# Patient Record
Sex: FEMALE | Race: WHITE | NOT HISPANIC OR LATINO | Employment: OTHER | URBAN - METROPOLITAN AREA
[De-identification: names, ages, dates, MRNs, and addresses within clinical notes are randomized per-mention and may not be internally consistent; named-entity substitution may affect disease eponyms.]

---

## 2017-03-06 ENCOUNTER — TELEPHONE (OUTPATIENT)
Dept: MEDICAL GROUP | Facility: MEDICAL CENTER | Age: 82
End: 2017-03-06

## 2017-03-06 ENCOUNTER — HOSPITAL ENCOUNTER (OUTPATIENT)
Dept: LAB | Facility: MEDICAL CENTER | Age: 82
End: 2017-03-06
Attending: INTERNAL MEDICINE
Payer: MEDICARE

## 2017-03-06 ENCOUNTER — OFFICE VISIT (OUTPATIENT)
Dept: MEDICAL GROUP | Facility: MEDICAL CENTER | Age: 82
End: 2017-03-06
Payer: MEDICARE

## 2017-03-06 VITALS
HEIGHT: 60 IN | DIASTOLIC BLOOD PRESSURE: 60 MMHG | HEART RATE: 97 BPM | WEIGHT: 136 LBS | BODY MASS INDEX: 26.7 KG/M2 | SYSTOLIC BLOOD PRESSURE: 118 MMHG | TEMPERATURE: 98.6 F | RESPIRATION RATE: 16 BRPM | OXYGEN SATURATION: 97 %

## 2017-03-06 DIAGNOSIS — E83.52 HYPERCALCEMIA: ICD-10-CM

## 2017-03-06 DIAGNOSIS — E55.9 VITAMIN D DEFICIENCY: ICD-10-CM

## 2017-03-06 DIAGNOSIS — M25.561 ACUTE PAIN OF RIGHT KNEE: ICD-10-CM

## 2017-03-06 LAB
ALBUMIN SERPL BCP-MCNC: 3.8 G/DL (ref 3.2–4.9)
ALBUMIN/GLOB SERPL: 1.2 G/DL
ALP SERPL-CCNC: 58 U/L (ref 30–99)
ALT SERPL-CCNC: 10 U/L (ref 2–50)
ANION GAP SERPL CALC-SCNC: 6 MMOL/L (ref 0–11.9)
AST SERPL-CCNC: 20 U/L (ref 12–45)
BILIRUB SERPL-MCNC: 0.4 MG/DL (ref 0.1–1.5)
BUN SERPL-MCNC: 20 MG/DL (ref 8–22)
CALCIUM SERPL-MCNC: 9.5 MG/DL (ref 8.5–10.5)
CHLORIDE SERPL-SCNC: 102 MMOL/L (ref 96–112)
CO2 SERPL-SCNC: 28 MMOL/L (ref 20–33)
CREAT SERPL-MCNC: 0.75 MG/DL (ref 0.5–1.4)
GLOBULIN SER CALC-MCNC: 3.3 G/DL (ref 1.9–3.5)
GLUCOSE SERPL-MCNC: 100 MG/DL (ref 65–99)
POTASSIUM SERPL-SCNC: 4.1 MMOL/L (ref 3.6–5.5)
PROT SERPL-MCNC: 7.1 G/DL (ref 6–8.2)
SODIUM SERPL-SCNC: 136 MMOL/L (ref 135–145)

## 2017-03-06 PROCEDURE — 4040F PNEUMOC VAC/ADMIN/RCVD: CPT | Performed by: INTERNAL MEDICINE

## 2017-03-06 PROCEDURE — 36415 COLL VENOUS BLD VENIPUNCTURE: CPT

## 2017-03-06 PROCEDURE — 80053 COMPREHEN METABOLIC PANEL: CPT

## 2017-03-06 PROCEDURE — G8432 DEP SCR NOT DOC, RNG: HCPCS | Performed by: INTERNAL MEDICINE

## 2017-03-06 PROCEDURE — G8482 FLU IMMUNIZE ORDER/ADMIN: HCPCS | Performed by: INTERNAL MEDICINE

## 2017-03-06 PROCEDURE — 99213 OFFICE O/P EST LOW 20 MIN: CPT | Performed by: INTERNAL MEDICINE

## 2017-03-06 PROCEDURE — G8420 CALC BMI NORM PARAMETERS: HCPCS | Performed by: INTERNAL MEDICINE

## 2017-03-06 PROCEDURE — 1036F TOBACCO NON-USER: CPT | Performed by: INTERNAL MEDICINE

## 2017-03-06 PROCEDURE — 1101F PT FALLS ASSESS-DOCD LE1/YR: CPT | Performed by: INTERNAL MEDICINE

## 2017-03-06 ASSESSMENT — PAIN SCALES - GENERAL: PAINLEVEL: 5=MODERATE PAIN

## 2017-03-06 NOTE — TELEPHONE ENCOUNTER
1. Caller Name: Ramya Bhakta                                             Call Back Number: [phone] (home)         Patient approves a detailed voicemail message: N\A    Patient wanted recent lab faxed to [phone],  [facility] on [address]. This has been addressed as requested by the patient.

## 2017-03-07 NOTE — ASSESSMENT & PLAN NOTE
In reviewing her labs for this visit, we noticed that her vitamin D level was a little low. She takes her vitamin D supplement only sporadically.

## 2017-03-07 NOTE — PROGRESS NOTES
Subjective:     Chief Complaint   Patient presents with   • Knee Pain     RT knee x 1 wk     Ramya Bhakta is a 83 y.o. female here today for evaluation of right knee pain. She is a patient of Dr. Banks.    Vitamin D deficiency  In reviewing her labs for this visit, we noticed that her vitamin D level was a little low. She takes her vitamin D supplement only sporadically.    Acute pain of right knee  She reports that for about the last week she has noticed some discomfort in her right knee anteriorly. She is not aware of any injury. She is not particularly aware of aggravating or alleviating factors. She has not really taken any medication for this. She denies any redness or swelling or locking or clicking or snapping. The pain does not radiate.       Diagnoses of Acute pain of right knee and Vitamin D deficiency were pertinent to this visit.    Allergies: Prednisolone  Current medicines (including changes today)  Current Outpatient Prescriptions   Medication Sig Dispense Refill   • calcium carbonate (CALCIUM CARBONATE) 500 MG Tab Take 1,000 mg by mouth 2 times a day, with meals.     • Cholecalciferol (VITAMIN D) 2000 UNITS Cap Take  by mouth.       No current facility-administered medications for this visit.       She  has a past medical history of Hepatitis (1955); Arthritis; Glaucoma; Cancer (CMS-HCC); Hepatitis A (1955); Jaundice (1955); CATARACT; and Acute pain of right knee (3/6/2017).    ROS    Patient denies significant change in strength, weight or appetite.  No new ankle swelling. She has the above-noted right knee pain for about the last week. She is not aware of any particular injury. She has not had similar such pain.       Objective:     PE:  /60 mmHg  Pulse 97  Temp(Src) 37 °C (98.6 °F)  Resp 16  Ht 1.524 m (5')  Wt 61.689 kg (136 lb)  BMI 26.56 kg/m2  SpO2 97%   Lungs are clear with normal breath sounds without wheezes or rales .  Cardiovascular: peripheral circulation is  satisfactory, heart sounds are unchanged and unremarkable.  Abdomen is soft, without masses or tenderness, with normal bowel sounds.  Extremities are without significant edema, cyanosis or deformity. There is no significant knee effusion. There is good range of motion. There is no redness. There are no popliteal masses. I cannot elicit any locking or clicking or snapping. There is some tenderness to moderate palpation over the anterior ridge of the tibial plateau. There is no palpable abnormality. Ligaments are intact.      Assessment and Plan:   The following treatment plan was discussed  1. Acute pain of right knee      She will try taking Aleve 2 tablets twice a day for the next 5 or 6 days and report back.  We reviewed potential side effects to taking Aleve and other anti-inflammatory medications. If she is not better or is worse, she should see her orthopedist.   2. Vitamin D deficiency      She should take her vitamin D supplement regularly. We discussed the importance of this.       Followup: She will keep her next scheduled appointment with Dr. Banks or contact us as needed sooner.

## 2017-03-07 NOTE — ASSESSMENT & PLAN NOTE
She reports that for about the last week she has noticed some discomfort in her right knee anteriorly. She is not aware of any injury. She is not particularly aware of aggravating or alleviating factors. She has not really taken any medication for this. She denies any redness or swelling or locking or clicking or snapping. The pain does not radiate.

## 2017-03-27 ENCOUNTER — TELEPHONE (OUTPATIENT)
Dept: MEDICAL GROUP | Facility: MEDICAL CENTER | Age: 82
End: 2017-03-27

## 2017-03-27 DIAGNOSIS — M81.0 OSTEOPOROSIS: ICD-10-CM

## 2017-03-27 NOTE — TELEPHONE ENCOUNTER
1. Caller Name: Kyra Dennison [facility]                                         Call Back Number: [phone]      Patient approves a detailed voicemail message: N\A    2. SPECIFIC Action To Be Taken: Orders Needed for Prolia Injection  Kyra called for the patient she wanted to know if you would order the prolia injection for the patient? Please advise    3. Diagnosis/Clinical Reason for Request: Osteoporosis       4. Specialty & Provider Name/Lab/Imaging Location: [facility]    5. Is appointment scheduled for requested order/referral: yes - 3/28

## 2017-04-04 ENCOUNTER — OUTPATIENT INFUSION SERVICES (OUTPATIENT)
Dept: ONCOLOGY | Facility: MEDICAL CENTER | Age: 82
End: 2017-04-04
Attending: FAMILY MEDICINE
Payer: MEDICARE

## 2017-04-04 VITALS
OXYGEN SATURATION: 96 % | WEIGHT: 138.23 LBS | RESPIRATION RATE: 18 BRPM | DIASTOLIC BLOOD PRESSURE: 69 MMHG | HEIGHT: 59 IN | BODY MASS INDEX: 27.87 KG/M2 | HEART RATE: 105 BPM | TEMPERATURE: 98.2 F | SYSTOLIC BLOOD PRESSURE: 118 MMHG

## 2017-04-04 LAB
CA-I BLD ISE-SCNC: 1.22 MMOL/L (ref 1.1–1.3)
CREAT BLD-MCNC: 0.8 MG/DL (ref 0.5–1.4)

## 2017-04-04 PROCEDURE — 82565 ASSAY OF CREATININE: CPT

## 2017-04-04 PROCEDURE — 700111 HCHG RX REV CODE 636 W/ 250 OVERRIDE (IP): Performed by: INTERNAL MEDICINE

## 2017-04-04 PROCEDURE — 82330 ASSAY OF CALCIUM: CPT

## 2017-04-04 PROCEDURE — 96401 CHEMO ANTI-NEOPL SQ/IM: CPT

## 2017-04-04 RX ADMIN — DENOSUMAB 60 MG: 60 INJECTION SUBCUTANEOUS at 12:04

## 2017-04-04 ASSESSMENT — PAIN SCALES - GENERAL: PAINLEVEL: NO PAIN

## 2017-04-04 NOTE — PROGRESS NOTES
"Pt arrives ambulatory for scheduled s2tyfra Prolia. Denies any recent invasive dental work and/or bone fractures. Pt reports having symptoms of a \"cold\" for the last \"seven days\" which include a cough & sore throat. Assessment complete. POC reviewed. Call placed to MD and informed Dr. Banks of pt condition and sx's reported; okay to proceed w/ Prolia received. Pt updated on POC and instructed pt to call MD office if sx's worsen or do not improve within 1 week, understanding is verbalized. Istat ca & creatinine drawn per protocol from R AC w/ 23g BF, gauze dressing applied. Lab results reviewed w/ pharmacy. Prolia administered as ordered & to back of R arm, band aid applied. Future appt given. Pt d/c'd in good condition no distress observed.   "

## 2017-04-04 NOTE — PROGRESS NOTES
Pharmacy Note:    Ca = 1.22 mmol/L  SCr = 0.8 mg/dL, est CrCl ~53 mL/min  Last Dose 9/26/17  Okay to receive Prolia today.    Martha Ocampo, PharmD

## 2017-04-04 NOTE — Clinical Note
Infusion Services   [address] [place] [address]  [place] [address]  Phone: [phone]  Fax: [phone]              Dear Dr. Banks,    Your patient, Ramya Bhakta (: 9/15/1933), was scheduled at [facility].  Ramya's encounter diagnosis is:  Osteoporosis  She arrived for her appointment, and  the scheduled treatment was   given. These medications were administered to the patient: We administered denosumab..  Ramya tolerated treatment.. In addition, the following labs were drawn    Recent Results (from the past 24 hour(s))   ISTAT CREATININE    Collection Time: 17 11:49 AM   Result Value Ref Range    Istat Creatinine 0.8 0.5 - 1.4 mg/dL   ISTAT IONIZED CA    Collection Time: 17 11:49 AM   Result Value Ref Range    Istat Ionized Calcium 1.22 1.10 - 1.30 mmol/L            Her next appointment is rescheduled for 10/3/17.    For more information, you may review the nurse's progress notes in chart review under the notes section.       Sincerely,  Hayley Anderson R.N.

## 2017-09-27 ENCOUNTER — OFFICE VISIT (OUTPATIENT)
Dept: MEDICAL GROUP | Facility: MEDICAL CENTER | Age: 82
End: 2017-09-27
Payer: MEDICARE

## 2017-09-27 VITALS
DIASTOLIC BLOOD PRESSURE: 68 MMHG | HEIGHT: 59 IN | RESPIRATION RATE: 16 BRPM | BODY MASS INDEX: 28.43 KG/M2 | SYSTOLIC BLOOD PRESSURE: 120 MMHG | WEIGHT: 141 LBS | OXYGEN SATURATION: 94 % | TEMPERATURE: 98.6 F | HEART RATE: 90 BPM

## 2017-09-27 DIAGNOSIS — Z23 NEED FOR PNEUMOCOCCAL VACCINATION: ICD-10-CM

## 2017-09-27 DIAGNOSIS — E78.5 DYSLIPIDEMIA: ICD-10-CM

## 2017-09-27 DIAGNOSIS — M81.0 OSTEOPOROSIS, UNSPECIFIED OSTEOPOROSIS TYPE, UNSPECIFIED PATHOLOGICAL FRACTURE PRESENCE: ICD-10-CM

## 2017-09-27 DIAGNOSIS — R10.84 GENERALIZED ABDOMINAL PAIN: ICD-10-CM

## 2017-09-27 DIAGNOSIS — K21.9 GASTROESOPHAGEAL REFLUX DISEASE, ESOPHAGITIS PRESENCE NOT SPECIFIED: ICD-10-CM

## 2017-09-27 DIAGNOSIS — Z00.00 MEDICARE ANNUAL WELLNESS VISIT, SUBSEQUENT: ICD-10-CM

## 2017-09-27 DIAGNOSIS — Z23 INFLUENZA VACCINE NEEDED: ICD-10-CM

## 2017-09-27 PROCEDURE — 90732 PPSV23 VACC 2 YRS+ SUBQ/IM: CPT | Performed by: INTERNAL MEDICINE

## 2017-09-27 PROCEDURE — G0008 ADMIN INFLUENZA VIRUS VAC: HCPCS | Performed by: INTERNAL MEDICINE

## 2017-09-27 PROCEDURE — 99213 OFFICE O/P EST LOW 20 MIN: CPT | Mod: 25 | Performed by: INTERNAL MEDICINE

## 2017-09-27 PROCEDURE — G0439 PPPS, SUBSEQ VISIT: HCPCS | Mod: 25 | Performed by: INTERNAL MEDICINE

## 2017-09-27 PROCEDURE — 90662 IIV NO PRSV INCREASED AG IM: CPT | Performed by: INTERNAL MEDICINE

## 2017-09-27 PROCEDURE — G0009 ADMIN PNEUMOCOCCAL VACCINE: HCPCS | Performed by: INTERNAL MEDICINE

## 2017-09-27 ASSESSMENT — PATIENT HEALTH QUESTIONNAIRE - PHQ9: CLINICAL INTERPRETATION OF PHQ2 SCORE: 0

## 2017-09-27 ASSESSMENT — PAIN SCALES - GENERAL: PAINLEVEL: NO PAIN

## 2017-09-27 NOTE — PROGRESS NOTES
CC: Multiple issues due for wellness examination.    HPI:   Ramya presents today with the following.    1. Medicare annual wellness visit, subsequent  Screens performed below.    2. Generalized abdominal pain  She is complaining of some mild intermittent abdominal complaints. Lasting for very brief moments in time sometimes associated with meals. No nausea or vomiting no changes to stool and no blood or melena.    3. Dyslipidemia  History of dyslipidemia coming due for repeat of cholesterol testing. No chest pain or shortness of breath no edema.    4. Osteoporosis, unspecified osteoporosis type, unspecified pathological fracture presence  She is maintained on infusions coming due for repeat next month. Tolerates medication well.    5. Gastroesophageal reflux disease, esophagitis presence not specified  Occasional reflux she is taking Tums again no alarm symptomatology.    6. Influenza vaccine needed      7. Need for pneumococcal vaccination        Depression Screening    Little interest or pleasure in doing things?  0 - not at all  Feeling down, depressed , or hopeless? 0 - not at all  Patient Health Questionnaire Score: 0     If depressive symptoms identified deferred to follow up visit unless specifically addressed in assessment and plan.    Interpretation of PHQ-9 Total Score   Score Severity   1-4 No Depression   5-9 Mild Depression   10-14 Moderate Depression   15-19 Moderately Severe Depression   20-27 Severe Depression    Screening for Cognitive Impairment    Three Minute Recall (apple, watch, maria fernanda)  3/3    Draw clock face with all 12 numbers set to the hand to show 10 minutes past 11 o'clock  1 4/5  Cognitive concerns identified deferred for follow up unless specifically addressed in assessment and plan.    Fall Risk Assessment    Has the patient had two or more falls in the last year or any fall with injury in the last year?  No    Safety Assessment    Throw rugs on floor.  Yes  Handrails on all stairs.   Yes  Good lighting in all hallways.  Yes  Difficulty hearing.  Yes  Patient counseled about all safety risks that were identified.    Functional Assessment ADLs    Are there any barriers preventing you from cooking for yourself or meeting nutritional needs?  No.    Are there any barriers preventing you from driving safely or obtaining transportation?  No.    Are there any barriers preventing you from using a telephone or calling for help?  No.    Are there any barriers preventing you from shopping?  No.    Are there any barriers preventing you from taking care of your own finances?  No.    Are there any barriers preventing you from managing your medications?  No.    Are currently engaging any exercise or physical activity?  No.       Health Maintenance Summary                IMM INFLUENZA Overdue 9/1/2017      Done 9/6/2016 Imm Admin: Influenza Vaccine Adult HD     Patient has more history with this topic...    IMM PNEUMOCOCCAL 65+ (ADULT) LOW/MEDIUM RISK SERIES Overdue 9/6/2017      Done 9/6/2016 Imm Admin: Pneumococcal Conjugate Vaccine (Prevnar/PCV-13)    Annual Wellness Visit Overdue 9/7/2017      Done 9/6/2016 Visit Dx: Medicare annual wellness visit, subsequent     Patient has more history with this topic...    MAMMOGRAM Next Due 10/17/2017      Done 10/17/2016 MA-MAMMO SCREENING BILAT W/TOMOSYNTHESIS W/CAD     Patient has more history with this topic...    BONE DENSITY Next Due 1/2/2020      Done 1/2/2015 DS-BONE DENSITY STUDY (DEXA)     Patient has more history with this topic...    IMM DTaP/Tdap/Td Vaccine Next Due 9/6/2021      Done 9/6/2011 Imm Admin: Tdap Vaccine          Patient Care Team:  Basil Banks M.D. as PCP - General (Internal Medicine)  Shanna Mac M.D. as Consulting Physician (Ophthalmology)  Jamin Tsang M.D. as Consulting Physician (Gastroenterology)  Amy R Schoening, P.A. as Consulting Physician (Family Medicine)  Tr Bustos M.D. 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 1 mL 0   • calcium carbonate (CALCIUM CARBONATE) 500 MG Tab Take 1,000 mg by mouth 2 times a day, with meals.     • Cholecalciferol (VITAMIN D) 2000 UNITS Cap Take  by mouth.       No current facility-administered medications for this visit.          Allergies as of 09/27/2017 - Reviewed 09/27/2017   Allergen Reaction Noted   • Prednisolone  07/14/2014        ROS: As per HPI.    /68   Pulse 90   Temp 37 °C (98.6 °F)   Resp 16   Ht 1.499 m (4' 11.02\")   Wt 64 kg (141 lb)   SpO2 94%   BMI 28.46 kg/m²     Physical Exam:  Gen:         Alert and oriented, No apparent distress.  Neck:        No Lymphadenopathy or Bruits.  Lungs:     Clear to auscultation bilaterally  CV:          Regular rate and rhythm. No murmurs, rubs or gallops.  Abd:         Soft non tender, non distended. Normal active bowel sounds.  No  Hepatosplenomegaly, No pulsatile masses.                   Ext:          No clubbing, cyanosis, edema.      Assessment and Plan.   84 y.o. female with the following issues.    1. Medicare annual wellness visit, subsequent  Discussed healthy lifestyle habits as well as screening regimens.  - Annual Wellness Visit - Includes PPPS Subsequent ()    2. Generalized abdominal pain  Fairly benign in nature in terms of description of it worsens she will follow up.    3. Dyslipidemia  Recheck cholesterol    - COMP METABOLIC PANEL; Future  - LIPID PROFILE; Future    4. Osteoporosis, unspecified osteoporosis type, unspecified pathological fracture presence  Have placed orders for repeat infusions " place order to the infusion center.      5. Gastroesophageal reflux disease, esophagitis presence not specified  Clinical stable no change in medications  - Annual Wellness Visit - Includes PPPS Subsequent ()    6. Influenza vaccine needed    - INFLUENZA VACCINE, HIGH DOSE (65+ ONLY)      7. Need for pneumococcal vaccination    - Pneumococal Polysaccharide Vaccine 23-Valent =>1yo SQ/IM

## 2017-10-03 ENCOUNTER — OUTPATIENT INFUSION SERVICES (OUTPATIENT)
Dept: ONCOLOGY | Facility: MEDICAL CENTER | Age: 82
End: 2017-10-03
Attending: FAMILY MEDICINE
Payer: MEDICARE

## 2017-10-03 VITALS
DIASTOLIC BLOOD PRESSURE: 71 MMHG | HEIGHT: 59 IN | WEIGHT: 139.99 LBS | RESPIRATION RATE: 18 BRPM | BODY MASS INDEX: 28.22 KG/M2 | HEART RATE: 86 BPM | SYSTOLIC BLOOD PRESSURE: 126 MMHG | TEMPERATURE: 97.8 F | OXYGEN SATURATION: 97 %

## 2017-10-03 LAB
CA-I BLD ISE-SCNC: 1.11 MMOL/L (ref 1.1–1.3)
CREAT BLD-MCNC: 0.8 MG/DL (ref 0.5–1.4)

## 2017-10-03 PROCEDURE — 700111 HCHG RX REV CODE 636 W/ 250 OVERRIDE (IP): Performed by: INTERNAL MEDICINE

## 2017-10-03 PROCEDURE — 96401 CHEMO ANTI-NEOPL SQ/IM: CPT

## 2017-10-03 PROCEDURE — 82565 ASSAY OF CREATININE: CPT

## 2017-10-03 PROCEDURE — 82330 ASSAY OF CALCIUM: CPT

## 2017-10-03 RX ADMIN — DENOSUMAB 60 MG: 60 INJECTION SUBCUTANEOUS at 12:05

## 2017-10-03 ASSESSMENT — PAIN SCALES - GENERAL: PAINLEVEL: NO PAIN

## 2017-10-03 NOTE — PROGRESS NOTES
Pharmacy note  Cr = 0.8, CrCl ~ 53 ml/min  Ionized Ca = 1.11  OK for denosumab (Prolia) 60 mg SQ today

## 2017-10-03 NOTE — PROGRESS NOTES
Prolia administered per MD orders to left back of arm via subcutaneous injection.  Pt tolerated injection well and without incident. No adverse effects observed or expressed.  Band aid applied to injection site.  Pt released to self care in no apparent distress after completion of treatment, ambulatory.  Pt to f/u with Dr. Banks.  No further appointments scheduled at this time.

## 2017-10-03 NOTE — LETTER
Infusion Services   [address] [place] [address]  [place] [address]  Phone: [phone]  Fax: [phone]              Dear Dr. Banks,    Your patient, Ramya hBakta (: 9/15/1933), was scheduled at [facility].  Ramya's encounter diagnosis is:  No diagnosis found.  She arrived for her appointment, and  the scheduled treatment was   given. These medications were administered to the patient: We administered denosumab..  Ramya tolerated treatment.. In addition, the following labs were drawn    Recent Results (from the past 24 hour(s))   ISTAT CREATININE    Collection Time: 10/03/17 11:47 AM   Result Value Ref Range    Istat Creatinine 0.8 0.5 - 1.4 mg/dL   ISTAT IONIZED CA    Collection Time: 10/03/17 11:47 AM   Result Value Ref Range    Istat Ionized Calcium 1.11 1.10 - 1.30 mmol/L            Her next appointment is not rescheduled; beacause she must see her provider first.    For more information, you may review the nurse's progress notes in chart review under the notes section.       Sincerely,  Infusion Services

## 2017-10-03 NOTE — PROGRESS NOTES
Pt to infusion services ambulatory per self.  Pt here for scheduled Prolia injection.  Plan of care reviewed.  Pt verbalizes understanding.  Pt tells me she has received this course of treatment in the past and tolerated well.  Pt tells me she is taking Calcium and Vitamin D, however admits to not being consistent about taking them.  Pt denies any invasive dental work or oral surgery in the past 4 weeks or plans to have any invasive dental work or oral surgery in the next 4 weeks.  Labs drawn from LAC using #23G BD needle.  Pt tolerated well.  Blood sample to lab for iSTAT calcium and creatinine.  Ionized calcium = 1.11 today.  Per protocol; okay for Prolia today.

## 2017-10-30 ENCOUNTER — HOSPITAL ENCOUNTER (OUTPATIENT)
Dept: RADIOLOGY | Facility: MEDICAL CENTER | Age: 82
End: 2017-10-30
Attending: INTERNAL MEDICINE
Payer: MEDICARE

## 2017-10-30 DIAGNOSIS — Z12.31 SCREENING MAMMOGRAM, ENCOUNTER FOR: ICD-10-CM

## 2017-10-30 PROCEDURE — G0202 SCR MAMMO BI INCL CAD: HCPCS

## 2017-11-08 ENCOUNTER — OFFICE VISIT (OUTPATIENT)
Dept: MEDICAL GROUP | Facility: MEDICAL CENTER | Age: 82
End: 2017-11-08
Payer: MEDICARE

## 2017-11-08 ENCOUNTER — HOSPITAL ENCOUNTER (OUTPATIENT)
Dept: LAB | Facility: MEDICAL CENTER | Age: 82
End: 2017-11-08
Attending: INTERNAL MEDICINE
Payer: MEDICARE

## 2017-11-08 VITALS
HEART RATE: 88 BPM | BODY MASS INDEX: 28.71 KG/M2 | TEMPERATURE: 99.2 F | HEIGHT: 59 IN | SYSTOLIC BLOOD PRESSURE: 116 MMHG | OXYGEN SATURATION: 96 % | DIASTOLIC BLOOD PRESSURE: 76 MMHG | WEIGHT: 142.4 LBS | RESPIRATION RATE: 16 BRPM

## 2017-11-08 DIAGNOSIS — H61.23 BILATERAL IMPACTED CERUMEN: ICD-10-CM

## 2017-11-08 DIAGNOSIS — R10.84 GENERALIZED ABDOMINAL PAIN: ICD-10-CM

## 2017-11-08 LAB
ALBUMIN SERPL BCP-MCNC: 3.9 G/DL (ref 3.2–4.9)
ALBUMIN/GLOB SERPL: 1.2 G/DL
ALP SERPL-CCNC: 51 U/L (ref 30–99)
ALT SERPL-CCNC: 12 U/L (ref 2–50)
ANION GAP SERPL CALC-SCNC: 9 MMOL/L (ref 0–11.9)
AST SERPL-CCNC: 21 U/L (ref 12–45)
BASOPHILS # BLD AUTO: 0.5 % (ref 0–1.8)
BASOPHILS # BLD: 0.03 K/UL (ref 0–0.12)
BILIRUB SERPL-MCNC: 0.5 MG/DL (ref 0.1–1.5)
BUN SERPL-MCNC: 15 MG/DL (ref 8–22)
CALCIUM SERPL-MCNC: 9.3 MG/DL (ref 8.5–10.5)
CHLORIDE SERPL-SCNC: 107 MMOL/L (ref 96–112)
CO2 SERPL-SCNC: 24 MMOL/L (ref 20–33)
CREAT SERPL-MCNC: 0.64 MG/DL (ref 0.5–1.4)
EOSINOPHIL # BLD AUTO: 0.02 K/UL (ref 0–0.51)
EOSINOPHIL NFR BLD: 0.3 % (ref 0–6.9)
ERYTHROCYTE [DISTWIDTH] IN BLOOD BY AUTOMATED COUNT: 49.9 FL (ref 35.9–50)
GFR SERPL CREATININE-BSD FRML MDRD: >60 ML/MIN/1.73 M 2
GLOBULIN SER CALC-MCNC: 3.3 G/DL (ref 1.9–3.5)
GLUCOSE SERPL-MCNC: 80 MG/DL (ref 65–99)
HCT VFR BLD AUTO: 44.5 % (ref 37–47)
HGB BLD-MCNC: 14.2 G/DL (ref 12–16)
IMM GRANULOCYTES # BLD AUTO: 0.01 K/UL (ref 0–0.11)
IMM GRANULOCYTES NFR BLD AUTO: 0.2 % (ref 0–0.9)
LYMPHOCYTES # BLD AUTO: 1.28 K/UL (ref 1–4.8)
LYMPHOCYTES NFR BLD: 21.8 % (ref 22–41)
MCH RBC QN AUTO: 30.5 PG (ref 27–33)
MCHC RBC AUTO-ENTMCNC: 31.9 G/DL (ref 33.6–35)
MCV RBC AUTO: 95.7 FL (ref 81.4–97.8)
MONOCYTES # BLD AUTO: 0.52 K/UL (ref 0–0.85)
MONOCYTES NFR BLD AUTO: 8.9 % (ref 0–13.4)
NEUTROPHILS # BLD AUTO: 4.01 K/UL (ref 2–7.15)
NEUTROPHILS NFR BLD: 68.3 % (ref 44–72)
NRBC # BLD AUTO: 0 K/UL
NRBC BLD AUTO-RTO: 0 /100 WBC
PLATELET # BLD AUTO: 245 K/UL (ref 164–446)
PMV BLD AUTO: 9.5 FL (ref 9–12.9)
POTASSIUM SERPL-SCNC: 4.1 MMOL/L (ref 3.6–5.5)
PROT SERPL-MCNC: 7.2 G/DL (ref 6–8.2)
RBC # BLD AUTO: 4.65 M/UL (ref 4.2–5.4)
SODIUM SERPL-SCNC: 140 MMOL/L (ref 135–145)
WBC # BLD AUTO: 5.9 K/UL (ref 4.8–10.8)

## 2017-11-08 PROCEDURE — 80053 COMPREHEN METABOLIC PANEL: CPT

## 2017-11-08 PROCEDURE — 99214 OFFICE O/P EST MOD 30 MIN: CPT | Performed by: INTERNAL MEDICINE

## 2017-11-08 PROCEDURE — 36415 COLL VENOUS BLD VENIPUNCTURE: CPT

## 2017-11-08 PROCEDURE — 85025 COMPLETE CBC W/AUTO DIFF WBC: CPT

## 2017-11-08 ASSESSMENT — PAIN SCALES - GENERAL: PAINLEVEL: 6=MODERATE PAIN

## 2017-11-08 NOTE — PROGRESS NOTES
"CC: Ear wax, abdominal pains.    HPI:   Ramya presents today with the following.    1. Generalized abdominal pain  Patient was seen a couple months ago reporting bilateral quadrant abdominal pain meals she reports they have gotten somewhat worse 4-10 intensity. She has no pain in office today. She reports no blood in her stool or black stools no diarrhea or constipation. She has no nausea or vomiting. Again only related to meals    2. Bilateral impacted cerumen  Complaining of bilateral hearing loss with a history of cerumen impaction.      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     • Cholecalciferol (VITAMIN D) 2000 UNITS Cap Take  by mouth.       No current facility-administered medications for this visit.          Allergies as of 11/08/2017 - Reviewed 11/08/2017   Allergen Reaction Noted   • Prednisolone  07/14/2014        ROS: As per HPI.    /76   Pulse 88   Temp 37.3 °C (99.2 °F)   Resp 16   Ht 1.499 m (4' 11\")   Wt 64.6 kg (142 lb 6.4 oz)   SpO2 96%   BMI 28.76 kg/m²     Physical Exam:  Gen:         Alert and oriented, No apparent distress.  Tms          Nl after levage bilat.   Neck:        No Lymphadenopathy or Bruits.  Lungs:     Clear to auscultation bilaterally  CV:          Regular rate and rhythm. No murmurs, rubs or gallops.     ABD:      Soft non tender, non distended. Normal active bowel sounds.  No  Hepatosplenomegaly, No pulsatile masses.                Ext:          No clubbing, cyanosis, edema.      Assessment and Plan.   84 y.o. female with the following issues.    1. Generalized " abdominal pain  Have written for blood work and CT referral back to gastroenterology.  - REFERRAL TO GASTROENTEROLOGY  - CT-ABDOMEN WITH & W/O, PELVIS WITH; Future  - COMP METABOLIC PANEL; Future  - CBC WITH DIFFERENTIAL; Future    2. Bilateral impacted cerumen  Disimpacted today with good result.

## 2017-11-15 ENCOUNTER — APPOINTMENT (RX ONLY)
Dept: URBAN - METROPOLITAN AREA CLINIC 4 | Facility: CLINIC | Age: 82
Setting detail: DERMATOLOGY
End: 2017-11-15

## 2017-11-15 DIAGNOSIS — Z85.828 PERSONAL HISTORY OF OTHER MALIGNANT NEOPLASM OF SKIN: ICD-10-CM

## 2017-11-15 DIAGNOSIS — L82.1 OTHER SEBORRHEIC KERATOSIS: ICD-10-CM

## 2017-11-15 DIAGNOSIS — L57.0 ACTINIC KERATOSIS: ICD-10-CM

## 2017-11-15 PROCEDURE — ? LIQUID NITROGEN

## 2017-11-15 PROCEDURE — ? COUNSELING

## 2017-11-15 PROCEDURE — 99213 OFFICE O/P EST LOW 20 MIN: CPT | Mod: 25

## 2017-11-15 PROCEDURE — 17000 DESTRUCT PREMALG LESION: CPT

## 2017-11-15 PROCEDURE — 17003 DESTRUCT PREMALG LES 2-14: CPT

## 2017-11-15 PROCEDURE — ? OBSERVATION AND MEASURE

## 2017-11-15 ASSESSMENT — LOCATION DETAILED DESCRIPTION DERM
LOCATION DETAILED: RIGHT SUPERIOR NASAL CHEEK
LOCATION DETAILED: LEFT ANTERIOR DISTAL THIGH
LOCATION DETAILED: RIGHT MEDIAL UPPER BACK
LOCATION DETAILED: LEFT SUPERIOR FOREHEAD
LOCATION DETAILED: SUPERIOR THORACIC SPINE
LOCATION DETAILED: LEFT MEDIAL FRONTAL SCALP
LOCATION DETAILED: LEFT RADIAL DORSAL HAND
LOCATION DETAILED: RIGHT RADIAL DORSAL HAND
LOCATION DETAILED: RIGHT DISTAL DORSAL FOREARM

## 2017-11-15 ASSESSMENT — LOCATION ZONE DERM
LOCATION ZONE: SCALP
LOCATION ZONE: LEG
LOCATION ZONE: HAND
LOCATION ZONE: TRUNK
LOCATION ZONE: ARM
LOCATION ZONE: FACE

## 2017-11-15 ASSESSMENT — LOCATION SIMPLE DESCRIPTION DERM
LOCATION SIMPLE: UPPER BACK
LOCATION SIMPLE: RIGHT FOREARM
LOCATION SIMPLE: LEFT SCALP
LOCATION SIMPLE: LEFT HAND
LOCATION SIMPLE: RIGHT CHEEK
LOCATION SIMPLE: LEFT THIGH
LOCATION SIMPLE: RIGHT UPPER BACK
LOCATION SIMPLE: LEFT FOREHEAD
LOCATION SIMPLE: RIGHT HAND

## 2017-11-15 NOTE — HPI: SKIN LESION (ACTINIC KERATOSES)
Is This A New Presentation, Or A Follow-Up?: Follow Up Actinic Keratoses
Additional History: Pt has no concerns Today.  She has not noticed any tender or bleeding spots.  No changes with her moles.

## 2017-12-18 ENCOUNTER — HOSPITAL ENCOUNTER (OUTPATIENT)
Dept: RADIOLOGY | Facility: MEDICAL CENTER | Age: 82
End: 2017-12-18
Attending: INTERNAL MEDICINE
Payer: MEDICARE

## 2017-12-18 DIAGNOSIS — R10.84 GENERALIZED ABDOMINAL PAIN: ICD-10-CM

## 2017-12-18 PROCEDURE — 700117 HCHG RX CONTRAST REV CODE 255: Performed by: INTERNAL MEDICINE

## 2017-12-18 PROCEDURE — 74177 CT ABD & PELVIS W/CONTRAST: CPT

## 2017-12-18 RX ADMIN — IOHEXOL 100 ML: 350 INJECTION, SOLUTION INTRAVENOUS at 11:01

## 2017-12-18 RX ADMIN — IOHEXOL 50 ML: 240 INJECTION, SOLUTION INTRATHECAL; INTRAVASCULAR; INTRAVENOUS; ORAL at 10:56

## 2018-01-31 ENCOUNTER — HOSPITAL ENCOUNTER (OUTPATIENT)
Dept: LAB | Facility: MEDICAL CENTER | Age: 83
End: 2018-01-31
Attending: INTERNAL MEDICINE
Payer: MEDICARE

## 2018-01-31 LAB
BASOPHILS # BLD AUTO: 0.8 % (ref 0–1.8)
BASOPHILS # BLD: 0.04 K/UL (ref 0–0.12)
CRP SERPL HS-MCNC: 0.13 MG/DL (ref 0–0.75)
EOSINOPHIL # BLD AUTO: 0.03 K/UL (ref 0–0.51)
EOSINOPHIL NFR BLD: 0.6 % (ref 0–6.9)
ERYTHROCYTE [DISTWIDTH] IN BLOOD BY AUTOMATED COUNT: 46 FL (ref 35.9–50)
HCT VFR BLD AUTO: 42.3 % (ref 37–47)
HGB BLD-MCNC: 14.1 G/DL (ref 12–16)
IMM GRANULOCYTES # BLD AUTO: 0.02 K/UL (ref 0–0.11)
IMM GRANULOCYTES NFR BLD AUTO: 0.4 % (ref 0–0.9)
LYMPHOCYTES # BLD AUTO: 1.48 K/UL (ref 1–4.8)
LYMPHOCYTES NFR BLD: 29.1 % (ref 22–41)
MCH RBC QN AUTO: 31.5 PG (ref 27–33)
MCHC RBC AUTO-ENTMCNC: 33.3 G/DL (ref 33.6–35)
MCV RBC AUTO: 94.4 FL (ref 81.4–97.8)
MONOCYTES # BLD AUTO: 0.57 K/UL (ref 0–0.85)
MONOCYTES NFR BLD AUTO: 11.2 % (ref 0–13.4)
NEUTROPHILS # BLD AUTO: 2.94 K/UL (ref 2–7.15)
NEUTROPHILS NFR BLD: 57.9 % (ref 44–72)
NRBC # BLD AUTO: 0 K/UL
NRBC BLD-RTO: 0 /100 WBC
PLATELET # BLD AUTO: 240 K/UL (ref 164–446)
PMV BLD AUTO: 9.5 FL (ref 9–12.9)
RBC # BLD AUTO: 4.48 M/UL (ref 4.2–5.4)
WBC # BLD AUTO: 5.1 K/UL (ref 4.8–10.8)

## 2018-01-31 PROCEDURE — 86140 C-REACTIVE PROTEIN: CPT

## 2018-01-31 PROCEDURE — 36415 COLL VENOUS BLD VENIPUNCTURE: CPT

## 2018-01-31 PROCEDURE — 85025 COMPLETE CBC W/AUTO DIFF WBC: CPT

## 2018-03-13 ENCOUNTER — PATIENT OUTREACH (OUTPATIENT)
Dept: HEALTH INFORMATION MANAGEMENT | Facility: OTHER | Age: 83
End: 2018-03-13

## 2018-03-13 NOTE — PROGRESS NOTES
1. Attempt #: 1    2. HealthConnect Verified: yes    3. Verify PCP: yes    4. Care Team Updated:       •   DME Company (gait device, O2, CPAP, etc.): YES       •   Other Specialists (eye doctor, derm, GYN, cardiology, endo, etc): YES    5.  Reviewed/Updated the following with patient:       •   Communication Preference Obtained? YES       •   Preferred Pharmacy? YES       •   Preferred Lab? YES       •   Family History (document living status of immediate family members and if + hx of cancer, diabetes, hypertension, hyperlipidemia, heart attack, stroke) -pt declined-stated she had trouble hearing  6. Xi3 Activation: already active    7. Xi3 Jocelyn: no    8. Annual Wellness Visit Scheduling  Scheduling Status:Scheduled      9. Care Gap Scheduling (Attempt to Schedule EACH Overdue Care Gap!)     There are no preventive care reminders to display for this patient.     Scheduled patient for Annual Wellness Visit    10. Patient was advised: “This is a free wellness visit. The provider will screen for medical conditions to help you stay healthy. If you have other concerns to address you may be asked to discuss these at a separate visit or there may be an additional fee.”     11. Patient was informed to arrive 15 min prior to their scheduled appointment and bring in their medication bottles.

## 2018-04-11 ENCOUNTER — APPOINTMENT (OUTPATIENT)
Dept: MEDICAL GROUP | Facility: MEDICAL CENTER | Age: 83
End: 2018-04-11
Payer: MEDICARE

## 2018-05-16 ENCOUNTER — APPOINTMENT (RX ONLY)
Dept: URBAN - METROPOLITAN AREA CLINIC 4 | Facility: CLINIC | Age: 83
Setting detail: DERMATOLOGY
End: 2018-05-16

## 2018-05-16 DIAGNOSIS — L21.8 OTHER SEBORRHEIC DERMATITIS: ICD-10-CM

## 2018-05-16 DIAGNOSIS — L82.1 OTHER SEBORRHEIC KERATOSIS: ICD-10-CM

## 2018-05-16 DIAGNOSIS — L81.4 OTHER MELANIN HYPERPIGMENTATION: ICD-10-CM

## 2018-05-16 DIAGNOSIS — Z85.828 PERSONAL HISTORY OF OTHER MALIGNANT NEOPLASM OF SKIN: ICD-10-CM

## 2018-05-16 DIAGNOSIS — Z71.89 OTHER SPECIFIED COUNSELING: ICD-10-CM

## 2018-05-16 DIAGNOSIS — D18.0 HEMANGIOMA: ICD-10-CM

## 2018-05-16 DIAGNOSIS — L57.0 ACTINIC KERATOSIS: ICD-10-CM

## 2018-05-16 DIAGNOSIS — D22 MELANOCYTIC NEVI: ICD-10-CM

## 2018-05-16 PROBLEM — D18.01 HEMANGIOMA OF SKIN AND SUBCUTANEOUS TISSUE: Status: ACTIVE | Noted: 2018-05-16

## 2018-05-16 PROBLEM — D22.5 MELANOCYTIC NEVI OF TRUNK: Status: ACTIVE | Noted: 2018-05-16

## 2018-05-16 PROCEDURE — ? LIQUID NITROGEN

## 2018-05-16 PROCEDURE — 99213 OFFICE O/P EST LOW 20 MIN: CPT | Mod: 25

## 2018-05-16 PROCEDURE — ? COUNSELING

## 2018-05-16 PROCEDURE — 17000 DESTRUCT PREMALG LESION: CPT

## 2018-05-16 PROCEDURE — 17003 DESTRUCT PREMALG LES 2-14: CPT

## 2018-05-16 PROCEDURE — ? ADDITIONAL NOTES

## 2018-05-16 ASSESSMENT — LOCATION SIMPLE DESCRIPTION DERM
LOCATION SIMPLE: LEFT FOREHEAD
LOCATION SIMPLE: POSTERIOR SCALP
LOCATION SIMPLE: LEFT UPPER BACK
LOCATION SIMPLE: RIGHT SCALP
LOCATION SIMPLE: ABDOMEN
LOCATION SIMPLE: UPPER BACK
LOCATION SIMPLE: RIGHT FOREARM
LOCATION SIMPLE: RIGHT FOREHEAD
LOCATION SIMPLE: RIGHT CHEEK

## 2018-05-16 ASSESSMENT — LOCATION DETAILED DESCRIPTION DERM
LOCATION DETAILED: LEFT FOREHEAD
LOCATION DETAILED: SUPERIOR THORACIC SPINE
LOCATION DETAILED: LEFT MEDIAL UPPER BACK
LOCATION DETAILED: RIGHT MEDIAL FRONTAL SCALP
LOCATION DETAILED: INFERIOR THORACIC SPINE
LOCATION DETAILED: RIGHT CENTRAL FRONTAL SCALP
LOCATION DETAILED: RIGHT SUPERIOR FOREHEAD
LOCATION DETAILED: EPIGASTRIC SKIN
LOCATION DETAILED: RIGHT LATERAL FOREHEAD
LOCATION DETAILED: RIGHT SUPERIOR MEDIAL FOREHEAD
LOCATION DETAILED: POSTERIOR MID-PARIETAL SCALP
LOCATION DETAILED: RIGHT SUPERIOR NASAL CHEEK
LOCATION DETAILED: LEFT RIB CAGE
LOCATION DETAILED: RIGHT DISTAL DORSAL FOREARM

## 2018-05-16 ASSESSMENT — LOCATION ZONE DERM
LOCATION ZONE: ARM
LOCATION ZONE: TRUNK
LOCATION ZONE: FACE
LOCATION ZONE: SCALP

## 2018-05-16 NOTE — HPI: FULL BODY SKIN EXAMINATION
What Is The Reason For Today's Visit?: Full Body Skin Examination
What Is The Reason For Today's Visit? (Being Monitored For X): concerning skin lesions on an annual basis
Additional History: She has not noticed any changes with her moles or any tender or bleeding spots.

## 2018-05-18 ENCOUNTER — OFFICE VISIT (OUTPATIENT)
Dept: MEDICAL GROUP | Facility: MEDICAL CENTER | Age: 83
End: 2018-05-18
Payer: MEDICARE

## 2018-05-18 VITALS
SYSTOLIC BLOOD PRESSURE: 110 MMHG | HEART RATE: 87 BPM | DIASTOLIC BLOOD PRESSURE: 60 MMHG | TEMPERATURE: 98.5 F | OXYGEN SATURATION: 96 % | HEIGHT: 59 IN | BODY MASS INDEX: 29.23 KG/M2 | WEIGHT: 145 LBS

## 2018-05-18 DIAGNOSIS — E78.5 DYSLIPIDEMIA: ICD-10-CM

## 2018-05-18 DIAGNOSIS — K21.9 GASTROESOPHAGEAL REFLUX DISEASE, ESOPHAGITIS PRESENCE NOT SPECIFIED: ICD-10-CM

## 2018-05-18 DIAGNOSIS — M81.0 OSTEOPOROSIS, UNSPECIFIED OSTEOPOROSIS TYPE, UNSPECIFIED PATHOLOGICAL FRACTURE PRESENCE: ICD-10-CM

## 2018-05-18 DIAGNOSIS — Z00.00 MEDICARE ANNUAL WELLNESS VISIT, SUBSEQUENT: ICD-10-CM

## 2018-05-18 DIAGNOSIS — F41.8 SITUATIONAL ANXIETY: ICD-10-CM

## 2018-05-18 PROBLEM — R41.3 MEMORY LOSS: Status: RESOLVED | Noted: 2018-05-18 | Resolved: 2018-05-18

## 2018-05-18 PROBLEM — R41.3 MEMORY LOSS: Status: ACTIVE | Noted: 2018-05-18

## 2018-05-18 PROBLEM — M25.561 ACUTE PAIN OF RIGHT KNEE: Status: RESOLVED | Noted: 2017-03-06 | Resolved: 2018-05-18

## 2018-05-18 PROCEDURE — 99213 OFFICE O/P EST LOW 20 MIN: CPT | Mod: 25 | Performed by: INTERNAL MEDICINE

## 2018-05-18 PROCEDURE — G0439 PPPS, SUBSEQ VISIT: HCPCS | Performed by: INTERNAL MEDICINE

## 2018-05-18 ASSESSMENT — ENCOUNTER SYMPTOMS: GENERAL WELL-BEING: GOOD

## 2018-05-18 ASSESSMENT — PATIENT HEALTH QUESTIONNAIRE - PHQ9: CLINICAL INTERPRETATION OF PHQ2 SCORE: 0

## 2018-05-18 ASSESSMENT — PAIN SCALES - GENERAL: PAINLEVEL: NO PAIN

## 2018-05-18 ASSESSMENT — ACTIVITIES OF DAILY LIVING (ADL): BATHING_REQUIRES_ASSISTANCE: 0
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
## 2018-06-18 ENCOUNTER — OFFICE VISIT (OUTPATIENT)
Dept: MEDICAL GROUP | Facility: MEDICAL CENTER | Age: 83
End: 2018-06-18
Payer: MEDICARE

## 2018-06-18 VITALS
BODY MASS INDEX: 28.22 KG/M2 | SYSTOLIC BLOOD PRESSURE: 110 MMHG | RESPIRATION RATE: 16 BRPM | WEIGHT: 140 LBS | DIASTOLIC BLOOD PRESSURE: 72 MMHG | TEMPERATURE: 99.8 F | OXYGEN SATURATION: 94 % | HEIGHT: 59 IN | HEART RATE: 91 BPM

## 2018-06-18 DIAGNOSIS — F03.90 DEMENTIA WITHOUT BEHAVIORAL DISTURBANCE, UNSPECIFIED DEMENTIA TYPE: ICD-10-CM

## 2018-06-18 PROCEDURE — 99213 OFFICE O/P EST LOW 20 MIN: CPT | Performed by: INTERNAL MEDICINE

## 2018-06-18 RX ORDER — DICYCLOMINE HYDROCHLORIDE 10 MG/1
CAPSULE ORAL
Refills: 3 | Status: ON HOLD | COMMUNITY
Start: 2018-05-22 | End: 2019-08-16
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
## 2018-08-11 ENCOUNTER — HOSPITAL ENCOUNTER (OUTPATIENT)
Facility: MEDICAL CENTER | Age: 83
End: 2018-08-13
Attending: EMERGENCY MEDICINE | Admitting: HOSPITALIST
Payer: MEDICARE

## 2018-08-11 ENCOUNTER — APPOINTMENT (OUTPATIENT)
Dept: RADIOLOGY | Facility: MEDICAL CENTER | Age: 83
End: 2018-08-11
Attending: EMERGENCY MEDICINE
Payer: MEDICARE

## 2018-08-11 DIAGNOSIS — T74.91XA ELDER ABUSE, INITIAL ENCOUNTER: ICD-10-CM
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
## 2018-08-11 DIAGNOSIS — S09.90XA CLOSED HEAD INJURY, INITIAL ENCOUNTER: ICD-10-CM

## 2018-08-11 DIAGNOSIS — S00.83XA CONTUSION OF FACE, INITIAL ENCOUNTER: ICD-10-CM

## 2018-08-11 DIAGNOSIS — H53.9 VISION CHANGES: ICD-10-CM

## 2018-08-11 LAB
ANION GAP SERPL CALC-SCNC: 13 MMOL/L (ref 0–11.9)
APTT PPP: 24.7 SEC (ref 24.7–36)
BASOPHILS # BLD AUTO: 0.6 % (ref 0–1.8)
BASOPHILS # BLD: 0.04 K/UL (ref 0–0.12)
BUN SERPL-MCNC: 35 MG/DL (ref 8–22)
CALCIUM SERPL-MCNC: 9.6 MG/DL (ref 8.5–10.5)
CHLORIDE SERPL-SCNC: 106 MMOL/L (ref 96–112)
CO2 SERPL-SCNC: 20 MMOL/L (ref 20–33)
CREAT SERPL-MCNC: 0.86 MG/DL (ref 0.5–1.4)
EOSINOPHIL # BLD AUTO: 0.08 K/UL (ref 0–0.51)
EOSINOPHIL NFR BLD: 1.2 % (ref 0–6.9)
ERYTHROCYTE [DISTWIDTH] IN BLOOD BY AUTOMATED COUNT: 44.6 FL (ref 35.9–50)
GLUCOSE SERPL-MCNC: 118 MG/DL (ref 65–99)
HCT VFR BLD AUTO: 41.9 % (ref 37–47)
HGB BLD-MCNC: 14.3 G/DL (ref 12–16)
IMM GRANULOCYTES # BLD AUTO: 0.01 K/UL (ref 0–0.11)
IMM GRANULOCYTES NFR BLD AUTO: 0.2 % (ref 0–0.9)
INR PPP: 1.01 (ref 0.87–1.13)
LYMPHOCYTES # BLD AUTO: 1.44 K/UL (ref 1–4.8)
LYMPHOCYTES NFR BLD: 21.8 % (ref 22–41)
MCH RBC QN AUTO: 31.7 PG (ref 27–33)
MCHC RBC AUTO-ENTMCNC: 34.1 G/DL (ref 33.6–35)
MCV RBC AUTO: 92.9 FL (ref 81.4–97.8)
MONOCYTES # BLD AUTO: 0.68 K/UL (ref 0–0.85)
MONOCYTES NFR BLD AUTO: 10.3 % (ref 0–13.4)
NEUTROPHILS # BLD AUTO: 4.36 K/UL (ref 2–7.15)
NEUTROPHILS NFR BLD: 65.9 % (ref 44–72)
NRBC # BLD AUTO: 0 K/UL
NRBC BLD-RTO: 0 /100 WBC
PLATELET # BLD AUTO: 211 K/UL (ref 164–446)
PMV BLD AUTO: 9.7 FL (ref 9–12.9)
POTASSIUM SERPL-SCNC: 4 MMOL/L (ref 3.6–5.5)
PROTHROMBIN TIME: 13 SEC (ref 12–14.6)
RBC # BLD AUTO: 4.51 M/UL (ref 4.2–5.4)
SODIUM SERPL-SCNC: 139 MMOL/L (ref 135–145)
WBC # BLD AUTO: 6.6 K/UL (ref 4.8–10.8)

## 2018-08-11 PROCEDURE — 85610 PROTHROMBIN TIME: CPT

## 2018-08-11 PROCEDURE — 99220 PR INITIAL OBSERVATION CARE,LEVL III: CPT | Performed by: HOSPITALIST

## 2018-08-11 PROCEDURE — 85025 COMPLETE CBC W/AUTO DIFF WBC: CPT

## 2018-08-11 PROCEDURE — 85730 THROMBOPLASTIN TIME PARTIAL: CPT

## 2018-08-11 PROCEDURE — 700102 HCHG RX REV CODE 250 W/ 637 OVERRIDE(OP): Performed by: HOSPITALIST

## 2018-08-11 PROCEDURE — 70486 CT MAXILLOFACIAL W/O DYE: CPT

## 2018-08-11 PROCEDURE — 36415 COLL VENOUS BLD VENIPUNCTURE: CPT

## 2018-08-11 PROCEDURE — 70450 CT HEAD/BRAIN W/O DYE: CPT

## 2018-08-11 PROCEDURE — 700101 HCHG RX REV CODE 250: Performed by: EMERGENCY MEDICINE

## 2018-08-11 PROCEDURE — G0378 HOSPITAL OBSERVATION PER HR: HCPCS

## 2018-08-11 PROCEDURE — A9270 NON-COVERED ITEM OR SERVICE: HCPCS | Performed by: EMERGENCY MEDICINE

## 2018-08-11 PROCEDURE — 80048 BASIC METABOLIC PNL TOTAL CA: CPT

## 2018-08-11 PROCEDURE — 73080 X-RAY EXAM OF ELBOW: CPT | Mod: RT

## 2018-08-11 PROCEDURE — A9270 NON-COVERED ITEM OR SERVICE: HCPCS | Performed by: HOSPITALIST

## 2018-08-11 PROCEDURE — 99285 EMERGENCY DEPT VISIT HI MDM: CPT

## 2018-08-11 PROCEDURE — 700101 HCHG RX REV CODE 250

## 2018-08-11 PROCEDURE — 700102 HCHG RX REV CODE 250 W/ 637 OVERRIDE(OP): Performed by: EMERGENCY MEDICINE

## 2018-08-11 PROCEDURE — 72125 CT NECK SPINE W/O DYE: CPT

## 2018-08-11 RX ORDER — ERYTHROMYCIN 5 MG/G
1 OINTMENT OPHTHALMIC DAILY
COMMUNITY
End: 2018-11-09

## 2018-08-11 RX ORDER — ACETAMINOPHEN 325 MG/1
650 TABLET ORAL EVERY 4 HOURS PRN
COMMUNITY
End: 2021-04-06

## 2018-08-11 RX ORDER — PROPARACAINE HYDROCHLORIDE 5 MG/ML
1 SOLUTION/ DROPS OPHTHALMIC ONCE
Status: COMPLETED | OUTPATIENT
Start: 2018-08-11 | End: 2018-08-11

## 2018-08-11 RX ORDER — ONDANSETRON 4 MG/1
4 TABLET, ORALLY DISINTEGRATING ORAL EVERY 4 HOURS PRN
Status: DISCONTINUED | OUTPATIENT
Start: 2018-08-11 | End: 2018-08-13 | Stop reason: HOSPADM

## 2018-08-11 RX ORDER — CALCIUM CARBONATE 500 MG/1
1000 TABLET, CHEWABLE ORAL 3 TIMES DAILY PRN
Status: ON HOLD | COMMUNITY
End: 2019-09-23

## 2018-08-11 RX ORDER — ONDANSETRON 2 MG/ML
4 INJECTION INTRAMUSCULAR; INTRAVENOUS EVERY 4 HOURS PRN
Status: DISCONTINUED | OUTPATIENT
Start: 2018-08-11 | End: 2018-08-13 | Stop reason: HOSPADM

## 2018-08-11 RX ORDER — ACETAMINOPHEN 325 MG/1
1000 TABLET ORAL ONCE
Status: COMPLETED | OUTPATIENT
Start: 2018-08-11 | End: 2018-08-11

## 2018-08-11 RX ORDER — ACETAMINOPHEN 325 MG/1
650 TABLET ORAL EVERY 6 HOURS PRN
Status: DISCONTINUED | OUTPATIENT
Start: 2018-08-11 | End: 2018-08-13 | Stop reason: HOSPADM

## 2018-08-11 RX ORDER — POLYETHYLENE GLYCOL 3350 17 G/17G
1 POWDER, FOR SOLUTION ORAL
Status: DISCONTINUED | OUTPATIENT
Start: 2018-08-11 | End: 2018-08-13 | Stop reason: HOSPADM

## 2018-08-11 RX ORDER — BISACODYL 10 MG
10 SUPPOSITORY, RECTAL RECTAL
Status: DISCONTINUED | OUTPATIENT
Start: 2018-08-11 | End: 2018-08-13 | Stop reason: HOSPADM

## 2018-08-11 RX ORDER — AMOXICILLIN 250 MG
2 CAPSULE ORAL 2 TIMES DAILY
Status: DISCONTINUED | OUTPATIENT
Start: 2018-08-11 | End: 2018-08-13 | Stop reason: HOSPADM

## 2018-08-11 RX ADMIN — FLUORESCEIN SODIUM 1 STRIP: 0.6 STRIP OPHTHALMIC at 05:30

## 2018-08-11 RX ADMIN — VITAMIN D, TAB 1000IU (100/BT) 2000 UNITS: 25 TAB at 11:07

## 2018-08-11 RX ADMIN — PROPARACAINE HYDROCHLORIDE 1 DROP: 5 SOLUTION/ DROPS OPHTHALMIC at 05:45

## 2018-08-11 RX ADMIN — ACETAMINOPHEN 975 MG: 325 TABLET, FILM COATED ORAL at 03:00

## 2018-08-11 ASSESSMENT — COGNITIVE AND FUNCTIONAL STATUS - GENERAL
MOBILITY SCORE: 24
SUGGESTED CMS G CODE MODIFIER DAILY ACTIVITY: CH
MOBILITY SCORE: 24
DAILY ACTIVITIY SCORE: 24
SUGGESTED CMS G CODE MODIFIER MOBILITY: CH
SUGGESTED CMS G CODE MODIFIER DAILY ACTIVITY: CH
DAILY ACTIVITIY SCORE: 24
SUGGESTED CMS G CODE MODIFIER MOBILITY: CH

## 2018-08-11 ASSESSMENT — COPD QUESTIONNAIRES
DO YOU EVER COUGH UP ANY MUCUS OR PHLEGM?: NO/ONLY WITH OCCASIONAL COLDS OR INFECTIONS
DURING THE PAST 4 WEEKS HOW MUCH DID YOU FEEL SHORT OF BREATH: NONE/LITTLE OF THE TIME
IN THE PAST 12 MONTHS DO YOU DO LESS THAN YOU USED TO BECAUSE OF YOUR BREATHING PROBLEMS: DISAGREE/UNSURE
COPD SCREENING SCORE: 2
HAVE YOU SMOKED AT LEAST 100 CIGARETTES IN YOUR ENTIRE LIFE: NO/DON'T KNOW

## 2018-08-11 ASSESSMENT — ENCOUNTER SYMPTOMS
VOMITING: 0
MYALGIAS: 1
WEAKNESS: 0
LOSS OF CONSCIOUSNESS: 0
COUGH: 0
DIZZINESS: 0
BLURRED VISION: 1
EYE REDNESS: 1
HEADACHES: 0
FOCAL WEAKNESS: 0
CHILLS: 0
NAUSEA: 0
PSYCHIATRIC NEGATIVE: 1
FALLS: 0
PALPITATIONS: 0
ABDOMINAL PAIN: 0
EYE PAIN: 1
FEVER: 0
SHORTNESS OF BREATH: 0

## 2018-08-11 ASSESSMENT — LIFESTYLE VARIABLES
ALCOHOL_USE: NO
EVER_SMOKED: NEVER
DO YOU DRINK ALCOHOL: NO

## 2018-08-11 ASSESSMENT — PATIENT HEALTH QUESTIONNAIRE - PHQ9
1. LITTLE INTEREST OR PLEASURE IN DOING THINGS: NOT AT ALL
SUM OF ALL RESPONSES TO PHQ9 QUESTIONS 1 AND 2: 0
2. FEELING DOWN, DEPRESSED, IRRITABLE, OR HOPELESS: NOT AT ALL

## 2018-08-11 ASSESSMENT — PAIN SCALES - GENERAL
PAINLEVEL_OUTOF10: 9
PAINLEVEL_OUTOF10: 1
PAINLEVEL_OUTOF10: 0
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
## 2018-08-11 NOTE — PROGRESS NOTES
Head to toe skin assessment completed with second RN Marilyn. Scattered bruising over upper extremities. One inch open tear on right forearm, photo taken and uploaded to chart. Facial bruising, significant surrounding right orbital. Otherwise skin intact with no other wounds or pressure injuries.

## 2018-08-11 NOTE — ED NOTES
The Medication Reconciliation process has been PARTIALLY completed by interviewing the patient and I will call her pharmacy when they open for an eye ointment    Allergies have been reviewed  Antibiotic use in 30 days - unknown    Home Pharmacy:  [facility] [place]
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
## 2018-08-11 NOTE — ED NOTES
Pratima fall assessment completed. Pt is High risk for fall. Interventions complete. Pt placed in yellow non slip socks, wrist band placed, green sign on door. Bed locked in low position, call light in place. Personal possessions in place.  Personal needs assessed. Charge nurse notified to move pt to a more visible room if available. Safety assessed. Will monitor frequently
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
## 2018-08-11 NOTE — PROGRESS NOTES
Patient set up with welcome packet and care bag. Admission profile completed and needs provided. Currently resting in bed comfortably watching tv.
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
## 2018-08-11 NOTE — CARE PLAN
Problem: Discharge Barriers/Planning  Goal: Patient's continuum of care needs will be met  Outcome: PROGRESSING AS EXPECTED  Pt oriented to room and unit. Questions, concerns and needs addressed. Vision improved from this morning    Problem: Pain Management  Goal: Pain level will decrease to patient's comfort goal  Outcome: PROGRESSING AS EXPECTED  Ice and medications offered for right orbital pain
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
## 2018-08-12 ENCOUNTER — HOME HEALTH ADMISSION (OUTPATIENT)
Dept: HOME HEALTH SERVICES | Facility: HOME HEALTHCARE | Age: 83
End: 2018-08-12
Payer: MEDICARE

## 2018-08-12 LAB
ANION GAP SERPL CALC-SCNC: 10 MMOL/L (ref 0–11.9)
BUN SERPL-MCNC: 22 MG/DL (ref 8–22)
CALCIUM SERPL-MCNC: 9.5 MG/DL (ref 8.5–10.5)
CHLORIDE SERPL-SCNC: 107 MMOL/L (ref 96–112)
CO2 SERPL-SCNC: 24 MMOL/L (ref 20–33)
CREAT SERPL-MCNC: 0.81 MG/DL (ref 0.5–1.4)
GLUCOSE SERPL-MCNC: 98 MG/DL (ref 65–99)
POTASSIUM SERPL-SCNC: 4.2 MMOL/L (ref 3.6–5.5)
SODIUM SERPL-SCNC: 141 MMOL/L (ref 135–145)

## 2018-08-12 PROCEDURE — A9270 NON-COVERED ITEM OR SERVICE: HCPCS | Performed by: HOSPITALIST

## 2018-08-12 PROCEDURE — 80048 BASIC METABOLIC PNL TOTAL CA: CPT

## 2018-08-12 PROCEDURE — 99224 PR SUBSEQUENT OBSERVATION CARE,LEVEL I: CPT | Performed by: HOSPITALIST

## 2018-08-12 PROCEDURE — 36415 COLL VENOUS BLD VENIPUNCTURE: CPT

## 2018-08-12 PROCEDURE — 700102 HCHG RX REV CODE 250 W/ 637 OVERRIDE(OP): Performed by: HOSPITALIST

## 2018-08-12 PROCEDURE — G0378 HOSPITAL OBSERVATION PER HR: HCPCS

## 2018-08-12 RX ORDER — POLYVINYL ALCOHOL 14 MG/ML
1 SOLUTION/ DROPS OPHTHALMIC 2 TIMES DAILY
Status: DISCONTINUED | OUTPATIENT
Start: 2018-08-12 | End: 2018-08-13 | Stop reason: HOSPADM

## 2018-08-12 RX ADMIN — Medication 2 TABLET: at 05:45

## 2018-08-12 RX ADMIN — POLYVINYL ALCOHOL 1 DROP: 14 SOLUTION/ DROPS OPHTHALMIC at 09:45

## 2018-08-12 RX ADMIN — VITAMIN D, TAB 1000IU (100/BT) 2000 UNITS: 25 TAB at 05:45

## 2018-08-12 RX ADMIN — POLYVINYL ALCOHOL 1 DROP: 14 SOLUTION/ DROPS OPHTHALMIC at 18:00

## 2018-08-12 RX ADMIN — Medication 2 TABLET: at 17:29

## 2018-08-12 ASSESSMENT — ENCOUNTER SYMPTOMS
LOSS OF CONSCIOUSNESS: 0
WEAKNESS: 0
SHORTNESS OF BREATH: 0
VOMITING: 0
ABDOMINAL PAIN: 0
CHILLS: 0
DIZZINESS: 0
SENSORY CHANGE: 0
FALLS: 0
HEADACHES: 0
NAUSEA: 0
BLURRED VISION: 1
FEVER: 0
MYALGIAS: 1
PALPITATIONS: 0
PSYCHIATRIC NEGATIVE: 1

## 2018-08-12 ASSESSMENT — PAIN SCALES - GENERAL
PAINLEVEL_OUTOF10: 0

## 2018-08-12 NOTE — DISCHARGE PLANNING
Received Choice form at [id number]  Agency/Facility Name: [facility]  Referral sent per Choice form @ 1887

## 2018-08-12 NOTE — CARE PLAN
Problem: Communication  Goal: The ability to communicate needs accurately and effectively will improve  Outcome: PROGRESSING AS EXPECTED  Pt encouraged to call for assistance when needed. Pt calls appropriately.

## 2018-08-12 NOTE — DISCHARGE PLANNING
Thank you for the referral.  This referral is being reviewed by a clinician and we will let you know of acceptance as soon as possible.

## 2018-08-12 NOTE — PROGRESS NOTES
Patient is alert/orientated x4 and resting in bed at this point in time. Tolerates food and has a good appetite this morning. States no pain and her only question at this point in time is when she will be discharged. Fall precautions maintained.

## 2018-08-12 NOTE — CARE PLAN
Problem: Safety  Goal: Will remain free from injury  Outcome: PROGRESSING AS EXPECTED  Treaded slipper socks on. Bed in locked and low position. Pt encouraged to call for assistance when needed.

## 2018-08-12 NOTE — CARE PLAN
Problem: Safety  Goal: Will remain free from injury  Outcome: PROGRESSING AS EXPECTED  Patient understands fall precautions education, asks for assistance, frequent RN/CNA checks.     Problem: Pain Management  Goal: Pain level will decrease to patient's comfort goal  Outcome: PROGRESSING AS EXPECTED

## 2018-08-13 ENCOUNTER — PATIENT OUTREACH (OUTPATIENT)
Dept: HEALTH INFORMATION MANAGEMENT | Facility: OTHER | Age: 83
End: 2018-08-13

## 2018-08-13 VITALS
SYSTOLIC BLOOD PRESSURE: 104 MMHG | DIASTOLIC BLOOD PRESSURE: 60 MMHG | HEART RATE: 76 BPM | BODY MASS INDEX: 27.29 KG/M2 | WEIGHT: 139 LBS | RESPIRATION RATE: 16 BRPM | OXYGEN SATURATION: 92 % | HEIGHT: 60 IN | TEMPERATURE: 97.4 F
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
## 2018-08-13 PROCEDURE — G8987 SELF CARE CURRENT STATUS: HCPCS | Mod: CH

## 2018-08-13 PROCEDURE — G8988 SELF CARE GOAL STATUS: HCPCS | Mod: CH

## 2018-08-13 PROCEDURE — G8989 SELF CARE D/C STATUS: HCPCS | Mod: CH

## 2018-08-13 PROCEDURE — 700102 HCHG RX REV CODE 250 W/ 637 OVERRIDE(OP): Performed by: HOSPITALIST

## 2018-08-13 PROCEDURE — 97165 OT EVAL LOW COMPLEX 30 MIN: CPT

## 2018-08-13 PROCEDURE — G0378 HOSPITAL OBSERVATION PER HR: HCPCS

## 2018-08-13 PROCEDURE — 99217 PR OBSERVATION CARE DISCHARGE: CPT | Performed by: HOSPITALIST

## 2018-08-13 PROCEDURE — A9270 NON-COVERED ITEM OR SERVICE: HCPCS | Performed by: HOSPITALIST

## 2018-08-13 RX ADMIN — POLYVINYL ALCOHOL 1 DROP: 14 SOLUTION/ DROPS OPHTHALMIC at 06:00

## 2018-08-13 RX ADMIN — VITAMIN D, TAB 1000IU (100/BT) 2000 UNITS: 25 TAB at 05:36

## 2018-08-13 ASSESSMENT — COGNITIVE AND FUNCTIONAL STATUS - GENERAL
DAILY ACTIVITIY SCORE: 24
SUGGESTED CMS G CODE MODIFIER DAILY ACTIVITY: CH

## 2018-08-13 ASSESSMENT — ACTIVITIES OF DAILY LIVING (ADL): TOILETING: INDEPENDENT

## 2018-08-13 ASSESSMENT — PAIN SCALES - GENERAL: PAINLEVEL_OUTOF10: 0
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
## 2018-08-13 NOTE — PROGRESS NOTES
Pt AOx4 with some forgetfulness. Sleeping but easily aroused. Not complaint of pain. Pt stated that vision has improved since yesterday. Hourly rounding in place. All needs met at this time.

## 2018-08-13 NOTE — PROGRESS NOTES
Patient discharged, down with staff and with her niece at her side. Going home. IV discontinued and patient belongings returned. Education provided on safety and fall prevention, and patient understood the importance of keeping all follow-up appointments. Patient left with no pain and no distress.

## 2018-08-13 NOTE — DISCHARGE PLANNING
ATTN: Case Management  RE: Referral for Home Health    Disregard previous note              We would like to take this opportunity to thank you for submitting a referral for your patient to continue care with [facility]. Our skilled team is dedicated to helping all patients recover and gain independence in the home setting.            As of 8/13/2018, we have accepted the above patient into our service. A [facility] clinician will be out to see the patient within 48 hours to conduct our initial visit. If you have any questions or concerns regarding the patient’s transition to Home Health, please do not hesitate to contact us. We are open for referrals 7 days a week from 8AM to 5PM at [phone].      We look forward to collaborating with you,  [facility] Team
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
## 2018-08-13 NOTE — DISCHARGE PLANNING
Spoke To: Enriqueta  Agency/Facility Name:  [facility]  Plan or Request: Per Enriqueta, family has now changed there minds and are requesting [facility].  HELEN Phelps has been advised.

## 2018-08-13 NOTE — THERAPY
"Occupational Therapy Evaluation completed.   Functional Status:  Pt is independent for all self care, functional mob and bed mobility, without use of AD. Pt has social support as needed. No further acute OT needs.   Plan of Care: Patient with no further skilled OT needs in the acute care setting at this time  Discharge Recommendations:  Equipment: No Equipment Needed. Post-acute therapy Currently anticipate no further skilled therapy needs once patient is discharged from the inpatient setting.    See \"Rehab Therapy-Acute\" Patient Summary Report for complete documentation.    "

## 2018-08-13 NOTE — DISCHARGE INSTRUCTIONS
Discharge Instructions    Discharged to home by car with relative. Discharged via walking, hospital escort: Refused.  Special equipment needed: Not Applicable    Be sure to schedule a follow-up appointment with your primary care doctor or any specialists as instructed.     Discharge Plan:   Diet Plan: Discussed  Activity Level: Discussed  Confirmed Follow up Appointment: Appointment Scheduled  Confirmed Symptoms Management: Discussed  Medication Reconciliation Updated: Yes  Influenza Vaccine Indication: Indicated: Not available from distributor/    I understand that a diet low in cholesterol, fat, and sodium is recommended for good health. Unless I have been given specific instructions below for another diet, I accept this instruction as my diet prescription.   Other diet: Regular    Special Instructions: None    · Is patient discharged on Warfarin / Coumadin?   No     Depression / Suicide Risk    As you are discharged from this [facility], it is important to learn how to keep safe from harming yourself.    Recognize the warning signs:  · Abrupt changes in personality, positive or negative- including increase in energy   · Giving away possessions  · Change in eating patterns- significant weight changes-  positive or negative  · Change in sleeping patterns- unable to sleep or sleeping all the time   · Unwillingness or inability to communicate  · Depression  · Unusual sadness, discouragement and loneliness  · Talk of wanting to die  · Neglect of personal appearance   · Rebelliousness- reckless behavior  · Withdrawal from people/activities they love  · Confusion- inability to concentrate     If you or a loved one observes any of these behaviors or has concerns about self-harm, here's what you can do:  · Talk about it- your feelings and reasons for harming yourself  · Remove any means that you might use to hurt yourself (examples: pills, rope, extension cords, firearm)  · Get professional help  from the community (Mental Health, Substance Abuse, psychological counseling)  · Do not be alone:Call your Safe Contact- someone whom you trust who will be there for you.  · Call your local CRISIS HOTLINE 022-7280 or 567-948-4668  · Call your local Children's Mobile Crisis Response Team [place] (143) 269-4930 or www.[employer]  · Call the toll free National Suicide Prevention Hotlines   · National Suicide Prevention Lifeline 425-920-NTXL (0822)  · National Hope Line Network 800-SUICIDE (422-9291)        Fall Prevention in the Home  Introduction  Falls can cause injuries. They can happen to people of all ages. There are many things you can do to make your home safe and to help prevent falls.  What can I do on the outside of my home?  · Regularly fix the edges of walkways and driveways and fix any cracks.  · Remove anything that might make you trip as you walk through a door, such as a raised step or threshold.  · Trim any bushes or trees on the path to your home.  · Use bright outdoor lighting.  · Clear any walking paths of anything that might make someone trip, such as rocks or tools.  · Regularly check to see if handrails are loose or broken. Make sure that both sides of any steps have handrails.  · Any raised decks and porches should have guardrails on the edges.  · Have any leaves, snow, or ice cleared regularly.  · Use sand or salt on walking paths during winter.  · Clean up any spills in your garage right away. This includes oil or grease spills.  What can I do in the bathroom?  · Use night lights.  · Install grab bars by the toilet and in the tub and shower. Do not use towel bars as grab bars.  · Use non-skid mats or decals in the tub or shower.  · If you need to sit down in the shower, use a plastic, non-slip stool.  · Keep the floor dry. Clean up any water that spills on the floor as soon as it happens.  · Remove soap buildup in the tub or shower regularly.  · Attach bath mats securely with  double-sided non-slip rug tape.  · Do not have throw rugs and other things on the floor that can make you trip.  What can I do in the bedroom?  · Use night lights.  · Make sure that you have a light by your bed that is easy to reach.  · Do not use any sheets or blankets that are too big for your bed. They should not hang down onto the floor.  · Have a firm chair that has side arms. You can use this for support while you get dressed.  · Do not have throw rugs and other things on the floor that can make you trip.  What can I do in the kitchen?  · Clean up any spills right away.  · Avoid walking on wet floors.  · Keep items that you use a lot in easy-to-reach places.  · If you need to reach something above you, use a strong step stool that has a grab bar.  · Keep electrical cords out of the way.  · Do not use floor polish or wax that makes floors slippery. If you must use wax, use non-skid floor wax.  · Do not have throw rugs and other things on the floor that can make you trip.  What can I do with my stairs?  · Do not leave any items on the stairs.  · Make sure that there are handrails on both sides of the stairs and use them. Fix handrails that are broken or loose. Make sure that handrails are as long as the stairways.  · Check any carpeting to make sure that it is firmly attached to the stairs. Fix any carpet that is loose or worn.  · Avoid having throw rugs at the top or bottom of the stairs. If you do have throw rugs, attach them to the floor with carpet tape.  · Make sure that you have a light switch at the top of the stairs and the bottom of the stairs. If you do not have them, ask someone to add them for you.  What else can I do to help prevent falls?  · Wear shoes that:  ¨ Do not have high heels.  ¨ Have rubber bottoms.  ¨ Are comfortable and fit you well.  ¨ Are closed at the toe. Do not wear sandals.  · If you use a stepladder:  ¨ Make sure that it is fully opened. Do not climb a closed stepladder.  ¨ Make  sure that both sides of the stepladder are locked into place.  ¨ Ask someone to hold it for you, if possible.  · Clearly vibha and make sure that you can see:  ¨ Any grab bars or handrails.  ¨ First and last steps.  ¨ Where the edge of each step is.  · Use tools that help you move around (mobility aids) if they are needed. These include:  ¨ Canes.  ¨ Walkers.  ¨ Scooters.  ¨ Crutches.  · Turn on the lights when you go into a dark area. Replace any light bulbs as soon as they burn out.  · Set up your furniture so you have a clear path. Avoid moving your furniture around.  · If any of your floors are uneven, fix them.  · If there are any pets around you, be aware of where they are.  · Review your medicines with your doctor. Some medicines can make you feel dizzy. This can increase your chance of falling.  Ask your doctor what other things that you can do to help prevent falls.  This information is not intended to replace advice given to you by your health care provider. Make sure you discuss any questions you have with your health care provider.  Document Released: 10/14/2010 Document Revised: 05/25/2017 Document Reviewed: 01/22/2016  © 2017 [employer]

## 2018-08-13 NOTE — CARE PLAN
Problem: Communication  Goal: The ability to communicate needs accurately and effectively will improve  Outcome: PROGRESSING AS EXPECTED  Pt encouraged to verbalize feelings. Pt calls for assistance and makes needs known appropriately.     Problem: Safety  Goal: Will remain free from injury  Outcome: PROGRESSING AS EXPECTED  Pt educated on fall risk. Bed locked and in low position. Treaded slipper socks on.

## 2018-08-13 NOTE — DISCHARGE PLANNING
ATTN: Case Management  RE: Referral for Home Health    Reason for referral denial: [facility] just received a call from family member stating they wish to go with [facility] and not [facility].  Declining this referral due to refusal of [facility].                We would like to take this opportunity to thank you for submitting a referral for your patient to continue the journey to recovery with [facility]. We hope to facilitate continued referrals from your facility as our goal is to provide quality, skilled care to as many patients as possible.            Unfortunately, we are not able to accept your patient into our service for the reason listed above. If further clarity is needed, our Intake Coordinators are available to discuss any barriers to service.            If you have any questions or concerns regarding this or future patients’ transition to Home Health, please do not hesitate to contact us. We are open for referrals 7 days a week from 8AM to 5PM at [phone].      We look forward to collaborating with you in the future,  [facility] Team

## 2018-08-13 NOTE — DISCHARGE PLANNING
ATTN: Case Management  RE: Referral for Home Health    Reason for referral denial: Patient DC'd without appropriate PCP appointment in placed. CCS and SW were both notified that the appointment for 8/29 was too far into the future for home health to accept since we need a PCP to sign all home health orders.               We would like to take this opportunity to thank you for submitting a referral for your patient to continue the journey to recovery with [facility]. We hope to facilitate continued referrals from your facility as our goal is to provide quality, skilled care to as many patients as possible.            Unfortunately, we are not able to accept your patient into our service for the reason listed above. If further clarity is needed, our Intake Coordinators are available to discuss any barriers to service.            If you have any questions or concerns regarding this or future patients’ transition to Home Health, please do not hesitate to contact us. We are open for referrals 7 days a week from 8AM to 5PM at [phone].      We look forward to collaborating with you in the future,  [facility] Team

## 2018-08-14 ENCOUNTER — PATIENT OUTREACH (OUTPATIENT)
Dept: HEALTH INFORMATION MANAGEMENT | Facility: OTHER | Age: 83
End: 2018-08-14

## 2018-08-14 ENCOUNTER — TELEPHONE (OUTPATIENT)
Dept: MEDICAL GROUP | Facility: MEDICAL CENTER | Age: 83
End: 2018-08-14

## 2018-08-14 DIAGNOSIS — S09.90XA INJURY OF HEAD, INITIAL ENCOUNTER: ICD-10-CM

## 2018-08-14 DIAGNOSIS — T74.91XA ELDER ABUSE, INITIAL ENCOUNTER: ICD-10-CM
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
## 2018-08-21 NOTE — ADDENDUM NOTE
Encounter addended by: Magaly Valadez R.N. on: 8/21/2018  3:18 PM<BR>    Actions taken: Flowsheet accepted

## 2018-08-22 ENCOUNTER — OFFICE VISIT (OUTPATIENT)
Dept: MEDICAL GROUP | Facility: MEDICAL CENTER | Age: 83
End: 2018-08-22
Payer: MEDICARE

## 2018-08-22 VITALS
RESPIRATION RATE: 16 BRPM | HEART RATE: 89 BPM | SYSTOLIC BLOOD PRESSURE: 116 MMHG | HEIGHT: 60 IN | BODY MASS INDEX: 26.5 KG/M2 | TEMPERATURE: 98.3 F | OXYGEN SATURATION: 94 % | DIASTOLIC BLOOD PRESSURE: 60 MMHG | WEIGHT: 135 LBS

## 2018-08-22 DIAGNOSIS — S09.90XS INJURY OF HEAD, SEQUELA: ICD-10-CM
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
## 2018-08-22 DIAGNOSIS — F03.90 DEMENTIA WITHOUT BEHAVIORAL DISTURBANCE, UNSPECIFIED DEMENTIA TYPE: ICD-10-CM

## 2018-08-22 PROCEDURE — 99495 TRANSJ CARE MGMT MOD F2F 14D: CPT | Performed by: INTERNAL MEDICINE
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
## 2018-08-28 ENCOUNTER — HOSPITAL ENCOUNTER (OUTPATIENT)
Facility: MEDICAL CENTER | Age: 83
End: 2018-08-28
Attending: INTERNAL MEDICINE
Payer: MEDICARE

## 2018-08-28 PROCEDURE — 81001 URINALYSIS AUTO W/SCOPE: CPT

## 2018-08-29 LAB
APPEARANCE UR: ABNORMAL
BACTERIA #/AREA URNS HPF: NEGATIVE /HPF
BILIRUB UR QL STRIP.AUTO: NEGATIVE
CAOX CRY #/AREA URNS HPF: ABNORMAL /HPF
COLOR UR: ABNORMAL
EPI CELLS #/AREA URNS HPF: ABNORMAL /HPF
GLUCOSE UR STRIP.AUTO-MCNC: NEGATIVE MG/DL
HYALINE CASTS #/AREA URNS LPF: ABNORMAL /LPF
KETONES UR STRIP.AUTO-MCNC: 15 MG/DL
LEUKOCYTE ESTERASE UR QL STRIP.AUTO: ABNORMAL
MICRO URNS: ABNORMAL
NITRITE UR QL STRIP.AUTO: NEGATIVE
PH UR STRIP.AUTO: 5 [PH]
PROT UR QL STRIP: NEGATIVE MG/DL
RBC # URNS HPF: ABNORMAL /HPF
RBC UR QL AUTO: ABNORMAL
SP GR UR STRIP.AUTO: 1.03
UROBILINOGEN UR STRIP.AUTO-MCNC: 1 MG/DL
WBC #/AREA URNS HPF: ABNORMAL /HPF

## 2018-08-31 ENCOUNTER — TELEPHONE (OUTPATIENT)
Dept: MEDICAL GROUP | Facility: MEDICAL CENTER | Age: 83
End: 2018-08-31

## 2018-08-31 NOTE — TELEPHONE ENCOUNTER
VOICEMAIL  1. Caller Name: Sigifredo                      Call Back Number: [phone]    2. Message: Niseverino called and patient's  is requesting a letter that states that patient needs assistance with making decisions and financial support. Please advise.    3. Patient approves office to leave a detailed voicemail/MyChart message: N\A

## 2018-08-31 NOTE — LETTER
August 31, 2018        Ramya Alarcon suffers from cognitive decline and needs assistance with complex decisions.                         Basil Banks MD

## 2018-10-01 ENCOUNTER — OFFICE VISIT (OUTPATIENT)
Dept: MEDICAL GROUP | Facility: MEDICAL CENTER | Age: 83
End: 2018-10-01
Payer: MEDICARE

## 2018-10-01 VITALS
HEIGHT: 60 IN | BODY MASS INDEX: 27.48 KG/M2 | TEMPERATURE: 97.5 F | SYSTOLIC BLOOD PRESSURE: 128 MMHG | DIASTOLIC BLOOD PRESSURE: 60 MMHG | HEART RATE: 74 BPM | WEIGHT: 140 LBS | OXYGEN SATURATION: 96 % | RESPIRATION RATE: 16 BRPM

## 2018-10-01 DIAGNOSIS — J06.9 UPPER RESPIRATORY TRACT INFECTION, UNSPECIFIED TYPE: ICD-10-CM

## 2018-10-01 DIAGNOSIS — Z23 NEED FOR VACCINATION: ICD-10-CM

## 2018-10-01 PROCEDURE — 90662 IIV NO PRSV INCREASED AG IM: CPT | Performed by: NURSE PRACTITIONER

## 2018-10-01 PROCEDURE — 99214 OFFICE O/P EST MOD 30 MIN: CPT | Mod: 25 | Performed by: NURSE PRACTITIONER

## 2018-10-01 PROCEDURE — G0008 ADMIN INFLUENZA VIRUS VAC: HCPCS | Performed by: NURSE PRACTITIONER

## 2018-10-01 RX ORDER — AZITHROMYCIN 250 MG/1
TABLET, FILM COATED ORAL
Qty: 1 QUANTITY SUFFICIENT | Refills: 0 | Status: SHIPPED | OUTPATIENT
Start: 2018-10-01 | End: 2018-11-09

## 2018-10-01 RX ORDER — BENZONATATE 100 MG/1
100-200 CAPSULE ORAL 3 TIMES DAILY PRN
Qty: 60 CAP | Refills: 0 | Status: SHIPPED | OUTPATIENT
Start: 2018-10-01 | End: 2018-11-09

## 2018-10-01 NOTE — PROGRESS NOTES
CC: Cough (x several days, feels like in the chest. Chest pain. Some sore throat. )        HPI:     Ramya is a Dr. Banks patient, all problems are new to me today, presents today for the followin. Upper respiratory tract infection, unspecified type  Here today stating for several days however less than a week she has been having a cough that is dry.  States today and yesterday she felt like it was getting deeper in her chest and has some associated aches in the substernal area with deep breathing and coughing.  Mild sore throat.  No earache.  Cough is dry.  No associated fevers.  Only mild relief with over-the-counter cough syrup of unknown type    She has no history of asthma or bronchitis.  No personal history of pneumonia.  She is never used an inhaler before.    She is a poor historian today however this seems to be consistent with a notation of dementia in her chart.  She lives at the [facility].    Current Outpatient Prescriptions   Medication Sig Dispense Refill   • benzonatate (TESSALON) 100 MG Cap Take 1-2 Caps by mouth 3 times a day as needed for Cough. 60 Cap 0   • azithromycin (ZITHROMAX) 250 MG Tab Per packet 1 Quantity Sufficient 0   • acetaminophen (TYLENOL) 325 MG Tab Take 650 mg by mouth every four hours as needed.     • calcium carbonate (TUMS) 500 MG Chew Tab Take 1,000 mg by mouth 3 times a day as needed (bone and stomach).     • erythromycin 5 MG/GM Ointment Place 1 Dose in left eye every day.     • dicyclomine (BENTYL) 10 MG Cap TAKE 1 CAPSULE BY MOUTH EVERY 6 HOURS AS NEEDED FOR ABDOMINAL CRAMPS AND/OR DISCOMFORT  3   • Cholecalciferol (VITAMIN D) 2000 UNITS Cap Take 2,000 Units by mouth every day.       No current facility-administered medications for this visit.      Social History   Substance Use Topics   • Smoking status: Never Smoker   • Smokeless tobacco: Never Used   • Alcohol use Yes      Comment: very rare     I reviewed patients allergies, problem list and medications today in  EPIC.    ROS: Any/all pertinent positives listed in the HPI, otherwise all others reviewed are negative today.      /60 (BP Location: Left arm, Patient Position: Sitting, BP Cuff Size: Adult)   Pulse 74   Temp 36.4 °C (97.5 °F) (Temporal)   Resp 16   Ht 1.524 m (5')   Wt 63.5 kg (140 lb)   SpO2 96%   BMI 27.34 kg/m²     Exam:   Gen: Alert and oriented, No apparent distress. WDWN  Psych: A+Ox3, normal affect and mood  Skin: Warm, dry and intact. Good turgor   No rashes in visible areas.  Eye: Conjunctiva clear, lids normal  ENMT: Lips without lesions, good dentition   Oropharynx clear.  TMs unremarkable with cerumen however bilaterally.  No significant erythema bilateral nasal turbinates.  Neck: No Lymphadenopathy, Thyromegaly, Bruits.   Trachea midline, no masses  Lungs: Clear to auscultation bilaterally, no rales or rhonchi   Unlabored respiratory effort.  Intermittent dry cough noted  CV: Regular rate and rhythm, S1, S2. No murmurs.   No Edema        Assessment and Plan.   85 y.o. female with the following issues.    1. Upper respiratory tract infection, unspecified type  Suspect this is likely viral however given age we will cover with antibiotics.  Tessalon Perles and/or her over-the-counter cough syrup for cough.  Exam is normal without wheezes so we will hold off on anything further such as inhalers.  She will notify us if she is not getting improvement or she has any new symptoms.  - benzonatate (TESSALON) 100 MG Cap; Take 1-2 Caps by mouth 3 times a day as needed for Cough.  Dispense: 60 Cap; Refill: 0  - azithromycin (ZITHROMAX) 250 MG Tab; Per packet  Dispense: 1 Quantity Sufficient; Refill: 0    2. Need for vaccination  I have placed the below orders and discussed them with an approved delegating provider. The MA is performing the below orders under the direction of an office MD.  -Given today.  - Influenza Vaccine, High Dose (65+ Only)

## 2018-10-24 ENCOUNTER — APPOINTMENT (RX ONLY)
Dept: URBAN - METROPOLITAN AREA CLINIC 4 | Facility: CLINIC | Age: 83
Setting detail: DERMATOLOGY
End: 2018-10-24

## 2018-10-24 DIAGNOSIS — Z71.89 OTHER SPECIFIED COUNSELING: ICD-10-CM

## 2018-10-24 DIAGNOSIS — L82.1 OTHER SEBORRHEIC KERATOSIS: ICD-10-CM

## 2018-10-24 DIAGNOSIS — L57.0 ACTINIC KERATOSIS: ICD-10-CM

## 2018-10-24 DIAGNOSIS — D22 MELANOCYTIC NEVI: ICD-10-CM

## 2018-10-24 DIAGNOSIS — L81.4 OTHER MELANIN HYPERPIGMENTATION: ICD-10-CM

## 2018-10-24 DIAGNOSIS — L90.5 SCAR CONDITIONS AND FIBROSIS OF SKIN: ICD-10-CM

## 2018-10-24 DIAGNOSIS — D18.0 HEMANGIOMA: ICD-10-CM

## 2018-10-24 PROBLEM — D22.5 MELANOCYTIC NEVI OF TRUNK: Status: ACTIVE | Noted: 2018-10-24

## 2018-10-24 PROBLEM — D18.01 HEMANGIOMA OF SKIN AND SUBCUTANEOUS TISSUE: Status: ACTIVE | Noted: 2018-10-24

## 2018-10-24 PROCEDURE — 17003 DESTRUCT PREMALG LES 2-14: CPT

## 2018-10-24 PROCEDURE — ? LIQUID NITROGEN

## 2018-10-24 PROCEDURE — ? ADDITIONAL NOTES

## 2018-10-24 PROCEDURE — ? COUNSELING

## 2018-10-24 PROCEDURE — 17000 DESTRUCT PREMALG LESION: CPT

## 2018-10-24 PROCEDURE — 99213 OFFICE O/P EST LOW 20 MIN: CPT | Mod: 25

## 2018-10-24 ASSESSMENT — LOCATION SIMPLE DESCRIPTION DERM
LOCATION SIMPLE: FRONTAL SCALP
LOCATION SIMPLE: LEFT SCALP
LOCATION SIMPLE: RIGHT FOREHEAD
LOCATION SIMPLE: ABDOMEN
LOCATION SIMPLE: RIGHT SCALP
LOCATION SIMPLE: LEFT UPPER BACK
LOCATION SIMPLE: RIGHT FOREARM

## 2018-10-24 ASSESSMENT — LOCATION DETAILED DESCRIPTION DERM
LOCATION DETAILED: RIGHT PROXIMAL RADIAL DORSAL FOREARM
LOCATION DETAILED: EPIGASTRIC SKIN
LOCATION DETAILED: MEDIAL FRONTAL SCALP
LOCATION DETAILED: LEFT MEDIAL UPPER BACK
LOCATION DETAILED: RIGHT FOREHEAD
LOCATION DETAILED: LEFT MEDIAL FRONTAL SCALP
LOCATION DETAILED: LEFT RIB CAGE
LOCATION DETAILED: RIGHT CENTRAL FRONTAL SCALP

## 2018-10-24 ASSESSMENT — LOCATION ZONE DERM
LOCATION ZONE: ARM
LOCATION ZONE: SCALP
LOCATION ZONE: TRUNK
LOCATION ZONE: FACE

## 2018-10-24 NOTE — HPI: SKIN LESION
Is This A New Presentation, Or A Follow-Up?: Skin Lesion
How Severe Is Your Skin Lesion?: moderate
Has Your Skin Lesion Been Treated?: not been treated
Additional History: Her niece states she did have a skin tear in this area a month ago.

## 2018-10-24 NOTE — PROCEDURE: ADDITIONAL NOTES
Detail Level: Simple
Additional Notes: Discussed with patient doing a biopsy versus using over the counter scar away for 1 month. Patient will use scar away for 1 month and return to clinic for observation.

## 2018-10-31 ENCOUNTER — HOSPITAL ENCOUNTER (OUTPATIENT)
Dept: RADIOLOGY | Facility: MEDICAL CENTER | Age: 83
End: 2018-10-31
Attending: INTERNAL MEDICINE
Payer: MEDICARE

## 2018-10-31 DIAGNOSIS — Z12.39 SCREENING BREAST EXAMINATION: ICD-10-CM

## 2018-10-31 PROCEDURE — 77067 SCR MAMMO BI INCL CAD: CPT

## 2018-11-01 ENCOUNTER — OCCUPATIONAL THERAPY (OUTPATIENT)
Dept: OCCUPATIONAL THERAPY | Facility: REHABILITATION | Age: 83
End: 2018-11-01
Attending: INTERNAL MEDICINE
Payer: MEDICARE

## 2018-11-01 DIAGNOSIS — F03.90 DEMENTIA WITHOUT BEHAVIORAL DISTURBANCE, UNSPECIFIED DEMENTIA TYPE: ICD-10-CM

## 2018-11-01 PROCEDURE — 97750 PHYSICAL PERFORMANCE TEST: CPT

## 2018-11-01 ASSESSMENT — BALANCE ASSESSMENTS
BALANCE - SITTING STATIC: GOOD
BALANCE - STANDING STATIC: FAIR +
BALANCE - STANDING DYNAMIC: FAIR +
BALANCE - SITTING DYNAMIC: GOOD
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
## 2018-11-09 ENCOUNTER — OFFICE VISIT (OUTPATIENT)
Dept: NEUROLOGY | Facility: MEDICAL CENTER | Age: 83
End: 2018-11-09
Payer: MEDICARE

## 2018-11-09 VITALS
TEMPERATURE: 98 F | OXYGEN SATURATION: 93 % | RESPIRATION RATE: 18 BRPM | BODY MASS INDEX: 27.88 KG/M2 | WEIGHT: 142 LBS | HEART RATE: 66 BPM | HEIGHT: 60 IN | SYSTOLIC BLOOD PRESSURE: 114 MMHG | DIASTOLIC BLOOD PRESSURE: 66 MMHG

## 2018-11-09 DIAGNOSIS — G30.1 LATE ONSET ALZHEIMER'S DISEASE WITHOUT BEHAVIORAL DISTURBANCE (HCC): Primary | ICD-10-CM

## 2018-11-09 DIAGNOSIS — F02.80 LATE ONSET ALZHEIMER'S DISEASE WITHOUT BEHAVIORAL DISTURBANCE (HCC): Primary | ICD-10-CM

## 2018-11-09 PROCEDURE — 99205 OFFICE O/P NEW HI 60 MIN: CPT | Performed by: PSYCHIATRY & NEUROLOGY

## 2018-11-09 RX ORDER — DONEPEZIL HYDROCHLORIDE 5 MG/1
5 TABLET, FILM COATED ORAL EVERY EVENING
Qty: 30 TAB | Refills: 0 | Status: SHIPPED | OUTPATIENT
Start: 2018-11-09 | End: 2018-12-28

## 2018-11-09 RX ORDER — DONEPEZIL HYDROCHLORIDE 10 MG/1
10 TABLET, FILM COATED ORAL EVERY EVENING
Qty: 30 TAB | Refills: 6 | Status: SHIPPED | OUTPATIENT
Start: 2018-11-09 | End: 2019-02-08

## 2018-11-09 ASSESSMENT — ENCOUNTER SYMPTOMS
MEMORY LOSS: 1
DEPRESSION: 0
TREMORS: 0
SPEECH CHANGE: 0
MYALGIAS: 0
FALLS: 0
INSOMNIA: 0
LOSS OF CONSCIOUSNESS: 0
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
## 2018-11-18 ENCOUNTER — HOSPITAL ENCOUNTER (OUTPATIENT)
Dept: RADIOLOGY | Facility: MEDICAL CENTER | Age: 83
End: 2018-11-18
Attending: PSYCHIATRY & NEUROLOGY
Payer: MEDICARE

## 2018-11-18 DIAGNOSIS — F02.80 LATE ONSET ALZHEIMER'S DISEASE WITHOUT BEHAVIORAL DISTURBANCE (HCC): ICD-10-CM

## 2018-11-18 DIAGNOSIS — G30.1 LATE ONSET ALZHEIMER'S DISEASE WITHOUT BEHAVIORAL DISTURBANCE (HCC): ICD-10-CM

## 2018-11-18 PROCEDURE — 70551 MRI BRAIN STEM W/O DYE: CPT

## 2018-11-26 ENCOUNTER — NON-PROVIDER VISIT (OUTPATIENT)
Dept: NEUROLOGY | Facility: MEDICAL CENTER | Age: 83
End: 2018-11-26
Payer: MEDICARE

## 2018-11-26 DIAGNOSIS — G30.1 LATE ONSET ALZHEIMER'S DISEASE WITHOUT BEHAVIORAL DISTURBANCE (HCC): ICD-10-CM

## 2018-11-26 DIAGNOSIS — F02.80 LATE ONSET ALZHEIMER'S DISEASE WITHOUT BEHAVIORAL DISTURBANCE (HCC): ICD-10-CM

## 2018-11-26 PROCEDURE — 95951 EEG: CPT | Mod: 52 | Performed by: PSYCHIATRY & NEUROLOGY

## 2018-11-26 NOTE — PROCEDURES
VIDEO ELECTROENCEPHALOGRAM REPORT      Referring provider: Dr. Banks    DOS: November 26, 2018, of 30-minute duration.    INDICATION:  Ramya Bhakta 85 y.o. female presenting with a history of cognitive impairment suggestive of dementia, also with a history of severe concussion/head trauma, EEG requested to rule out nonconvulsive seizure activity.    CURRENT ANTIEPILEPTIC REGIMEN: None    TECHNIQUE: 30 channel video electroencephalogram (EEG) was performed in accordance with the international 10-20 system. The study was reviewed in bipolar and referential montages. The recording examined the patient during wakeful and drowsy/sleep state(s).     DESCRIPTION OF THE RECORD:  During the wakefulness, the background showed a symmetrical, though low amplitude <70 mV alpha activity, of 10 Hz posteriorly.  There was reactivity to eye closure/opening.  A normal anterior-posterior gradient was noted with faster beta frequencies seen anteriorly.  During drowsiness, theta/delta frequencies were seen.  No asymmetries or paroxysmal activity was seen.    For most of the tracing, the patient remained in a sleep state, background shows diffuse high-amplitude 4-5 Hz delta activity.  Symmetrical high-amplitude sleep spindles and vertex sharps were seen in the leads over the central regions.     ACTIVATION PROCEDURES:   Hyperventilation was not performed. Intermittent Photic stimulation was performed in a stepwise fashion from 1 to 30 Hz and elicited a normal response (photic driving), most noticeable in the posterior leads.    ICTAL AND/OR INTERICTAL FINDINGS:   No focal or generalized epileptiform activity noted. No regional slowing was seen during this routine study.  No clinical events or seizures were reported or recorded during the study.     EKG: sampling of the EKG recording demonstrated sinus rhythm.     EVENTS: None    INTERPRETATION:  This is a normal video EEG recording in the awake, but mostly drowsy/sleep state(s).   Clinical correlation is recommended.  A cause for the patient's cognitive impairment cannot be determined.    Note: A normal EEG does not rule out epilepsy.  If the clinical suspicion remains high for seizures, a prolonged recording to capture clinical or subclinical events may be helpful.        Tony Reeves M.D.

## 2018-11-28 ENCOUNTER — APPOINTMENT (RX ONLY)
Dept: URBAN - METROPOLITAN AREA CLINIC 4 | Facility: CLINIC | Age: 83
Setting detail: DERMATOLOGY
End: 2018-11-28

## 2018-11-28 DIAGNOSIS — L90.5 SCAR CONDITIONS AND FIBROSIS OF SKIN: ICD-10-CM

## 2018-11-28 PROCEDURE — 99212 OFFICE O/P EST SF 10 MIN: CPT

## 2018-11-28 PROCEDURE — ? ADDITIONAL NOTES

## 2018-11-28 PROCEDURE — ? COUNSELING

## 2018-11-28 ASSESSMENT — LOCATION SIMPLE DESCRIPTION DERM
LOCATION SIMPLE: RIGHT FOREARM
LOCATION SIMPLE: RIGHT FOREARM

## 2018-11-28 ASSESSMENT — LOCATION DETAILED DESCRIPTION DERM
LOCATION DETAILED: RIGHT PROXIMAL RADIAL DORSAL FOREARM
LOCATION DETAILED: RIGHT PROXIMAL RADIAL DORSAL FOREARM

## 2018-11-28 ASSESSMENT — LOCATION ZONE DERM
LOCATION ZONE: ARM
LOCATION ZONE: ARM

## 2018-11-28 NOTE — HPI: SCAR
Is This A New Presentation, Or A Follow-Up?: Scar Follow Up
How Severe Is Your Scar?: mild
Additional History: Patient has used scar away on this site.  Spot has improved.
caffeine

## 2018-12-03 ENCOUNTER — HOSPITAL ENCOUNTER (OUTPATIENT)
Dept: LAB | Facility: MEDICAL CENTER | Age: 83
End: 2018-12-03
Attending: PSYCHIATRY & NEUROLOGY
Payer: MEDICARE

## 2018-12-03 DIAGNOSIS — G30.1 LATE ONSET ALZHEIMER'S DISEASE WITHOUT BEHAVIORAL DISTURBANCE (HCC): ICD-10-CM

## 2018-12-03 DIAGNOSIS — F02.80 LATE ONSET ALZHEIMER'S DISEASE WITHOUT BEHAVIORAL DISTURBANCE (HCC): ICD-10-CM

## 2018-12-03 PROCEDURE — 84165 PROTEIN E-PHORESIS SERUM: CPT

## 2018-12-03 PROCEDURE — 84160 ASSAY OF PROTEIN ANY SOURCE: CPT

## 2018-12-03 PROCEDURE — 82746 ASSAY OF FOLIC ACID SERUM: CPT

## 2018-12-03 PROCEDURE — 85652 RBC SED RATE AUTOMATED: CPT

## 2018-12-03 PROCEDURE — 86141 C-REACTIVE PROTEIN HS: CPT

## 2018-12-03 PROCEDURE — 82607 VITAMIN B-12: CPT

## 2018-12-03 PROCEDURE — 36415 COLL VENOUS BLD VENIPUNCTURE: CPT

## 2018-12-03 PROCEDURE — 81401 MOPATH PROCEDURE LEVEL 2: CPT

## 2018-12-04 LAB
CRP SERPL HS-MCNC: 5 MG/L (ref 0–7.5)
ERYTHROCYTE [SEDIMENTATION RATE] IN BLOOD BY WESTERGREN METHOD: 27 MM/HOUR (ref 0–30)
FOLATE SERPL-MCNC: >23.4 NG/ML
VIT B12 SERPL-MCNC: 455 PG/ML (ref 211–911)

## 2018-12-06 LAB
ALBUMIN SERPL-MCNC: 3.86 G/DL (ref 3.75–5.01)
ALPHA1 GLOB SERPL ELPH-MCNC: 0.3 G/DL (ref 0.19–0.46)
ALPHA2 GLOB SERPL ELPH-MCNC: 0.94 G/DL (ref 0.48–1.05)
B-GLOBULIN SERPL ELPH-MCNC: 0.88 G/DL (ref 0.48–1.1)
GAMMA GLOB SERPL ELPH-MCNC: 1.22 G/DL (ref 0.62–1.51)
INTERPRETATION SERPL IFE-IMP: NORMAL
MONOCLON BAND OBS SERPL: NORMAL
PATHOLOGY STUDY: NORMAL
PROT SERPL-MCNC: 7.2 G/DL (ref 6–8.3)

## 2018-12-07 LAB — TEST NAME 95000: ABNORMAL

## 2018-12-28 ENCOUNTER — TELEPHONE (OUTPATIENT)
Dept: MEDICAL GROUP | Facility: MEDICAL CENTER | Age: 83
End: 2018-12-28

## 2018-12-28 ENCOUNTER — HOSPITAL ENCOUNTER (OUTPATIENT)
Dept: RADIOLOGY | Facility: MEDICAL CENTER | Age: 83
End: 2018-12-28
Attending: INTERNAL MEDICINE
Payer: MEDICARE

## 2018-12-28 ENCOUNTER — OFFICE VISIT (OUTPATIENT)
Dept: MEDICAL GROUP | Facility: MEDICAL CENTER | Age: 83
End: 2018-12-28
Payer: MEDICARE

## 2018-12-28 ENCOUNTER — HOSPITAL ENCOUNTER (OUTPATIENT)
Dept: LAB | Facility: MEDICAL CENTER | Age: 83
End: 2018-12-28
Attending: INTERNAL MEDICINE
Payer: MEDICARE

## 2018-12-28 VITALS
OXYGEN SATURATION: 96 % | WEIGHT: 128 LBS | HEIGHT: 60 IN | BODY MASS INDEX: 25.13 KG/M2 | HEART RATE: 94 BPM | DIASTOLIC BLOOD PRESSURE: 60 MMHG | TEMPERATURE: 97.6 F | SYSTOLIC BLOOD PRESSURE: 118 MMHG

## 2018-12-28 DIAGNOSIS — R10.9 ABDOMINAL PAIN, UNSPECIFIED ABDOMINAL LOCATION: ICD-10-CM

## 2018-12-28 DIAGNOSIS — M54.41 ACUTE RIGHT-SIDED LOW BACK PAIN WITH RIGHT-SIDED SCIATICA: ICD-10-CM

## 2018-12-28 DIAGNOSIS — H61.23 BILATERAL IMPACTED CERUMEN: ICD-10-CM

## 2018-12-28 DIAGNOSIS — S32.040A CLOSED COMPRESSION FRACTURE OF FOURTH LUMBAR VERTEBRA, INITIAL ENCOUNTER: ICD-10-CM

## 2018-12-28 LAB
ALBUMIN SERPL BCP-MCNC: 4.1 G/DL (ref 3.2–4.9)
ALBUMIN/GLOB SERPL: 1.1 G/DL
ALP SERPL-CCNC: 193 U/L (ref 30–99)
ALT SERPL-CCNC: 12 U/L (ref 2–50)
ANION GAP SERPL CALC-SCNC: 10 MMOL/L (ref 0–11.9)
APPEARANCE UR: CLEAR
AST SERPL-CCNC: 20 U/L (ref 12–45)
BACTERIA #/AREA URNS HPF: NEGATIVE /HPF
BASOPHILS # BLD AUTO: 0.3 % (ref 0–1.8)
BASOPHILS # BLD: 0.03 K/UL (ref 0–0.12)
BILIRUB SERPL-MCNC: 0.5 MG/DL (ref 0.1–1.5)
BILIRUB UR QL STRIP.AUTO: NEGATIVE
BUN SERPL-MCNC: 21 MG/DL (ref 8–22)
CALCIUM SERPL-MCNC: 10.2 MG/DL (ref 8.5–10.5)
CHLORIDE SERPL-SCNC: 102 MMOL/L (ref 96–112)
CO2 SERPL-SCNC: 27 MMOL/L (ref 20–33)
COLOR UR: YELLOW
CREAT SERPL-MCNC: 0.77 MG/DL (ref 0.5–1.4)
EOSINOPHIL # BLD AUTO: 0.02 K/UL (ref 0–0.51)
EOSINOPHIL NFR BLD: 0.2 % (ref 0–6.9)
EPI CELLS #/AREA URNS HPF: ABNORMAL /HPF
ERYTHROCYTE [DISTWIDTH] IN BLOOD BY AUTOMATED COUNT: 45.8 FL (ref 35.9–50)
GLOBULIN SER CALC-MCNC: 3.7 G/DL (ref 1.9–3.5)
GLUCOSE SERPL-MCNC: 76 MG/DL (ref 65–99)
GLUCOSE UR STRIP.AUTO-MCNC: NEGATIVE MG/DL
HCT VFR BLD AUTO: 44 % (ref 37–47)
HGB BLD-MCNC: 14.2 G/DL (ref 12–16)
HYALINE CASTS #/AREA URNS LPF: ABNORMAL /LPF
IMM GRANULOCYTES # BLD AUTO: 0.04 K/UL (ref 0–0.11)
IMM GRANULOCYTES NFR BLD AUTO: 0.4 % (ref 0–0.9)
KETONES UR STRIP.AUTO-MCNC: 15 MG/DL
LEUKOCYTE ESTERASE UR QL STRIP.AUTO: NEGATIVE
LYMPHOCYTES # BLD AUTO: 1.55 K/UL (ref 1–4.8)
LYMPHOCYTES NFR BLD: 16.8 % (ref 22–41)
MCH RBC QN AUTO: 30.4 PG (ref 27–33)
MCHC RBC AUTO-ENTMCNC: 32.3 G/DL (ref 33.6–35)
MCV RBC AUTO: 94.2 FL (ref 81.4–97.8)
MICRO URNS: ABNORMAL
MONOCYTES # BLD AUTO: 0.66 K/UL (ref 0–0.85)
MONOCYTES NFR BLD AUTO: 7.2 % (ref 0–13.4)
NEUTROPHILS # BLD AUTO: 6.92 K/UL (ref 2–7.15)
NEUTROPHILS NFR BLD: 75.1 % (ref 44–72)
NITRITE UR QL STRIP.AUTO: NEGATIVE
NRBC # BLD AUTO: 0 K/UL
NRBC BLD-RTO: 0 /100 WBC
PH UR STRIP.AUTO: 5 [PH]
PLATELET # BLD AUTO: 260 K/UL (ref 164–446)
PMV BLD AUTO: 9.7 FL (ref 9–12.9)
POTASSIUM SERPL-SCNC: 4 MMOL/L (ref 3.6–5.5)
PROT SERPL-MCNC: 7.8 G/DL (ref 6–8.2)
PROT UR QL STRIP: NEGATIVE MG/DL
RBC # BLD AUTO: 4.67 M/UL (ref 4.2–5.4)
RBC # URNS HPF: ABNORMAL /HPF
RBC UR QL AUTO: ABNORMAL
SODIUM SERPL-SCNC: 139 MMOL/L (ref 135–145)
SP GR UR STRIP.AUTO: 1.02
UROBILINOGEN UR STRIP.AUTO-MCNC: 0.2 MG/DL
WBC # BLD AUTO: 9.2 K/UL (ref 4.8–10.8)
WBC #/AREA URNS HPF: ABNORMAL /HPF

## 2018-12-28 PROCEDURE — 36415 COLL VENOUS BLD VENIPUNCTURE: CPT

## 2018-12-28 PROCEDURE — 81001 URINALYSIS AUTO W/SCOPE: CPT

## 2018-12-28 PROCEDURE — 99214 OFFICE O/P EST MOD 30 MIN: CPT | Mod: 25 | Performed by: INTERNAL MEDICINE

## 2018-12-28 PROCEDURE — 69210 REMOVE IMPACTED EAR WAX UNI: CPT | Performed by: INTERNAL MEDICINE

## 2018-12-28 PROCEDURE — 85025 COMPLETE CBC W/AUTO DIFF WBC: CPT

## 2018-12-28 PROCEDURE — 74018 RADEX ABDOMEN 1 VIEW: CPT

## 2018-12-28 PROCEDURE — 72100 X-RAY EXAM L-S SPINE 2/3 VWS: CPT

## 2018-12-28 PROCEDURE — 80053 COMPREHEN METABOLIC PANEL: CPT

## 2018-12-28 RX ORDER — METHYLPREDNISOLONE 4 MG/1
TABLET ORAL
Qty: 21 TAB | Refills: 0 | Status: SHIPPED | OUTPATIENT
Start: 2018-12-28 | End: 2019-08-15

## 2018-12-28 NOTE — PROGRESS NOTES
CC: Back pain, abdominal pain, hearing loss.    HPI:   Ramya presents today with the following.    1. Acute right-sided low back pain with right-sided sciatica  Presents complaining of back pain for the last month.  Denies any recent falls.  Pain is 5 out of 10 intensity on the right side rating down the leg.  No weakness in the extremity no loss of bowel or bladder.  Advil slightly helpful.    2. Abdominal pain, unspecified abdominal location  Recurrent abdominal pain.  She does have a slightly poor history on this.  Seems to have been daily 4 out of 10 intensity may be worsened by meals.  There is no reports of blood in stool or dark tarry stool.  Daughter reports she does take a long time in the bathroom but does not condone constipation issues.  No fevers or chills.    3.  Decreased hearing  Decreased hearing concerns for impacted cerumen.      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 21 Tab 0   • donepezil (ARICEPT) 10 MG tablet Take 1 Tab by mouth every evening. 30 Tab 6   • acetaminophen (TYLENOL) 325 MG Tab Take 650 mg by mouth every four hours as needed.     • calcium carbonate (TUMS) 500 MG Chew Tab Take 1,000 mg by mouth 3 times a day as needed (bone and stomach).     • dicyclomine (BENTYL) 10 MG Cap TAKE 1 CAPSULE BY MOUTH EVERY 6 HOURS AS NEEDED FOR ABDOMINAL CRAMPS AND/OR DISCOMFORT  3   • Cholecalciferol (VITAMIN D) 2000 UNITS Cap Take 2,000 Units by mouth every day.       No current facility-administered medications for this visit.          Allergies as of  12/28/2018 - Reviewed 12/28/2018   Allergen Reaction Noted   • Prednisone Unspecified 08/11/2018        ROS: Denies Chest pain, SOB, LE edema.    /60 (BP Location: Left arm, Patient Position: Sitting)   Pulse 94   Temp 36.4 °C (97.6 °F)   Ht 1.524 m (5')   Wt 58.1 kg (128 lb)   SpO2 96%   BMI 25.00 kg/m²     Physical Exam:  Gen:         Alert and oriented, No apparent distress.  TM            occluded by cerumen bilaterally  Neck:        No Lymphadenopathy or Bruits.  Lungs:     Clear to auscultation bilaterally  CV:          Regular rate and rhythm. No murmurs, rubs or gallops.     ABD:      Soft non tender, non distended. Normal active bowel sounds.  No  Hepatosplenomegaly, No pulsatile masses.                Ext:          No clubbing, cyanosis, edema.      Assessment and Plan.   85 y.o. female with the following issues.    1. Acute right-sided low back pain with right-sided sciatica  Have written for x-ray to rule out compression fracture giving a Medrol Dosepak see if we can improve symptoms and referring to physical therapy.  - DX-LUMBAR SPINE-2 OR 3 VIEWS; Future  - MethylPREDNISolone (MEDROL DOSEPAK) 4 MG Tablet Therapy Pack; Per package insert.  Dispense: 21 Tab; Refill: 0    2. Abdominal pain, unspecified abdominal location  Suspect constipation have written for x-rays recommending stool softeners.  Follow-up if not improving  - COMP METABOLIC PANEL; Future  - CBC WITH DIFFERENTIAL; Future  - URINALYSIS,CULTURE IF INDICATED; Future  - [id number] VIEW; Future    3. Bilateral impacted cerumen  Lavaged and personally removed with curette.

## 2018-12-28 NOTE — TELEPHONE ENCOUNTER
----- Message from Basil Banks M.D. sent at 12/28/2018  1:41 PM PST -----  Compression fracture of low back.  Would hold off on physical therapy.   WE may want to get mri to determine if this is a new fracture.  If it is there is some injections to prop up the vertebrae.

## 2019-01-08 ENCOUNTER — HOSPITAL ENCOUNTER (OUTPATIENT)
Dept: RADIOLOGY | Facility: MEDICAL CENTER | Age: 84
End: 2019-01-08
Attending: INTERNAL MEDICINE
Payer: MEDICARE

## 2019-01-08 DIAGNOSIS — S32.040A CLOSED COMPRESSION FRACTURE OF FOURTH LUMBAR VERTEBRA, INITIAL ENCOUNTER: ICD-10-CM

## 2019-01-08 PROCEDURE — 72148 MRI LUMBAR SPINE W/O DYE: CPT

## 2019-02-08 ENCOUNTER — OFFICE VISIT (OUTPATIENT)
Dept: MEDICAL GROUP | Facility: MEDICAL CENTER | Age: 84
End: 2019-02-08
Payer: MEDICARE

## 2019-02-08 VITALS
SYSTOLIC BLOOD PRESSURE: 124 MMHG | DIASTOLIC BLOOD PRESSURE: 62 MMHG | HEART RATE: 91 BPM | OXYGEN SATURATION: 91 % | WEIGHT: 134 LBS | HEIGHT: 60 IN | BODY MASS INDEX: 26.31 KG/M2 | TEMPERATURE: 97.2 F

## 2019-02-08 DIAGNOSIS — M54.50 CHRONIC RIGHT-SIDED LOW BACK PAIN WITHOUT SCIATICA: ICD-10-CM

## 2019-02-08 DIAGNOSIS — G89.29 CHRONIC RIGHT-SIDED LOW BACK PAIN WITHOUT SCIATICA: ICD-10-CM

## 2019-02-08 PROCEDURE — 8041 PR SCP AHA: Performed by: INTERNAL MEDICINE

## 2019-02-08 PROCEDURE — 99213 OFFICE O/P EST LOW 20 MIN: CPT | Performed by: INTERNAL MEDICINE

## 2019-02-08 NOTE — PROGRESS NOTES
Annual Health Assessment Questions:    1.  Are you currently engaging in any exercise or physical activity? Yes    2.  How would you describe your mood or emotional well-being today? good    3.  Have you had any falls in the last year? Yes    4.  Have you noticed any problems with your balance or had difficulty walking? Yes  Precautions given.    5.  In the last six months have you experienced any leakage of urine? No    6. DPA/Advanced Directive: Patient has Durable Power of  on file.     CC: Follow-up back pain.    HPI:   Ramya presents today with the following.    1. Chronic right-sided low back pain without sciatica  Was seen approximately 1 month ago with radicular symptoms down the right leg.  She reports her pain is somewhat improved.  She does have some chronic compression fractures in her low back.  The initial pain is significantly improved after course of steroids.  She denies any loss of bowel or bladder no weakness of the extremities.  They are more in a comfort care mode for her as opposed to being aggressive and or not interested in surgical interventions.  They are interested in nonnarcotic modalities of treatment.      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     • calcium carbonate (TUMS) 500 MG Chew Tab Take 1,000 mg by mouth 3 times a day as needed (bone and stomach).     • dicyclomine (BENTYL) 10 MG Cap TAKE 1 CAPSULE BY MOUTH EVERY 6 HOURS AS NEEDED FOR ABDOMINAL CRAMPS AND/OR DISCOMFORT  3   • Cholecalciferol (VITAMIN D) 2000 UNITS Cap Take 2,000  Units by mouth every day.     • MethylPREDNISolone (MEDROL DOSEPAK) 4 MG Tablet Therapy Pack Per package insert. (Patient not taking: Reported on 2/8/2019) 21 Tab 0     No current facility-administered medications for this visit.          Allergies as of 02/08/2019 - Reviewed 02/08/2019   Allergen Reaction Noted   • Prednisone Unspecified 08/11/2018        ROS: Denies Chest pain, SOB, LE edema.    /62 (BP Location: Right arm, Patient Position: Sitting)   Pulse 91   Temp 36.2 °C (97.2 °F)   Ht 1.524 m (5')   Wt 60.8 kg (134 lb)   SpO2 91%   BMI 26.17 kg/m²     Physical Exam:  Gen:         Alert and oriented, No apparent distress.  Neck:        No Lymphadenopathy or Bruits.  Lungs:     Clear to auscultation bilaterally  CV:          Regular rate and rhythm. No murmurs, rubs or gallops.               Ext:          No clubbing, cyanosis, edema.      Assessment and Plan.   85 y.o. female with the following issues.    1. Chronic right-sided low back pain without sciatica  Have placed referral to pain specialist to discuss therapy.  There is a portion of central canal stenosis which is likely not to be taking care of short of surgery but again they are not interested in this consultation.  - REFERRAL TO PAIN CLINIC

## 2019-02-21 ENCOUNTER — NON-PROVIDER VISIT (OUTPATIENT)
Dept: MEDICAL GROUP | Facility: MEDICAL CENTER | Age: 84
End: 2019-02-21

## 2019-02-21 DIAGNOSIS — H61.23 BILATERAL HEARING LOSS DUE TO CERUMEN IMPACTION: ICD-10-CM

## 2019-02-21 PROCEDURE — 99999 PR NO CHARGE: CPT | Performed by: INTERNAL MEDICINE

## 2019-02-21 NOTE — PROGRESS NOTES
Ramya Bhakta is a 85 y.o. female here for a non-provider visit for bilateral ear lavage.   Patient had a small amount of cerumen in her left ear which was successfully removed. Patient said she could hear better out of left ear.  Patient had moderate amount in right ear that could not be completed today. Right ear was starting to have mild pain per patient  and cerumen had only moved a little bit. Therefore I confirmed with Dr. Banks that patient should leave today and  OTC ear wax removal drops. Her daughter will either try to clean them at home after use of the cerumen removal drops or will return on another day for MA visit.     If abnormal was an in office provider notified today (if so, indicate provider)? Yes, Dr. Banks.   Routed to PCP? Yes

## 2019-04-10 ENCOUNTER — APPOINTMENT (RX ONLY)
Dept: URBAN - METROPOLITAN AREA CLINIC 4 | Facility: CLINIC | Age: 84
Setting detail: DERMATOLOGY
End: 2019-04-10

## 2019-04-10 DIAGNOSIS — Z85.828 PERSONAL HISTORY OF OTHER MALIGNANT NEOPLASM OF SKIN: ICD-10-CM

## 2019-04-10 PROBLEM — D48.5 NEOPLASM OF UNCERTAIN BEHAVIOR OF SKIN: Status: ACTIVE | Noted: 2019-04-10

## 2019-04-10 PROCEDURE — ? BIOPSY BY SHAVE METHOD

## 2019-04-10 PROCEDURE — 99212 OFFICE O/P EST SF 10 MIN: CPT | Mod: 25

## 2019-04-10 PROCEDURE — ? COUNSELING

## 2019-04-10 PROCEDURE — 11102 TANGNTL BX SKIN SINGLE LES: CPT

## 2019-04-10 PROCEDURE — ? ADDITIONAL NOTES

## 2019-04-10 ASSESSMENT — LOCATION DETAILED DESCRIPTION DERM
LOCATION DETAILED: RIGHT DISTAL DORSAL FOREARM
LOCATION DETAILED: INFERIOR THORACIC SPINE
LOCATION DETAILED: RIGHT SUPERIOR NASAL CHEEK
LOCATION DETAILED: SUPERIOR THORACIC SPINE

## 2019-04-10 ASSESSMENT — LOCATION SIMPLE DESCRIPTION DERM
LOCATION SIMPLE: RIGHT CHEEK
LOCATION SIMPLE: RIGHT FOREARM
LOCATION SIMPLE: UPPER BACK

## 2019-04-10 ASSESSMENT — LOCATION ZONE DERM
LOCATION ZONE: TRUNK
LOCATION ZONE: FACE
LOCATION ZONE: ARM

## 2019-04-10 NOTE — PROCEDURE: BIOPSY BY SHAVE METHOD
Bill 41675 For Specimen Handling/Conveyance To Laboratory?: no
Electrodesiccation Text: The wound bed was treated with electrodesiccation after the biopsy was performed.
Render Post-Care Instructions In Note?: yes
Biopsy Type: H and E
Wound Care: Vaseline
Detail Level: Detailed
Biopsy Method: Personna blade
Post-Care Instructions: I reviewed with the patient in detail post-care instructions. Patient is to keep the biopsy site dry overnight, and then apply vaseline twice daily until healed.
Dressing: bandage
Type Of Destruction Used: Curettage
Billing Type: Third-Party Bill
Electrodesiccation And Curettage Text: The wound bed was treated with electrodesiccation and curettage after the biopsy was performed.
Anesthesia Volume In Cc: 0.5
Curettage Text: The wound bed was treated with curettage after the biopsy was performed.
Notification Instructions: Patient will be notified of biopsy results. However, patient instructed to call the office if not contacted within 2 weeks.
Hemostasis: Drysol and Electrocautery
Silver Nitrate Text: The wound bed was treated with silver nitrate after the biopsy was performed.
Cryotherapy Text: The wound bed was treated with cryotherapy after the biopsy was performed.
Lab Facility: 
X Size Of Lesion In Cm: 0
Anesthesia Type: 1% lidocaine with epinephrine
Consent: Written consent was obtained and risks were reviewed including but not limited to scarring, infection, bleeding, scabbing, incomplete removal, nerve damage and allergy to anesthesia.
Depth Of Biopsy: dermis
Lab: [id number]

## 2019-05-20 ENCOUNTER — OFFICE VISIT (OUTPATIENT)
Dept: MEDICAL GROUP | Facility: MEDICAL CENTER | Age: 84
End: 2019-05-20
Payer: MEDICARE

## 2019-05-20 VITALS
WEIGHT: 138 LBS | SYSTOLIC BLOOD PRESSURE: 116 MMHG | BODY MASS INDEX: 27.09 KG/M2 | OXYGEN SATURATION: 95 % | HEIGHT: 60 IN | DIASTOLIC BLOOD PRESSURE: 66 MMHG | TEMPERATURE: 97.4 F | HEART RATE: 87 BPM

## 2019-05-20 DIAGNOSIS — K21.9 GASTROESOPHAGEAL REFLUX DISEASE, ESOPHAGITIS PRESENCE NOT SPECIFIED: Chronic | ICD-10-CM

## 2019-05-20 DIAGNOSIS — Z00.00 MEDICARE ANNUAL WELLNESS VISIT, SUBSEQUENT: ICD-10-CM

## 2019-05-20 DIAGNOSIS — M81.0 OSTEOPOROSIS, UNSPECIFIED OSTEOPOROSIS TYPE, UNSPECIFIED PATHOLOGICAL FRACTURE PRESENCE: ICD-10-CM

## 2019-05-20 DIAGNOSIS — E78.5 DYSLIPIDEMIA: ICD-10-CM

## 2019-05-20 DIAGNOSIS — G30.1 LATE ONSET ALZHEIMER'S DISEASE WITHOUT BEHAVIORAL DISTURBANCE (HCC): ICD-10-CM

## 2019-05-20 DIAGNOSIS — F02.80 LATE ONSET ALZHEIMER'S DISEASE WITHOUT BEHAVIORAL DISTURBANCE (HCC): ICD-10-CM

## 2019-05-20 PROCEDURE — G0439 PPPS, SUBSEQ VISIT: HCPCS | Performed by: INTERNAL MEDICINE

## 2019-05-20 PROCEDURE — 99213 OFFICE O/P EST LOW 20 MIN: CPT | Mod: 25 | Performed by: INTERNAL MEDICINE

## 2019-05-20 RX ORDER — GABAPENTIN 100 MG/1
100-200 CAPSULE ORAL 2 TIMES DAILY PRN
Refills: 2 | Status: ON HOLD | COMMUNITY
Start: 2019-04-05 | End: 2019-10-07 | Stop reason: SDUPTHER

## 2019-05-20 RX ORDER — DONEPEZIL HYDROCHLORIDE 10 MG/1
TABLET, FILM COATED ORAL
Refills: 5 | COMMUNITY
Start: 2019-04-12 | End: 2019-06-28 | Stop reason: SDUPTHER

## 2019-05-20 ASSESSMENT — PATIENT HEALTH QUESTIONNAIRE - PHQ9: CLINICAL INTERPRETATION OF PHQ2 SCORE: 0

## 2019-05-20 ASSESSMENT — ACTIVITIES OF DAILY LIVING (ADL): BATHING_REQUIRES_ASSISTANCE: 0

## 2019-05-20 ASSESSMENT — ENCOUNTER SYMPTOMS: GENERAL WELL-BEING: GOOD

## 2019-05-20 NOTE — PROGRESS NOTES
CC: Osteoporosis, bone density testing.    HPI:   Ramya presents today with the following.      1. Medicare annual wellness visit, subsequent  Screenings performed below advance directives already on file.    2. Osteoporosis, unspecified osteoporosis type, unspecified pathological fracture presence  Does have a history of osteoporosis been maintained on injections clinically doing well no side effects to medication she is coming due for repeat.  She is reportedly taking calcium and vitamin D.  Denying any other complaints no other concerns today.  No recent falls or fractures.      Information for advance directives given to patient or instructed to bring in advance directives into to office to put in chart.      Depression Screening    Little interest or pleasure in doing things?  0 - not at all  Feeling down, depressed , or hopeless? 0 - not at all  Patient Health Questionnaire Score: 0     If depressive symptoms identified deferred to follow up visit unless specifically addressed in assessment and plan.    Interpretation of PHQ-9 Total Score   Score Severity   1-4 No Depression   5-9 Mild Depression   10-14 Moderate Depression   15-19 Moderately Severe Depression   20-27 Severe Depression    Screening for Cognitive Impairment    Three Minute Recall (village, kitchen, baby) 0/3    Hakeem clock face with all 12 numbers and set the hands to show 10 past 10.  Yes 4/5  Cognitive concerns identified deferred for follow up unless specifically addressed in assessment and plan.    Fall Risk Assessment    Has the patient had two or more falls in the last year or any fall with injury in the last year?  No    Safety Assessment    Throw rugs on floor.  Yes  Handrails on all stairs.  Yes  Good lighting in all hallways.  Yes  Difficulty hearing.  Yes  Patient counseled about all safety risks that were identified.    Functional Assessment ADLs    Are there any barriers preventing you from cooking for yourself or meeting  nutritional needs?  No.    Are there any barriers preventing you from driving safely or obtaining transportation?  Yes.    Are there any barriers preventing you from using a telephone or calling for help?  No.    Are there any barriers preventing you from shopping?  Yes.    Are there any barriers preventing you from taking care of your own finances?  No.    Are there any barriers preventing you from managing your medications?  Yes.    Are there any barriers preventing you from showering, bathing or dressing yourself?  No.    Are you currently engaging in any exercise or physical activity?  Yes.     What is your perception of your health?  Good.      Health Maintenance Summary                Annual Wellness Visit Overdue 5/19/2019      Done 5/18/2018 Visit Dx: Medicare annual wellness visit, subsequent     Patient has more history with this topic...    IMM ZOSTER VACCINES Postponed 12/15/2033 Originally 9/15/1983. Insurance/Financial    MAMMOGRAM Next Due 10/31/2019      Done 10/31/2018 MA-SCREENING MAMMO BILAT W/TOMOSYNTHESIS W/CAD     Patient has more history with this topic...    BONE DENSITY Next Due 1/2/2020      Done 1/2/2015 DS-BONE DENSITY STUDY (DEXA)     Patient has more history with this topic...    IMM DTaP/Tdap/Td Vaccine Next Due 9/6/2021      Done 9/6/2011 Imm Admin: Tdap Vaccine          Patient Care Team:  Basil Banks M.D. as PCP - General (Internal Medicine)  Shanna Mac M.D. as Consulting Physician (Ophthalmology)  Jamin Tsang M.D. as Consulting Physician (Gastroenterology)  Amy R. Schoening, P.A. as Consulting Physician (Family Medicine)  Tr Bustos M.D. as Consulting Physician (Dermatology)  Pranav Álvarez MS,OTR/L as Occupational Therapy (Occupational Therapy)            Health Care Screening: Age-appropriate preventive services that Medicare covers were discussed today and ordered as indicated and agreed upon by the patient, and as recommended by USPTF and ACIP.     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 1 mL 0   • acetaminophen (TYLENOL) 325 MG Tab Take 650 mg by mouth every four hours as needed.     • calcium carbonate (TUMS) 500 MG Chew Tab Take 1,000 mg by mouth 3 times a day as needed (bone and stomach).     • dicyclomine (BENTYL) 10 MG Cap TAKE 1 CAPSULE BY MOUTH EVERY 6 HOURS AS NEEDED FOR ABDOMINAL CRAMPS AND/OR DISCOMFORT  3   • Cholecalciferol (VITAMIN D) 2000 UNITS Cap Take 2,000 Units by mouth every day.     • MethylPREDNISolone (MEDROL DOSEPAK) 4 MG Tablet Therapy Pack Per package insert. (Patient not taking: Reported on 2/8/2019) 21 Tab 0     No current facility-administered medications for this visit.        Family History   Problem Relation Age of Onset   • Heart Disease Mother    • Lung Disease Father    • Stroke Brother    • Cancer Sister         breast   • Heart Disease Sister    • Stroke Maternal Grandfather        Social History     Social History   • Marital status: Single     Spouse name: N/A   • Number of children: N/A   • Years of education: N/A     Occupational History   • Not on file.     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    /66 (BP Location: Left arm, Patient Position: Sitting)   Pulse 87   Temp 36.3 °C (97.4 °F)   Ht 1.524 m (5')   Wt 62.6 kg (138 lb)   SpO2 95%   BMI 26.95 kg/m²     Physical Exam:  Gen:         Alert and oriented, No apparent distress.  Neck:        No Lymphadenopathy or Bruits.  Lungs:     Clear to auscultation bilaterally  CV:          Regular rate and rhythm. No murmurs, rubs or gallops.  Abd:         Soft non tender, non distended. Normal active bowel sounds.  No  Hepatosplenomegaly, No pulsatile masses.                   Ext:          No clubbing, cyanosis, edema.      Assessment and Plan.   85 y.o. female with the following issues.    1. Medicare annual wellness visit, subsequent  Discussed healthy lifestyle habits as well as screening regimens.  - Subsequent Annual Wellness Visit - Includes PPPS ()    2. Osteoporosis, unspecified osteoporosis type, unspecified pathological fracture presence  Tolerating well have reordered injections.  - denosumab (PROLIA) 60 MG/ML Solution; Inject 1 mL as instructed Once for 1 dose.  Dispense: 1 mL; Refill: 0    3. Late onset Alzheimer's disease without behavioral disturbance  Stable continue current meds now living in assisted living.  - Subsequent Annual Wellness Visit - Includes PPPS ()    4. Gastroesophageal reflux disease, esophagitis presence not specified  Stable  - Subsequent Annual Wellness Visit - Includes PPPS ()    5. Dyslipidemia  Lipids currently well controlled. Discussed continued diet and exercise recheck 6 months to 1 year.  - Subsequent Annual Wellness Visit - Includes  PPPS ()        Referrals offered: Community-based lifestyle interventions to reduce health risks and promote self-management and wellness, fall prevention, nutrition, physical activity, tobacco-use cessation, weight loss, and mental health services as per orders if indicated.    Discussion today about general wellness and lifestyle habits:    · Prevent falls and reduce trip hazards; Cautioned about securing or removing rugs.  · Have a working fire alarm and carbon monoxide detector;   · Engage in regular physical activity and social activities

## 2019-07-01 RX ORDER — DONEPEZIL HYDROCHLORIDE 10 MG/1
TABLET, FILM COATED ORAL
Qty: 90 TAB | Refills: 3 | Status: SHIPPED | OUTPATIENT
Start: 2019-07-01 | End: 2019-07-09 | Stop reason: SDUPTHER

## 2019-07-09 ENCOUNTER — OFFICE VISIT (OUTPATIENT)
Dept: NEUROLOGY | Facility: MEDICAL CENTER | Age: 84
End: 2019-07-09
Payer: MEDICARE

## 2019-07-09 VITALS
WEIGHT: 140 LBS | DIASTOLIC BLOOD PRESSURE: 62 MMHG | OXYGEN SATURATION: 96 % | SYSTOLIC BLOOD PRESSURE: 100 MMHG | TEMPERATURE: 97.6 F | BODY MASS INDEX: 27.48 KG/M2 | HEIGHT: 60 IN | HEART RATE: 68 BPM

## 2019-07-09 DIAGNOSIS — G30.1 LATE ONSET ALZHEIMER'S DISEASE WITHOUT BEHAVIORAL DISTURBANCE (HCC): ICD-10-CM

## 2019-07-09 DIAGNOSIS — F02.80 LATE ONSET ALZHEIMER'S DISEASE WITHOUT BEHAVIORAL DISTURBANCE (HCC): ICD-10-CM

## 2019-07-09 PROCEDURE — 99214 OFFICE O/P EST MOD 30 MIN: CPT | Performed by: PSYCHIATRY & NEUROLOGY

## 2019-07-09 RX ORDER — DONEPEZIL HYDROCHLORIDE 10 MG/1
20 TABLET, FILM COATED ORAL
Qty: 60 TAB | Refills: 0 | Status: ON HOLD | OUTPATIENT
Start: 2019-07-09 | End: 2019-08-16

## 2019-07-09 RX ORDER — OMEGA-3 FATTY ACIDS/FISH OIL 300-1000MG
1 CAPSULE ORAL EVERY 6 HOURS PRN
Status: ON HOLD | COMMUNITY
End: 2019-08-20

## 2019-07-09 NOTE — PROGRESS NOTES
Subjective:      Ramya Bhakta is a 85 y.o. female who presents with her niece Lisa, for follow-up, with a history of severe cognitive impairment, most likely dementia due to Alzheimer's disease.     HPI    Still at her resident facility, Ramya continues to insist that she lives at home, but then when corrected, she states that she does live in her own apartment, she goes to the cafeteria and dining room to eat, she actually remembers her table mates by name.  She states she takes medications, but it is actually Lisa who puts them out, has had to change her schedule so that she takes them consistently.    There have been no problems with nausea, diarrhea or vomiting, she sleeps well without hallucinations or nightmares, she does get out and socialize.  She does repeat herself still, has difficulty retaining recently attended events.  Appetite and weight are stable, she has not been falling, there has been no real distortion with her ADLs, her personality has been unchanged.    Now on Aricept 10 mg at midday, Lisa feels that things have continued to worsen slowly.  The patient herself is unaware of any of this as being significant.    EEG done back on November 26, 2018 was normal.  MRI scan of the brain as well revealed some asymmetric atrophy possibly consistent with Alzheimer's disease, no significant parenchymal abnormalities or NPH.  Blood work looking for medical illnesses associated with cognitive impairment also proved unremarkable.  Her apo-E genotyping was 3/4, increasing her risk of her dementing illness as being due to Alzheimer's.    Medical, surgical and family histories are reviewed in the electronic health record, there are no new drug allergies.  The patient herself is unaware of anyone in the family with fact both her sisters and her mother all suffered from the disease.  Other than Aricept 10 mg every afternoon, she is on Neurontin 100 mg every morning and 200 mill grams every evening, also  dicyclomine and Advil.    Review of Systems   Unable to perform ROS: Dementia        Objective:     /62 (BP Location: Left arm, Patient Position: Sitting, BP Cuff Size: Adult)   Pulse 68   Temp 36.4 °C (97.6 °F) (Temporal)   Ht 1.524 m (5')   Wt 63.5 kg (140 lb)   SpO2 96%   BMI 27.34 kg/m²      Physical Exam    She appears in no acute distress.  Her vital signs are stable.  There is no malar rash.  The neck is supple.  Cardiac evaluation is unremarkable.    She repeats herself throughout the evaluation, often looking at Lisa when questions are asked of her.  She has no concept of the date or place where my office is located, she does not remember who I am or why she had seen me.  She does deny that she has major memory problems.  Frontal release signs are absent.  There is no aphasia, apraxia, or inattention.    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  Visual fields are full with the right. Otherwise, in quick and cursory fashion, with observation, cranial nerve, musculoskeletal and coordination evaluations are unremarkable and undisturbed.       Assessment/Plan:     1. Late onset Alzheimer's disease without behavioral disturbance  She does have significant disease, most likely Alzheimer's type, I would like to increase the Aricept a little bit further, we will go to 20 mg daily for 1 month and then eventually 23 mg daily.  GI side effects were reviewed.  Lisa will call the office to update through the titration, and so that we can write a 23 mg strength tablet.  She seems to be doing well in her present living circumstance, there is no reason to rock the boat in this.  We can follow-up in about another 8 or 9 months and a repeat MOCA will be performed.  Hopefully will be able to see an more clean benefit symptomatically with a higher dose.    - donepezil (ARICEPT) 10 MG tablet; Take 2 Tabs by mouth every day.  Dispense: 60 Tab; Refill: 0    Time: 25-minute  spent face-to-face for exam, review, discussion, and education, of this over 50% of the time spent counseling and coordinating care surrounding all of the above issues.

## 2019-07-17 ENCOUNTER — HOSPITAL ENCOUNTER (OUTPATIENT)
Dept: RADIOLOGY | Facility: MEDICAL CENTER | Age: 84
End: 2019-07-17
Attending: NEUROLOGICAL SURGERY
Payer: MEDICARE

## 2019-07-17 DIAGNOSIS — M54.40 LOW BACK PAIN WITH SCIATICA, SCIATICA LATERALITY UNSPECIFIED, UNSPECIFIED BACK PAIN LATERALITY, UNSPECIFIED CHRONICITY: ICD-10-CM

## 2019-07-17 PROCEDURE — 72110 X-RAY EXAM L-2 SPINE 4/>VWS: CPT

## 2019-07-29 ENCOUNTER — OFFICE VISIT (OUTPATIENT)
Dept: MEDICAL GROUP | Facility: MEDICAL CENTER | Age: 84
End: 2019-07-29
Payer: MEDICARE

## 2019-07-29 VITALS
HEIGHT: 60 IN | TEMPERATURE: 97.8 F | WEIGHT: 143 LBS | DIASTOLIC BLOOD PRESSURE: 78 MMHG | SYSTOLIC BLOOD PRESSURE: 128 MMHG | HEART RATE: 85 BPM | OXYGEN SATURATION: 95 % | BODY MASS INDEX: 28.07 KG/M2

## 2019-07-29 DIAGNOSIS — G30.1 LATE ONSET ALZHEIMER'S DISEASE WITHOUT BEHAVIORAL DISTURBANCE (HCC): ICD-10-CM

## 2019-07-29 DIAGNOSIS — Z01.818 PRE-OP EXAM: ICD-10-CM

## 2019-07-29 DIAGNOSIS — F02.80 LATE ONSET ALZHEIMER'S DISEASE WITHOUT BEHAVIORAL DISTURBANCE (HCC): ICD-10-CM

## 2019-07-29 PROCEDURE — 93000 ELECTROCARDIOGRAM COMPLETE: CPT | Performed by: INTERNAL MEDICINE

## 2019-07-29 PROCEDURE — 99213 OFFICE O/P EST LOW 20 MIN: CPT | Performed by: INTERNAL MEDICINE

## 2019-07-29 NOTE — PROGRESS NOTES
CC: Preop evaluation back pain.                                                                                                                                    HPI:   Ramya presents today with the following.    1. Pre-op exam/ Late onset Alzheimer's disease without behavioral disturbance  Resents with plans for laminectomy in the coming months reporting that her pain is severe on occasion.  She does have very poor memory.  She is active denying any chest pain or progressive shortness of breath no orthopnea or progressive edema.  She does have rather severe dementia does not pose a great surgical candidate in terms of memory postanesthesia however there seems to be little alternatives for pain control per neurosurgery.      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     • Ibuprofen (ADVIL) 200 MG Cap Take  by mouth.     • donepezil (ARICEPT) 10 MG tablet Take 2 Tabs by mouth every day. 60 Tab 0   • gabapentin (NEURONTIN) 100 MG Cap TK 1 TO 2 CS  PO BID AS TOLERATED FOR PAIN  2   • acetaminophen (TYLENOL) 325 MG Tab Take 650 mg by mouth every four hours as needed.     • calcium carbonate (TUMS) 500 MG Chew Tab Take 1,000 mg by mouth 3 times a day as needed (bone and stomach).     • dicyclomine (BENTYL) 10 MG Cap TAKE 1 CAPSULE BY MOUTH EVERY 6 HOURS AS NEEDED FOR ABDOMINAL CRAMPS AND/OR DISCOMFORT  3   • MethylPREDNISolone (MEDROL DOSEPAK) 4 MG Tablet Therapy Pack Per package insert. (Patient not taking: Reported on 2/8/2019) 21 Tab 0     No current facility-administered medications for this visit.           Allergies as of 07/29/2019 - Reviewed 07/29/2019   Allergen Reaction Noted   • Prednisone Unspecified 08/11/2018        ROS: Denies Chest pain, SOB, LE edema.    /78 (BP Location: Left arm, Patient Position: Sitting)   Pulse 85   Temp 36.6 °C (97.8 °F)   Ht 1.524 m (5')   Wt 64.9 kg (143 lb)   SpO2 95%   BMI 27.93 kg/m²     Physical Exam:  Gen:         Alert and oriented, No apparent distress.  Neck:        No Lymphadenopathy or Bruits.  Lungs:     Clear to auscultation bilaterally  CV:          Regular rate and rhythm. No murmurs, rubs or gallops.               Ext:          No clubbing, cyanosis, edema.    EKG:  Normal sinus rythm, no acute st-t wave changes.    Assessment and Plan.   85 y.o. female with the following issues.  1. Pre-op exam/ Late onset Alzheimer's disease without behavioral disturbance  Patient is cleared for surgery with the above average risk given her underlying dementia.  Do believe she would do fine from a cardiac perspective but again postoperatively may have a prolonged recovery with increased confusion.  Do recommend that she is evaluated prior to hospitalization to ensure that daughter is able to care for at home otherwise she should go to a care facility.  - EKG; Future

## 2019-08-13 ENCOUNTER — HOSPITAL ENCOUNTER (OUTPATIENT)
Dept: RADIOLOGY | Facility: MEDICAL CENTER | Age: 84
End: 2019-08-13
Attending: NEUROLOGICAL SURGERY
Payer: MEDICARE

## 2019-08-13 DIAGNOSIS — M48.062 PSEUDOCLAUDICATION SYNDROME: ICD-10-CM

## 2019-08-13 DIAGNOSIS — M48.061 SPINAL STENOSIS, LUMBAR REGION, WITHOUT NEUROGENIC CLAUDICATION: ICD-10-CM

## 2019-08-13 DIAGNOSIS — M54.16 LUMBAR RADICULOPATHY: ICD-10-CM

## 2019-08-13 DIAGNOSIS — M47.817 LUMBOSACRAL SPONDYLOSIS WITHOUT MYELOPATHY: ICD-10-CM

## 2019-08-13 PROCEDURE — 72148 MRI LUMBAR SPINE W/O DYE: CPT

## 2019-08-13 PROCEDURE — 72131 CT LUMBAR SPINE W/O DYE: CPT

## 2019-08-15 ENCOUNTER — HOSPITAL ENCOUNTER (OUTPATIENT)
Dept: RADIOLOGY | Facility: MEDICAL CENTER | Age: 84
DRG: 519 | End: 2019-08-15
Attending: NEUROLOGICAL SURGERY | Admitting: NEUROLOGICAL SURGERY
Payer: MEDICARE

## 2019-08-15 DIAGNOSIS — Z01.810 PRE-OPERATIVE CARDIOVASCULAR EXAMINATION: ICD-10-CM

## 2019-08-15 DIAGNOSIS — Z01.811 PRE-OPERATIVE RESPIRATORY EXAMINATION: ICD-10-CM

## 2019-08-15 DIAGNOSIS — Z01.812 PRE-OPERATIVE LABORATORY EXAMINATION: ICD-10-CM

## 2019-08-15 LAB
ANION GAP SERPL CALC-SCNC: 9 MMOL/L (ref 0–11.9)
APTT PPP: 24.2 SEC (ref 24.7–36)
BASOPHILS # BLD AUTO: 0.3 % (ref 0–1.8)
BASOPHILS # BLD: 0.02 K/UL (ref 0–0.12)
BUN SERPL-MCNC: 36 MG/DL (ref 8–22)
CALCIUM SERPL-MCNC: 10.1 MG/DL (ref 8.5–10.5)
CHLORIDE SERPL-SCNC: 105 MMOL/L (ref 96–112)
CO2 SERPL-SCNC: 26 MMOL/L (ref 20–33)
CREAT SERPL-MCNC: 0.87 MG/DL (ref 0.5–1.4)
EKG IMPRESSION: NORMAL
EOSINOPHIL # BLD AUTO: 0.04 K/UL (ref 0–0.51)
EOSINOPHIL NFR BLD: 0.6 % (ref 0–6.9)
ERYTHROCYTE [DISTWIDTH] IN BLOOD BY AUTOMATED COUNT: 49.1 FL (ref 35.9–50)
GLUCOSE SERPL-MCNC: 83 MG/DL (ref 65–99)
HCT VFR BLD AUTO: 42.9 % (ref 37–47)
HGB BLD-MCNC: 13.3 G/DL (ref 12–16)
IMM GRANULOCYTES # BLD AUTO: 0.01 K/UL (ref 0–0.11)
IMM GRANULOCYTES NFR BLD AUTO: 0.2 % (ref 0–0.9)
INR PPP: 1 (ref 0.87–1.13)
LYMPHOCYTES # BLD AUTO: 1.71 K/UL (ref 1–4.8)
LYMPHOCYTES NFR BLD: 26.7 % (ref 22–41)
MCH RBC QN AUTO: 30.5 PG (ref 27–33)
MCHC RBC AUTO-ENTMCNC: 31 G/DL (ref 33.6–35)
MCV RBC AUTO: 98.4 FL (ref 81.4–97.8)
MONOCYTES # BLD AUTO: 0.78 K/UL (ref 0–0.85)
MONOCYTES NFR BLD AUTO: 12.2 % (ref 0–13.4)
NEUTROPHILS # BLD AUTO: 3.85 K/UL (ref 2–7.15)
NEUTROPHILS NFR BLD: 60 % (ref 44–72)
NRBC # BLD AUTO: 0 K/UL
NRBC BLD-RTO: 0 /100 WBC
PLATELET # BLD AUTO: 254 K/UL (ref 164–446)
PMV BLD AUTO: 9.2 FL (ref 9–12.9)
POTASSIUM SERPL-SCNC: 4.7 MMOL/L (ref 3.6–5.5)
PROTHROMBIN TIME: 13.4 SEC (ref 12–14.6)
RBC # BLD AUTO: 4.36 M/UL (ref 4.2–5.4)
SODIUM SERPL-SCNC: 140 MMOL/L (ref 135–145)
WBC # BLD AUTO: 6.4 K/UL (ref 4.8–10.8)

## 2019-08-15 PROCEDURE — 00NY0ZZ RELEASE LUMBAR SPINAL CORD, OPEN APPROACH: ICD-10-PCS | Performed by: NEUROLOGICAL SURGERY

## 2019-08-15 PROCEDURE — 71045 X-RAY EXAM CHEST 1 VIEW: CPT

## 2019-08-15 PROCEDURE — 93005 ELECTROCARDIOGRAM TRACING: CPT | Performed by: NEUROLOGICAL SURGERY

## 2019-08-15 PROCEDURE — 85025 COMPLETE CBC W/AUTO DIFF WBC: CPT

## 2019-08-15 PROCEDURE — 36415 COLL VENOUS BLD VENIPUNCTURE: CPT

## 2019-08-15 PROCEDURE — 85610 PROTHROMBIN TIME: CPT

## 2019-08-15 PROCEDURE — 80048 BASIC METABOLIC PNL TOTAL CA: CPT

## 2019-08-15 PROCEDURE — 01NB0ZZ RELEASE LUMBAR NERVE, OPEN APPROACH: ICD-10-PCS | Performed by: NEUROLOGICAL SURGERY

## 2019-08-15 PROCEDURE — 85730 THROMBOPLASTIN TIME PARTIAL: CPT

## 2019-08-15 SDOH — HEALTH STABILITY: MENTAL HEALTH: HOW OFTEN DO YOU HAVE A DRINK CONTAINING ALCOHOL?: 2-3 TIMES A WEEK

## 2019-08-16 ENCOUNTER — APPOINTMENT (OUTPATIENT)
Dept: RADIOLOGY | Facility: MEDICAL CENTER | Age: 84
DRG: 519 | End: 2019-08-16
Attending: NEUROLOGICAL SURGERY
Payer: MEDICARE

## 2019-08-16 ENCOUNTER — HOSPITAL ENCOUNTER (INPATIENT)
Facility: MEDICAL CENTER | Age: 84
LOS: 4 days | DRG: 519 | End: 2019-08-20
Attending: NEUROLOGICAL SURGERY | Admitting: NEUROLOGICAL SURGERY
Payer: MEDICARE

## 2019-08-16 ENCOUNTER — ANESTHESIA EVENT (OUTPATIENT)
Dept: SURGERY | Facility: MEDICAL CENTER | Age: 84
DRG: 519 | End: 2019-08-16
Payer: MEDICARE

## 2019-08-16 ENCOUNTER — ANESTHESIA (OUTPATIENT)
Dept: SURGERY | Facility: MEDICAL CENTER | Age: 84
DRG: 519 | End: 2019-08-16
Payer: MEDICARE

## 2019-08-16 DIAGNOSIS — M48.061 SPINAL STENOSIS OF LUMBAR REGION, UNSPECIFIED WHETHER NEUROGENIC CLAUDICATION PRESENT: Primary | ICD-10-CM

## 2019-08-16 PROCEDURE — 500444 HCHG HEMOSTAT, SURGICEL 2X3: Performed by: NEUROLOGICAL SURGERY

## 2019-08-16 PROCEDURE — 500864 HCHG NEEDLE, SPINAL 18G: Performed by: NEUROLOGICAL SURGERY

## 2019-08-16 PROCEDURE — 160035 HCHG PACU - 1ST 60 MINS PHASE I: Performed by: NEUROLOGICAL SURGERY

## 2019-08-16 PROCEDURE — 500885 HCHG PACK, JACKSON TABLE: Performed by: NEUROLOGICAL SURGERY

## 2019-08-16 PROCEDURE — 700111 HCHG RX REV CODE 636 W/ 250 OVERRIDE (IP): Performed by: ANESTHESIOLOGY

## 2019-08-16 PROCEDURE — A9270 NON-COVERED ITEM OR SERVICE: HCPCS | Performed by: ANESTHESIOLOGY

## 2019-08-16 PROCEDURE — 500423 HCHG DRESSING, ABD COMBINE: Performed by: NEUROLOGICAL SURGERY

## 2019-08-16 PROCEDURE — A9270 NON-COVERED ITEM OR SERVICE: HCPCS | Performed by: PHYSICIAN ASSISTANT

## 2019-08-16 PROCEDURE — 700105 HCHG RX REV CODE 258: Performed by: PHYSICIAN ASSISTANT

## 2019-08-16 PROCEDURE — 700105 HCHG RX REV CODE 258: Performed by: NEUROLOGICAL SURGERY

## 2019-08-16 PROCEDURE — 700102 HCHG RX REV CODE 250 W/ 637 OVERRIDE(OP): Performed by: NEUROLOGICAL SURGERY

## 2019-08-16 PROCEDURE — 700101 HCHG RX REV CODE 250: Performed by: NEUROLOGICAL SURGERY

## 2019-08-16 PROCEDURE — 160048 HCHG OR STATISTICAL LEVEL 1-5: Performed by: NEUROLOGICAL SURGERY

## 2019-08-16 PROCEDURE — 160029 HCHG SURGERY MINUTES - 1ST 30 MINS LEVEL 4: Performed by: NEUROLOGICAL SURGERY

## 2019-08-16 PROCEDURE — 700102 HCHG RX REV CODE 250 W/ 637 OVERRIDE(OP): Performed by: PHYSICIAN ASSISTANT

## 2019-08-16 PROCEDURE — 500331 HCHG COTTONOID, SURG PATTIE: Performed by: NEUROLOGICAL SURGERY

## 2019-08-16 PROCEDURE — A9270 NON-COVERED ITEM OR SERVICE: HCPCS

## 2019-08-16 PROCEDURE — 700112 HCHG RX REV CODE 229: Performed by: PHYSICIAN ASSISTANT

## 2019-08-16 PROCEDURE — 700102 HCHG RX REV CODE 250 W/ 637 OVERRIDE(OP)

## 2019-08-16 PROCEDURE — 501838 HCHG SUTURE GENERAL: Performed by: NEUROLOGICAL SURGERY

## 2019-08-16 PROCEDURE — 700111 HCHG RX REV CODE 636 W/ 250 OVERRIDE (IP): Performed by: PHYSICIAN ASSISTANT

## 2019-08-16 PROCEDURE — 72020 X-RAY EXAM OF SPINE 1 VIEW: CPT

## 2019-08-16 PROCEDURE — 160009 HCHG ANES TIME/MIN: Performed by: NEUROLOGICAL SURGERY

## 2019-08-16 PROCEDURE — 160036 HCHG PACU - EA ADDL 30 MINS PHASE I: Performed by: NEUROLOGICAL SURGERY

## 2019-08-16 PROCEDURE — 700102 HCHG RX REV CODE 250 W/ 637 OVERRIDE(OP): Performed by: ANESTHESIOLOGY

## 2019-08-16 PROCEDURE — A9270 NON-COVERED ITEM OR SERVICE: HCPCS | Performed by: NEUROLOGICAL SURGERY

## 2019-08-16 PROCEDURE — 110371 HCHG SHELL REV 272: Performed by: NEUROLOGICAL SURGERY

## 2019-08-16 PROCEDURE — 700111 HCHG RX REV CODE 636 W/ 250 OVERRIDE (IP): Performed by: NEUROLOGICAL SURGERY

## 2019-08-16 PROCEDURE — 160002 HCHG RECOVERY MINUTES (STAT): Performed by: NEUROLOGICAL SURGERY

## 2019-08-16 PROCEDURE — 700101 HCHG RX REV CODE 250: Performed by: ANESTHESIOLOGY

## 2019-08-16 PROCEDURE — 160041 HCHG SURGERY MINUTES - EA ADDL 1 MIN LEVEL 4: Performed by: NEUROLOGICAL SURGERY

## 2019-08-16 PROCEDURE — 770001 HCHG ROOM/CARE - MED/SURG/GYN PRIV*

## 2019-08-16 RX ORDER — ACETAMINOPHEN 500 MG
500 TABLET ORAL ONCE
Status: COMPLETED | OUTPATIENT
Start: 2019-08-16 | End: 2019-08-16

## 2019-08-16 RX ORDER — ONDANSETRON 2 MG/ML
4 INJECTION INTRAMUSCULAR; INTRAVENOUS
Status: DISCONTINUED | OUTPATIENT
Start: 2019-08-16 | End: 2019-08-16 | Stop reason: HOSPADM

## 2019-08-16 RX ORDER — SODIUM CHLORIDE, SODIUM LACTATE, POTASSIUM CHLORIDE, CALCIUM CHLORIDE 600; 310; 30; 20 MG/100ML; MG/100ML; MG/100ML; MG/100ML
INJECTION, SOLUTION INTRAVENOUS CONTINUOUS
Status: DISCONTINUED | OUTPATIENT
Start: 2019-08-16 | End: 2019-08-16 | Stop reason: HOSPADM

## 2019-08-16 RX ORDER — HYDROMORPHONE HYDROCHLORIDE 1 MG/ML
0.2 INJECTION, SOLUTION INTRAMUSCULAR; INTRAVENOUS; SUBCUTANEOUS
Status: DISCONTINUED | OUTPATIENT
Start: 2019-08-16 | End: 2019-08-16 | Stop reason: HOSPADM

## 2019-08-16 RX ORDER — HYDRALAZINE HYDROCHLORIDE 20 MG/ML
10 INJECTION INTRAMUSCULAR; INTRAVENOUS
Status: DISCONTINUED | OUTPATIENT
Start: 2019-08-16 | End: 2019-08-20 | Stop reason: HOSPADM

## 2019-08-16 RX ORDER — DIPHENHYDRAMINE HCL 25 MG
25 TABLET ORAL EVERY 6 HOURS PRN
Status: DISCONTINUED | OUTPATIENT
Start: 2019-08-16 | End: 2019-08-20 | Stop reason: HOSPADM

## 2019-08-16 RX ORDER — OXYCODONE HYDROCHLORIDE 5 MG/1
2.5 TABLET ORAL EVERY 4 HOURS PRN
Status: DISCONTINUED | OUTPATIENT
Start: 2019-08-16 | End: 2019-08-20 | Stop reason: HOSPADM

## 2019-08-16 RX ORDER — BISACODYL 10 MG
10 SUPPOSITORY, RECTAL RECTAL
Status: DISCONTINUED | OUTPATIENT
Start: 2019-08-16 | End: 2019-08-20 | Stop reason: HOSPADM

## 2019-08-16 RX ORDER — OXYCODONE HCL 5 MG/5 ML
5 SOLUTION, ORAL ORAL
Status: COMPLETED | OUTPATIENT
Start: 2019-08-16 | End: 2019-08-16

## 2019-08-16 RX ORDER — SODIUM CHLORIDE 9 MG/ML
INJECTION, SOLUTION INTRAVENOUS CONTINUOUS
Status: DISCONTINUED | OUTPATIENT
Start: 2019-08-16 | End: 2019-08-19

## 2019-08-16 RX ORDER — ONDANSETRON 4 MG/1
4 TABLET, ORALLY DISINTEGRATING ORAL EVERY 4 HOURS PRN
Status: DISCONTINUED | OUTPATIENT
Start: 2019-08-16 | End: 2019-08-20 | Stop reason: HOSPADM

## 2019-08-16 RX ORDER — DONEPEZIL HYDROCHLORIDE 10 MG/1
20 TABLET, FILM COATED ORAL DAILY
COMMUNITY
End: 2020-11-19 | Stop reason: SDUPTHER

## 2019-08-16 RX ORDER — DONEPEZIL HYDROCHLORIDE 5 MG/1
20 TABLET, FILM COATED ORAL DAILY
Status: DISCONTINUED | OUTPATIENT
Start: 2019-08-17 | End: 2019-08-20 | Stop reason: HOSPADM

## 2019-08-16 RX ORDER — CALCIUM CARBONATE 500 MG/1
500 TABLET, CHEWABLE ORAL 2 TIMES DAILY
Status: DISCONTINUED | OUTPATIENT
Start: 2019-08-16 | End: 2019-08-20 | Stop reason: HOSPADM

## 2019-08-16 RX ORDER — LIDOCAINE HYDROCHLORIDE 40 MG/ML
SOLUTION TOPICAL PRN
Status: DISCONTINUED | OUTPATIENT
Start: 2019-08-16 | End: 2019-08-16 | Stop reason: SURG

## 2019-08-16 RX ORDER — OXYCODONE HCL 5 MG/5 ML
2.5 SOLUTION, ORAL ORAL
Status: COMPLETED | OUTPATIENT
Start: 2019-08-16 | End: 2019-08-16

## 2019-08-16 RX ORDER — OXYCODONE HYDROCHLORIDE 5 MG/1
5 TABLET ORAL EVERY 4 HOURS PRN
Status: DISCONTINUED | OUTPATIENT
Start: 2019-08-16 | End: 2019-08-20 | Stop reason: HOSPADM

## 2019-08-16 RX ORDER — GABAPENTIN 100 MG/1
100 CAPSULE ORAL 2 TIMES DAILY
Status: DISCONTINUED | OUTPATIENT
Start: 2019-08-16 | End: 2019-08-20 | Stop reason: HOSPADM

## 2019-08-16 RX ORDER — ENEMA 19; 7 G/133ML; G/133ML
1 ENEMA RECTAL
Status: DISCONTINUED | OUTPATIENT
Start: 2019-08-16 | End: 2019-08-20 | Stop reason: HOSPADM

## 2019-08-16 RX ORDER — HYDROMORPHONE HYDROCHLORIDE 1 MG/ML
0.4 INJECTION, SOLUTION INTRAMUSCULAR; INTRAVENOUS; SUBCUTANEOUS
Status: DISCONTINUED | OUTPATIENT
Start: 2019-08-16 | End: 2019-08-16 | Stop reason: HOSPADM

## 2019-08-16 RX ORDER — BUPIVACAINE HYDROCHLORIDE AND EPINEPHRINE 5; 5 MG/ML; UG/ML
INJECTION, SOLUTION EPIDURAL; INTRACAUDAL; PERINEURAL
Status: DISCONTINUED | OUTPATIENT
Start: 2019-08-16 | End: 2019-08-16 | Stop reason: HOSPADM

## 2019-08-16 RX ORDER — ONDANSETRON 2 MG/ML
4 INJECTION INTRAMUSCULAR; INTRAVENOUS EVERY 4 HOURS PRN
Status: DISCONTINUED | OUTPATIENT
Start: 2019-08-16 | End: 2019-08-20 | Stop reason: HOSPADM

## 2019-08-16 RX ORDER — OXYMETAZOLINE HYDROCHLORIDE 0.05 G/100ML
3 SPRAY NASAL
Status: DISCONTINUED | OUTPATIENT
Start: 2019-08-16 | End: 2019-08-20 | Stop reason: HOSPADM

## 2019-08-16 RX ORDER — SODIUM CHLORIDE, SODIUM LACTATE, POTASSIUM CHLORIDE, CALCIUM CHLORIDE 600; 310; 30; 20 MG/100ML; MG/100ML; MG/100ML; MG/100ML
INJECTION, SOLUTION INTRAVENOUS CONTINUOUS
Status: ACTIVE | OUTPATIENT
Start: 2019-08-16 | End: 2019-08-17

## 2019-08-16 RX ORDER — HYDROMORPHONE HYDROCHLORIDE 1 MG/ML
0.1 INJECTION, SOLUTION INTRAMUSCULAR; INTRAVENOUS; SUBCUTANEOUS
Status: DISCONTINUED | OUTPATIENT
Start: 2019-08-16 | End: 2019-08-16 | Stop reason: HOSPADM

## 2019-08-16 RX ORDER — LIDOCAINE HYDROCHLORIDE 20 MG/ML
INJECTION, SOLUTION EPIDURAL; INFILTRATION; INTRACAUDAL; PERINEURAL PRN
Status: DISCONTINUED | OUTPATIENT
Start: 2019-08-16 | End: 2019-08-16 | Stop reason: SURG

## 2019-08-16 RX ORDER — CEFAZOLIN SODIUM 2 G/100ML
2 INJECTION, SOLUTION INTRAVENOUS EVERY 8 HOURS
Status: COMPLETED | OUTPATIENT
Start: 2019-08-16 | End: 2019-08-17

## 2019-08-16 RX ORDER — POLYETHYLENE GLYCOL 3350 17 G/17G
1 POWDER, FOR SOLUTION ORAL 2 TIMES DAILY PRN
Status: DISCONTINUED | OUTPATIENT
Start: 2019-08-16 | End: 2019-08-20 | Stop reason: HOSPADM

## 2019-08-16 RX ORDER — AMOXICILLIN 250 MG
1 CAPSULE ORAL
Status: DISCONTINUED | OUTPATIENT
Start: 2019-08-16 | End: 2019-08-20 | Stop reason: HOSPADM

## 2019-08-16 RX ORDER — AMOXICILLIN 250 MG
1 CAPSULE ORAL NIGHTLY
Status: DISCONTINUED | OUTPATIENT
Start: 2019-08-16 | End: 2019-08-20 | Stop reason: HOSPADM

## 2019-08-16 RX ORDER — DOCUSATE SODIUM 100 MG/1
100 CAPSULE, LIQUID FILLED ORAL 2 TIMES DAILY
Status: DISCONTINUED | OUTPATIENT
Start: 2019-08-16 | End: 2019-08-20 | Stop reason: HOSPADM

## 2019-08-16 RX ORDER — VANCOMYCIN HYDROCHLORIDE 1 G/20ML
INJECTION, POWDER, LYOPHILIZED, FOR SOLUTION INTRAVENOUS
Status: COMPLETED | OUTPATIENT
Start: 2019-08-16 | End: 2019-08-16

## 2019-08-16 RX ORDER — DEXMEDETOMIDINE HYDROCHLORIDE 100 UG/ML
INJECTION, SOLUTION INTRAVENOUS PRN
Status: DISCONTINUED | OUTPATIENT
Start: 2019-08-16 | End: 2019-08-16 | Stop reason: SURG

## 2019-08-16 RX ORDER — PHENYLEPHRINE HYDROCHLORIDE 10 MG/ML
INJECTION, SOLUTION INTRAMUSCULAR; INTRAVENOUS; SUBCUTANEOUS PRN
Status: DISCONTINUED | OUTPATIENT
Start: 2019-08-16 | End: 2019-08-16 | Stop reason: SURG

## 2019-08-16 RX ORDER — DIPHENHYDRAMINE HYDROCHLORIDE 50 MG/ML
6.25 INJECTION INTRAMUSCULAR; INTRAVENOUS
Status: DISCONTINUED | OUTPATIENT
Start: 2019-08-16 | End: 2019-08-16 | Stop reason: HOSPADM

## 2019-08-16 RX ORDER — ONDANSETRON 2 MG/ML
INJECTION INTRAMUSCULAR; INTRAVENOUS PRN
Status: DISCONTINUED | OUTPATIENT
Start: 2019-08-16 | End: 2019-08-16 | Stop reason: SURG

## 2019-08-16 RX ORDER — DEXAMETHASONE SODIUM PHOSPHATE 4 MG/ML
INJECTION, SOLUTION INTRA-ARTICULAR; INTRALESIONAL; INTRAMUSCULAR; INTRAVENOUS; SOFT TISSUE PRN
Status: DISCONTINUED | OUTPATIENT
Start: 2019-08-16 | End: 2019-08-16 | Stop reason: SURG

## 2019-08-16 RX ORDER — BACITRACIN ZINC 500 [USP'U]/G
OINTMENT TOPICAL
Status: DISCONTINUED | OUTPATIENT
Start: 2019-08-16 | End: 2019-08-16 | Stop reason: HOSPADM

## 2019-08-16 RX ORDER — HALOPERIDOL 5 MG/ML
1 INJECTION INTRAMUSCULAR
Status: DISCONTINUED | OUTPATIENT
Start: 2019-08-16 | End: 2019-08-16 | Stop reason: HOSPADM

## 2019-08-16 RX ORDER — ACETAMINOPHEN 500 MG
500 TABLET ORAL EVERY 6 HOURS PRN
Status: DISCONTINUED | OUTPATIENT
Start: 2019-08-16 | End: 2019-08-19

## 2019-08-16 RX ORDER — GABAPENTIN 100 MG/1
100 CAPSULE ORAL
Status: ACTIVE | OUTPATIENT
Start: 2019-08-16 | End: 2019-08-17

## 2019-08-16 RX ORDER — CEFAZOLIN SODIUM 1 G/3ML
INJECTION, POWDER, FOR SOLUTION INTRAMUSCULAR; INTRAVENOUS PRN
Status: DISCONTINUED | OUTPATIENT
Start: 2019-08-16 | End: 2019-08-16 | Stop reason: SURG

## 2019-08-16 RX ORDER — DIPHENHYDRAMINE HYDROCHLORIDE 50 MG/ML
25 INJECTION INTRAMUSCULAR; INTRAVENOUS EVERY 6 HOURS PRN
Status: DISCONTINUED | OUTPATIENT
Start: 2019-08-16 | End: 2019-08-20 | Stop reason: HOSPADM

## 2019-08-16 RX ORDER — GABAPENTIN 300 MG/1
CAPSULE ORAL
Status: COMPLETED
Start: 2019-08-16 | End: 2019-08-16

## 2019-08-16 RX ORDER — ROCURONIUM BROMIDE 10 MG/ML
INJECTION, SOLUTION INTRAVENOUS PRN
Status: DISCONTINUED | OUTPATIENT
Start: 2019-08-16 | End: 2019-08-16 | Stop reason: SURG

## 2019-08-16 RX ORDER — HYDRALAZINE HYDROCHLORIDE 20 MG/ML
5 INJECTION INTRAMUSCULAR; INTRAVENOUS
Status: DISCONTINUED | OUTPATIENT
Start: 2019-08-16 | End: 2019-08-16 | Stop reason: HOSPADM

## 2019-08-16 RX ADMIN — EPHEDRINE SULFATE 10 MG: 50 INJECTION, SOLUTION INTRAVENOUS at 15:43

## 2019-08-16 RX ADMIN — ACETAMINOPHEN 500 MG: 500 TABLET ORAL at 21:14

## 2019-08-16 RX ADMIN — GABAPENTIN 100 MG: 100 CAPSULE ORAL at 20:48

## 2019-08-16 RX ADMIN — ONDANSETRON 4 MG: 2 INJECTION INTRAMUSCULAR; INTRAVENOUS at 16:33

## 2019-08-16 RX ADMIN — OXYCODONE HYDROCHLORIDE 5 MG: 5 SOLUTION ORAL at 17:45

## 2019-08-16 RX ADMIN — SENNOSIDES, DOCUSATE SODIUM 1 TABLET: 50; 8.6 TABLET, FILM COATED ORAL at 20:48

## 2019-08-16 RX ADMIN — ROCURONIUM BROMIDE 40 MG: 10 INJECTION, SOLUTION INTRAVENOUS at 14:49

## 2019-08-16 RX ADMIN — OXYMETAZOLINE HCL 3 SPRAY: 0.05 SPRAY NASAL at 17:20

## 2019-08-16 RX ADMIN — SODIUM CHLORIDE, POTASSIUM CHLORIDE, SODIUM LACTATE AND CALCIUM CHLORIDE: 600; 310; 30; 20 INJECTION, SOLUTION INTRAVENOUS at 13:28

## 2019-08-16 RX ADMIN — EPHEDRINE SULFATE 10 MG: 50 INJECTION, SOLUTION INTRAVENOUS at 15:20

## 2019-08-16 RX ADMIN — HALOPERIDOL LACTATE 1 MG: 5 INJECTION, SOLUTION INTRAMUSCULAR at 17:10

## 2019-08-16 RX ADMIN — DOCUSATE SODIUM 100 MG: 100 CAPSULE, LIQUID FILLED ORAL at 20:48

## 2019-08-16 RX ADMIN — ROCURONIUM BROMIDE 10 MG: 10 INJECTION, SOLUTION INTRAVENOUS at 16:03

## 2019-08-16 RX ADMIN — FENTANYL CITRATE 50 MCG: 50 INJECTION, SOLUTION INTRAMUSCULAR; INTRAVENOUS at 17:00

## 2019-08-16 RX ADMIN — CEFAZOLIN SODIUM 2 G: 2 INJECTION, SOLUTION INTRAVENOUS at 23:22

## 2019-08-16 RX ADMIN — OXYCODONE HYDROCHLORIDE 5 MG: 5 TABLET ORAL at 19:14

## 2019-08-16 RX ADMIN — FENTANYL CITRATE 50 MCG: 50 INJECTION, SOLUTION INTRAMUSCULAR; INTRAVENOUS at 16:37

## 2019-08-16 RX ADMIN — EPHEDRINE SULFATE 10 MG: 50 INJECTION, SOLUTION INTRAVENOUS at 14:58

## 2019-08-16 RX ADMIN — DEXAMETHASONE SODIUM PHOSPHATE 8 MG: 4 INJECTION, SOLUTION INTRA-ARTICULAR; INTRALESIONAL; INTRAMUSCULAR; INTRAVENOUS; SOFT TISSUE at 14:52

## 2019-08-16 RX ADMIN — ANTACID TABLETS 500 MG: 500 TABLET, CHEWABLE ORAL at 20:48

## 2019-08-16 RX ADMIN — SODIUM CHLORIDE: 9 INJECTION, SOLUTION INTRAVENOUS at 20:51

## 2019-08-16 RX ADMIN — PHENYLEPHRINE HYDROCHLORIDE 50 MCG: 10 INJECTION INTRAVENOUS at 16:21

## 2019-08-16 RX ADMIN — LIDOCAINE HYDROCHLORIDE 2 ML: 20 INJECTION, SOLUTION EPIDURAL; INFILTRATION; INTRACAUDAL at 14:49

## 2019-08-16 RX ADMIN — EPHEDRINE SULFATE 10 MG: 50 INJECTION, SOLUTION INTRAVENOUS at 15:12

## 2019-08-16 RX ADMIN — ACETAMINOPHEN 500 MG: 500 TABLET ORAL at 13:27

## 2019-08-16 RX ADMIN — DEXMEDETOMIDINE HYDROCHLORIDE 25 MCG: 100 INJECTION, SOLUTION INTRAVENOUS at 14:52

## 2019-08-16 RX ADMIN — CEFAZOLIN 2 G: 330 INJECTION, POWDER, FOR SOLUTION INTRAMUSCULAR; INTRAVENOUS at 14:49

## 2019-08-16 RX ADMIN — PROPOFOL 100 MG: 10 INJECTION, EMULSION INTRAVENOUS at 14:49

## 2019-08-16 RX ADMIN — GABAPENTIN 300 MG: 300 CAPSULE ORAL at 13:26

## 2019-08-16 RX ADMIN — LIDOCAINE HYDROCHLORIDE 4 ML: 40 SOLUTION TOPICAL at 14:50

## 2019-08-16 RX ADMIN — FENTANYL CITRATE 50 MCG: 50 INJECTION, SOLUTION INTRAMUSCULAR; INTRAVENOUS at 14:49

## 2019-08-16 RX ADMIN — SUGAMMADEX 120 MG: 100 INJECTION, SOLUTION INTRAVENOUS at 16:39

## 2019-08-16 NOTE — ANESTHESIA PREPROCEDURE EVALUATION
Past Medical History:   Diagnosis Date   • Acute pain of right knee 3/6/2017   • Arthritis    • Cancer (HCC)     basel cell skin   • CATARACT     right eye   • Dry cough    • Glaucoma    • Hepatitis 1955   • Hepatitis A 1955   • Jaundice 1955   • Pain     back         Relevant Problems   GI   (+) GERD (gastroesophageal reflux disease)      Other   (+) Dementia without behavioral disturbance       Physical Exam    Airway   Mallampati: II  TM distance: >3 FB  Neck ROM: full       Cardiovascular - normal exam  Rhythm: regular  Rate: normal  (-) murmur     Dental - normal exam         Pulmonary - normal exam  Breath sounds clear to auscultation     Abdominal    Neurological - normal exam                 Anesthesia Plan    ASA 2       Plan - general       Airway plan will be ETT        Induction: intravenous    Postoperative Plan: Postoperative administration of opioids is intended.    Pertinent diagnostic labs and testing reviewed    Informed Consent:    Anesthetic plan and risks discussed with patient.    Use of blood products discussed with: patient whom consented to blood products.

## 2019-08-16 NOTE — OR SURGEON
Immediate Post OP Note    PreOp Diagnosis: L2-5 lumbar stenosis with neurogenic claudication and radiculopathy    PostOp Diagnosis: L2-5 lumbar stenosis with neurogenic claudication and radiculopathy    Procedure(s):  LAMINECTOMY, SPINE, LUMBAR, WITH [id number] - Wound Class: Clean    Surgeon(s):  Bhanu Lorenz D.O.    Anesthesiologist/Type of Anesthesia:  Anesthesiologist: Bryan Broderick M.D./General    Surgical Staff:  Assistant: Adeola Dueñas P.A.-C.  Circulator: Herlinda Sandoval R.N.; Melisa Frazier R.N.  Relief Circulator: Thang Lyons R.N.  Relief Scrub: Oscar Womack  Scrub Person: Nicole Fraga R.N.    Specimens removed if any:  * No specimens in log *    Estimated Blood Loss: 150 cc     Findings: Severe spinal stenosis the worst levels being L3-4, L4-5. Good decompression achieved. In PACU now moving all extremities antigravity with good strength.    Complications: Durotomy, duraseal, and gell foam used to seal defect.         8/16/2019 5:00 PM Bhanu Lorenz D.O.

## 2019-08-16 NOTE — ANESTHESIA PROCEDURE NOTES
Airway  Date/Time: 8/16/2019 2:50 PM  Performed by: Bryan Broderick M.D.  Authorized by: Bryan Broderick M.D.     Location:  OR  Urgency:  Elective  Difficult Airway: No    Indications for Airway Management:  Anesthesia  Spontaneous Ventilation: absent    Sedation Level:  Deep  Preoxygenated: Yes    Patient Position:  Sniffing  Mask Difficulty Assessment:  1 - vent by mask  Final Airway Type:  Endotracheal airway  Final Endotracheal Airway:  ETT  Cuffed: Yes    Technique Used for Successful ETT Placement:  Direct laryngoscopy  Insertion Site:  Oral  Blade Type:  Niya  Laryngoscope Blade/Videolaryngoscope Blade Size:  3  ETT Size (mm):  6.5  Measured from:  Teeth  ETT to Teeth (cm):  20  Placement Verified by: auscultation and capnometry    Cormack-Lehane Classification:  Grade I - full view of glottis  Number of Attempts at Approach:  1

## 2019-08-16 NOTE — ANESTHESIA TIME REPORT
Anesthesia Start and Stop Event Times     Date Time Event    8/16/2019 1425 Ready for Procedure     1447 Anesthesia Start     1650 Anesthesia Stop        Responsible Staff  08/16/19    Name Role Begin End    Bryan Broderick M.D. Anesth 1447 1650        Preop Diagnosis (Free Text):  Pre-op Diagnosis     LUMBAR STENOSIS, LUMBAR RADICULOPATHY        Preop Diagnosis (Codes):    Post op Diagnosis  Lumbar stenosis      Premium Reason  A. 3PM - 7AM    Comments:

## 2019-08-17 PROCEDURE — A9270 NON-COVERED ITEM OR SERVICE: HCPCS | Performed by: NEUROLOGICAL SURGERY

## 2019-08-17 PROCEDURE — 700102 HCHG RX REV CODE 250 W/ 637 OVERRIDE(OP): Performed by: PHYSICIAN ASSISTANT

## 2019-08-17 PROCEDURE — A9270 NON-COVERED ITEM OR SERVICE: HCPCS | Performed by: PHYSICIAN ASSISTANT

## 2019-08-17 PROCEDURE — 770001 HCHG ROOM/CARE - MED/SURG/GYN PRIV*

## 2019-08-17 PROCEDURE — 700105 HCHG RX REV CODE 258: Performed by: PHYSICIAN ASSISTANT

## 2019-08-17 PROCEDURE — 700111 HCHG RX REV CODE 636 W/ 250 OVERRIDE (IP): Performed by: PHYSICIAN ASSISTANT

## 2019-08-17 PROCEDURE — 700102 HCHG RX REV CODE 250 W/ 637 OVERRIDE(OP): Performed by: NEUROLOGICAL SURGERY

## 2019-08-17 PROCEDURE — 700112 HCHG RX REV CODE 229: Performed by: PHYSICIAN ASSISTANT

## 2019-08-17 RX ORDER — POLYVINYL ALCOHOL 14 MG/ML
1 SOLUTION/ DROPS OPHTHALMIC 2 TIMES DAILY
Status: DISCONTINUED | OUTPATIENT
Start: 2019-08-17 | End: 2019-08-20 | Stop reason: HOSPADM

## 2019-08-17 RX ADMIN — OXYCODONE HYDROCHLORIDE 5 MG: 5 TABLET ORAL at 18:05

## 2019-08-17 RX ADMIN — VITAMIN D, TAB 1000IU (100/BT) 5000 UNITS: 25 TAB at 05:35

## 2019-08-17 RX ADMIN — ANTACID TABLETS 500 MG: 500 TABLET, CHEWABLE ORAL at 18:05

## 2019-08-17 RX ADMIN — OXYCODONE HYDROCHLORIDE 5 MG: 5 TABLET ORAL at 07:29

## 2019-08-17 RX ADMIN — ANTACID TABLETS 500 MG: 500 TABLET, CHEWABLE ORAL at 05:35

## 2019-08-17 RX ADMIN — ONDANSETRON 4 MG: 4 TABLET, ORALLY DISINTEGRATING ORAL at 13:23

## 2019-08-17 RX ADMIN — GABAPENTIN 100 MG: 100 CAPSULE ORAL at 18:43

## 2019-08-17 RX ADMIN — DOCUSATE SODIUM 100 MG: 100 CAPSULE, LIQUID FILLED ORAL at 18:05

## 2019-08-17 RX ADMIN — SODIUM CHLORIDE: 9 INJECTION, SOLUTION INTRAVENOUS at 05:31

## 2019-08-17 RX ADMIN — POLYVINYL ALCOHOL 1 DROP: 14 SOLUTION/ DROPS OPHTHALMIC at 18:05

## 2019-08-17 RX ADMIN — CEFAZOLIN SODIUM 2 G: 2 INJECTION, SOLUTION INTRAVENOUS at 05:36

## 2019-08-17 RX ADMIN — GABAPENTIN 100 MG: 100 CAPSULE ORAL at 05:35

## 2019-08-17 RX ADMIN — OXYCODONE HYDROCHLORIDE 5 MG: 5 TABLET ORAL at 02:10

## 2019-08-17 RX ADMIN — OXYCODONE HYDROCHLORIDE 5 MG: 5 TABLET ORAL at 22:50

## 2019-08-17 RX ADMIN — SODIUM CHLORIDE: 9 INJECTION, SOLUTION INTRAVENOUS at 16:13

## 2019-08-17 RX ADMIN — SENNOSIDES, DOCUSATE SODIUM 1 TABLET: 50; 8.6 TABLET, FILM COATED ORAL at 22:50

## 2019-08-17 RX ADMIN — DONEPEZIL HYDROCHLORIDE 20 MG: 5 TABLET, FILM COATED ORAL at 05:35

## 2019-08-17 RX ADMIN — OXYCODONE HYDROCHLORIDE 5 MG: 5 TABLET ORAL at 13:23

## 2019-08-17 NOTE — CARE PLAN
Problem: Communication  Goal: The ability to communicate needs accurately and effectively will improve  Outcome: PROGRESSING AS EXPECTED  Note:   Pt is hard of hearing and visually impaired. Niece is at bedside and able to better communicate with the pt and verbalize the pt's needs and concerns.      Problem: Safety  Goal: Will remain free from injury  Outcome: PROGRESSING AS EXPECTED  Note:   Pt is on bedrest and flat at this time. Fall precautions are in place. Niece is at bedside and will stay the night with the pt.

## 2019-08-17 NOTE — ANESTHESIA POSTPROCEDURE EVALUATION
Patient: Ramya Bhakta    Procedure Summary     Date:  08/16/19 Room / Location:  [facility] 07 / SURGERY [facility]    Anesthesia Start:  1447 Anesthesia Stop:  1650    Procedure:  LAMINECTOMY, SPINE, LUMBAR, WITH [id number] (Back) Diagnosis:  (LUMBAR STENOSIS, LUMBAR RADICULOPATHY)    Surgeon:  Bhanu Lorenz D.O. Responsible Provider:  Bryan Broderick M.D.    Anesthesia Type:  general ASA Status:  2          Final Anesthesia Type: general  Last vitals  BP   Blood Pressure : 122/57    Temp   36.4 °C (97.6 °F)    Pulse   Pulse: 84   Resp        SpO2   95 %      Anesthesia Post Evaluation    Patient location during evaluation: PACU  Patient participation: complete - patient participated  Level of consciousness: sleepy but conscious    Airway patency: patent  Anesthetic complications: no  Cardiovascular status: hemodynamically stable  Respiratory status: acceptable  Hydration status: euvolemic    PONV: none           Nurse Pain Score: 0 (NPRS)
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
## 2019-08-17 NOTE — PROGRESS NOTES
Pt aaox2. MADDOX 5/5. Denies N/T. Pt on bedrest with HOB flat, pt need reinforcement not to raise HOB. Pt states she is having difficulty swallowing laying down, educated pt frequently that she can turn on sides, pt swallowing better turned to side. Pt c/o back pain, Oxy given PRN with +results. +BS. Mahoney in place, patent. Dressing CDI. Reviewed poc with pt-verbalized understanding. Bed alarm in use. Call light in reach.

## 2019-08-17 NOTE — OR NURSING
"1649-Received pt.from OR,somewhat restless and c/o\"I can't breathe\",O2 sat.WNL,lungs sound clear,non-prod. cough noted.Flat on bed ordered .emotional support given  "

## 2019-08-17 NOTE — PROGRESS NOTES
2 RN skin check complete.   Devices in place: Pulse ox, O2 tubing, SCDs, Mahoney.  Skin assessed under devices.    Pt's sacrum was red but blanchable. She also has a skin tear on her right forearm, no drainage and covered with a band-aid.     No pressure ulcers found at this time, will continue to assess for potential pressure injury.     Pt is able to make major positional changes on her own. Extra pillows were provided for offloading pressure to her sacrum.

## 2019-08-17 NOTE — CARE PLAN
Problem: Pain Management  Goal: Pain level will decrease to patient's comfort goal  Outcome: PROGRESSING AS EXPECTED  Intervention: Follow pain managment plan developed in collaboration with patient and Interdisciplinary Team  Note:   Oxy given PRN with +results. Educated pt on importance of pain control- pt verbalized understanding.       Problem: Knowledge Deficit  Goal: Knowledge of disease process/condition, treatment plan, diagnostic tests, and medications will improve  Outcome: PROGRESSING SLOWER THAN EXPECTED  Note:   Poc discussed- pt needs reinforcement

## 2019-08-17 NOTE — OP REPORT
Operative Note    Lumbar Laminectomy    DATE OF SURGERY: 8/16/2019  SURGEON: Bhanu Daley D.O.   ASSISTANT: Adeola Orr  PREOPERATIVE DIAGNOSIS: Symptomatic spinal stenosis, L2 through L5 .  POSTOPERATIVE DIAGNOSIS: Symptomatic spinal stenosis, L2 through L5 .  PROCEDURE PERFORMED: Decompressive bilateral laminectomy, foraminotomy, L2 through L5 .    ANESTHESIA: GETA.  ESTIMATED BLOOD LOSS: 200 cc.  FINDINGS: Severe ligamentum hypertrophy with lateral recess canal stenosis, L3 through L5. Moderate stenosis at L2-3. Good decompression. CSF leak at L3-5 due to very severe stenosis. No CSF seen leaking at closure.  DRAINS: No drain placed due to duratomy.  COMPLICATIONS: None.  DISPOSITION: Stable to the PACU.    INDICATIONS FOR THE PROCEDURE  History: Ms. Bhakta is a 85 year old femaler who presents with signs, symptoms and radiographic evidence of back pain, lumbar radiculopathy, weakness, poor coordination, sensory changes of leg, right worse than left. Her niece was with the patient at every visit. The patient has severe stenosis in Lumbar spine from L3-5 and moderate stenosis at L2-3. She has attempted all conservative treatments with no help. She is worsening in her ambulation per the patietn and niece. She walks less and when she does walk she has severe pain in back and right LE radiculopathy. I discussed the option of surgery with a laminectomy of L2-L5 to relieve the stenosi. Due to the patients age I particularly emphasized the risks of surgery and explained that the laminectomy is mainly to address her leg pain and that she may continue to have back pain. The patient who is very pleasant but somewhat forgetful according to her niece who she lives with, and her niece stated they understood the risks and wish to proceed with the procedure.  DIAGNOSTIC STUDY: MRI of the lumbar spine showed stenosis in the lumbar spine from L2-5, with sever stenosis from L3-5. Given the progression of the symptoms,  the patient decided to proceed with laminectomy and decompression of the L2-5 nerve root/s.    SURGICAL RISKS: The patient and niece were well apprised of all objectives, benefits, risks and potential complications of the procedure, including but not limited to: worsening of current status, the possible need for further procedures, the risk of infection, headaches, CSF leak, possible spinal nerve injury resulting in paralysis, infection, injury to major vessels causing hemorrhage, stroke, loss of language function, coma and even death. No assurance was given whether symptoms would improve following the procedure. Informed consent was obtained and secured in the chart after the patient niece voiced understanding of these risks and decided to proceed with the operation.     DESCRIPTION OF THE PROCEDURE  The patient was transferred to the operating room. She was given preoperative prophylactic IV antibiotics.    ANESTHESIA: The patient was sedated and intubated without difficulty by the anesthesia service. Eyes were taped shut after ointment was applied to prevent corneal abrasion. A Stone Hugger was placed over the upper body to maintain control of core body temperature.   A Mahoney catheter was inserted.    POSITIONING: The patient was turned prone onto a eliza spine table. All pressure points were carefully padded.    OPERATIVE TECHNIQUE  The patient was prepped and draped in the standard sterile fashion. The C-arm fluoroscopy was draped and brought in to the operative field and the L2 -L5 was positively identified. Local anesthetic was infiltrated along the line of the skin incision which was subsequently opened sharply with a # 10 scalpel blade. Further dissection was carried down in the midline until the level of supraspinous ligament utilizing bipolar forceps and Bovie electrocautery for hemostasis. The musculature was elevated subperiosteally on the with Bovie electrocautery and Hemphill elevator. Hemostasis was  achieved. Self-retaining retractors were then inserted.  The space was again confirmed utilizing fluoroscopy.   The posterior spinous processes were cut off with heavy bone cutters/ Leksell rongeurs. Utilizing a high-speed pneumatic drill, a full thickness groove was drilled through the bilateral L2, L3, L4 lamina and the superior edge of L5. The lamina were drilled under continuous irrigation..  Utilizing sharp dissection and #2 and #3 Kerrisons the ligamentum flavum was freed from the superior border of L3 and the inferior border of L2 and the remaining thinned lamina uplifted carefully preserving the dura intact. This was repeated for the levels of L3-4, L4-5. At L3-L5 a durotomy resulted with herniation of nerve roots. The patients dura was under significant pressure making placing of any instruments for ligamentum removal difficult also the patient's dura was extremely thin and therefore a duratomy resulted. Primary closure of the duratomy was not possible due to the friability of the dura at these levels. The nerve roots were reduced back into the thecal sac as best as possible and gel foam placed over the durotomy followed by a layer of durasealant (Adherus)sprayed over the gel foam and duratomy site.    At this point, the dura was noted to be well decompressed. All lateral recesses and the neuroforamina were noted to be well decompressed as well. No drain was placed due to the duratomy and CSF leak. The wound was copiously irrigated. The fascia was subsequently closed utilizing interrupted 0 polyglactin synthetic absorbable suture (Vicryl). Local anesthetic was given around the area and subcutaneous tissue was approximated with 2-0 polyglactin synthetic absorbable suture (Vicryl, after the wound had been copiously irrigated with antibiotic saline irrigation. The skin was then approximated with surgical staples. The incision was dressed in a clean dry dressing.    All sponge counts, needle counts and  instrument counts were correct at the end of the case times two. The patient tolerated the procedure well, without any complications and was transferred in stable condition to the recovery room.    She was evaluated in the PACU and she was moving all extremities and states her pain in the right lower extremity has improved. She will remain flat for 24-36 hours to prevent CSF leak from her incision site.

## 2019-08-17 NOTE — OR NURSING
"Pt. Transported to room,pt. Verbalized pain is \"tolerable\",less nasal congestion noted.Pt's. niece-Lisa updated.  "

## 2019-08-17 NOTE — PROGRESS NOTES
Neurosurgery Progress Note    Subjective:  Doing well  No complaints of HA, dizziness, nausea, leg pain/radic    Exam:  VSS  A&Ox4, NAD  NM: 5/5 hip flexion, knee extension, knee flexion, plantar flexion, dorsiflexion, EHL while laying flat   Sensation intact and equal throughout all four extremities.   Abdomen: soft, non-tender  Non-labored breathing on room air, normal respiratory effort  No LE edema, erythema, cyanosis, clubbing  Calves non-tender to compression bilat  Incision, minimal drainage no halo sign     BP  Min: 98/56  Max: 131/75  Pulse  Av.9  Min: 59  Max: 84  Resp  Av.5  Min: 13  Max: 21  Temp  Av.5 °C (97.7 °F)  Min: 36.3 °C (97.4 °F)  Max: 36.7 °C (98.1 °F)  SpO2  Av.1 %  Min: 94 %  Max: 100 %    No data recorded    Recent Labs     08/15/19  1129   WBC 6.4   RBC 4.36   HEMOGLOBIN 13.3   HEMATOCRIT 42.9   MCV 98.4*   MCH 30.5   MCHC 31.0*   RDW 49.1   PLATELETCT 254   MPV 9.2     Recent Labs     08/15/19  1129   SODIUM 140   POTASSIUM 4.7   CHLORIDE 105   CO2 26   GLUCOSE 83   BUN 36*   CREATININE 0.87   CALCIUM 10.1     Recent Labs     08/15/19  1129   APTT 24.2*   INR 1.00           Intake/Output       19 0700 - 19 0659 19 0700 - 19 0659      1900-0659 Total  4849-3702 Total       Intake    P.O.  75  -- 75  --  -- --    P.O. 75 -- 75 -- -- --    I.V.  1000  -- 1000  --  -- --    Volume (mL) (lactated ringers infusion) 1000 -- 1000 -- -- --    Total Intake 1075 -- 1075 -- -- --       Output    Urine  400  2320 2720  --  -- --    Output (mL) (Urethral Catheter Latex 16 Fr.) 400 2320 2720 -- -- --    Blood  100  -- 100  --  -- --    Est. Blood Loss 100 -- 100 -- -- --    Total Output 500 2320 2820 -- -- --       Net I/O     123 -3793 -9699 -- -- --            Intake/Output Summary (Last 24 hours) at 2019 0953  Last data filed at 2019 0600  Gross per 24 hour   Intake 1075 ml   Output 2820 ml   Net -1745 ml            • gabapentin   100 mg Pre-Op Once   • oxymetazoline  3 Spray Intra-Op Once PRN   • donepezil  20 mg DAILY   • gabapentin  100 mg BID   • Pharmacy Consult Request  1 Each PHARMACY TO DOSE   • MD ALERT...DO NOT ADMINISTER NSAIDS or ASPIRIN unless ORDERED By Neurosurgery  1 Each PRN   • docusate sodium  100 mg BID   • senna-docusate  1 Tab Nightly   • senna-docusate  1 Tab Q24HRS PRN   • polyethylene glycol/lytes  1 Packet BID PRN   • magnesium hydroxide  30 mL QDAY PRN   • bisacodyl  10 mg Q24HRS PRN   • fleet  1 Each Once PRN   • NS   Continuous   • acetaminophen  500 mg Q6HRS PRN   • oxyCODONE immediate-release  2.5 mg Q4HRS PRN   • oxyCODONE immediate-release  5 mg Q4HRS PRN   • fentaNYL  25 mcg Q3HRS PRN   • ondansetron  4 mg Q4HRS PRN   • ondansetron  4 mg Q4HRS PRN   • diphenhydrAMINE  25 mg Q6HRS PRN    Or   • diphenhydrAMINE  25 mg Q6HRS PRN   • hydrALAZINE  10 mg Q HOUR PRN   • vitamin D  5,000 Units DAILY   • benzocaine-menthol  1 Lozenge Q2HRS PRN   • calcium carbonate  500 mg BID       Assessment and Plan:  POD #1 L2-L5 laminectomy, repair of durotomy   Maintain andrews until OOB   Wean PCA  OK for therapies tomorrow  Plan to keep HOB flat until re-eval tomorrow   Continue incentive spirometry Q1H  Continue pain control  Continue stool softeners to encourage BM    Prophylactic anticoagulation: yes, OK for heparin 5000 BID

## 2019-08-18 PROCEDURE — 700102 HCHG RX REV CODE 250 W/ 637 OVERRIDE(OP): Performed by: PHYSICIAN ASSISTANT

## 2019-08-18 PROCEDURE — 700111 HCHG RX REV CODE 636 W/ 250 OVERRIDE (IP): Performed by: PHYSICIAN ASSISTANT

## 2019-08-18 PROCEDURE — A9270 NON-COVERED ITEM OR SERVICE: HCPCS | Performed by: PHYSICIAN ASSISTANT

## 2019-08-18 PROCEDURE — 770001 HCHG ROOM/CARE - MED/SURG/GYN PRIV*

## 2019-08-18 PROCEDURE — 700105 HCHG RX REV CODE 258: Performed by: PHYSICIAN ASSISTANT

## 2019-08-18 PROCEDURE — 302118 SHAMPOO,NO RINSE: Performed by: NEUROLOGICAL SURGERY

## 2019-08-18 PROCEDURE — 700112 HCHG RX REV CODE 229: Performed by: PHYSICIAN ASSISTANT

## 2019-08-18 RX ORDER — HEPARIN SODIUM 5000 [USP'U]/ML
5000 INJECTION, SOLUTION INTRAVENOUS; SUBCUTANEOUS EVERY 12 HOURS
Status: DISCONTINUED | OUTPATIENT
Start: 2019-08-18 | End: 2019-08-20 | Stop reason: HOSPADM

## 2019-08-18 RX ORDER — SODIUM CHLORIDE 9 MG/ML
1000 INJECTION, SOLUTION INTRAVENOUS ONCE
Status: COMPLETED | OUTPATIENT
Start: 2019-08-18 | End: 2019-08-18

## 2019-08-18 RX ORDER — TIZANIDINE 4 MG/1
2-4 TABLET ORAL EVERY 6 HOURS PRN
Status: DISCONTINUED | OUTPATIENT
Start: 2019-08-18 | End: 2019-08-20 | Stop reason: HOSPADM

## 2019-08-18 RX ORDER — KETOROLAC TROMETHAMINE 30 MG/ML
15 INJECTION, SOLUTION INTRAMUSCULAR; INTRAVENOUS 2 TIMES DAILY
Status: COMPLETED | OUTPATIENT
Start: 2019-08-18 | End: 2019-08-18

## 2019-08-18 RX ADMIN — KETOROLAC TROMETHAMINE 15 MG: 30 INJECTION, SOLUTION INTRAMUSCULAR at 10:29

## 2019-08-18 RX ADMIN — ACETAMINOPHEN 500 MG: 500 TABLET ORAL at 10:29

## 2019-08-18 RX ADMIN — TIZANIDINE 2 MG: 4 TABLET ORAL at 10:29

## 2019-08-18 RX ADMIN — TIZANIDINE 2 MG: 4 TABLET ORAL at 17:21

## 2019-08-18 RX ADMIN — VITAMIN D, TAB 1000IU (100/BT) 5000 UNITS: 25 TAB at 05:49

## 2019-08-18 RX ADMIN — ACETAMINOPHEN 500 MG: 500 TABLET ORAL at 17:21

## 2019-08-18 RX ADMIN — OXYCODONE HYDROCHLORIDE 5 MG: 5 TABLET ORAL at 05:49

## 2019-08-18 RX ADMIN — SODIUM CHLORIDE: 9 INJECTION, SOLUTION INTRAVENOUS at 10:28

## 2019-08-18 RX ADMIN — HEPARIN SODIUM 5000 UNITS: 5000 INJECTION, SOLUTION INTRAVENOUS; SUBCUTANEOUS at 17:20

## 2019-08-18 RX ADMIN — SODIUM CHLORIDE: 9 INJECTION, SOLUTION INTRAVENOUS at 21:15

## 2019-08-18 RX ADMIN — ANTACID TABLETS 500 MG: 500 TABLET, CHEWABLE ORAL at 17:21

## 2019-08-18 RX ADMIN — KETOROLAC TROMETHAMINE 15 MG: 30 INJECTION, SOLUTION INTRAMUSCULAR at 17:20

## 2019-08-18 RX ADMIN — GABAPENTIN 100 MG: 100 CAPSULE ORAL at 17:21

## 2019-08-18 RX ADMIN — DOCUSATE SODIUM 100 MG: 100 CAPSULE, LIQUID FILLED ORAL at 17:21

## 2019-08-18 RX ADMIN — HEPARIN SODIUM 5000 UNITS: 5000 INJECTION, SOLUTION INTRAVENOUS; SUBCUTANEOUS at 10:28

## 2019-08-18 RX ADMIN — SODIUM CHLORIDE 1000 ML: 9 INJECTION, SOLUTION INTRAVENOUS at 20:15

## 2019-08-18 RX ADMIN — DONEPEZIL HYDROCHLORIDE 20 MG: 5 TABLET, FILM COATED ORAL at 05:49

## 2019-08-18 RX ADMIN — ANTACID TABLETS 500 MG: 500 TABLET, CHEWABLE ORAL at 05:49

## 2019-08-18 RX ADMIN — POLYVINYL ALCOHOL 1 DROP: 14 SOLUTION/ DROPS OPHTHALMIC at 10:37

## 2019-08-18 RX ADMIN — GABAPENTIN 100 MG: 100 CAPSULE ORAL at 05:50

## 2019-08-18 RX ADMIN — POLYVINYL ALCOHOL 1 DROP: 14 SOLUTION/ DROPS OPHTHALMIC at 17:25

## 2019-08-18 RX ADMIN — DOCUSATE SODIUM 100 MG: 100 CAPSULE, LIQUID FILLED ORAL at 06:00

## 2019-08-18 NOTE — CARE PLAN
Problem: Communication  Goal: The ability to communicate needs accurately and effectively will improve  Outcome: PROGRESSING AS EXPECTED     Problem: Safety  Goal: Will remain free from injury  Outcome: PROGRESSING AS EXPECTED     Problem: Infection  Goal: Will remain free from infection  Outcome: PROGRESSING AS EXPECTED

## 2019-08-18 NOTE — PROGRESS NOTES
Patient seen and examined, no acute distress.  Doing well.  Continue treatment plan.  Continue to elevate head of bed by 10 degrees every 2 hours.  Dressing checked clean dry and intact patient denies any headaches.  Continue elevating until out of bed is all the way up.  Assessed by PT tomorrow for ambulation.

## 2019-08-18 NOTE — PROGRESS NOTES
Pt aaox2, pt impulsive and restless. Pt getting OOB w/o assistance and standing at EOB with bed alarm going off. Re-educated pt on bedrest order with raising HOB slowly. Pt assisted back to bed. Pt denies HA. Pt states she needs to void- reminded pt she has andrews in place. Pt c/o back pain, Oxy given PRN. MADDOX 5/5. Denies N/T. PA rounded and aware of pts confusion- new orders placed. Reviewed poc with pt- needs frequent reiforcement. Bed alarm in use. Call light in reach.

## 2019-08-18 NOTE — PROGRESS NOTES
Neurosurgery Progress Note    Subjective:  Doing well  No complaints of HA, dizziness, nausea, leg pain/radic  Wants to ambulate and get OOB  C/o back pain, incisional pain     Exam:  VSS  A&Ox4, NAD  HOB up to 20 degrees   NM: 5/5 hip flexion, knee extension, knee flexion, plantar flexion, dorsiflexion, EHL while laying flat   Sensation intact and equal throughout all four extremities.   Abdomen: soft, non-tender  Non-labored breathing on room air, normal respiratory effort  No LE edema, erythema, cyanosis, clubbing  Calves non-tender to compression bilat  Incision, minimal drainage no halo sign     BP  Min: 116/65  Max: 118/59  Pulse  Av.7  Min: 81  Max: 83  Resp  Av  Min: 16  Max: 18  Temp  Av.5 °C (99.5 °F)  Min: 37.1 °C (98.8 °F)  Max: 37.8 °C (100 °F)  SpO2  Av.3 %  Min: 94 %  Max: 98 %    No data recorded    Recent Labs     08/15/19  1129   WBC 6.4   RBC 4.36   HEMOGLOBIN 13.3   HEMATOCRIT 42.9   MCV 98.4*   MCH 30.5   MCHC 31.0*   RDW 49.1   PLATELETCT 254   MPV 9.2     Recent Labs     08/15/19  1129   SODIUM 140   POTASSIUM 4.7   CHLORIDE 105   CO2 26   GLUCOSE 83   BUN 36*   CREATININE 0.87   CALCIUM 10.1     Recent Labs     08/15/19  1129   APTT 24.2*   INR 1.00           Intake/Output       19 0700 - 19 0659 19 07 - 19 0659      8852-1877 9144-1684 Total 8813-4630 7501-0043 Total       Intake    P.O.  1050  -- 1050  --  -- --    P.O. 1050 -- 1050 -- -- --    I.V.  1200  -- 1200  --  -- --    Volume (mL) (NS infusion) 1200 -- 1200 -- -- --    Total Intake 2250 -- 2250 -- -- --       Output    Urine  1400  1100 2500  --  -- --    Output (mL) (Urethral Catheter Latex 16 Fr.) 1400 1100 2500 -- -- --    Total Output 1400 1100 2500 -- -- --       Net I/O     850 -1100 -250 -- -- --            Intake/Output Summary (Last 24 hours) at 2019 0917  Last data filed at 2019 0425  Gross per 24 hour   Intake 2010 ml   Output 2500 ml   Net -490 ml            •  artificial tears  1 Drop BID   • oxymetazoline  3 Spray Intra-Op Once PRN   • donepezil  20 mg DAILY   • gabapentin  100 mg BID   • Pharmacy Consult Request  1 Each PHARMACY TO DOSE   • MD ALERT...DO NOT ADMINISTER NSAIDS or ASPIRIN unless ORDERED By Neurosurgery  1 Each PRN   • docusate sodium  100 mg BID   • senna-docusate  1 Tab Nightly   • senna-docusate  1 Tab Q24HRS PRN   • polyethylene glycol/lytes  1 Packet BID PRN   • magnesium hydroxide  30 mL QDAY PRN   • bisacodyl  10 mg Q24HRS PRN   • fleet  1 Each Once PRN   • NS   Continuous   • acetaminophen  500 mg Q6HRS PRN   • oxyCODONE immediate-release  2.5 mg Q4HRS PRN   • oxyCODONE immediate-release  5 mg Q4HRS PRN   • fentaNYL  25 mcg Q3HRS PRN   • ondansetron  4 mg Q4HRS PRN   • ondansetron  4 mg Q4HRS PRN   • diphenhydrAMINE  25 mg Q6HRS PRN    Or   • diphenhydrAMINE  25 mg Q6HRS PRN   • hydrALAZINE  10 mg Q HOUR PRN   • vitamin D  5,000 Units DAILY   • benzocaine-menthol  1 Lozenge Q2HRS PRN   • calcium carbonate  500 mg BID       Assessment and Plan:  POD #2 L2-L5 laminectomy, repair of durotomy   Maintain andrews until OOB   Recommend continued HOB elevated 10 degrees every 2 hours   Recommend fluids, zanaflex, tylenol, toradol   Will reorder BMP this evening   Continue incentive spirometry Q1H  Continue pain control  Continue stool softeners to encourage BM    Prophylactic anticoagulation: yes, OK for heparin 5000 BID

## 2019-08-18 NOTE — CARE PLAN
Problem: Pain Management  Goal: Pain level will decrease to patient's comfort goal  Outcome: PROGRESSING AS EXPECTED  Intervention: Follow pain managment plan developed in collaboration with patient and Interdisciplinary Team  Note:   New orders placed for pain control. Educated pt on importance of pain control- pt verbalized understanding.       Problem: Knowledge Deficit  Goal: Knowledge of disease process/condition, treatment plan, diagnostic tests, and medications will improve  Outcome: PROGRESSING SLOWER THAN EXPECTED  Note:   Poc discussed- pt needs reinforcement

## 2019-08-18 NOTE — THERAPY
PT consult received. RN states neurosx requesting hold therapy today, order is now for 8/19. Will complete eval when appropriate.

## 2019-08-19 PROBLEM — M48.061 LUMBAR STENOSIS: Status: ACTIVE | Noted: 2019-08-19

## 2019-08-19 LAB
ANION GAP SERPL CALC-SCNC: 6 MMOL/L (ref 0–11.9)
BUN SERPL-MCNC: 25 MG/DL (ref 8–22)
CALCIUM SERPL-MCNC: 8.5 MG/DL (ref 8.5–10.5)
CHLORIDE SERPL-SCNC: 107 MMOL/L (ref 96–112)
CO2 SERPL-SCNC: 24 MMOL/L (ref 20–33)
CREAT SERPL-MCNC: 0.64 MG/DL (ref 0.5–1.4)
GLUCOSE SERPL-MCNC: 94 MG/DL (ref 65–99)
NT-PROBNP SERPL IA-MCNC: 445 PG/ML (ref 0–125)
POTASSIUM SERPL-SCNC: 4 MMOL/L (ref 3.6–5.5)
SODIUM SERPL-SCNC: 137 MMOL/L (ref 135–145)

## 2019-08-19 PROCEDURE — 51798 US URINE CAPACITY MEASURE: CPT

## 2019-08-19 PROCEDURE — 700112 HCHG RX REV CODE 229: Performed by: PHYSICIAN ASSISTANT

## 2019-08-19 PROCEDURE — 97165 OT EVAL LOW COMPLEX 30 MIN: CPT

## 2019-08-19 PROCEDURE — 700102 HCHG RX REV CODE 250 W/ 637 OVERRIDE(OP): Performed by: NURSE PRACTITIONER

## 2019-08-19 PROCEDURE — 80048 BASIC METABOLIC PNL TOTAL CA: CPT

## 2019-08-19 PROCEDURE — A9270 NON-COVERED ITEM OR SERVICE: HCPCS | Performed by: PHYSICIAN ASSISTANT

## 2019-08-19 PROCEDURE — 770001 HCHG ROOM/CARE - MED/SURG/GYN PRIV*

## 2019-08-19 PROCEDURE — 700111 HCHG RX REV CODE 636 W/ 250 OVERRIDE (IP): Performed by: PHYSICIAN ASSISTANT

## 2019-08-19 PROCEDURE — 36415 COLL VENOUS BLD VENIPUNCTURE: CPT

## 2019-08-19 PROCEDURE — 97161 PT EVAL LOW COMPLEX 20 MIN: CPT

## 2019-08-19 PROCEDURE — A9270 NON-COVERED ITEM OR SERVICE: HCPCS | Performed by: NURSE PRACTITIONER

## 2019-08-19 PROCEDURE — 700102 HCHG RX REV CODE 250 W/ 637 OVERRIDE(OP): Performed by: PHYSICIAN ASSISTANT

## 2019-08-19 PROCEDURE — 700105 HCHG RX REV CODE 258: Performed by: PHYSICIAN ASSISTANT

## 2019-08-19 PROCEDURE — 83880 ASSAY OF NATRIURETIC PEPTIDE: CPT

## 2019-08-19 RX ORDER — ACETAMINOPHEN 325 MG/1
650 TABLET ORAL EVERY 4 HOURS
Status: DISCONTINUED | OUTPATIENT
Start: 2019-08-19 | End: 2019-08-20 | Stop reason: HOSPADM

## 2019-08-19 RX ADMIN — POLYVINYL ALCOHOL 1 DROP: 14 SOLUTION/ DROPS OPHTHALMIC at 06:00

## 2019-08-19 RX ADMIN — HEPARIN SODIUM 5000 UNITS: 5000 INJECTION, SOLUTION INTRAVENOUS; SUBCUTANEOUS at 04:30

## 2019-08-19 RX ADMIN — ACETAMINOPHEN 500 MG: 500 TABLET ORAL at 04:30

## 2019-08-19 RX ADMIN — POLYVINYL ALCOHOL 1 DROP: 14 SOLUTION/ DROPS OPHTHALMIC at 16:51

## 2019-08-19 RX ADMIN — VITAMIN D, TAB 1000IU (100/BT) 5000 UNITS: 25 TAB at 04:30

## 2019-08-19 RX ADMIN — TIZANIDINE 2 MG: 4 TABLET ORAL at 17:54

## 2019-08-19 RX ADMIN — ANTACID TABLETS 500 MG: 500 TABLET, CHEWABLE ORAL at 04:30

## 2019-08-19 RX ADMIN — TIZANIDINE 2 MG: 4 TABLET ORAL at 04:25

## 2019-08-19 RX ADMIN — GABAPENTIN 100 MG: 100 CAPSULE ORAL at 04:30

## 2019-08-19 RX ADMIN — ACETAMINOPHEN 650 MG: 325 TABLET, FILM COATED ORAL at 10:27

## 2019-08-19 RX ADMIN — OXYCODONE HYDROCHLORIDE 5 MG: 5 TABLET ORAL at 06:31

## 2019-08-19 RX ADMIN — DONEPEZIL HYDROCHLORIDE 20 MG: 5 TABLET, FILM COATED ORAL at 04:30

## 2019-08-19 RX ADMIN — GABAPENTIN 100 MG: 100 CAPSULE ORAL at 17:51

## 2019-08-19 RX ADMIN — ANTACID TABLETS 500 MG: 500 TABLET, CHEWABLE ORAL at 16:44

## 2019-08-19 RX ADMIN — MAGNESIUM HYDROXIDE 30 ML: 400 SUSPENSION ORAL at 06:31

## 2019-08-19 RX ADMIN — SODIUM CHLORIDE: 9 INJECTION, SOLUTION INTRAVENOUS at 09:14

## 2019-08-19 RX ADMIN — ACETAMINOPHEN 650 MG: 325 TABLET, FILM COATED ORAL at 16:43

## 2019-08-19 RX ADMIN — HEPARIN SODIUM 5000 UNITS: 5000 INJECTION, SOLUTION INTRAVENOUS; SUBCUTANEOUS at 16:44

## 2019-08-19 RX ADMIN — ACETAMINOPHEN 650 MG: 325 TABLET, FILM COATED ORAL at 22:00

## 2019-08-19 RX ADMIN — DOCUSATE SODIUM 100 MG: 100 CAPSULE, LIQUID FILLED ORAL at 04:30

## 2019-08-19 ASSESSMENT — GAIT ASSESSMENTS
DISTANCE (FEET): 75
DEVIATION: DECREASED BASE OF SUPPORT
ASSISTIVE DEVICE: FRONT WHEEL WALKER
GAIT LEVEL OF ASSIST: MINIMAL ASSIST

## 2019-08-19 ASSESSMENT — COGNITIVE AND FUNCTIONAL STATUS - GENERAL
MOVING TO AND FROM BED TO CHAIR: A LITTLE
MOVING FROM LYING ON BACK TO SITTING ON SIDE OF FLAT BED: A LITTLE
SUGGESTED CMS G CODE MODIFIER MOBILITY: CK
TOILETING: A LOT
MOBILITY SCORE: 15
SUGGESTED CMS G CODE MODIFIER DAILY ACTIVITY: CK
STANDING UP FROM CHAIR USING ARMS: A LITTLE
DAILY ACTIVITIY SCORE: 18
HELP NEEDED FOR BATHING: A LOT
MOVING TO AND FROM BED TO CHAIR: A LOT
EATING MEALS: A LITTLE
SUGGESTED CMS G CODE MODIFIER MOBILITY: CK
PERSONAL GROOMING: A LITTLE
CLIMB 3 TO 5 STEPS WITH RAILING: A LITTLE
TURNING FROM BACK TO SIDE WHILE IN FLAT BAD: A LOT
DRESSING REGULAR UPPER BODY CLOTHING: A LITTLE
WALKING IN HOSPITAL ROOM: A LITTLE
STANDING UP FROM CHAIR USING ARMS: A LITTLE
EATING MEALS: A LITTLE
HELP NEEDED FOR BATHING: A LITTLE
TOILETING: A LITTLE
DRESSING REGULAR LOWER BODY CLOTHING: A LOT
TURNING FROM BACK TO SIDE WHILE IN FLAT BAD: A LITTLE
WALKING IN HOSPITAL ROOM: A LITTLE
CLIMB 3 TO 5 STEPS WITH RAILING: A LOT
MOBILITY SCORE: 18
DRESSING REGULAR LOWER BODY CLOTHING: A LITTLE
SUGGESTED CMS G CODE MODIFIER DAILY ACTIVITY: CK
DAILY ACTIVITIY SCORE: 15
MOVING FROM LYING ON BACK TO SITTING ON SIDE OF FLAT BED: A LITTLE
PERSONAL GROOMING: A LITTLE
DRESSING REGULAR UPPER BODY CLOTHING: A LITTLE

## 2019-08-19 ASSESSMENT — LIFESTYLE VARIABLES
TOTAL SCORE: 0
HAVE YOU EVER FELT YOU SHOULD CUT DOWN ON YOUR DRINKING: NO
AVERAGE NUMBER OF DAYS PER WEEK YOU HAVE A DRINK CONTAINING ALCOHOL: 0
EVER FELT BAD OR GUILTY ABOUT YOUR DRINKING: NO
TOTAL SCORE: 0
ON A TYPICAL DAY WHEN YOU DRINK ALCOHOL HOW MANY DRINKS DO YOU HAVE: 0
HOW MANY TIMES IN THE PAST YEAR HAVE YOU HAD 5 OR MORE DRINKS IN A DAY: 0
EVER HAD A DRINK FIRST THING IN THE MORNING TO STEADY YOUR NERVES TO GET RID OF A HANGOVER: NO
TOTAL SCORE: 0
CONSUMPTION TOTAL: NEGATIVE
HAVE PEOPLE ANNOYED YOU BY CRITICIZING YOUR DRINKING: NO
ALCOHOL_USE: NO

## 2019-08-19 ASSESSMENT — ACTIVITIES OF DAILY LIVING (ADL): TOILETING: INDEPENDENT

## 2019-08-19 NOTE — DISCHARGE SUMMARY
DATE OF ADMISSION:  08/16/2019    ADMITTING DIAGNOSIS:  Severe lumbar stenosis L2 through L5 with neurogenic   claudication and radiculopathy.    COURSE OF HOSPITALIZATION:  This patient was admitted to the [facility] on 08/16/2019 to undergo an elective L2 through L5   decompression with bilateral laminectomies, foraminotomies.  A durotomy was   performed  without any signs of any continued cerebrospinal fluid leak today on 08/19/2019.    The patient progressed as anticipated.  Given her age and her dementia,   however, she is unable to return to her usual place of living and requires a   higher level of care.  She is deemed to be a good candidate for skilled   nursing facility stay.    On examination today, the patient is awake, alert and oriented.  She has full   lower extremity strength.  She is ambulatory for short distances using a front   wheel walker.  Her incision is clean, dry, and intact with staples in place.    There is no swelling.  The patient denies any positional headaches.    Pertinent postoperative lab includes a chemistry panel on 08/19/2019 that is   in essence normal with exception of a slightly elevated BUN at 25, creatinine   is 0.64.  Her preoperative hemoglobin was normal at 13.3.  Her estimated blood   loss was 150 mL.    The patient does have drug sensitivities to opioid narcotics.  At home, she   did best on Tylenol.  She is on scheduled Tylenol here.  She also used Advil   several times a day.  We recommend that she may resume any NSAID therapy if needed on   postoperative day  since she has just been started on heparin for DVT prophylaxis.    DISCHARGE INSTRUCTIONS:  1.  Follow up with Dr. Bhanu Lorenz at 2 weeks postop.  2.  Keep the incision clean, dry, and intact and staples intact.  3.  Continue chemical DVT prophylaxis as indicated.  4.  Pain control, may resume Advil for additional pain control as indicated on postoperative  #7.  5. The patient and family  were instructed to inform the attending medical and nursing staff for any positional headache or incisional swelling.  In that case: please notify [facility] and keep the patient  flat supine in bed.      IMPRESSION AND PLAN:  1.  Lumbar stenosis with neurogenic claudication and radiculopathy, status   post successful L2 through L5 decompression with sealing of  of dural tear without  any evidence of any cerebrospinal fluid leak on postoperative day #3. Please notify [facility] in case of incisional swelling and/or development of positional headaches.   2.  Acute on chronic pain.  Continue Tylenol scheduled.  May resume   anti-inflammatory postoperative day #7 if needed.  3.  Chemical DVT prophylaxis.  Continue as indicated per attending provider.       ____________________________________     ADRIANO STAPLES / LYNETTE    DD:  08/19/2019 09:33:00  DT:  08/19/2019 11:32:48    D#:  [id number]  Job#:  [id number]    cc: NATHALY ALVA MD

## 2019-08-19 NOTE — DISCHARGE PLANNING
Received Choice form at 0966.  Agency/Facility Name: [facility]  Referral sent per Choice form at 0981.       Agency/Facility Name: [facility]   Spoke To: Heather  Outcome: Patient's referral is under review with admissions.

## 2019-08-19 NOTE — DISCHARGE PLANNING
Agency/Facility Name: [facility]   Spoke To: Heather   Outcome: Patient accepted, bed available tomorrow. Transportation scheduled for 8/20 at 1200 via wheelchair van. [facility] notified.

## 2019-08-19 NOTE — CARE PLAN
Problem: Safety  Goal: Will remain free from injury  Outcome: PROGRESSING AS EXPECTED  Goal: Will remain free from falls  Outcome: PROGRESSING AS EXPECTED     Problem: Infection  Goal: Will remain free from infection  Outcome: PROGRESSING AS EXPECTED     Problem: Bowel/Gastric:  Goal: Normal bowel function is maintained or improved  Outcome: PROGRESSING AS EXPECTED

## 2019-08-19 NOTE — CARE PLAN
Problem: Safety  Goal: Will remain free from injury  Outcome: PROGRESSING AS EXPECTED  Goal: Will remain free from falls  Outcome: PROGRESSING AS EXPECTED     Problem: Knowledge Deficit  Goal: Knowledge of disease process/condition, treatment plan, diagnostic tests, and medications will improve  Outcome: PROGRESSING AS EXPECTED

## 2019-08-19 NOTE — THERAPY
"Physical Therapy Evaluation completed.   Bed Mobility:  Supine to Sit: Moderate Assist  Transfers: Sit to Stand: Minimal Assist  Gait: Level Of Assist: Minimal Assist with Front-Wheel Walker       Plan of Care: Will benefit from Physical Therapy 4 times per week  Discharge Recommendations: Equipment: Will Continue to Assess for Equipment Needs. Post-acute therapy Discharge to a transitional care facility for continued skilled therapy services.    Pt presents s/p lumbar surgery without fusion, now needs min to mod assist with OOB, min A for ambulation using FWW x 75 feet. Pt shows significant memory issues as well, chronic and ongoing. Pt will be seen by inpt PT and will need a transitional care stay before returing to her IRIS.     See \"Rehab Therapy-Acute\" Patient Summary Report for complete documentation.     "

## 2019-08-19 NOTE — PROGRESS NOTES
Received bedside report from [facility].  Patient resting comfortably in bed, patient vitals taken with SBP in 70's, patient asymptomatic.  Paged physician for orders.

## 2019-08-19 NOTE — PROGRESS NOTES
S: no sign. C.o, no headache  O: VSS, for PE: see discharge summary    Ok to transfer to SNF when bed available. SNF referal ordered. Discharge order and reconciliation completed. Prescription on chart.

## 2019-08-19 NOTE — DISCHARGE PLANNING
Anticipated Discharge Disposition:   SNF-[facility] accepted    Action:   Notified pt and niece that [facility] has accepted pt. They are happy.    PASRR: [id number]    Barriers to Discharge:   none    Plan:  Dc to [facility] at 12 noon tomorrow

## 2019-08-19 NOTE — DISCHARGE PLANNING
Anticipated Discharge Disposition:   Received verbal order from Nitza BUCIO that recommendation is SNF.    Action:   Rounding done. Talked to niece and pt. They are agreeable with SNF. Pt lives alone in an independent living senior apartment called [facility] and pt is normally independent. Pt said she is a  but is not service connected. Choice made for the [facility] and for [facility].     Assessment completed.     Barriers to Discharge:   Pending acceptance by SNF.     Plan:   Follow up with [facility].

## 2019-08-19 NOTE — PROGRESS NOTES
Pt A/Ox4. Reports numbness in her fingertips. Sx site dressing C/D/I. Pt up to chair for meal, up to commode several times this morning w/ diarrhea. Complaining of minimal lower back pain at this time. Family at bedside. Call light within reach.

## 2019-08-19 NOTE — PROGRESS NOTES
Pt calmer with niece visiting today. Niece requesting a safety sitter for pt tonight. Notified Charge RN- stated she would try to get a sitter but it is not guaranteed. Pts niece later stated she will have private sitter come be with pt tonight starting at 2000. Pt tolerating sitting up for dinner at 70 degrees. Pt continues to deny headache.

## 2019-08-19 NOTE — THERAPY
"Occupational Therapy Evaluation completed.   Functional Status: Pt is an 84 y/o female admitted for elective lumbar spine surgery. SHe was pleasant and cooperative, hx of memory issues per patient and niece. ModA bed mobility. MaxA LB dressing. Rey sit<>stand and for functional mobility with FWW, pt's legs buckled due to pain. Edu on spine precautions, provided with handout. SHe is limited by weakness, fatigue, impaired balance, and impaired cog which impacts independence in self care and functional mobility.  Plan of Care: Will benefit from Occupational Therapy 4 times per week  Discharge Recommendations:  Equipment: Will Continue to Assess for Equipment Needs. Recommend post-acute placement for additional occupational therapy services prior to discharge home. Patient can tolerate post-acute therapies at a 5x/week frequency.      See \"Rehab Therapy-Acute\" Patient Summary Report for complete documentation.    "
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
## 2019-08-20 VITALS
WEIGHT: 138.89 LBS | DIASTOLIC BLOOD PRESSURE: 49 MMHG | TEMPERATURE: 97.3 F | HEIGHT: 59 IN | SYSTOLIC BLOOD PRESSURE: 119 MMHG | OXYGEN SATURATION: 94 % | HEART RATE: 82 BPM | RESPIRATION RATE: 16 BRPM | BODY MASS INDEX: 28 KG/M2

## 2019-08-20 PROCEDURE — 700102 HCHG RX REV CODE 250 W/ 637 OVERRIDE(OP): Performed by: NURSE PRACTITIONER

## 2019-08-20 PROCEDURE — A9270 NON-COVERED ITEM OR SERVICE: HCPCS | Performed by: NURSE PRACTITIONER

## 2019-08-20 PROCEDURE — 700111 HCHG RX REV CODE 636 W/ 250 OVERRIDE (IP): Performed by: PHYSICIAN ASSISTANT

## 2019-08-20 PROCEDURE — 700112 HCHG RX REV CODE 229: Performed by: PHYSICIAN ASSISTANT

## 2019-08-20 PROCEDURE — 700102 HCHG RX REV CODE 250 W/ 637 OVERRIDE(OP): Performed by: PHYSICIAN ASSISTANT

## 2019-08-20 PROCEDURE — A9270 NON-COVERED ITEM OR SERVICE: HCPCS | Performed by: PHYSICIAN ASSISTANT

## 2019-08-20 RX ADMIN — ACETAMINOPHEN 650 MG: 325 TABLET, FILM COATED ORAL at 13:47

## 2019-08-20 RX ADMIN — TIZANIDINE 4 MG: 4 TABLET ORAL at 02:39

## 2019-08-20 RX ADMIN — TIZANIDINE 2 MG: 4 TABLET ORAL at 09:57

## 2019-08-20 RX ADMIN — ANTACID TABLETS 500 MG: 500 TABLET, CHEWABLE ORAL at 04:56

## 2019-08-20 RX ADMIN — POLYVINYL ALCOHOL 1 DROP: 14 SOLUTION/ DROPS OPHTHALMIC at 05:04

## 2019-08-20 RX ADMIN — ACETAMINOPHEN 650 MG: 325 TABLET, FILM COATED ORAL at 09:57

## 2019-08-20 RX ADMIN — DONEPEZIL HYDROCHLORIDE 20 MG: 5 TABLET, FILM COATED ORAL at 04:56

## 2019-08-20 RX ADMIN — GABAPENTIN 100 MG: 100 CAPSULE ORAL at 04:56

## 2019-08-20 RX ADMIN — ACETAMINOPHEN 650 MG: 325 TABLET, FILM COATED ORAL at 04:56

## 2019-08-20 RX ADMIN — VITAMIN D, TAB 1000IU (100/BT) 5000 UNITS: 25 TAB at 05:05

## 2019-08-20 RX ADMIN — HEPARIN SODIUM 5000 UNITS: 5000 INJECTION, SOLUTION INTRAVENOUS; SUBCUTANEOUS at 04:55

## 2019-08-20 RX ADMIN — DOCUSATE SODIUM 100 MG: 100 CAPSULE, LIQUID FILLED ORAL at 04:56

## 2019-08-20 NOTE — DISCHARGE SUMMARY
ADDENDUM TO DISCHARGE SUMMARY 8/19//19.  The patient has remained stable from a neurosurgical point of view.  Her incision is flat, dry. She is ambulatory with a walker.  She has no focal motor weakness.  Her pain has been well controlled with Tylenol 650 mg po every 4-6 hours.     She was found to have urinary retention yesterday and a andrews catheter was placed.  Per nursing staff and documentation, she patient was able to void small amounts with PVR of 600 and 900 cc. The patient denies any saddle anesthesia.  We recommend keeping the catheter in place for a minimum of  72 hours and then initiate the bladder program per facility guidelines.

## 2019-08-20 NOTE — PROGRESS NOTES
Pt aaox3. Pt denies HA. Mahoney in place for retention. Pt c/o back pain, tylenol and zanaflex given PRN. MADDOX 5/5. Denies N/T. Up with x1 assist with FWW. Reviewed poc with pt- needs reiforcement. Pt to d/c to SNF at 12PM today. Bed alarm in use. Call light in reach.

## 2019-08-20 NOTE — CARE PLAN
Plan discharge tomorrow at [facility] discharge at noon time. Family aware of plan discharge requiring SNF rehab.

## 2019-08-20 NOTE — CARE PLAN
Problem: Pain Management  Goal: Pain level will decrease to patient's comfort goal  Outcome: PROGRESSING AS EXPECTED  Intervention: Follow pain managment plan developed in collaboration with patient and Interdisciplinary Team  Note:   Tylenol and Zanaflex given PRN. Educated pt on importance of pain control- pt verbalized understanding.       Problem: Knowledge Deficit  Goal: Knowledge of disease process/condition, treatment plan, diagnostic tests, and medications will improve  Outcome: PROGRESSING SLOWER THAN EXPECTED  Note:   Poc discussed- pt needs reinforcement

## 2019-08-20 NOTE — DISCHARGE INSTRUCTIONS
Discharge Instructions    Discharged to [facility] by medical transportation with escort. Discharged via wheelchair, hospital escort: Yes.  Special equipment needed: walker    Be sure to schedule a follow-up appointment with your primary care doctor or any specialists as instructed.     Discharge Plan:   Influenza Vaccine Indication: Indicated: Not available from distributor/    I understand that a diet low in cholesterol, fat, and sodium is recommended for good health. Unless I have been given specific instructions below for another diet, I accept this instruction as my diet prescription.   Other diet: N/A     Special instructions: NO pushing, pulling or lifting greater than 15 lbs, NO repetitive bending and twisting, shower ok 24 hours after drain removed, pat incision / steri strips dry, no dressing unless drainage, NO non-steroidal anti-inflammatory meds until cleared by MD (for example Motrin, Ibuprofen, Celebrex), call office for incision redness, drainage, chills or increased temperature, ambulate as much as it is comfortable for you, take an over-the-counter stool softener while taking narcotic pain medication, wear brace when out of bed, and NO driving for at least 2 weeks following surgery.     · Is patient discharged on Warfarin / Coumadin?   No     Depression / Suicide Risk    As you are discharged from this [facility] facility, it is important to learn how to keep safe from harming yourself.    Recognize the warning signs:  · Abrupt changes in personality, positive or negative- including increase in energy   · Giving away possessions  · Change in eating patterns- significant weight changes-  positive or negative  · Change in sleeping patterns- unable to sleep or sleeping all the time   · Unwillingness or inability to communicate  · Depression  · Unusual sadness, discouragement and loneliness  · Talk of wanting to die  · Neglect of personal appearance   · Rebelliousness- reckless  behavior  · Withdrawal from people/activities they love  · Confusion- inability to concentrate     If you or a loved one observes any of these behaviors or has concerns about self-harm, here's what you can do:  · Talk about it- your feelings and reasons for harming yourself  · Remove any means that you might use to hurt yourself (examples: pills, rope, extension cords, firearm)  · Get professional help from the community (Mental Health, Substance Abuse, psychological counseling)  · Do not be alone:Call your Safe Contact- someone whom you trust who will be there for you.  · Call your local CRISIS HOTLINE 775-8832 or 876-351-3754  · Call your local Children's Mobile Crisis Response Team [place] (882) 986-9988 or www.MetGen  · Call the toll free National Suicide Prevention Hotlines   · National Suicide Prevention Lifeline 201-422-MASS (4607)  · National Hope Line Network 800-SUICIDE (604-5787)

## 2019-08-20 NOTE — PROGRESS NOTES
Pt showered by pts niece who is an RN, leg bag provided per request. Went over discharge instructions w/ pt & neice, when to call the doctor, f/u appointments, medications, spinal precautions. Copy of discharge paperwork given to pt. Pt had no further questions.     Report called to Rodrigo ELVY at [facility]. Pt scheduled to d/c at noon.

## 2019-08-20 NOTE — DISCHARGE PLANNING
Anticipated Discharge Disposition:   SNF    Action:   Niece was at the bedside who signed the IMM.  Talked to Eli BUCIO who cleared pt [facility].     Barriers to Discharge:   none    Plan:   Dc to SNF at 12noon.

## 2019-09-04 ENCOUNTER — APPOINTMENT (OUTPATIENT)
Dept: RADIOLOGY | Facility: MEDICAL CENTER | Age: 84
End: 2019-09-04
Attending: NEUROLOGICAL SURGERY
Payer: MEDICARE

## 2019-09-04 DIAGNOSIS — M54.16 LUMBAR RADICULOPATHY: ICD-10-CM

## 2019-09-04 DIAGNOSIS — M48.062 SPINAL STENOSIS, LUMBAR REGION, WITH NEUROGENIC CLAUDICATION: ICD-10-CM

## 2019-09-04 PROCEDURE — 72148 MRI LUMBAR SPINE W/O DYE: CPT

## 2019-09-18 ENCOUNTER — PATIENT OUTREACH (OUTPATIENT)
Dept: HEALTH INFORMATION MANAGEMENT | Facility: OTHER | Age: 84
End: 2019-09-18

## 2019-09-19 ENCOUNTER — TELEPHONE (OUTPATIENT)
Dept: MEDICAL GROUP | Facility: MEDICAL CENTER | Age: 84
End: 2019-09-19

## 2019-09-19 DIAGNOSIS — F02.80 LATE ONSET ALZHEIMER'S DISEASE WITHOUT BEHAVIORAL DISTURBANCE (HCC): ICD-10-CM

## 2019-09-19 DIAGNOSIS — G30.1 LATE ONSET ALZHEIMER'S DISEASE WITHOUT BEHAVIORAL DISTURBANCE (HCC): ICD-10-CM

## 2019-09-19 NOTE — TELEPHONE ENCOUNTER
VOICEMAIL  1. Caller Name: [employer]                      Call Back Number: N/A    2. Message: Family is requesting a Home Health Aid through [facility] to help with bathing twice weekly. Please advise.    3. Patient approves office to leave a detailed voicemail/MyChart message: N\A

## 2019-09-20 ENCOUNTER — HOSPITAL ENCOUNTER (OUTPATIENT)
Dept: LAB | Facility: MEDICAL CENTER | Age: 84
DRG: 029 | End: 2019-09-20
Attending: NEUROLOGICAL SURGERY
Payer: MEDICARE

## 2019-09-20 LAB
ANION GAP SERPL CALC-SCNC: 6 MMOL/L (ref 0–11.9)
APTT PPP: 26.8 SEC (ref 24.7–36)
BASOPHILS # BLD AUTO: 0.4 % (ref 0–1.8)
BASOPHILS # BLD: 0.03 K/UL (ref 0–0.12)
BUN SERPL-MCNC: 28 MG/DL (ref 8–22)
CALCIUM SERPL-MCNC: 8.9 MG/DL (ref 8.5–10.5)
CHLORIDE SERPL-SCNC: 98 MMOL/L (ref 96–112)
CO2 SERPL-SCNC: 23 MMOL/L (ref 20–33)
CREAT SERPL-MCNC: 0.85 MG/DL (ref 0.5–1.4)
EOSINOPHIL # BLD AUTO: 0.01 K/UL (ref 0–0.51)
EOSINOPHIL NFR BLD: 0.1 % (ref 0–6.9)
ERYTHROCYTE [DISTWIDTH] IN BLOOD BY AUTOMATED COUNT: 45.6 FL (ref 35.9–50)
GLUCOSE SERPL-MCNC: 105 MG/DL (ref 65–99)
HCT VFR BLD AUTO: 36.9 % (ref 37–47)
HGB BLD-MCNC: 12.4 G/DL (ref 12–16)
IMM GRANULOCYTES # BLD AUTO: 0.04 K/UL (ref 0–0.11)
IMM GRANULOCYTES NFR BLD AUTO: 0.5 % (ref 0–0.9)
INR PPP: 1.05 (ref 0.87–1.13)
LYMPHOCYTES # BLD AUTO: 1.62 K/UL (ref 1–4.8)
LYMPHOCYTES NFR BLD: 21.5 % (ref 22–41)
MCH RBC QN AUTO: 31.7 PG (ref 27–33)
MCHC RBC AUTO-ENTMCNC: 33.6 G/DL (ref 33.6–35)
MCV RBC AUTO: 94.4 FL (ref 81.4–97.8)
MONOCYTES # BLD AUTO: 0.92 K/UL (ref 0–0.85)
MONOCYTES NFR BLD AUTO: 12.2 % (ref 0–13.4)
NEUTROPHILS # BLD AUTO: 4.92 K/UL (ref 2–7.15)
NEUTROPHILS NFR BLD: 65.3 % (ref 44–72)
NRBC # BLD AUTO: 0 K/UL
NRBC BLD-RTO: 0 /100 WBC
PLATELET # BLD AUTO: 318 K/UL (ref 164–446)
PMV BLD AUTO: 9.2 FL (ref 9–12.9)
POTASSIUM SERPL-SCNC: 4.1 MMOL/L (ref 3.6–5.5)
PROTHROMBIN TIME: 13.9 SEC (ref 12–14.6)
RBC # BLD AUTO: 3.91 M/UL (ref 4.2–5.4)
SODIUM SERPL-SCNC: 127 MMOL/L (ref 135–145)
WBC # BLD AUTO: 7.5 K/UL (ref 4.8–10.8)

## 2019-09-20 PROCEDURE — 36415 COLL VENOUS BLD VENIPUNCTURE: CPT

## 2019-09-20 PROCEDURE — 85025 COMPLETE CBC W/AUTO DIFF WBC: CPT

## 2019-09-20 PROCEDURE — 85610 PROTHROMBIN TIME: CPT

## 2019-09-20 PROCEDURE — 85730 THROMBOPLASTIN TIME PARTIAL: CPT

## 2019-09-20 PROCEDURE — 80048 BASIC METABOLIC PNL TOTAL CA: CPT

## 2019-09-23 ENCOUNTER — HOSPITAL ENCOUNTER (INPATIENT)
Facility: MEDICAL CENTER | Age: 84
LOS: 7 days | DRG: 029 | End: 2019-09-30
Attending: NEUROLOGICAL SURGERY | Admitting: NEUROLOGICAL SURGERY
Payer: MEDICARE

## 2019-09-23 ENCOUNTER — ANESTHESIA EVENT (OUTPATIENT)
Dept: SURGERY | Facility: MEDICAL CENTER | Age: 84
DRG: 029 | End: 2019-09-23
Payer: MEDICARE

## 2019-09-23 ENCOUNTER — ANESTHESIA (OUTPATIENT)
Dept: SURGERY | Facility: MEDICAL CENTER | Age: 84
DRG: 029 | End: 2019-09-23
Payer: MEDICARE

## 2019-09-23 DIAGNOSIS — G96.198 PSEUDOMENINGOCELE: ICD-10-CM

## 2019-09-23 DIAGNOSIS — M48.061 SPINAL STENOSIS OF LUMBAR REGION, UNSPECIFIED WHETHER NEUROGENIC CLAUDICATION PRESENT: ICD-10-CM

## 2019-09-23 DIAGNOSIS — N39.0: ICD-10-CM

## 2019-09-23 DIAGNOSIS — N99.89: ICD-10-CM

## 2019-09-23 LAB
GRAM STN SPEC: NORMAL
SIGNIFICANT IND 70042: NORMAL
SITE SITE: NORMAL
SOURCE SOURCE: NORMAL

## 2019-09-23 PROCEDURE — 160035 HCHG PACU - 1ST 60 MINS PHASE I: Performed by: NEUROLOGICAL SURGERY

## 2019-09-23 PROCEDURE — 3E0R3GC INTRODUCTION OF OTHER THERAPEUTIC SUBSTANCE INTO SPINAL CANAL, PERCUTANEOUS APPROACH: ICD-10-PCS | Performed by: NEUROLOGICAL SURGERY

## 2019-09-23 PROCEDURE — 700101 HCHG RX REV CODE 250: Performed by: NEUROLOGICAL SURGERY

## 2019-09-23 PROCEDURE — 87205 SMEAR GRAM STAIN: CPT

## 2019-09-23 PROCEDURE — 700111 HCHG RX REV CODE 636 W/ 250 OVERRIDE (IP): Performed by: PHYSICIAN ASSISTANT

## 2019-09-23 PROCEDURE — 700105 HCHG RX REV CODE 258: Performed by: ANESTHESIOLOGY

## 2019-09-23 PROCEDURE — 700105 HCHG RX REV CODE 258: Performed by: PHYSICIAN ASSISTANT

## 2019-09-23 PROCEDURE — 00UT0KZ SUPPLEMENT SPINAL MENINGES WITH NONAUTOLOGOUS TISSUE SUBSTITUTE, OPEN APPROACH: ICD-10-PCS | Performed by: NEUROLOGICAL SURGERY

## 2019-09-23 PROCEDURE — 87075 CULTR BACTERIA EXCEPT BLOOD: CPT

## 2019-09-23 PROCEDURE — 502000 HCHG MISC OR IMPLANTS RC 0278: Performed by: NEUROLOGICAL SURGERY

## 2019-09-23 PROCEDURE — A9270 NON-COVERED ITEM OR SERVICE: HCPCS | Performed by: PHYSICIAN ASSISTANT

## 2019-09-23 PROCEDURE — A4314 CATH W/DRAINAGE 2-WAY LATEX: HCPCS | Performed by: NEUROLOGICAL SURGERY

## 2019-09-23 PROCEDURE — 160009 HCHG ANES TIME/MIN: Performed by: NEUROLOGICAL SURGERY

## 2019-09-23 PROCEDURE — 500445 HCHG HEMOSTAT, SURGICEL 4X8: Performed by: NEUROLOGICAL SURGERY

## 2019-09-23 PROCEDURE — 700111 HCHG RX REV CODE 636 W/ 250 OVERRIDE (IP): Performed by: ANESTHESIOLOGY

## 2019-09-23 PROCEDURE — 87086 URINE CULTURE/COLONY COUNT: CPT

## 2019-09-23 PROCEDURE — 87186 SC STD MICRODIL/AGAR DIL: CPT

## 2019-09-23 PROCEDURE — 700105 HCHG RX REV CODE 258: Performed by: NEUROLOGICAL SURGERY

## 2019-09-23 PROCEDURE — 500367 HCHG DRAIN KIT, HEMOVAC: Performed by: NEUROLOGICAL SURGERY

## 2019-09-23 PROCEDURE — 160002 HCHG RECOVERY MINUTES (STAT): Performed by: NEUROLOGICAL SURGERY

## 2019-09-23 PROCEDURE — 160029 HCHG SURGERY MINUTES - 1ST 30 MINS LEVEL 4: Performed by: NEUROLOGICAL SURGERY

## 2019-09-23 PROCEDURE — 87206 SMEAR FLUORESCENT/ACID STAI: CPT

## 2019-09-23 PROCEDURE — 87102 FUNGUS ISOLATION CULTURE: CPT

## 2019-09-23 PROCEDURE — A9270 NON-COVERED ITEM OR SERVICE: HCPCS | Performed by: ANESTHESIOLOGY

## 2019-09-23 PROCEDURE — 501445 HCHG STAPLER, SKIN DISP: Performed by: NEUROLOGICAL SURGERY

## 2019-09-23 PROCEDURE — 87116 MYCOBACTERIA CULTURE: CPT

## 2019-09-23 PROCEDURE — 110454 HCHG SHELL REV 250: Performed by: NEUROLOGICAL SURGERY

## 2019-09-23 PROCEDURE — 160036 HCHG PACU - EA ADDL 30 MINS PHASE I: Performed by: NEUROLOGICAL SURGERY

## 2019-09-23 PROCEDURE — 700102 HCHG RX REV CODE 250 W/ 637 OVERRIDE(OP): Performed by: ANESTHESIOLOGY

## 2019-09-23 PROCEDURE — 160041 HCHG SURGERY MINUTES - EA ADDL 1 MIN LEVEL 4: Performed by: NEUROLOGICAL SURGERY

## 2019-09-23 PROCEDURE — 87077 CULTURE AEROBIC IDENTIFY: CPT

## 2019-09-23 PROCEDURE — 700102 HCHG RX REV CODE 250 W/ 637 OVERRIDE(OP): Performed by: PHYSICIAN ASSISTANT

## 2019-09-23 PROCEDURE — 501838 HCHG SUTURE GENERAL: Performed by: NEUROLOGICAL SURGERY

## 2019-09-23 PROCEDURE — 770001 HCHG ROOM/CARE - MED/SURG/GYN PRIV*

## 2019-09-23 PROCEDURE — 87070 CULTURE OTHR SPECIMN AEROBIC: CPT

## 2019-09-23 PROCEDURE — 700101 HCHG RX REV CODE 250: Performed by: ANESTHESIOLOGY

## 2019-09-23 PROCEDURE — 700111 HCHG RX REV CODE 636 W/ 250 OVERRIDE (IP)

## 2019-09-23 PROCEDURE — 700112 HCHG RX REV CODE 229: Performed by: PHYSICIAN ASSISTANT

## 2019-09-23 PROCEDURE — 700111 HCHG RX REV CODE 636 W/ 250 OVERRIDE (IP): Performed by: NEUROLOGICAL SURGERY

## 2019-09-23 PROCEDURE — 110371 HCHG SHELL REV 272: Performed by: NEUROLOGICAL SURGERY

## 2019-09-23 PROCEDURE — 87015 SPECIMEN INFECT AGNT CONCNTJ: CPT

## 2019-09-23 PROCEDURE — 160048 HCHG OR STATISTICAL LEVEL 1-5: Performed by: NEUROLOGICAL SURGERY

## 2019-09-23 DEVICE — SEALANT DURAL AUTOSPRAY ADHERUS (5EA/PK): Type: IMPLANTABLE DEVICE | Site: BACK | Status: FUNCTIONAL

## 2019-09-23 DEVICE — IMPLANTABLE DEVICE: Type: IMPLANTABLE DEVICE | Site: BACK | Status: FUNCTIONAL

## 2019-09-23 DEVICE — GRAFT DURAMATRIX ONLAY PLUS 1IN X 3IN OR 2.75CM X 7.5CM: Type: IMPLANTABLE DEVICE | Site: BACK | Status: FUNCTIONAL

## 2019-09-23 RX ORDER — BACITRACIN 50000 [IU]/1
INJECTION, POWDER, FOR SOLUTION INTRAMUSCULAR
Status: DISCONTINUED | OUTPATIENT
Start: 2019-09-23 | End: 2019-09-23 | Stop reason: HOSPADM

## 2019-09-23 RX ORDER — LABETALOL HYDROCHLORIDE 5 MG/ML
5 INJECTION, SOLUTION INTRAVENOUS
Status: DISCONTINUED | OUTPATIENT
Start: 2019-09-23 | End: 2019-09-23 | Stop reason: HOSPADM

## 2019-09-23 RX ORDER — MORPHINE SULFATE 4 MG/ML
2 INJECTION, SOLUTION INTRAMUSCULAR; INTRAVENOUS
Status: DISCONTINUED | OUTPATIENT
Start: 2019-09-23 | End: 2019-09-30

## 2019-09-23 RX ORDER — AMOXICILLIN 250 MG
1 CAPSULE ORAL
Status: DISCONTINUED | OUTPATIENT
Start: 2019-09-23 | End: 2019-09-30

## 2019-09-23 RX ORDER — ENEMA 19; 7 G/133ML; G/133ML
1 ENEMA RECTAL
Status: DISCONTINUED | OUTPATIENT
Start: 2019-09-23 | End: 2019-09-30 | Stop reason: HOSPADM

## 2019-09-23 RX ORDER — HYDROMORPHONE HYDROCHLORIDE 1 MG/ML
0.1 INJECTION, SOLUTION INTRAMUSCULAR; INTRAVENOUS; SUBCUTANEOUS
Status: DISCONTINUED | OUTPATIENT
Start: 2019-09-23 | End: 2019-09-23 | Stop reason: HOSPADM

## 2019-09-23 RX ORDER — KETOROLAC TROMETHAMINE 30 MG/ML
15 INJECTION, SOLUTION INTRAMUSCULAR; INTRAVENOUS ONCE
Status: COMPLETED | OUTPATIENT
Start: 2019-09-23 | End: 2019-09-23

## 2019-09-23 RX ORDER — CYCLOBENZAPRINE HCL 10 MG
10 TABLET ORAL EVERY 8 HOURS PRN
Status: DISCONTINUED | OUTPATIENT
Start: 2019-09-23 | End: 2019-09-30

## 2019-09-23 RX ORDER — HEPARIN SODIUM 5000 [USP'U]/ML
5000 INJECTION, SOLUTION INTRAVENOUS; SUBCUTANEOUS EVERY 12 HOURS
Status: DISCONTINUED | OUTPATIENT
Start: 2019-09-24 | End: 2019-09-29

## 2019-09-23 RX ORDER — ONDANSETRON 2 MG/ML
INJECTION INTRAMUSCULAR; INTRAVENOUS PRN
Status: DISCONTINUED | OUTPATIENT
Start: 2019-09-23 | End: 2019-09-23 | Stop reason: SURG

## 2019-09-23 RX ORDER — CEFAZOLIN SODIUM 1 G/3ML
INJECTION, POWDER, FOR SOLUTION INTRAMUSCULAR; INTRAVENOUS PRN
Status: DISCONTINUED | OUTPATIENT
Start: 2019-09-23 | End: 2019-09-23 | Stop reason: SURG

## 2019-09-23 RX ORDER — DIPHENHYDRAMINE HYDROCHLORIDE 50 MG/ML
25 INJECTION INTRAMUSCULAR; INTRAVENOUS EVERY 6 HOURS PRN
Status: DISCONTINUED | OUTPATIENT
Start: 2019-09-23 | End: 2019-09-30 | Stop reason: HOSPADM

## 2019-09-23 RX ORDER — ONDANSETRON 2 MG/ML
4 INJECTION INTRAMUSCULAR; INTRAVENOUS
Status: COMPLETED | OUTPATIENT
Start: 2019-09-23 | End: 2019-09-23

## 2019-09-23 RX ORDER — CARBOXYMETHYLCELLULOSE SODIUM 5 MG/ML
1 SOLUTION/ DROPS OPHTHALMIC PRN
Status: DISCONTINUED | OUTPATIENT
Start: 2019-09-23 | End: 2019-09-27

## 2019-09-23 RX ORDER — OXYCODONE HYDROCHLORIDE 5 MG/1
5 TABLET ORAL
Status: DISCONTINUED | OUTPATIENT
Start: 2019-09-23 | End: 2019-09-26

## 2019-09-23 RX ORDER — AMOXICILLIN 250 MG
1 CAPSULE ORAL NIGHTLY
Status: DISCONTINUED | OUTPATIENT
Start: 2019-09-23 | End: 2019-09-30 | Stop reason: HOSPADM

## 2019-09-23 RX ORDER — HYDRALAZINE HYDROCHLORIDE 20 MG/ML
5 INJECTION INTRAMUSCULAR; INTRAVENOUS
Status: DISCONTINUED | OUTPATIENT
Start: 2019-09-23 | End: 2019-09-23 | Stop reason: HOSPADM

## 2019-09-23 RX ORDER — HYDROMORPHONE HYDROCHLORIDE 1 MG/ML
0.4 INJECTION, SOLUTION INTRAMUSCULAR; INTRAVENOUS; SUBCUTANEOUS
Status: DISCONTINUED | OUTPATIENT
Start: 2019-09-23 | End: 2019-09-23 | Stop reason: HOSPADM

## 2019-09-23 RX ORDER — ONDANSETRON 4 MG/1
4 TABLET, ORALLY DISINTEGRATING ORAL EVERY 4 HOURS PRN
Status: DISCONTINUED | OUTPATIENT
Start: 2019-09-23 | End: 2019-09-30 | Stop reason: HOSPADM

## 2019-09-23 RX ORDER — MIDAZOLAM HYDROCHLORIDE 1 MG/ML
INJECTION INTRAMUSCULAR; INTRAVENOUS PRN
Status: DISCONTINUED | OUTPATIENT
Start: 2019-09-23 | End: 2019-09-23 | Stop reason: SURG

## 2019-09-23 RX ORDER — ACETAMINOPHEN 500 MG
500 TABLET ORAL EVERY 6 HOURS PRN
Status: DISPENSED | OUTPATIENT
Start: 2019-09-23 | End: 2019-09-28

## 2019-09-23 RX ORDER — DIPHENHYDRAMINE HYDROCHLORIDE 50 MG/ML
12.5 INJECTION INTRAMUSCULAR; INTRAVENOUS
Status: DISCONTINUED | OUTPATIENT
Start: 2019-09-23 | End: 2019-09-23 | Stop reason: HOSPADM

## 2019-09-23 RX ORDER — BISACODYL 10 MG
10 SUPPOSITORY, RECTAL RECTAL
Status: DISCONTINUED | OUTPATIENT
Start: 2019-09-23 | End: 2019-09-30 | Stop reason: HOSPADM

## 2019-09-23 RX ORDER — OXYCODONE HYDROCHLORIDE AND ACETAMINOPHEN 5; 325 MG/1; MG/1
2 TABLET ORAL
Status: COMPLETED | OUTPATIENT
Start: 2019-09-23 | End: 2019-09-23

## 2019-09-23 RX ORDER — CALCIUM CARBONATE/VITAMIN D3 600 MG-10
1 TABLET ORAL DAILY
COMMUNITY
End: 2021-04-06

## 2019-09-23 RX ORDER — DOCUSATE SODIUM 100 MG/1
100 CAPSULE, LIQUID FILLED ORAL 2 TIMES DAILY
Status: DISCONTINUED | OUTPATIENT
Start: 2019-09-23 | End: 2019-09-30 | Stop reason: HOSPADM

## 2019-09-23 RX ORDER — CALCIUM CARBONATE 500 MG/1
500 TABLET, CHEWABLE ORAL 2 TIMES DAILY
Status: DISCONTINUED | OUTPATIENT
Start: 2019-09-23 | End: 2019-09-30

## 2019-09-23 RX ORDER — HYDRALAZINE HYDROCHLORIDE 20 MG/ML
10 INJECTION INTRAMUSCULAR; INTRAVENOUS
Status: DISCONTINUED | OUTPATIENT
Start: 2019-09-23 | End: 2019-09-30 | Stop reason: HOSPADM

## 2019-09-23 RX ORDER — METOPROLOL TARTRATE 1 MG/ML
1 INJECTION, SOLUTION INTRAVENOUS
Status: DISCONTINUED | OUTPATIENT
Start: 2019-09-23 | End: 2019-09-23 | Stop reason: HOSPADM

## 2019-09-23 RX ORDER — OXYCODONE HYDROCHLORIDE 5 MG/1
2.5 TABLET ORAL
Status: DISCONTINUED | OUTPATIENT
Start: 2019-09-23 | End: 2019-09-26

## 2019-09-23 RX ORDER — MIDAZOLAM HYDROCHLORIDE 1 MG/ML
1 INJECTION INTRAMUSCULAR; INTRAVENOUS
Status: DISCONTINUED | OUTPATIENT
Start: 2019-09-23 | End: 2019-09-23 | Stop reason: HOSPADM

## 2019-09-23 RX ORDER — GABAPENTIN 100 MG/1
100 CAPSULE ORAL 2 TIMES DAILY
Status: DISCONTINUED | OUTPATIENT
Start: 2019-09-23 | End: 2019-09-30 | Stop reason: HOSPADM

## 2019-09-23 RX ORDER — OXYCODONE HYDROCHLORIDE AND ACETAMINOPHEN 5; 325 MG/1; MG/1
1 TABLET ORAL
Status: COMPLETED | OUTPATIENT
Start: 2019-09-23 | End: 2019-09-23

## 2019-09-23 RX ORDER — SODIUM CHLORIDE, SODIUM LACTATE, POTASSIUM CHLORIDE, CALCIUM CHLORIDE 600; 310; 30; 20 MG/100ML; MG/100ML; MG/100ML; MG/100ML
INJECTION, SOLUTION INTRAVENOUS CONTINUOUS
Status: DISCONTINUED | OUTPATIENT
Start: 2019-09-23 | End: 2019-09-23 | Stop reason: HOSPADM

## 2019-09-23 RX ORDER — CEFAZOLIN SODIUM 2 G/100ML
2 INJECTION, SOLUTION INTRAVENOUS EVERY 8 HOURS
Status: COMPLETED | OUTPATIENT
Start: 2019-09-23 | End: 2019-09-24

## 2019-09-23 RX ORDER — HALOPERIDOL 5 MG/ML
1 INJECTION INTRAMUSCULAR
Status: DISCONTINUED | OUTPATIENT
Start: 2019-09-23 | End: 2019-09-23 | Stop reason: HOSPADM

## 2019-09-23 RX ORDER — SUCCINYLCHOLINE CHLORIDE 20 MG/ML
INJECTION INTRAMUSCULAR; INTRAVENOUS PRN
Status: DISCONTINUED | OUTPATIENT
Start: 2019-09-23 | End: 2019-09-23 | Stop reason: SURG

## 2019-09-23 RX ORDER — POLYETHYLENE GLYCOL 3350 17 G/17G
1 POWDER, FOR SOLUTION ORAL 2 TIMES DAILY PRN
Status: DISCONTINUED | OUTPATIENT
Start: 2019-09-23 | End: 2019-09-30

## 2019-09-23 RX ORDER — HYDROMORPHONE HYDROCHLORIDE 1 MG/ML
0.2 INJECTION, SOLUTION INTRAMUSCULAR; INTRAVENOUS; SUBCUTANEOUS
Status: DISCONTINUED | OUTPATIENT
Start: 2019-09-23 | End: 2019-09-23 | Stop reason: HOSPADM

## 2019-09-23 RX ORDER — KETOROLAC TROMETHAMINE 30 MG/ML
INJECTION, SOLUTION INTRAMUSCULAR; INTRAVENOUS
Status: DISPENSED
Start: 2019-09-23 | End: 2019-09-24

## 2019-09-23 RX ORDER — SODIUM CHLORIDE 9 MG/ML
INJECTION, SOLUTION INTRAVENOUS CONTINUOUS
Status: DISCONTINUED | OUTPATIENT
Start: 2019-09-23 | End: 2019-09-28

## 2019-09-23 RX ORDER — ONDANSETRON 2 MG/ML
4 INJECTION INTRAMUSCULAR; INTRAVENOUS EVERY 4 HOURS PRN
Status: DISCONTINUED | OUTPATIENT
Start: 2019-09-23 | End: 2019-09-30 | Stop reason: HOSPADM

## 2019-09-23 RX ORDER — MEPERIDINE HYDROCHLORIDE 25 MG/ML
12.5 INJECTION INTRAMUSCULAR; INTRAVENOUS; SUBCUTANEOUS
Status: DISCONTINUED | OUTPATIENT
Start: 2019-09-23 | End: 2019-09-23 | Stop reason: HOSPADM

## 2019-09-23 RX ORDER — DIPHENHYDRAMINE HCL 25 MG
25 TABLET ORAL EVERY 6 HOURS PRN
Status: DISCONTINUED | OUTPATIENT
Start: 2019-09-23 | End: 2019-09-30 | Stop reason: HOSPADM

## 2019-09-23 RX ORDER — SODIUM CHLORIDE, SODIUM LACTATE, POTASSIUM CHLORIDE, CALCIUM CHLORIDE 600; 310; 30; 20 MG/100ML; MG/100ML; MG/100ML; MG/100ML
INJECTION, SOLUTION INTRAVENOUS CONTINUOUS
Status: ACTIVE | OUTPATIENT
Start: 2019-09-23 | End: 2019-09-23

## 2019-09-23 RX ORDER — LIDOCAINE HYDROCHLORIDE 20 MG/ML
INJECTION, SOLUTION EPIDURAL; INFILTRATION; INTRACAUDAL; PERINEURAL PRN
Status: DISCONTINUED | OUTPATIENT
Start: 2019-09-23 | End: 2019-09-23 | Stop reason: SURG

## 2019-09-23 RX ORDER — DONEPEZIL HYDROCHLORIDE 5 MG/1
20 TABLET, FILM COATED ORAL DAILY
Status: DISCONTINUED | OUTPATIENT
Start: 2019-09-23 | End: 2019-09-30 | Stop reason: HOSPADM

## 2019-09-23 RX ADMIN — ANTACID TABLETS 500 MG: 500 TABLET, CHEWABLE ORAL at 17:57

## 2019-09-23 RX ADMIN — KETOROLAC TROMETHAMINE 15 MG: 30 INJECTION, SOLUTION INTRAMUSCULAR at 14:10

## 2019-09-23 RX ADMIN — CYCLOBENZAPRINE 10 MG: 10 TABLET, FILM COATED ORAL at 17:57

## 2019-09-23 RX ADMIN — FENTANYL CITRATE 100 MCG: 50 INJECTION, SOLUTION INTRAMUSCULAR; INTRAVENOUS at 12:11

## 2019-09-23 RX ADMIN — CEFAZOLIN 2 G: 330 INJECTION, POWDER, FOR SOLUTION INTRAMUSCULAR; INTRAVENOUS at 12:11

## 2019-09-23 RX ADMIN — FENTANYL CITRATE 25 MCG: 50 INJECTION INTRAMUSCULAR; INTRAVENOUS at 14:14

## 2019-09-23 RX ADMIN — ONDANSETRON 4 MG: 2 INJECTION INTRAMUSCULAR; INTRAVENOUS at 12:11

## 2019-09-23 RX ADMIN — GABAPENTIN 100 MG: 100 CAPSULE ORAL at 17:56

## 2019-09-23 RX ADMIN — MIDAZOLAM 2 MG: 1 INJECTION INTRAMUSCULAR; INTRAVENOUS at 12:11

## 2019-09-23 RX ADMIN — ONDANSETRON 4 MG: 2 INJECTION INTRAMUSCULAR; INTRAVENOUS at 13:58

## 2019-09-23 RX ADMIN — SODIUM CHLORIDE, POTASSIUM CHLORIDE, SODIUM LACTATE AND CALCIUM CHLORIDE: 600; 310; 30; 20 INJECTION, SOLUTION INTRAVENOUS at 10:57

## 2019-09-23 RX ADMIN — ONDANSETRON 4 MG: 2 INJECTION INTRAMUSCULAR; INTRAVENOUS at 17:38

## 2019-09-23 RX ADMIN — PROPOFOL 100 MG: 10 INJECTION, EMULSION INTRAVENOUS at 12:11

## 2019-09-23 RX ADMIN — SUCCINYLCHOLINE CHLORIDE 60 MG: 20 INJECTION, SOLUTION INTRAMUSCULAR; INTRAVENOUS at 12:11

## 2019-09-23 RX ADMIN — DOCUSATE SODIUM 100 MG: 100 CAPSULE, LIQUID FILLED ORAL at 17:56

## 2019-09-23 RX ADMIN — FENTANYL CITRATE 25 MCG: 50 INJECTION, SOLUTION INTRAMUSCULAR; INTRAVENOUS at 14:00

## 2019-09-23 RX ADMIN — METHOCARBAMOL 750 MG: 100 INJECTION INTRAMUSCULAR; INTRAVENOUS at 14:22

## 2019-09-23 RX ADMIN — EPHEDRINE SULFATE 25 MG: 50 INJECTION, SOLUTION INTRAVENOUS at 12:11

## 2019-09-23 RX ADMIN — SODIUM CHLORIDE: 9 INJECTION, SOLUTION INTRAVENOUS at 17:42

## 2019-09-23 RX ADMIN — SODIUM CHLORIDE, POTASSIUM CHLORIDE, SODIUM LACTATE AND CALCIUM CHLORIDE: 600; 310; 30; 20 INJECTION, SOLUTION INTRAVENOUS at 14:18

## 2019-09-23 RX ADMIN — ACETAMINOPHEN 500 MG: 500 TABLET ORAL at 18:17

## 2019-09-23 RX ADMIN — HYDROMORPHONE HYDROCHLORIDE 0.2 MG: 1 INJECTION, SOLUTION INTRAMUSCULAR; INTRAVENOUS; SUBCUTANEOUS at 14:17

## 2019-09-23 RX ADMIN — LIDOCAINE HYDROCHLORIDE 20 MG: 20 INJECTION, SOLUTION EPIDURAL; INFILTRATION; INTRACAUDAL at 12:11

## 2019-09-23 RX ADMIN — FENTANYL CITRATE 25 MCG: 50 INJECTION INTRAMUSCULAR; INTRAVENOUS at 14:08

## 2019-09-23 RX ADMIN — MORPHINE SULFATE 2 MG: 4 INJECTION INTRAVENOUS at 17:38

## 2019-09-23 RX ADMIN — DONEPEZIL HYDROCHLORIDE 20 MG: 5 TABLET, FILM COATED ORAL at 17:55

## 2019-09-23 RX ADMIN — FENTANYL CITRATE 25 MCG: 50 INJECTION INTRAMUSCULAR; INTRAVENOUS at 13:55

## 2019-09-23 RX ADMIN — OXYCODONE HYDROCHLORIDE 5 MG: 5 TABLET ORAL at 17:56

## 2019-09-23 RX ADMIN — HYDROMORPHONE HYDROCHLORIDE 0.2 MG: 1 INJECTION, SOLUTION INTRAMUSCULAR; INTRAVENOUS; SUBCUTANEOUS at 14:24

## 2019-09-23 RX ADMIN — FENTANYL CITRATE 25 MCG: 50 INJECTION INTRAMUSCULAR; INTRAVENOUS at 14:00

## 2019-09-23 RX ADMIN — MINERAL OIL AND PETROLATUM 1 APPLICATION: 150; 830 OINTMENT OPHTHALMIC at 18:17

## 2019-09-23 RX ADMIN — CEFAZOLIN SODIUM 2 G: 2 INJECTION, SOLUTION INTRAVENOUS at 17:57

## 2019-09-23 SDOH — HEALTH STABILITY: MENTAL HEALTH: HOW OFTEN DO YOU HAVE A DRINK CONTAINING ALCOHOL?: MONTHLY OR LESS

## 2019-09-23 ASSESSMENT — LIFESTYLE VARIABLES
EVER HAD A DRINK FIRST THING IN THE MORNING TO STEADY YOUR NERVES TO GET RID OF A HANGOVER: NO
TOTAL SCORE: 0
DOES PATIENT WANT TO STOP DRINKING: NO
AVERAGE NUMBER OF DAYS PER WEEK YOU HAVE A DRINK CONTAINING ALCOHOL: 0
TOTAL SCORE: 0
ON A TYPICAL DAY WHEN YOU DRINK ALCOHOL HOW MANY DRINKS DO YOU HAVE: 0
HAVE PEOPLE ANNOYED YOU BY CRITICIZING YOUR DRINKING: NO
EVER FELT BAD OR GUILTY ABOUT YOUR DRINKING: NO
HAVE YOU EVER FELT YOU SHOULD CUT DOWN ON YOUR DRINKING: NO
HOW MANY TIMES IN THE PAST YEAR HAVE YOU HAD 5 OR MORE DRINKS IN A DAY: 0
TOTAL SCORE: 0
ALCOHOL_USE: NO
CONSUMPTION TOTAL: NEGATIVE
EVER_SMOKED: NEVER

## 2019-09-23 ASSESSMENT — PATIENT HEALTH QUESTIONNAIRE - PHQ9
2. FEELING DOWN, DEPRESSED, IRRITABLE, OR HOPELESS: NOT AT ALL
1. LITTLE INTEREST OR PLEASURE IN DOING THINGS: NOT AT ALL
SUM OF ALL RESPONSES TO PHQ9 QUESTIONS 1 AND 2: 0

## 2019-09-23 ASSESSMENT — COGNITIVE AND FUNCTIONAL STATUS - GENERAL
TURNING FROM BACK TO SIDE WHILE IN FLAT BAD: A LITTLE
MOVING TO AND FROM BED TO CHAIR: A LITTLE
DAILY ACTIVITIY SCORE: 18
STANDING UP FROM CHAIR USING ARMS: A LITTLE
PERSONAL GROOMING: A LITTLE
DRESSING REGULAR UPPER BODY CLOTHING: A LITTLE
DRESSING REGULAR LOWER BODY CLOTHING: A LITTLE
EATING MEALS: A LITTLE
SUGGESTED CMS G CODE MODIFIER DAILY ACTIVITY: CK
MOVING FROM LYING ON BACK TO SITTING ON SIDE OF FLAT BED: A LITTLE
CLIMB 3 TO 5 STEPS WITH RAILING: A LITTLE
HELP NEEDED FOR BATHING: A LITTLE
TOILETING: A LITTLE

## 2019-09-23 ASSESSMENT — PAIN SCALES - GENERAL: PAIN_LEVEL: 2

## 2019-09-23 NOTE — OR NURSING
Patient A+Ox3.  Extremely painful in lower back.  Dr Lorenz at bedside to assess.  MADDOX well. To give IV Robaxin and IV torodol along with meds as ordered for pain.

## 2019-09-23 NOTE — OP REPORT
DATE OF SURGERY: 9/23/2019  SURGEON: Bhanu Daley   ASSISTANT: Adeola Dueñas  PREOPERATIVE DIAGNOSIS: Pseudomeningocele post lumbar laminectomy  POSTOPERATIVE DIAGNOSIS:Pseudomeningocele post lumbar laminectomy  PROCEDURE PERFORMED: Incision and Drainage of pseudomeningocele with CSF leak repair.    ANESTHESIA: GETA.  ESTIMATED BLOOD LOSS: 50 cc.  FINDINGS: Subcutaneous collection of CSF with pseudomeningocele. Able to patch CSF leak and close fascia water tight seal.  DRAINS: None  COMPLICATIONS: None.  DISPOSITION: Stable to the PACU     INDICATIONS FOR THE PROCEDURE  HISTORY: 86 yr old female who originally had intolerable pain in LLE limiting her daily activities. Lives with niece. She could not take the pain anymore. Underwent L2-L5 laminectomy for decompression of nerve roots with sever stenosis. Pain in LLE resolved, able to ambulate more, but developed a pseudomeningocele with a subcutaneous collection of CSF. Denies any postitional headaches. Has pain in the back in fluid collection area. Extensive discussion was had with patient and niece about options. The patient has baseline dementia and is forgetful at times. But conversant and AOx3. After discussion it was decided to attempt to eliminate the fluid collection subcutaneously and may have to leave the subfascial pseudomenigocele since she is asymptomatic from the leak.     SURGICAL RISKS: The patient and niece Lisa were apprised of all objectives, benefits, risks and potential complications of the procedure, including but not limited to: worsening of current status, the possible need for further procedures, the risk of infection, headaches, continued CSF leak, stroke, loss of language function, coma and even death. Informed consent was obtained and secured in the chart after the patient and niece voiced understanding of these risks and decided to proceed with the operation.     DESCRIPTION OF THE PROCEDURE  ANESTHESIA: The patient was  intubated by the anesthesia department. Eyes were taped. Antibiotics were given after intra procedure cultures were sent.    POSITIONING: Patient was turned prone onto a Spinal Raul table. All pressure points were padded and a NOEL hugger was placed for temperature regulation. Mahoney catheter placed prior to turning.    OPERATIVE TECHNIQUE  The patient was prepped and draped in the usual sterile fashion.  Incision was made on the previous scar from L2-L5.  There was immediate gush of clear fluid presumably to a spinal fluid.There was a clear evidence of fascial opening superiorly and inferiorly where CSF was being expressed. This was opened and inspected. Intraoperative sample was sent for culture and sensitivity. The two defects were copiously irrigated with antibiotic saline. All remnants of dural sealant and gel foam from previous surgery were removed. CSF was seen expressing from around the dural edges. No clear hole with CSF coming out was observed. A dural sealant patch (duragen) was placed over the area and held in place with Adherus dural sealant. Then a XXsmall piece of BMP was cut into half and one half was placed in each defect, superiorly and inferiorly over the duragen and Adherus. Studies have shown that due to the inflammation provoked by the BMP, scaring and sealing of dural leaks is enhanced. Next eviceal was used to hold the BMP in place. Next the fascia was closed with running interlocking suture with a 0-0 prolene suture. A 30 second valsalva maneuver was performed by anesthesia at 40mmhg. There was no leak of any fluid witnessed. Water tight fascial closure was obtained. Next the dead space was closed with 2-0 interrupted vicryl suture. Then subcutaneous interrupted 2-0 vicryl sutures were placed followed by skin closure with surgical staples. Sterile dressing was then placed. The patient was returned to the supine position on her bed and taken to the recovery room in stable condition.    All  counts of sharps were correct times two.     The  Mahoney catheter was left in place due to cloudy appearance of her urine. Sample was sent for UA and cultures.    In the recovery room the patient is antigravity in all extremities with exam the same as preoperatively.      SPECIMEN:  Lumbar deep wound cultures.

## 2019-09-23 NOTE — ANESTHESIA QCDR
2019 [facility] Clinical Data Registry (for Quality Improvement)     Postoperative nausea/vomiting risk protocol (Adult = 18 yrs and Pediatric 3-17 yrs)- (430 and 463)  General inhalation anesthetic (NOT TIVA) with PONV risk factors: Yes  Provision of anti-emetic therapy with at least 2 different classes of agents: Yes   Patient DID NOT receive anti-emetic therapy and reason is documented in Medical Record:  N/A    Multimodal Pain Management- (AQI59)  Patient undergoing Elective Surgery (i.e. Outpatient, or ASC, or Prescheduled Surgery prior to Hospital Admission): Yes  Use of Multimodal Pain Management, two or more drugs and/or interventions, NOT including systemic opioids: Yes   Exception: Documented allergy to multiple classes of analgesics:  N/A    PACU assessment of acute postoperative pain prior to Anesthesia Care End- Applies to Patients Age = 18- (ABG7)  Initial PACU pain score is which of the following: < 7/10  Patient unable to report pain score: N/A    Post-anesthetic transfer of care checklist/protocol to PACU/ICU- (426 and 427)  Upon conclusion of case, patient transferred to which of the following locations: PACU/Non-ICU  Use of transfer checklist/protocol: Yes  Exclusion: Service Performed in Patient Hospital Room (and thus did not require transfer): N/A    PACU Reintubation- (AQI31)  General anesthesia requiring endotracheal intubation (ETT) along with subsequent extubation in OR or PACU: Yes  Required reintubation in the PACU: No   Extubation was a planned trial documented in the medical record prior to removal of the original airway device:  N/A    Unplanned admission to ICU related to anesthesia service up through end of PACU care- (MD51)  Unplanned admission to ICU (not initially anticipated at anesthesia start time): No

## 2019-09-23 NOTE — ANESTHESIA POSTPROCEDURE EVALUATION
Patient: Ramya Bhakta    Procedure Summary     Date:  09/23/19 Room / Location:  [facility] 05 / SURGERY [facility]    Anesthesia Start:  1211 Anesthesia Stop:      Procedures:       IRRIGATION AND DEBRIDEMENT, WOUND, AFTER PROCEDURE INVOLVING LUMBAR SPINE BY POSTERIOR APPROACH-CEREBROSPINAL FLUID LEAK REPAIR (N/A Back)      IRRIGATION AND DEBRIDEMENT, WOUND (N/A Back) Diagnosis:       Lumbar radiculopathy      Lumbar spondylosis      (RADCULOPATHY, LUMBAR REGION,SPONDYLOSIS WITHOUT MYELOPAHTY OR RADICULOPATHY)    Surgeon:  Bhanu Lorenz D.O. Responsible Provider:  Loc Angel M.D.    Anesthesia Type:  general ASA Status:  2          Final Anesthesia Type: general  Last vitals  BP   Blood Pressure : 110/49    Temp   36.6 °C (97.8 °F)    Pulse   Pulse: 79   Resp   18    SpO2   96 %      Anesthesia Post Evaluation    Patient location during evaluation: PACU  Patient participation: complete - patient participated  Level of consciousness: awake and alert  Pain score: 2    Airway patency: patent  Anesthetic complications: no  Cardiovascular status: hemodynamically stable  Respiratory status: acceptable  Hydration status: euvolemic    PONV: none           Nurse Pain Score: 3 (NPRS)

## 2019-09-23 NOTE — ANESTHESIA PREPROCEDURE EVALUATION
Relevant Problems   GI   (+) GERD (gastroesophageal reflux disease)       Physical Exam    Airway   Mallampati: II  TM distance: >3 FB  Neck ROM: full       Cardiovascular - normal exam  Rhythm: regular  Rate: normal  (-) murmur     Dental - normal exam         Pulmonary - normal exam  Breath sounds clear to auscultation     Abdominal    Neurological - normal exam                 Anesthesia Plan    ASA 2       Plan - general       Airway plan will be ETT        Induction: intravenous    Postoperative Plan: Postoperative administration of opioids is intended.    Pertinent diagnostic labs and testing reviewed    Informed Consent:    Anesthetic plan and risks discussed with patient.    Use of blood products discussed with: patient whom consented to blood products.

## 2019-09-23 NOTE — ANESTHESIA TIME REPORT
Anesthesia Start and Stop Event Times     Date Time Event    9/23/2019 1142 Ready for Procedure     1211 Anesthesia Start     1343 Anesthesia Stop        Responsible Staff  09/23/19    Name Role Begin End    Loc Angel M.D. Anesth 1211 1343        Preop Diagnosis (Free Text):  Pre-op Diagnosis     RADCULOPATHY, LUMBAR REGION,SPONDYLOSIS WITHOUT MYELOPAHTY OR RADICULOPATHY        Preop Diagnosis (Codes):  Diagnosis Information     Diagnosis Code(s): Lumbar radiculopathy [M54.16]     Lumbar spondylosis [M47.816]        Post op Diagnosis  Postoperative CSF leak      Premium Reason  Non-Premium    Comments:

## 2019-09-23 NOTE — ANESTHESIA PROCEDURE NOTES
Airway  Date/Time: 9/23/2019 12:11 PM  Performed by: Loc Angel M.D.  Authorized by: Loc Angel M.D.     Location:  OR  Urgency:  Elective  Indications for Airway Management:  Anesthesia  Spontaneous Ventilation: absent    Sedation Level:  Deep  Preoxygenated: Yes    Patient Position:  Sniffing  Final Airway Type:  Endotracheal airway  Final Endotracheal Airway:  ETT  Cuffed: Yes    Technique Used for Successful ETT Placement:  Direct laryngoscopy  Insertion Site:  Oral  Blade Type:  Niya  Laryngoscope Blade/Videolaryngoscope Blade Size:  3  ETT Size (mm):  6.5  Measured from:  Teeth  ETT to Teeth (cm):  18  Placement Verified by: auscultation and capnometry    Cormack-Lehane Classification:  Grade I - full view of glottis  Number of Attempts at Approach:  1

## 2019-09-23 NOTE — OR NURSING
Patient rhett Gonzalez updated with patient progress.  Patient able to sleep now.  Cont to monitor

## 2019-09-23 NOTE — OR NURSING
Patient A+Ox3. States back is sore.  Patient given meds as ordered.  resp spont and reg.  VSS.  Patient very sensitive to iv pain meds and needed enc to take deep breaths after given.  Patient able to maintain airway now without enc to deep breathe.  Called rhett to update.  Report given to Arelis LEVY.  Patient to remain flat in bed.  Patient may tilt side to side for comfort as ordered.  Belongings brought to bedside.  Bag x1 with pillow.

## 2019-09-23 NOTE — OR SURGEON
Immediate Post OP Note    PreOp Diagnosis: Pseudomeningocele post lumbar laminectomy    PostOp Diagnosis: Pseudomeningocele post lumbar laminectomy    Procedure(s):  IRRIGATION AND DEBRIDEMENT, WOUND, AFTER PROCEDURE INVOLVING LUMBAR SPINE BY POSTERIOR APPROACH-CEREBROSPINAL FLUID LEAK REPAIR - Wound Class: Clean  IRRIGATION AND DEBRIDEMENT, WOUND - Wound Class: Clean    Surgeon(s):  Bhanu Lorenz D.O.    Anesthesiologist/Type of Anesthesia:  Anesthesiologist: Loc Angel M.D./General    Surgical Staff:  Assistant: Adeola Dueñas P.A.-C.  Circulator: Inés Menon R.N.; Soco Casarez R.N.  Scrub Person: Shawna Powers    Specimens removed if any:  ID Type Source Tests Collected by Time Destination   1 : deep lumbar wound C&s fungal and AFB Tissue Back AFB CULTURE, FUNGAL CULTURE, AEROBIC/ANAEROBIC CULTURE (SURGERY) Bhanu Lorenz D.O. 9/23/2019 12:59 PM    2 : urine from [facility]  Urine Urine MISCELLANEOUS TEST, FUNGAL CULTURE, URINE CULTURE(NEW) Bhanu Lorenz D.O. 9/23/2019 12:59 PM        Estimated Blood Loss: 50 cc    Findings: Good fascial closure with a small opening superiorly and inferiorly at previous incision sites with CSF leaking subcutaneously.  Good closure of the leaking sites.  Patch closure of CSF exposed superiorly inferiorly with DuraGen.  Watertight fascial closure obtained with no leak at 40 mmHg Valsalva maneuver.    Complications: None        9/23/2019 1:45 PM Bhanu Lorenz D.O.

## 2019-09-24 PROBLEM — E86.0 DEHYDRATION: Status: ACTIVE | Noted: 2019-09-24

## 2019-09-24 PROBLEM — N39.0 UTI (URINARY TRACT INFECTION): Status: ACTIVE | Noted: 2019-09-24

## 2019-09-24 PROBLEM — G96.198 PSEUDOMENINGOCELE: Status: ACTIVE | Noted: 2019-09-24

## 2019-09-24 LAB
BASOPHILS # BLD AUTO: 0.3 % (ref 0–1.8)
BASOPHILS # BLD: 0.03 K/UL (ref 0–0.12)
EOSINOPHIL # BLD AUTO: 0 K/UL (ref 0–0.51)
EOSINOPHIL NFR BLD: 0 % (ref 0–6.9)
ERYTHROCYTE [DISTWIDTH] IN BLOOD BY AUTOMATED COUNT: 46.5 FL (ref 35.9–50)
HCT VFR BLD AUTO: 35.9 % (ref 37–47)
HGB BLD-MCNC: 11.6 G/DL (ref 12–16)
IMM GRANULOCYTES # BLD AUTO: 0.05 K/UL (ref 0–0.11)
IMM GRANULOCYTES NFR BLD AUTO: 0.5 % (ref 0–0.9)
LYMPHOCYTES # BLD AUTO: 0.84 K/UL (ref 1–4.8)
LYMPHOCYTES NFR BLD: 8.4 % (ref 22–41)
MCH RBC QN AUTO: 30.7 PG (ref 27–33)
MCHC RBC AUTO-ENTMCNC: 32.3 G/DL (ref 33.6–35)
MCV RBC AUTO: 95 FL (ref 81.4–97.8)
MONOCYTES # BLD AUTO: 0.69 K/UL (ref 0–0.85)
MONOCYTES NFR BLD AUTO: 6.9 % (ref 0–13.4)
NEUTROPHILS # BLD AUTO: 8.37 K/UL (ref 2–7.15)
NEUTROPHILS NFR BLD: 83.9 % (ref 44–72)
NRBC # BLD AUTO: 0 K/UL
NRBC BLD-RTO: 0 /100 WBC
PLATELET # BLD AUTO: 273 K/UL (ref 164–446)
PMV BLD AUTO: 8.6 FL (ref 9–12.9)
RBC # BLD AUTO: 3.78 M/UL (ref 4.2–5.4)
RHODAMINE-AURAMINE STN SPEC: NORMAL
SIGNIFICANT IND 70042: NORMAL
SITE SITE: NORMAL
SOURCE SOURCE: NORMAL
WBC # BLD AUTO: 10 K/UL (ref 4.8–10.8)

## 2019-09-24 PROCEDURE — 700105 HCHG RX REV CODE 258: Performed by: INTERNAL MEDICINE

## 2019-09-24 PROCEDURE — 36415 COLL VENOUS BLD VENIPUNCTURE: CPT

## 2019-09-24 PROCEDURE — 700111 HCHG RX REV CODE 636 W/ 250 OVERRIDE (IP): Performed by: PHYSICIAN ASSISTANT

## 2019-09-24 PROCEDURE — 85025 COMPLETE CBC W/AUTO DIFF WBC: CPT

## 2019-09-24 PROCEDURE — 700102 HCHG RX REV CODE 250 W/ 637 OVERRIDE(OP): Performed by: PHYSICIAN ASSISTANT

## 2019-09-24 PROCEDURE — A9270 NON-COVERED ITEM OR SERVICE: HCPCS | Performed by: PHYSICIAN ASSISTANT

## 2019-09-24 PROCEDURE — 99223 1ST HOSP IP/OBS HIGH 75: CPT | Performed by: INTERNAL MEDICINE

## 2019-09-24 PROCEDURE — 700105 HCHG RX REV CODE 258: Performed by: PHYSICIAN ASSISTANT

## 2019-09-24 PROCEDURE — 770001 HCHG ROOM/CARE - MED/SURG/GYN PRIV*

## 2019-09-24 PROCEDURE — 80048 BASIC METABOLIC PNL TOTAL CA: CPT

## 2019-09-24 RX ORDER — SODIUM CHLORIDE 9 MG/ML
500 INJECTION, SOLUTION INTRAVENOUS ONCE
Status: COMPLETED | OUTPATIENT
Start: 2019-09-24 | End: 2019-09-24

## 2019-09-24 RX ORDER — DICYCLOMINE HCL 20 MG
20 TABLET ORAL
Status: DISCONTINUED | OUTPATIENT
Start: 2019-09-24 | End: 2019-09-30 | Stop reason: HOSPADM

## 2019-09-24 RX ORDER — HALOPERIDOL 5 MG/ML
2-5 INJECTION INTRAMUSCULAR EVERY 4 HOURS PRN
Status: DISCONTINUED | OUTPATIENT
Start: 2019-09-24 | End: 2019-09-30 | Stop reason: HOSPADM

## 2019-09-24 RX ADMIN — MINERAL OIL AND PETROLATUM 1 APPLICATION: 150; 830 OINTMENT OPHTHALMIC at 15:59

## 2019-09-24 RX ADMIN — SENNOSIDES, DOCUSATE SODIUM 1 TABLET: 50; 8.6 TABLET, FILM COATED ORAL at 21:00

## 2019-09-24 RX ADMIN — OXYCODONE HYDROCHLORIDE 5 MG: 5 TABLET ORAL at 22:07

## 2019-09-24 RX ADMIN — GABAPENTIN 100 MG: 100 CAPSULE ORAL at 16:00

## 2019-09-24 RX ADMIN — OXYCODONE HYDROCHLORIDE 5 MG: 5 TABLET ORAL at 12:31

## 2019-09-24 RX ADMIN — CEFTRIAXONE SODIUM 2 G: 2 INJECTION, POWDER, FOR SOLUTION INTRAMUSCULAR; INTRAVENOUS at 21:58

## 2019-09-24 RX ADMIN — OXYCODONE HYDROCHLORIDE 5 MG: 5 TABLET ORAL at 05:19

## 2019-09-24 RX ADMIN — ANTACID TABLETS 500 MG: 500 TABLET, CHEWABLE ORAL at 16:00

## 2019-09-24 RX ADMIN — MINERAL OIL AND PETROLATUM 1 APPLICATION: 150; 830 OINTMENT OPHTHALMIC at 22:02

## 2019-09-24 RX ADMIN — CEFAZOLIN SODIUM 2 G: 2 INJECTION, SOLUTION INTRAVENOUS at 01:30

## 2019-09-24 RX ADMIN — SODIUM CHLORIDE 500 ML: 9 INJECTION, SOLUTION INTRAVENOUS at 22:01

## 2019-09-24 RX ADMIN — HEPARIN SODIUM 5000 UNITS: 5000 INJECTION, SOLUTION INTRAVENOUS; SUBCUTANEOUS at 18:43

## 2019-09-24 RX ADMIN — SODIUM CHLORIDE: 9 INJECTION, SOLUTION INTRAVENOUS at 11:33

## 2019-09-24 RX ADMIN — CEFAZOLIN SODIUM 2 G: 2 INJECTION, SOLUTION INTRAVENOUS at 11:30

## 2019-09-24 RX ADMIN — CYCLOBENZAPRINE 10 MG: 10 TABLET, FILM COATED ORAL at 16:00

## 2019-09-24 ASSESSMENT — ENCOUNTER SYMPTOMS
VOMITING: 0
PHOTOPHOBIA: 0
DIARRHEA: 0
BLOOD IN STOOL: 0
HEMOPTYSIS: 0
ABDOMINAL PAIN: 1
FEVER: 0
CONSTIPATION: 0
HEARTBURN: 0
COUGH: 0
BLURRED VISION: 0
NAUSEA: 0
BACK PAIN: 1
FOCAL WEAKNESS: 0
FLANK PAIN: 0
HALLUCINATIONS: 0
SPEECH CHANGE: 0
SPUTUM PRODUCTION: 0
TREMORS: 0
NERVOUS/ANXIOUS: 0
CHILLS: 0
NECK PAIN: 0
HEADACHES: 0
POLYDIPSIA: 0
PALPITATIONS: 0
ORTHOPNEA: 0
WEIGHT LOSS: 0
DOUBLE VISION: 0
BRUISES/BLEEDS EASILY: 0

## 2019-09-24 ASSESSMENT — LIFESTYLE VARIABLES: SUBSTANCE_ABUSE: 0

## 2019-09-24 NOTE — PROGRESS NOTES
Neurosurgery Progress Note    Subjective:  HOB elevated to 15 degrees this AM despite bedrest orders  I was not notified  Per nurse, patient could not swallow pain meds without sitting up. I was not called regarding positional changes or IV medications. Apparently, HOB has been elevated since 5:00 AM and not brought back down   Patient did not complain of headache preoperatively but currently c/o frontal HA, no dizziness   Denies new pain, numbness, weakness.   Denies  positional HA, N/V, dizziness, chest pain, SOB, difficulty breathing, chills      Exam:  VSS  A&Ox4  Mild distress   HOB elevated to 15 degrees  Bed is broken, unable to bring back down to 0 degrees despite 2 nurse assist   NM: 5/5 hip flexion, knee extension, knee flexion, plantar flexion, dorsiflexion, EHL  Sensation intact and equal throughout all four extremities.   Abdomen: soft, non-tender  Non-labored breathing on room air, normal respiratory effort  Capillary refill in all four extremities <2 seconds  No LE edema, erythema, cyanosis, clubbing  Calves non-tender to compression bilat  Incision CDI, no halo sign, no drainage      BP  Min: 94/42  Max: 134/59  Pulse  Av.9  Min: 75  Max: 88  Resp  Av.6  Min: 12  Max: 27  Temp  Av.6 °C (97.9 °F)  Min: 36.3 °C (97.3 °F)  Max: 37.2 °C (98.9 °F)  SpO2  Av.2 %  Min: 91 %  Max: 100 %    No data recorded                      Intake/Output       19 - 19 0619 - 19 0659       Total  Total       Intake    P.O.  150  -- 150  --  -- --    P.O. 150 -- 150 -- -- --    I.V.  700  -- 700  --  -- --    Volume (mL) (lactated ringers infusion) 700 -- 700 -- -- --    Total Intake 850 -- 850 -- -- --       Output    Urine  450  1000 1450  --  -- --    Urine 450 -- 450 -- -- --    Output (mL) (Urethral Catheter Latex) -- 1000 1000 -- -- --    Blood  55  -- 55  --  -- --    Est. Blood Loss 55 -- 55 -- -- --    Total Output 505  1000 1505 -- -- --       Net I/O     551 -1000 -787 -- -- --            Intake/Output Summary (Last 24 hours) at 9/24/2019 0917  Last data filed at 9/24/2019 0400  Gross per 24 hour   Intake 850 ml   Output 1505 ml   Net -655 ml            • artificial tears  1 Application Q8HRS   • carboxymethylcellulose  1 Drop PRN   • donepezil  20 mg DAILY   • gabapentin  100 mg BID   • Pharmacy Consult Request  1 Each PHARMACY TO DOSE   • MD ALERT...DO NOT ADMINISTER NSAIDS or ASPIRIN unless ORDERED By Neurosurgery  1 Each PRN   • docusate sodium  100 mg BID   • senna-docusate  1 Tab Nightly   • senna-docusate  1 Tab Q24HRS PRN   • polyethylene glycol/lytes  1 Packet BID PRN   • magnesium hydroxide  30 mL QDAY PRN   • bisacodyl  10 mg Q24HRS PRN   • fleet  1 Each Once PRN   • NS   Continuous   • acetaminophen  500 mg Q6HRS PRN   • ceFAZolin  2 g Q8HR   • diphenhydrAMINE  25 mg Q6HRS PRN    Or   • diphenhydrAMINE  25 mg Q6HRS PRN   • ondansetron  4 mg Q4HRS PRN   • ondansetron  4 mg Q4HRS PRN   • cyclobenzaprine  10 mg Q8HRS PRN   • benzocaine-menthol  1 Lozenge Q2HRS PRN   • calcium carbonate  500 mg BID   • vitamin D  5,000 Units DAILY   • heparin  5,000 Units Q12HRS   • oxyCODONE immediate-release  2.5 mg Q3HRS PRN   • oxyCODONE immediate-release  5 mg Q3HRS PRN   • morphine injection  2 mg Q3HRS PRN   • hydrALAZINE  10 mg Q HOUR PRN       Assessment and Plan:  POD #1 Repair of CSF leak and post op pseudomeningocele   Prophylactic anticoagulation: no         Start date/time: 24 hours   I d/w two nurses, CNA, charge nurse and Poeth regarding care of this patient. Order was for patient to lay flat in bed x 24 hours. Due to symptoms of HA but no wound drainage/swelling we will keep patient flat for 72 hours from now rather than slowly increasing HOB after flat for 24 hours which was initially the plan.   If there is a need for IV medication or if pt needs positional change, please call neurosurgery [phone]    Patient agrees  with treatment plan.   Case discussed with Jennifer Daley

## 2019-09-24 NOTE — PROGRESS NOTES
Pt refused all morning meds due to being flat on bedrest. Pt has been having difficulty with swallowing when laying down. RN was able to encourage pt to take only a pain pill with the HOB raised to 10 degrees and bed put into reverse trendenlenberg to help the pt swallow the pill. Pt was able to get the pain pill down but with significant difficulty, including coughing and gargly sounds when speaking. Lung sounds remain diminished in the bases.     Pt is at a high risk for aspiration because everything is getting stuck in her throat as she tries to swallow it, while laying down. Nursing has tried floating pills in apple sauce and crushed pills in apple sauce, but have experience difficulty with all.

## 2019-09-24 NOTE — PROGRESS NOTES
1910 Patient's in bed. No changes in status. Bedside report given to Oncoming YAMIL RN (Kimberly).

## 2019-09-24 NOTE — THERAPY
PT eval order received. Pt currently on bedrest until this evening. Will defer eval until bedrest removed and pt is appropriate to participate with therapies.

## 2019-09-24 NOTE — CARE PLAN
Problem: Communication  Goal: The ability to communicate needs accurately and effectively will improve  Outcome: PROGRESSING AS EXPECTED  Note:   Pt is A/Ox2, however she is able to communicate her needs and concerns appropriately. Family has designated a caregiver/sitter to be with pt at all times. Pt was educated on how to use her call light to ask for assistance.      Problem: Safety  Goal: Will remain free from injury  Outcome: PROGRESSING AS EXPECTED  Note:   Pt is on flat bedrest. Fall precautions are in place and family designated sitter is at bedside.

## 2019-09-24 NOTE — CARE PLAN
Problem: Infection  Goal: Will remain free from infection  Outcome: PROGRESSING AS EXPECTED  Note:   Implement Standard precaution. Hand washing ever encounter & before & after patient care. Discussed with patient (confused, re oriented) and niece. Verbalized understanding.     Problem: Knowledge Deficit  Goal: Knowledge of disease process/condition, treatment plan, diagnostic tests, and medications will improve  Outcome: PROGRESSING AS EXPECTED  Note:   Discussed plan of care with patient (confused, re oriented) and niece. Questions answered. Verbalized understanding.     Problem: Pain Management  Goal: Pain level will decrease to patient's comfort goal  Outcome: PROGRESSING AS EXPECTED  Note:   Educated on pain scale.  Encouraged to verbalize pain. Will medicate as per MAR.

## 2019-09-24 NOTE — PROGRESS NOTES
0655 Patient's in bed. Bedside report received from NOC RN (Franny) at the beginning of the shift. Asked NOC RN regarding HOB elevated, noted 18 degrees.    0810 ADRIANO Mir visited. Noted that bed isn't working at all. Patient was moved to a different bed, using slide board. Turned & repositioned. SCD's initiated. On bedrest/flat, OK side to side per order. Fall Protocol in effect. Call light within reach. Reminded patient to call for assist. .Assessment completed. No distress noted. Noted confusion, re oriented.    0940 Patient's in bed. No distress noted.    1140 Patient's in bed. Noted patient's niece elevating HOB.  Educated patient's niece (Lisa) that patient's on bedrest/flat, OK side to side only. Verbalized understanding.    1350 Patien't in bed. Dr Lorenz visited, new order received and acknowledged (see MAR) and HOB (see order).    1603 Lisa (niece) notified Primary Nurse that patient needs medication but not pain medication. Medicated with flexeril (see MAR).

## 2019-09-24 NOTE — THERAPY
Occupational Therapy Contact Note    OT order received. Pt currently with active bedrest orders. Will hold OT until bedrest order removed and pt able to actively participate with therapy evaluation.     Jewell Simmons, OTR/L

## 2019-09-24 NOTE — PROGRESS NOTES
Surgery patient?: yes  Date of surgery: 9/23/19  Ambulated 50 ft on day of surgery? (N/A if today is not date of surgery): na  Number of times ambulated 50 feet or greater today: 0  Patient has been up to chair, edge of bed or HOB 90 degrees for all meals?: no  Goal met? (goal is ambulating at least 50 feet 2 times on day shift, one time on night shift): no  If patient did not meet mobility goal, why?: on bedrest/flat, OK side to side only per order.

## 2019-09-24 NOTE — PROGRESS NOTES
Pt refused all morning meds due to being flat on bedrest. Pt has been having difficulty with swallowing when laying down. RN was able to encourage pt to take only a pain pill with the HOB raised to 10 degrees and bed put into reverse trendenlenberg to help the pt swallow the pill. Pt was able to get the pain pill down but with significant difficulty, including coughing and gargly sounds when speaking. Lung sounds remain diminished in the bases. Pt's HOB was further raised because she continued to cough and reported that water/pill was stuck in her throat. After successfully swallowing the bed was lowered back down, then the pt again reported that she was choking again. HOB was left raised to prevent repeat occurrences of choking.     Pt is at a high risk for aspiration because everything is getting stuck in her throat as she tries to swallow it, while laying down. Nursing has tried floating pills in apple sauce and crushed pills in apple sauce, but have experienced difficulty with all. Rest of morning meds were held at this time to prevent possible aspiration.

## 2019-09-24 NOTE — PROGRESS NOTES
"Pt is awake and alert, with some confusion to place and time. Pt was unable to provide a numerical pain number, she was only able to say, \"I can still feel the pocket of fluid\". When asked if she felt any pain, she reported yes. She also stated that the pain was noticeable but that she could tolerate it. Pain was assessed using the numerical pain scale based on pt's description of pain. She expressed discomfort related to laying flat on bedrest, saying that she was laying right on the incision. Was offered to be repositioned and turned on her side to sleep, but she declined. Incision is covered with silver mepilex dressing, remains CDI.    Family-designated caregiver is at bedside and able to help communicate patient's needs.   "

## 2019-09-25 PROBLEM — N99.89 POSTOPERATIVE URINARY TRACT INFECTION: Status: ACTIVE | Noted: 2019-09-24

## 2019-09-25 PROBLEM — F02.818 ALZHEIMER'S DEMENTIA WITH BEHAVIORAL DISTURBANCE (HCC): Status: ACTIVE | Noted: 2019-09-25

## 2019-09-25 PROBLEM — G30.9 ALZHEIMER'S DEMENTIA WITH BEHAVIORAL DISTURBANCE (HCC): Status: ACTIVE | Noted: 2019-09-25

## 2019-09-25 LAB
ANION GAP SERPL CALC-SCNC: 9 MMOL/L (ref 0–11.9)
BACTERIA UR CULT: ABNORMAL
BACTERIA UR CULT: ABNORMAL
BUN SERPL-MCNC: 10 MG/DL (ref 8–22)
CALCIUM SERPL-MCNC: 8.2 MG/DL (ref 8.5–10.5)
CHLORIDE SERPL-SCNC: 101 MMOL/L (ref 96–112)
CO2 SERPL-SCNC: 22 MMOL/L (ref 20–33)
CREAT SERPL-MCNC: 0.46 MG/DL (ref 0.5–1.4)
GLUCOSE SERPL-MCNC: 92 MG/DL (ref 65–99)
POTASSIUM SERPL-SCNC: 3.9 MMOL/L (ref 3.6–5.5)
SIGNIFICANT IND 70042: ABNORMAL
SITE SITE: ABNORMAL
SODIUM SERPL-SCNC: 132 MMOL/L (ref 135–145)
SOURCE SOURCE: ABNORMAL

## 2019-09-25 PROCEDURE — 700105 HCHG RX REV CODE 258: Performed by: PHYSICIAN ASSISTANT

## 2019-09-25 PROCEDURE — 700111 HCHG RX REV CODE 636 W/ 250 OVERRIDE (IP): Performed by: PHYSICIAN ASSISTANT

## 2019-09-25 PROCEDURE — 700102 HCHG RX REV CODE 250 W/ 637 OVERRIDE(OP): Performed by: PHYSICIAN ASSISTANT

## 2019-09-25 PROCEDURE — A9270 NON-COVERED ITEM OR SERVICE: HCPCS | Performed by: PHYSICIAN ASSISTANT

## 2019-09-25 PROCEDURE — 700112 HCHG RX REV CODE 229: Performed by: PHYSICIAN ASSISTANT

## 2019-09-25 PROCEDURE — 99233 SBSQ HOSP IP/OBS HIGH 50: CPT | Performed by: INTERNAL MEDICINE

## 2019-09-25 PROCEDURE — 770001 HCHG ROOM/CARE - MED/SURG/GYN PRIV*

## 2019-09-25 RX ADMIN — MINERAL OIL AND PETROLATUM 1 APPLICATION: 150; 830 OINTMENT OPHTHALMIC at 15:47

## 2019-09-25 RX ADMIN — SENNOSIDES, DOCUSATE SODIUM 1 TABLET: 50; 8.6 TABLET, FILM COATED ORAL at 23:09

## 2019-09-25 RX ADMIN — DOCUSATE SODIUM 100 MG: 100 CAPSULE, LIQUID FILLED ORAL at 05:35

## 2019-09-25 RX ADMIN — CEFTRIAXONE SODIUM 2 G: 2 INJECTION, POWDER, FOR SOLUTION INTRAMUSCULAR; INTRAVENOUS at 16:01

## 2019-09-25 RX ADMIN — MINERAL OIL AND PETROLATUM 1 APPLICATION: 150; 830 OINTMENT OPHTHALMIC at 23:09

## 2019-09-25 RX ADMIN — GABAPENTIN 100 MG: 100 CAPSULE ORAL at 15:47

## 2019-09-25 RX ADMIN — SODIUM CHLORIDE: 9 INJECTION, SOLUTION INTRAVENOUS at 15:32

## 2019-09-25 RX ADMIN — ANTACID TABLETS 500 MG: 500 TABLET, CHEWABLE ORAL at 15:47

## 2019-09-25 RX ADMIN — ANTACID TABLETS 500 MG: 500 TABLET, CHEWABLE ORAL at 05:35

## 2019-09-25 RX ADMIN — MINERAL OIL AND PETROLATUM 1 APPLICATION: 150; 830 OINTMENT OPHTHALMIC at 05:35

## 2019-09-25 RX ADMIN — HEPARIN SODIUM 5000 UNITS: 5000 INJECTION, SOLUTION INTRAVENOUS; SUBCUTANEOUS at 05:35

## 2019-09-25 RX ADMIN — GABAPENTIN 100 MG: 100 CAPSULE ORAL at 05:35

## 2019-09-25 RX ADMIN — OXYCODONE HYDROCHLORIDE 5 MG: 5 TABLET ORAL at 15:47

## 2019-09-25 RX ADMIN — DONEPEZIL HYDROCHLORIDE 20 MG: 5 TABLET, FILM COATED ORAL at 05:35

## 2019-09-25 RX ADMIN — VITAMIN D, TAB 1000IU (100/BT) 5000 UNITS: 25 TAB at 05:43

## 2019-09-25 RX ADMIN — HEPARIN SODIUM 5000 UNITS: 5000 INJECTION, SOLUTION INTRAVENOUS; SUBCUTANEOUS at 16:01

## 2019-09-25 RX ADMIN — SODIUM CHLORIDE: 9 INJECTION, SOLUTION INTRAVENOUS at 05:34

## 2019-09-25 RX ADMIN — OXYCODONE HYDROCHLORIDE 5 MG: 5 TABLET ORAL at 23:09

## 2019-09-25 ASSESSMENT — COGNITIVE AND FUNCTIONAL STATUS - GENERAL
SUGGESTED CMS G CODE MODIFIER DAILY ACTIVITY: CK
SUGGESTED CMS G CODE MODIFIER MOBILITY: CK
MOBILITY SCORE: 18
DAILY ACTIVITIY SCORE: 18
TURNING FROM BACK TO SIDE WHILE IN FLAT BAD: A LITTLE
HELP NEEDED FOR BATHING: A LITTLE
MOVING TO AND FROM BED TO CHAIR: A LITTLE
CLIMB 3 TO 5 STEPS WITH RAILING: A LITTLE
EATING MEALS: A LITTLE
MOVING FROM LYING ON BACK TO SITTING ON SIDE OF FLAT BED: A LITTLE
WALKING IN HOSPITAL ROOM: A LITTLE
STANDING UP FROM CHAIR USING ARMS: A LITTLE
TOILETING: A LITTLE
PERSONAL GROOMING: A LITTLE
DRESSING REGULAR UPPER BODY CLOTHING: A LITTLE
DRESSING REGULAR LOWER BODY CLOTHING: A LITTLE

## 2019-09-25 ASSESSMENT — PATIENT HEALTH QUESTIONNAIRE - PHQ9
2. FEELING DOWN, DEPRESSED, IRRITABLE, OR HOPELESS: NOT AT ALL
SUM OF ALL RESPONSES TO PHQ9 QUESTIONS 1 AND 2: 0
1. LITTLE INTEREST OR PLEASURE IN DOING THINGS: NOT AT ALL

## 2019-09-25 NOTE — CARE PLAN
Problem: Safety  Goal: Will remain free from injury  Outcome: PROGRESSING AS EXPECTED  Note:   Safety precaution in effect. Non skid socks in use. Hourly rounds in effect. Patient's confused, re oriented. Discussed with Lisa (niece). Verbalized understanding.     Problem: Infection  Goal: Will remain free from infection  Outcome: PROGRESSING AS EXPECTED  Note:   Implement Standard precaution. Hand washing every encounter & before & after patient care. Verbalized understanding.     Problem: Knowledge Deficit  Goal: Knowledge of disease process/condition, treatment plan, diagnostic tests, and medications will improve  Outcome: PROGRESSING AS EXPECTED  Note:   Discussed Plan of care with the patient (confused, re oriented) and niece. Questions answered. Verbalized understanding. On Bedrest.

## 2019-09-25 NOTE — THERAPY
Order received for OT eval.  Pt currently on bedrest again today.  Will hold OT eval until pt cleared for OOB ADL's.

## 2019-09-25 NOTE — DIETARY
"Nutrition services: Day 2 of admit.  Ramya Bhakta is a 86 y.o. female with admitting DX of Radiculopathy, lumbar region, spondylosis without myelopathy or radiculopathy.    Notified by RN and MD that pt not eating well, received order for supplements per RD.    Assessment:  Height: 147.3 cm (4' 10\")  Weight: 59.6 kg (131 lb 6.3 oz)  Body mass index is 27.46 kg/m²., BMI classification: Overweight  Diet/Intake: Regular, Finger foods    Evaluation:   1. Recorded PO intake 0% to <25% of meals.  2. Family at bedside and requesting Chocolate Boost with meals.  Pt prefers smaller portions, fruit with meals.  3. Admit nutrition screen negative for weight loss or poor PO.  4. History includes dementia, pseudomeningocele s/p I&D.  5. Vitamin D deficiency, pt is receiving Vitamin D.  6. Pt with surgical incision, no skin breakdown noted.  7. Labs and medications reviewed.    Malnutrition Risk: No criteria met at this time.    Recommendations/Plan:  1. Add chocolate Boost Plus with meals per pt preference.   2. Encourage intake of at least 50% of meals and supplements.  3. Document intake of all meals and supplements as % taken in ADL's to provide interdisciplinary communication across all shifts.   4. Monitor weight.  5. Nutrition rep will continue to see patient for ongoing meal and snack preferences.     RD following.            "

## 2019-09-25 NOTE — CARE PLAN
Problem: Safety  Goal: Will remain free from falls  Outcome: PROGRESSING AS EXPECTED  Bed alarm on. Bed in lowest position. Sitter at bedside     Problem: Venous Thromboembolism (VTW)/Deep Vein Thrombosis (DVT) Prevention:  Goal: Patient will participate in Venous Thrombosis (VTE)/Deep Vein Thrombosis (DVT)Prevention Measures  Outcome: PROGRESSING AS EXPECTED   SCDs in place

## 2019-09-25 NOTE — CONSULTS
Hospital Medicine Consultation    Date of Service  9/24/2019    Referring Physician  Bhanu Lorenz D.O.    Consulting Physician  Timothy Godwin M.D.    Reason for Consultation  Management of UTI    History of Presenting Illness  86 y.o. 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  Hospitalist service consulted for management of UTI.  UA from Mahoney catheter on 9/23 showed lactose fermenting gram-negative rods.  Patient started on ceftriaxone.  Currently urine culture is pending.  Patient is somewhat confused today, disoriented in time and place, pulled out IV line.  I ordered IV or intramuscular Haldol.  Patient might need soft restraint if Haldol does not work.  Patient is known to have history of dementia, lives with her niece, taking Aricept.  According to her, lower back pain is worse after the surgery.  It is constant, dull, 7 out of 10, worsening with movement.  Additionally, complaining of frontal headache, dull, 4 out of 10, without alleviating or aggravating factors, constant.  Patient is on flat position for 72 hours per neurosurgery.    Patient took oxycodone 5 mg at 5 AM and 12 PM today.  According to her she had bowel movement earlier today.    Review of Systems  Review of Systems   Constitutional: Negative for chills, fever and weight loss.   HENT: Negative for ear pain, hearing loss and tinnitus.    Eyes: Negative for blurred vision, double vision and photophobia.   Respiratory: Negative for cough, hemoptysis and sputum production.    Cardiovascular: Negative for chest pain, palpitations and orthopnea.   Gastrointestinal: Positive for abdominal pain. Negative for blood in stool, constipation, diarrhea, heartburn, nausea and vomiting.   Genitourinary: Negative for dysuria, flank pain, frequency and hematuria.   Musculoskeletal: Positive for back  pain. Negative for joint pain and neck pain.   Skin: Negative for itching and rash.   Neurological: Negative for tremors, speech change, focal weakness and headaches.   Endo/Heme/Allergies: Negative for environmental allergies and polydipsia. Does not bruise/bleed easily.   Psychiatric/Behavioral: Negative for hallucinations and substance abuse. The patient is not nervous/anxious.        Past Medical History   has a past medical history of Acute pain of right knee (3/6/2017), Arthritis, Cancer (HCC), CATARACT, Dry cough, Glaucoma, Hepatitis (), Hepatitis A (), Jaundice (), and Pain.    Surgical History   has a past surgical history that includes eye enucleation (2007); hemorrhoidectomy; cataract phaco with iol (2011); other (); lumbar laminectomy diskectomy (2019); irrigation and debridement, wound, after procedure invoh (N/A, 2019); and irrigation & debridement neuro (N/A, 2019).    Family History  Cancer in her sister; Heart Disease in her mother and sister; Lung Disease in her father; Stroke in her brother and maternal grandfather.    Social History  she has never smoked. She has never used smokeless tobacco. She reports that she drinks alcohol. She reports that she does not use drugs.    Medications  Prior to Admission Medications   Prescriptions Last Dose Informant Patient Reported? Taking?   Carboxymethylcellulose Sodium (REFRESH TEARS OP) 2019 at 1800 Family Member Yes No   Si Drop by Ophthalmic route 2 Times a Day.   Omega 3 1200 MG Cap <2 weeks at Unknown time Family Member Yes Yes   Sig: Take 1 Tab by mouth every day.   acetaminophen (TYLENOL) 325 MG Tab 2019 at 0600 Family Member Yes No   Sig: Take 650 mg by mouth every four hours as needed.   artificial tears (EYE LUBRICANT) Ointment ophth ointment 2019 at 1800 Family Member Yes No   Sig: Place 1 Application in both eyes every 8 hours.   donepezil (ARICEPT) 10 MG tablet 2019 at 0800  Family Member Yes No   Sig: Take 20 mg by mouth every day.   gabapentin (NEURONTIN) 100 MG Cap 9/22/2019 at 0800 Family Member Yes No   Sig: Take 100-200 mg by mouth 2 times a day as needed.      Facility-Administered Medications: None       Allergies  Allergies   Allergen Reactions   • Prednisone Unspecified     Pt reports dizziness on this med   • Seasonal        Physical Exam  Temp:  [36.3 °C (97.3 °F)-37.3 °C (99.2 °F)] 37.3 °C (99.2 °F)  Pulse:  [73-89] 89  Resp:  [18] 18  BP: ()/(40-50) 129/48  SpO2:  [94 %-99 %] 94 %    Physical Exam   Constitutional: She is oriented to person, place, and time. She appears well-developed and well-nourished.   HENT:   Head: Normocephalic and atraumatic.   Mouth/Throat: Mucous membranes are dry.   Eyes: Pupils are equal, round, and reactive to light. EOM are normal.   Neck: Normal range of motion. Neck supple.   Cardiovascular: Normal rate, regular rhythm and normal heart sounds.   Pulmonary/Chest: Effort normal and breath sounds normal.   Abdominal: Soft. Bowel sounds are normal.   Genitourinary:   Genitourinary Comments: Mahoney catheter   Musculoskeletal: Normal range of motion.   Neurological: She is alert and oriented to person, place, and time.   Skin: Skin is warm and dry.   Psychiatric: She has a normal mood and affect.   Nursing note and vitals reviewed.      Fluids  Date 09/24/19 0700 - 09/25/19 0659   Shift 8545-0775 1075-0050 9474-5172 24 Hour Total   INTAKE   P.O. 360 120  480   I.V.  1100  1100   IV Piggyback  100  100   Shift Total 360 1320  1680   OUTPUT   Urine 575   575   Shift Total 575   575   Weight (kg) 59.6 59.6 59.6 59.6       Laboratory  Recent Labs     09/24/19 2010   WBC 10.0   RBC 3.78*   HEMOGLOBIN 11.6*   HEMATOCRIT 35.9*   MCV 95.0   MCH 30.7   MCHC 32.3*   RDW 46.5   PLATELETCT 273   MPV 8.6*                         Imaging  No orders to display       Assessment/Plan  UTI (urinary tract infection)  Assessment & Plan  Continue  ceftriaxone  Urine culture preliminary growing lactose fermenting gram-negative rods  Follow final urine culture    Dementia without behavioral disturbance- (present on admission)  Assessment & Plan  Confusion is worsening today.  Continue donepezil  Haldol as needed as a chemical restraint  Frequent reorientation  IV normal saline for rehydration    Pseudomeningocele  Assessment & Plan  Status post I&D.  Management per neurosurgery    Dehydration  Assessment & Plan  Dry mouth.  Blood pressure is soft  Will give additional bolus 500 cc normal saline, continue normal saline maintenance

## 2019-09-25 NOTE — PROGRESS NOTES
0650 Patient's in bed. Bedside report received from NOC RN (Anastacia) at the beginning of the shift. On Bedrest/flat as per order.    0800 Patient's in bed. Noted confusion, re oriented. Fall Protocol in effect. Call light within reach. Reminded patient to call for assist. FC to DD. Q 2 turn in effect.  Assessment completed. No distress noted. Plan of care reviewed with the patient. On bedrest/flat as per order. Plan of care reviewed with the patient.    0810 Adeola APN visited. POC discussed with the patient.    0930 Patient's in bed. No distress noted.    1145 Patient's in bed. Niece visiting. No distress noted. Fall Protocol in effect.    1315 Patient's in bed. POC discussed with the patient & niece (Lisa). Notified MD that patient has poor po intake, new order received & acknowleeged. Supplements per RD.    1547 Medicated with Oxycodone (see MAR) for c/o's back pain. Niece visiting.    1740 Patient refused Dinner even boost. Fall Protocol in effect.

## 2019-09-25 NOTE — PROGRESS NOTES
1710 Patient's confused, pulled iv out, noted tip intact.when asked stated she doesn't know. Tried placing an iv, patient tried hitting Primary Nurse.

## 2019-09-25 NOTE — PROGRESS NOTES
Patient oriented to place and self. Confused. On Bedrest for 72 hours. Turned and repositioned every 2 hours. Sitter at bedside. New PIV placed. Mahoney in place. Complained of back pain, Oxycodone given x1. Pills crushed and given with apple sauce. Needs met at this time. Bed alarm on, bed in lowest position. [facility]

## 2019-09-25 NOTE — PROGRESS NOTES
Surgery patient?: yes  Date of surgery: 9/23/19  Ambulated 50 ft on day of surgery? (N/A if today is not date of surgery): n/a  Number of times ambulated 50 feet or greater today: 0  Patient has been up to chair, edge of bed or HOB 90 degrees for all meals?:no as per order   Goal met? (goal is ambulating at least 50 feet 2 times on day shift, one time on night shift): no  If patient did not meet mobility goal, why?: on Bedrest/flat as per MD's order

## 2019-09-25 NOTE — ASSESSMENT & PLAN NOTE
Last culture Klebsiella.  Continue ceftriaxone  Urine culture preliminary growing lactose fermenting gram-negative rods  Appears to be pending speciation and sensitivities  Improved, switch to PO antibiotics.  PT/OT recommends SNF  9/29 SNF ordered  9/30 Rehab accepted

## 2019-09-25 NOTE — ASSESSMENT & PLAN NOTE
Confusion is worsening today.  Continue donepezil  Haldol as needed as a chemical restraint  Frequent reorientation  IV normal saline for rehydration  PT/OT may need rehab again

## 2019-09-25 NOTE — PROGRESS NOTES
Neurosurgery Progress Note    Subjective:  HOB flat since yesterday  States HA improved since yesterday   Denies new pain, numbness, weakness.   Denies  positional HA, N/V, dizziness, chest pain, SOB, difficulty breathing, chills    Exam:  VSS  A&Oriented to person, place, time, not event (doesn't understand why she is still in hospital)  HOB flat  NM: 5/5 hip flexion, knee extension, knee flexion, plantar flexion, dorsiflexion, EHL  Sensation intact and equal throughout all four extremities.   Abdomen: soft, non-tender  Non-labored breathing on room air, normal respiratory effort  Capillary refill in all four extremities <2 seconds  No LE edema, erythema, cyanosis, clubbing  Calves non-tender to compression bilat  Incision CDI, no halo sign, no drainage, incision is flat       BP  Min: 117/50  Max: 131/51  Pulse  Av  Min: 73  Max: 89  Resp  Av  Min: 18  Max: 18  Temp  Av.2 °C (98.9 °F)  Min: 36.9 °C (98.4 °F)  Max: 37.3 °C (99.2 °F)  SpO2  Av.3 %  Min: 93 %  Max: 96 %    No data recorded    Recent Labs     19   WBC 10.0   RBC 3.78*   HEMOGLOBIN 11.6*   HEMATOCRIT 35.9*   MCV 95.0   MCH 30.7   MCHC 32.3*   RDW 46.5   PLATELETCT 273   MPV 8.6*     Recent Labs     19   SODIUM 132*   POTASSIUM 3.9   CHLORIDE 101   CO2 22   GLUCOSE 92   BUN 10   CREATININE 0.46*   CALCIUM 8.2*               Intake/Output       19 - 19 - 19 0659       Total  Total       Intake    P.O.  360  120 480  --  -- --    P.O. 360 120 480 -- -- --    I.V.  --  2275  --  -- --    Volume (mL) (NS infusion) -- 2275 -- -- --    IV Piggyback  --  700 700  --  -- --    Volume (mL) (NS (BOLUS) infusion 500 mL) -- 500 500 -- -- --    Volume (mL) (ceFAZolin in dextrose (ANCEF) IVPB premix 2 g) -- 100 100 -- -- --    Volume (mL) (cefTRIAXone (ROCEPHIN) 2 g in  mL IVPB) -- 100 100 -- -- --    Total Intake 360 3738 9815  -- -- --       Output    Urine  575  1575 2150  --  -- --    Output (mL) (Urethral Catheter Latex) 575 1575 2150 -- -- --    Total Output 575 1575 2150 -- -- --       Net I/O     -215 1520 1305 -- -- --            Intake/Output Summary (Last 24 hours) at 9/25/2019 0758  Last data filed at 9/25/2019 0645  Gross per 24 hour   Intake 3455 ml   Output 2150 ml   Net 1305 ml            • dicyclomine  20 mg QDAY PRN   • cefTRIAXone (ROCEPHIN) IV  2 g Q24HRS   • haloperidol lactate  2-5 mg Q4HRS PRN   • artificial tears  1 Application Q8HRS   • carboxymethylcellulose  1 Drop PRN   • donepezil  20 mg DAILY   • gabapentin  100 mg BID   • Pharmacy Consult Request  1 Each PHARMACY TO DOSE   • MD ALERT...DO NOT ADMINISTER NSAIDS or ASPIRIN unless ORDERED By Neurosurgery  1 Each PRN   • docusate sodium  100 mg BID   • senna-docusate  1 Tab Nightly   • senna-docusate  1 Tab Q24HRS PRN   • polyethylene glycol/lytes  1 Packet BID PRN   • magnesium hydroxide  30 mL QDAY PRN   • bisacodyl  10 mg Q24HRS PRN   • fleet  1 Each Once PRN   • NS   Continuous   • acetaminophen  500 mg Q6HRS PRN   • diphenhydrAMINE  25 mg Q6HRS PRN    Or   • diphenhydrAMINE  25 mg Q6HRS PRN   • ondansetron  4 mg Q4HRS PRN   • ondansetron  4 mg Q4HRS PRN   • cyclobenzaprine  10 mg Q8HRS PRN   • benzocaine-menthol  1 Lozenge Q2HRS PRN   • calcium carbonate  500 mg BID   • vitamin D  5,000 Units DAILY   • heparin  5,000 Units Q12HRS   • oxyCODONE immediate-release  2.5 mg Q3HRS PRN   • oxyCODONE immediate-release  5 mg Q3HRS PRN   • morphine injection  2 mg Q3HRS PRN   • hydrALAZINE  10 mg Q HOUR PRN       Assessment and Plan:  POD #2 Repair of CSF leak and post op pseudomeningocele   Prophylactic anticoagulation: yes        Start date/time: heparin   Maintain HOB flat OK for Q1H log rolls x 1 day, will reassess tomorrow   Medicine consulted for UTI   Await final cultures   Lumbar Cx negative to date   If there is a need for IV medication or if pt needs  positional change, please call neurosurgery [phone]    Patient agrees with treatment plan.   Case discussed with Dr. Lorenz

## 2019-09-25 NOTE — ASSESSMENT & PLAN NOTE
Dry mouth.  Blood pressure is soft  Will give additional bolus 500 cc normal saline, continue normal saline maintenance  Improved.

## 2019-09-26 LAB
ANION GAP SERPL CALC-SCNC: 10 MMOL/L (ref 0–11.9)
BACTERIA WND AEROBE CULT: NORMAL
BUN SERPL-MCNC: 8 MG/DL (ref 8–22)
CALCIUM SERPL-MCNC: 8.3 MG/DL (ref 8.5–10.5)
CHLORIDE SERPL-SCNC: 101 MMOL/L (ref 96–112)
CO2 SERPL-SCNC: 23 MMOL/L (ref 20–33)
CREAT SERPL-MCNC: 0.37 MG/DL (ref 0.5–1.4)
ERYTHROCYTE [DISTWIDTH] IN BLOOD BY AUTOMATED COUNT: 45.7 FL (ref 35.9–50)
GLUCOSE SERPL-MCNC: 69 MG/DL (ref 65–99)
GRAM STN SPEC: NORMAL
HCT VFR BLD AUTO: 36.5 % (ref 37–47)
HGB BLD-MCNC: 12.1 G/DL (ref 12–16)
MCH RBC QN AUTO: 31.2 PG (ref 27–33)
MCHC RBC AUTO-ENTMCNC: 33.2 G/DL (ref 33.6–35)
MCV RBC AUTO: 94.1 FL (ref 81.4–97.8)
PLATELET # BLD AUTO: 293 K/UL (ref 164–446)
PMV BLD AUTO: 8.8 FL (ref 9–12.9)
POTASSIUM SERPL-SCNC: 3.5 MMOL/L (ref 3.6–5.5)
RBC # BLD AUTO: 3.88 M/UL (ref 4.2–5.4)
SIGNIFICANT IND 70042: NORMAL
SITE SITE: NORMAL
SODIUM SERPL-SCNC: 134 MMOL/L (ref 135–145)
SOURCE SOURCE: NORMAL
WBC # BLD AUTO: 7.6 K/UL (ref 4.8–10.8)

## 2019-09-26 PROCEDURE — 700105 HCHG RX REV CODE 258: Performed by: PHYSICIAN ASSISTANT

## 2019-09-26 PROCEDURE — 85027 COMPLETE CBC AUTOMATED: CPT

## 2019-09-26 PROCEDURE — 770001 HCHG ROOM/CARE - MED/SURG/GYN PRIV*

## 2019-09-26 PROCEDURE — 700111 HCHG RX REV CODE 636 W/ 250 OVERRIDE (IP): Performed by: PHYSICIAN ASSISTANT

## 2019-09-26 PROCEDURE — 80048 BASIC METABOLIC PNL TOTAL CA: CPT

## 2019-09-26 PROCEDURE — A9270 NON-COVERED ITEM OR SERVICE: HCPCS | Performed by: NEUROLOGICAL SURGERY

## 2019-09-26 PROCEDURE — 700102 HCHG RX REV CODE 250 W/ 637 OVERRIDE(OP): Performed by: PHYSICIAN ASSISTANT

## 2019-09-26 PROCEDURE — A9270 NON-COVERED ITEM OR SERVICE: HCPCS | Performed by: PHYSICIAN ASSISTANT

## 2019-09-26 PROCEDURE — 700102 HCHG RX REV CODE 250 W/ 637 OVERRIDE(OP): Performed by: NEUROLOGICAL SURGERY

## 2019-09-26 PROCEDURE — 700112 HCHG RX REV CODE 229: Performed by: PHYSICIAN ASSISTANT

## 2019-09-26 PROCEDURE — 99232 SBSQ HOSP IP/OBS MODERATE 35: CPT | Performed by: INTERNAL MEDICINE

## 2019-09-26 PROCEDURE — 36415 COLL VENOUS BLD VENIPUNCTURE: CPT

## 2019-09-26 RX ORDER — HYDROCODONE BITARTRATE AND ACETAMINOPHEN 5; 325 MG/1; MG/1
1-2 TABLET ORAL EVERY 6 HOURS PRN
Status: DISCONTINUED | OUTPATIENT
Start: 2019-09-26 | End: 2019-09-30 | Stop reason: HOSPADM

## 2019-09-26 RX ADMIN — SENNOSIDES, DOCUSATE SODIUM 1 TABLET: 50; 8.6 TABLET, FILM COATED ORAL at 20:39

## 2019-09-26 RX ADMIN — HEPARIN SODIUM 5000 UNITS: 5000 INJECTION, SOLUTION INTRAVENOUS; SUBCUTANEOUS at 17:10

## 2019-09-26 RX ADMIN — VITAMIN D, TAB 1000IU (100/BT) 5000 UNITS: 25 TAB at 06:59

## 2019-09-26 RX ADMIN — SODIUM CHLORIDE: 9 INJECTION, SOLUTION INTRAVENOUS at 18:40

## 2019-09-26 RX ADMIN — ANTACID TABLETS 500 MG: 500 TABLET, CHEWABLE ORAL at 06:58

## 2019-09-26 RX ADMIN — DONEPEZIL HYDROCHLORIDE 20 MG: 5 TABLET, FILM COATED ORAL at 06:58

## 2019-09-26 RX ADMIN — DOCUSATE SODIUM 100 MG: 100 CAPSULE, LIQUID FILLED ORAL at 17:13

## 2019-09-26 RX ADMIN — SODIUM CHLORIDE: 9 INJECTION, SOLUTION INTRAVENOUS at 06:55

## 2019-09-26 RX ADMIN — HYDROCODONE BITARTRATE AND ACETAMINOPHEN 1 TABLET: 5; 325 TABLET ORAL at 20:40

## 2019-09-26 RX ADMIN — ANTACID TABLETS 500 MG: 500 TABLET, CHEWABLE ORAL at 17:13

## 2019-09-26 RX ADMIN — DICYCLOMINE HYDROCHLORIDE 20 MG: 20 TABLET ORAL at 13:09

## 2019-09-26 RX ADMIN — GABAPENTIN 100 MG: 100 CAPSULE ORAL at 17:13

## 2019-09-26 RX ADMIN — CARBOXYMETHYLCELLULOSE SODIUM 1 DROP: 5 SOLUTION/ DROPS OPHTHALMIC at 13:15

## 2019-09-26 RX ADMIN — HEPARIN SODIUM 5000 UNITS: 5000 INJECTION, SOLUTION INTRAVENOUS; SUBCUTANEOUS at 06:58

## 2019-09-26 RX ADMIN — MINERAL OIL AND PETROLATUM 1 APPLICATION: 150; 830 OINTMENT OPHTHALMIC at 20:39

## 2019-09-26 RX ADMIN — POLYETHYLENE GLYCOL 3350 1 PACKET: 17 POWDER, FOR SOLUTION ORAL at 17:13

## 2019-09-26 RX ADMIN — ACETAMINOPHEN 500 MG: 500 TABLET ORAL at 12:23

## 2019-09-26 RX ADMIN — DOCUSATE SODIUM 100 MG: 100 CAPSULE, LIQUID FILLED ORAL at 06:58

## 2019-09-26 RX ADMIN — MINERAL OIL AND PETROLATUM 1 APPLICATION: 150; 830 OINTMENT OPHTHALMIC at 13:11

## 2019-09-26 RX ADMIN — MINERAL OIL AND PETROLATUM 1 APPLICATION: 150; 830 OINTMENT OPHTHALMIC at 06:58

## 2019-09-26 RX ADMIN — GABAPENTIN 100 MG: 100 CAPSULE ORAL at 06:58

## 2019-09-26 RX ADMIN — CEFTRIAXONE SODIUM 2 G: 2 INJECTION, POWDER, FOR SOLUTION INTRAMUSCULAR; INTRAVENOUS at 17:13

## 2019-09-26 ASSESSMENT — PATIENT HEALTH QUESTIONNAIRE - PHQ9
SUM OF ALL RESPONSES TO PHQ9 QUESTIONS 1 AND 2: 0
1. LITTLE INTEREST OR PLEASURE IN DOING THINGS: NOT AT ALL
2. FEELING DOWN, DEPRESSED, IRRITABLE, OR HOPELESS: NOT AT ALL

## 2019-09-26 NOTE — PROGRESS NOTES
Hospital Medicine Daily Progress Note    Date of Service  9/26/2019    Chief Complaint  86 y.o. female admitted 9/23/2019 with lower back pain and headache.    Hospital Course    History of dementia.  S/p lumbar laminectomy with discectomy L2-5 by Dr. Lorenz on 8/16.  C/b pseudomeningocele; admitted by Dr. Lorenz to the inpatient for InD.  S/p InD of pseudomeningocele by Dr. Lorenz 9/23.  Urinalysis revealed UTI. Neurosurgery consulted hospitalist for UTI.  Antibiotics started.        Interval Problem Update  9/25. Not complaining of fever or chills. She is a nonhistorian. Her family  Member did say she doesn't wipe her genitalia the right way so she is prone to UTI, her last one was Klebsiella UTI. It was also felt that her pseudomeningocele was a result of noncompliance with PT/OT at rehab.  9/26. Tolerated procedure. Remains baseline confusion but better mood as family is present. Likely needs SNF.    Consultants/Specialty  Hospitalist.  Dr. Lorenz is the attending.    Code Status  full    Disposition  PT/OT pending  May need SNF or rehab.    Review of Systems  Review of Systems   Unable to perform ROS: Mental acuity        Physical Exam  Temp:  [36.3 °C (97.4 °F)-37.8 °C (100 °F)] 37.2 °C (98.9 °F)  Pulse:  [87-92] 90  Resp:  [16-18] 18  BP: (136-141)/(53-74) 136/74  SpO2:  [90 %-93 %] 90 %    Physical Exam   Constitutional: She appears well-developed.   Elderly, frail     HENT:   Head: Normocephalic and atraumatic.   Eyes: Conjunctivae are normal. No scleral icterus.   Neck: Normal range of motion. Neck supple.   Cardiovascular: Normal rate and regular rhythm. Exam reveals no gallop and no friction rub.   No murmur heard.  Pulmonary/Chest: Effort normal and breath sounds normal. No respiratory distress. She has no wheezes. She has no rales.   Abdominal: Soft. Bowel sounds are normal. She exhibits no distension. There is no tenderness. There is no rebound and no guarding.   Musculoskeletal: She exhibits  tenderness (Low back, improved.). She exhibits no edema.   Neurological: She is alert.   Severe cognitive deficits.   Skin: Skin is warm.   Psychiatric: She has a normal mood and affect. Her behavior is normal.       Fluids    Intake/Output Summary (Last 24 hours) at 9/26/2019 1611  Last data filed at 9/26/2019 1400  Gross per 24 hour   Intake 1440 ml   Output 2500 ml   Net -1060 ml       Laboratory  Recent Labs     09/24/19 2010 09/26/19 0357   WBC 10.0 7.6   RBC 3.78* 3.88*   HEMOGLOBIN 11.6* 12.1   HEMATOCRIT 35.9* 36.5*   MCV 95.0 94.1   MCH 30.7 31.2   MCHC 32.3* 33.2*   RDW 46.5 45.7   PLATELETCT 273 293   MPV 8.6* 8.8*     Recent Labs     09/24/19 2010 09/26/19 0357   SODIUM 132* 134*   POTASSIUM 3.9 3.5*   CHLORIDE 101 101   CO2 22 23   GLUCOSE 92 69   BUN 10 8   CREATININE 0.46* 0.37*   CALCIUM 8.2* 8.3*                   Imaging  No orders to display        Assessment/Plan  * Post-InD of meningocele (complication of laminectomy) urinary tract infection  Assessment & Plan  Last culture Klebsiella.  Continue ceftriaxone  Urine culture preliminary growing lactose fermenting gram-negative rods  Appears to be pending speciation and sensitivities      s/p laminectomy c/b pseudomeningocele s/p InD  Assessment & Plan  Status post I&D.  Management per neurosurgery  DVT prophylaxis per NSG    Dementia without behavioral disturbance- (present on admission)  Assessment & Plan  Confusion is worsening today.  Continue donepezil  Haldol as needed as a chemical restraint  Frequent reorientation  IV normal saline for rehydration  PT/OT may need rehab again    Alzheimer's dementia with behavioral disturbance  Assessment & Plan  COntinue donezepil.    Dehydration  Assessment & Plan  Dry mouth.  Blood pressure is soft  Will give additional bolus 500 cc normal saline, continue normal saline maintenance  Improved.       VTE prophylaxis: Heparin SQ as per NSG  Reviewed vitals, labs, imaging, staff notes.  Discussed  assessment and plan with Ramya Bhakta amd family  Discussed with RN, SW

## 2019-09-26 NOTE — PROGRESS NOTES
Surgery patient?: yes  Date of surgery: 9/23/19  Ambulated 50 ft on day of surgery? (N/A if today is not date of surgery): n/a  Number of times ambulated 50 feet or greater today: 0  Patient has been up to chair, edge of bed or HOB 90 degrees for all meals?: no as per order  Goal met? (goal is ambulating at least 50 feet 2 times on day shift, one time on night shift): no  If patient did not meet mobility goal, why?: on Bedrest/flat as per MD's order.

## 2019-09-26 NOTE — CARE PLAN
Problem: Safety  Goal: Will remain free from injury  Outcome: PROGRESSING AS EXPECTED  Note:   Safety precaution in effect. Call light within reach. Non skid socks in use. Patient's confused, re oriented. Discussed with Lisa (niece). Hourly rounds in effect. Verbalized understanding.     Problem: Infection  Goal: Will remain free from infection  Outcome: PROGRESSING AS EXPECTED  Note:   Implement Standard precaution. Hand washing every encounter & before & after patient care.discussed with patient (confused) and Lisa (spouse).     Problem: Knowledge Deficit  Goal: Knowledge of disease process/condition, treatment plan, diagnostic tests, and medications will improve  Outcome: PROGRESSING AS EXPECTED  Note:   Discussed Plan of care. Questions answered. On Bedrest/flat. Verbalized understanding.     Problem: Pain Management  Goal: Pain level will decrease to patient's comfort goal  Outcome: PROGRESSING AS EXPECTED  Note:   Discussed Plan of care with patient (confused, re oriented) and rhett (Lisa). Questions answered. Verbalized understanding.

## 2019-09-26 NOTE — PROGRESS NOTES
Neurosurgery Progress Note    Subjective:  HOB flat since yesterday  States HA improved since yesterday, c/o blurred vision this AM   Denies new pain, numbness, weakness.   Denies  positional HA, N/V, dizziness, chest pain, SOB, difficulty breathing, chills    Exam:  VSS  A&Oriented to person, place, time  HOB flat  NM: 5/5 hip flexion, knee extension, knee flexion, plantar flexion, dorsiflexion, EHL  Sensation intact and equal throughout all four extremities.   Abdomen: soft, non-tender  Non-labored breathing on room air, normal respiratory effort  Capillary refill in all four extremities <2 seconds  No LE edema, erythema, cyanosis, clubbing  Calves non-tender to compression bilat  Incision CDI, no halo sign, no drainage, incision is flat       BP  Min: 136/74  Max: 141/60  Pulse  Av.3  Min: 87  Max: 92  Resp  Av.3  Min: 16  Max: 18  Temp  Av.2 °C (98.9 °F)  Min: 36.3 °C (97.4 °F)  Max: 37.8 °C (100 °F)  SpO2  Av.8 %  Min: 90 %  Max: 93 %    No data recorded    Recent Labs     19  0357   WBC 10.0 7.6   RBC 3.78* 3.88*   HEMOGLOBIN 11.6* 12.1   HEMATOCRIT 35.9* 36.5*   MCV 95.0 94.1   MCH 30.7 31.2   MCHC 32.3* 33.2*   RDW 46.5 45.7   PLATELETCT 273 293   MPV 8.6* 8.8*     Recent Labs     197   SODIUM 132* 134*   POTASSIUM 3.9 3.5*   CHLORIDE 101 101   CO2 22 23   GLUCOSE 92 69   BUN 10 8   CREATININE 0.46* 0.37*   CALCIUM 8.2* 8.3*               Intake/Output       19 - 19 - 19 Total  Total       Intake    P.O.  120  -- 120  240  -- 240    P.O. 120 -- 120 240 -- 240    I.V.  1100  -- 1100  --  -- --    Volume (mL) (NS infusion) 1100 -- 1100 -- -- --    IV Piggyback  100  -- 100  --  -- --    Volume (mL) (cefTRIAXone (ROCEPHIN) 2 g in  mL IVPB) 100 -- 100 -- -- --    Total Intake 1320 -- 1320 240 -- 240       Output    Urine  --  1400 1400  --  -- --     Output (mL) (Urethral Catheter Latex) -- 1400 1400 -- -- --    Total Output -- 1400 1400 -- -- --       Net I/O     1320 -1400 -80 240 -- 240            Intake/Output Summary (Last 24 hours) at 9/26/2019 1004  Last data filed at 9/26/2019 0900  Gross per 24 hour   Intake 1440 ml   Output 1400 ml   Net 40 ml            • dicyclomine  20 mg QDAY PRN   • cefTRIAXone (ROCEPHIN) IV  2 g Q24HRS   • haloperidol lactate  2-5 mg Q4HRS PRN   • artificial tears  1 Application Q8HRS   • carboxymethylcellulose  1 Drop PRN   • donepezil  20 mg DAILY   • gabapentin  100 mg BID   • Pharmacy Consult Request  1 Each PHARMACY TO DOSE   • MD ALERT...DO NOT ADMINISTER NSAIDS or ASPIRIN unless ORDERED By Neurosurgery  1 Each PRN   • docusate sodium  100 mg BID   • senna-docusate  1 Tab Nightly   • senna-docusate  1 Tab Q24HRS PRN   • polyethylene glycol/lytes  1 Packet BID PRN   • magnesium hydroxide  30 mL QDAY PRN   • bisacodyl  10 mg Q24HRS PRN   • fleet  1 Each Once PRN   • NS   Continuous   • acetaminophen  500 mg Q6HRS PRN   • diphenhydrAMINE  25 mg Q6HRS PRN    Or   • diphenhydrAMINE  25 mg Q6HRS PRN   • ondansetron  4 mg Q4HRS PRN   • ondansetron  4 mg Q4HRS PRN   • cyclobenzaprine  10 mg Q8HRS PRN   • benzocaine-menthol  1 Lozenge Q2HRS PRN   • calcium carbonate  500 mg BID   • vitamin D  5,000 Units DAILY   • heparin  5,000 Units Q12HRS   • oxyCODONE immediate-release  2.5 mg Q3HRS PRN   • oxyCODONE immediate-release  5 mg Q3HRS PRN   • morphine injection  2 mg Q3HRS PRN   • hydrALAZINE  10 mg Q HOUR PRN       Assessment and Plan:  POD #3 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 Lorenz to re-evaluate this afternoon   Medicine consulted for UTI   Await final cultures   Lumbar Cx negative to date  Will need PT/OT rehab once cleared from nsgy     Patient agrees with treatment plan.   Case discussed  with Dr. Lorenz

## 2019-09-26 NOTE — PROGRESS NOTES
0650 Patient's in bed. Bedside report received from NOC RN (Ana María) at the beginning of the shift.    0740 Patient's in bed. Noted confusion, re oriented. Fall Protocol in effect. Call light within reach. Reminded patient to call for assist. FC to DD. Q 2 hour turn in effect. Assessment completed. No distress noted. Plan of care reviewed with the patient. On bedrest/flat as per order.    0955 ADRIANO Mir. POC discussed with the patient. Per the said APN to continue on bedrest/flat.     1223 Medicated with Tylenol (see MAR) for c/o's back pain, rates pain 5/10. Niece visiting.    1230 Dr Lorenz visited. POC discussed with the patient.     1241 New order from Dr Lorenz (see MAR).    1350 Patient's in bed. No distress noted.    1545 Patient's in bed. Niece visiting. No distress noted.    1725 Patient's in bed.  No distress noted. Fall Protocol in effect.

## 2019-09-26 NOTE — PROGRESS NOTES
Hospital Medicine Daily Progress Note    Date of Service  9/25/2019    Chief Complaint  86 y.o. female admitted 9/23/2019 with lower back pain and headache.    Hospital Course    History of dementia.  S/p lumbar laminectomy with discectomy L2-5 by Dr. Lorenz on 8/16.  C/b pseudomeningocele; admitted by Dr. Lorenz to the inpatient for InD.  S/p InD of pseudomeningocele by Dr. Lroenz 9/23.  Urinalysis revealed UTI. Neurosurgery consulted hospitalist for UTI.  Antibiotics started.        Interval Problem Update  9/25. Not complaining of fever or chills. She is a nonhistorian. Her family  Member did say she doesn't wipe her genitalia the right way so she is prone to UTI, her last one was Klebsiella UTI. It was also felt that her pseudomeningocele was a result of noncompliance with PT/OT at rehab.    Consultants/Specialty  Hospitalist.  Dr. Lorenz is the attending.    Code Status  full    Disposition  PT/OT pending  May need SNF or rehab.    Review of Systems  Review of Systems   Unable to perform ROS: Mental acuity        Physical Exam  Temp:  [36.2 °C (97.1 °F)-37.3 °C (99.2 °F)] 36.2 °C (97.1 °F)  Pulse:  [87-98] 98  Resp:  [18] 18  BP: (129-140)/(48-51) 140/50  SpO2:  [91 %-94 %] 91 %    Physical Exam   Constitutional: She appears well-developed.   Elderly, frail     HENT:   Head: Normocephalic and atraumatic.   Eyes: Conjunctivae are normal. No scleral icterus.   Neck: Normal range of motion. Neck supple.   Cardiovascular: Normal rate and regular rhythm. Exam reveals no gallop and no friction rub.   No murmur heard.  Pulmonary/Chest: Effort normal and breath sounds normal. No respiratory distress. She has no wheezes. She has no rales.   Abdominal: Soft. Bowel sounds are normal. She exhibits no distension. There is no tenderness. There is no rebound and no guarding.   Musculoskeletal: She exhibits tenderness (Low back). She exhibits no edema.   Neurological: She is alert.   Severe cognitive deficits.   Skin:  Skin is warm.   Psychiatric: She has a normal mood and affect. Her behavior is normal.       Fluids    Intake/Output Summary (Last 24 hours) at 9/25/2019 1749  Last data filed at 9/25/2019 0840  Gross per 24 hour   Intake 3215 ml   Output 1575 ml   Net 1640 ml       Laboratory  Recent Labs     09/24/19 2010   WBC 10.0   RBC 3.78*   HEMOGLOBIN 11.6*   HEMATOCRIT 35.9*   MCV 95.0   MCH 30.7   MCHC 32.3*   RDW 46.5   PLATELETCT 273   MPV 8.6*     Recent Labs     09/24/19 2010   SODIUM 132*   POTASSIUM 3.9   CHLORIDE 101   CO2 22   GLUCOSE 92   BUN 10   CREATININE 0.46*   CALCIUM 8.2*                   Imaging  No orders to display        Assessment/Plan  * Post-InD of meningocele (complication of laminectomy) urinary tract infection  Assessment & Plan  Last culture Klebsiella.  Continue ceftriaxone  Urine culture preliminary growing lactose fermenting gram-negative rods  Follow final urine culture      s/p laminectomy c/b pseudomeningocele s/p InD  Assessment & Plan  Status post I&D.  Management per neurosurgery  DVT prophylaxis per NSG    Dementia without behavioral disturbance- (present on admission)  Assessment & Plan  Confusion is worsening today.  Continue donepezil  Haldol as needed as a chemical restraint  Frequent reorientation  IV normal saline for rehydration  PT/OT may need rehab again    Alzheimer's dementia with behavioral disturbance  Assessment & Plan  COntinue donezepil.    Dehydration  Assessment & Plan  Dry mouth.  Blood pressure is soft  Will give additional bolus 500 cc normal saline, continue normal saline maintenance  Improved.       VTE prophylaxis: Heparin SQ as per NSG  Reviewed vitals, labs, imaging, staff notes.  Discussed assessment and plan with Ramya Bhakta amd family  Discussed with RN, PARTH

## 2019-09-27 LAB
ERYTHROCYTE [DISTWIDTH] IN BLOOD BY AUTOMATED COUNT: 47.3 FL (ref 35.9–50)
HCT VFR BLD AUTO: 38.2 % (ref 37–47)
HGB BLD-MCNC: 12.4 G/DL (ref 12–16)
MCH RBC QN AUTO: 30.9 PG (ref 27–33)
MCHC RBC AUTO-ENTMCNC: 32.5 G/DL (ref 33.6–35)
MCV RBC AUTO: 95.3 FL (ref 81.4–97.8)
PLATELET # BLD AUTO: 315 K/UL (ref 164–446)
PMV BLD AUTO: 8.8 FL (ref 9–12.9)
RBC # BLD AUTO: 4.01 M/UL (ref 4.2–5.4)
WBC # BLD AUTO: 6.2 K/UL (ref 4.8–10.8)

## 2019-09-27 PROCEDURE — 700102 HCHG RX REV CODE 250 W/ 637 OVERRIDE(OP): Performed by: INTERNAL MEDICINE

## 2019-09-27 PROCEDURE — 700105 HCHG RX REV CODE 258: Performed by: PHYSICIAN ASSISTANT

## 2019-09-27 PROCEDURE — 770001 HCHG ROOM/CARE - MED/SURG/GYN PRIV*

## 2019-09-27 PROCEDURE — 700102 HCHG RX REV CODE 250 W/ 637 OVERRIDE(OP): Performed by: PHYSICIAN ASSISTANT

## 2019-09-27 PROCEDURE — 700102 HCHG RX REV CODE 250 W/ 637 OVERRIDE(OP): Performed by: NEUROLOGICAL SURGERY

## 2019-09-27 PROCEDURE — 700112 HCHG RX REV CODE 229: Performed by: PHYSICIAN ASSISTANT

## 2019-09-27 PROCEDURE — A9270 NON-COVERED ITEM OR SERVICE: HCPCS | Performed by: PHYSICIAN ASSISTANT

## 2019-09-27 PROCEDURE — 700111 HCHG RX REV CODE 636 W/ 250 OVERRIDE (IP): Performed by: PHYSICIAN ASSISTANT

## 2019-09-27 PROCEDURE — 36415 COLL VENOUS BLD VENIPUNCTURE: CPT

## 2019-09-27 PROCEDURE — A9270 NON-COVERED ITEM OR SERVICE: HCPCS | Performed by: NEUROLOGICAL SURGERY

## 2019-09-27 PROCEDURE — 85027 COMPLETE CBC AUTOMATED: CPT

## 2019-09-27 PROCEDURE — 99231 SBSQ HOSP IP/OBS SF/LOW 25: CPT | Performed by: INTERNAL MEDICINE

## 2019-09-27 PROCEDURE — A9270 NON-COVERED ITEM OR SERVICE: HCPCS | Performed by: INTERNAL MEDICINE

## 2019-09-27 RX ORDER — LACTOBACILLUS RHAMNOSUS GG 10B CELL
1 CAPSULE ORAL
Status: DISCONTINUED | OUTPATIENT
Start: 2019-09-28 | End: 2019-09-30 | Stop reason: HOSPADM

## 2019-09-27 RX ORDER — CARBOXYMETHYLCELLULOSE SODIUM 5 MG/ML
1 SOLUTION/ DROPS OPHTHALMIC 3 TIMES DAILY
Status: DISCONTINUED | OUTPATIENT
Start: 2019-09-27 | End: 2019-09-30 | Stop reason: HOSPADM

## 2019-09-27 RX ORDER — CEFUROXIME AXETIL 500 MG/1
500 TABLET ORAL EVERY 12 HOURS
Status: COMPLETED | OUTPATIENT
Start: 2019-09-27 | End: 2019-09-30

## 2019-09-27 RX ADMIN — ANTACID TABLETS 500 MG: 500 TABLET, CHEWABLE ORAL at 04:55

## 2019-09-27 RX ADMIN — HYDROCODONE BITARTRATE AND ACETAMINOPHEN 1 TABLET: 5; 325 TABLET ORAL at 04:59

## 2019-09-27 RX ADMIN — CYCLOBENZAPRINE 10 MG: 10 TABLET, FILM COATED ORAL at 19:55

## 2019-09-27 RX ADMIN — DOCUSATE SODIUM 100 MG: 100 CAPSULE, LIQUID FILLED ORAL at 18:00

## 2019-09-27 RX ADMIN — ACETAMINOPHEN 500 MG: 500 TABLET ORAL at 10:53

## 2019-09-27 RX ADMIN — GABAPENTIN 100 MG: 100 CAPSULE ORAL at 18:00

## 2019-09-27 RX ADMIN — HEPARIN SODIUM 5000 UNITS: 5000 INJECTION, SOLUTION INTRAVENOUS; SUBCUTANEOUS at 04:55

## 2019-09-27 RX ADMIN — SENNOSIDES, DOCUSATE SODIUM 1 TABLET: 50; 8.6 TABLET, FILM COATED ORAL at 19:56

## 2019-09-27 RX ADMIN — HYDROCODONE BITARTRATE AND ACETAMINOPHEN 1 TABLET: 5; 325 TABLET ORAL at 13:49

## 2019-09-27 RX ADMIN — ANTACID TABLETS 500 MG: 500 TABLET, CHEWABLE ORAL at 18:00

## 2019-09-27 RX ADMIN — VITAMIN D, TAB 1000IU (100/BT) 5000 UNITS: 25 TAB at 04:55

## 2019-09-27 RX ADMIN — MINERAL OIL AND PETROLATUM 1 APPLICATION: 150; 830 OINTMENT OPHTHALMIC at 05:06

## 2019-09-27 RX ADMIN — HYDROCODONE BITARTRATE AND ACETAMINOPHEN 1 TABLET: 5; 325 TABLET ORAL at 19:55

## 2019-09-27 RX ADMIN — MINERAL OIL AND PETROLATUM 1 APPLICATION: 150; 830 OINTMENT OPHTHALMIC at 21:21

## 2019-09-27 RX ADMIN — HEPARIN SODIUM 5000 UNITS: 5000 INJECTION, SOLUTION INTRAVENOUS; SUBCUTANEOUS at 17:43

## 2019-09-27 RX ADMIN — CEFUROXIME AXETIL 500 MG: 500 TABLET ORAL at 21:21

## 2019-09-27 RX ADMIN — CARBOXYMETHYLCELLULOSE SODIUM 1 DROP: 5 SOLUTION/ DROPS OPHTHALMIC at 17:44

## 2019-09-27 RX ADMIN — MINERAL OIL AND PETROLATUM 1 APPLICATION: 150; 830 OINTMENT OPHTHALMIC at 13:49

## 2019-09-27 RX ADMIN — SODIUM CHLORIDE: 9 INJECTION, SOLUTION INTRAVENOUS at 05:05

## 2019-09-27 RX ADMIN — POLYETHYLENE GLYCOL 3350 1 PACKET: 17 POWDER, FOR SOLUTION ORAL at 19:55

## 2019-09-27 RX ADMIN — GABAPENTIN 100 MG: 100 CAPSULE ORAL at 04:55

## 2019-09-27 RX ADMIN — DONEPEZIL HYDROCHLORIDE 20 MG: 5 TABLET, FILM COATED ORAL at 04:55
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
## 2019-09-27 NOTE — PROGRESS NOTES
Pt alert and oriented to self and place. She is frequently confused but redirectable. Family has a sitter at the bedside with her throughout the shift. Pt on RA and is still laying flat in bed. Orders to move HOB start at 0700. Pt complains of back pain but said that pain medication seemed to help.     NS infusing and IV ABX given as ordered. Pt was + for UTI.     Bed is locked and in lowest position, bed alarm and non skid socks are on. SCD pumps in use. Pt on bed rest for now. Hourly rounding in place, will continue to monitor.

## 2019-09-27 NOTE — DISCHARGE PLANNING
[facility] Rehabilitation Transitional Care Coordination     Referral from:  Dr. Newton   Facesheet indicates: SCP   Potential Rehab Diagnosis: other orthopedic    Chart review indicates patient does have on going medical management and anticipated therapy needs to possibly meet inpatient rehab facility criteria with the goal of returning to community,Independent living at the [facility]. Per  at the facility goal for rehab would be to increase independence for transfer at a JEISON level of care. Patient can use an assisted device w/c, walker, ect. Facility can assist with medication management as well as dressing grooming and bathing. Ramya is welcome back to facility when medically cleared.     D/C support attendant care and Senior living.      Physiatry consultation Pended per protocol.        Will follow for therapy evaluations to determine need for post acute care.       Thank you for referral.

## 2019-09-27 NOTE — PROGRESS NOTES
Hospital Medicine Daily Progress Note    Date of Service  9/27/2019    Chief Complaint  86 y.o. female admitted 9/23/2019 with lower back pain and headache.    Hospital Course    History of dementia.  S/p lumbar laminectomy with discectomy L2-5 by Dr. Lorenz on 8/16.  C/b pseudomeningocele; admitted by Dr. Lorenz to the inpatient for InD.  S/p InD of pseudomeningocele by Dr. Lorenz 9/23.  Urinalysis revealed UTI. Neurosurgery consulted hospitalist for UTI.  Antibiotics started.        Interval Problem Update  9/25. Not complaining of fever or chills. She is a nonhistorian. Her family  Member did say she doesn't wipe her genitalia the right way so she is prone to UTI, her last one was Klebsiella UTI. It was also felt that her pseudomeningocele was a result of noncompliance with PT/OT at rehab.  9/26. Tolerated procedure. Remains baseline confusion but better mood as family is present. Likely needs SNF.  9/27. Pain improved. She has dementia/cognitive deficits but not agitated or in discomfort at rest. Deconditioned.    Consultants/Specialty  Hospitalist.  Dr. Lorenz is the attending.    Code Status  full    Disposition  PT/OT pending  May need SNF or rehab.    Review of Systems  Review of Systems   Unable to perform ROS: Mental acuity        Physical Exam  Temp:  [36.3 °C (97.4 °F)-36.7 °C (98 °F)] 36.6 °C (97.8 °F)  Pulse:  [73-82] 79  Resp:  [16-17] 16  BP: (114-127)/(52-65) 114/65  SpO2:  [90 %-94 %] 94 %    Physical Exam   Constitutional: She appears well-developed.   Elderly, frail     HENT:   Head: Normocephalic and atraumatic.   Eyes: Conjunctivae are normal. No scleral icterus.   Neck: Normal range of motion. Neck supple.   Cardiovascular: Normal rate and regular rhythm. Exam reveals no gallop and no friction rub.   No murmur heard.  Pulmonary/Chest: Effort normal and breath sounds normal. No respiratory distress. She has no wheezes. She has no rales.   Abdominal: Soft. Bowel sounds are normal. She  exhibits no distension. There is no tenderness. There is no rebound and no guarding.   Musculoskeletal: She exhibits tenderness (Low back, improved.). She exhibits no edema.   Neurological: She is alert.   Severe cognitive deficits.   Skin: Skin is warm.   Psychiatric: She has a normal mood and affect. Her behavior is normal.   Not agitated       Fluids    Intake/Output Summary (Last 24 hours) at 9/27/2019 1649  Last data filed at 9/27/2019 0700  Gross per 24 hour   Intake 2550 ml   Output 1200 ml   Net 1350 ml       Laboratory  Recent Labs     09/24/19 2010 09/26/19  0357 09/27/19  0421   WBC 10.0 7.6 6.2   RBC 3.78* 3.88* 4.01*   HEMOGLOBIN 11.6* 12.1 12.4   HEMATOCRIT 35.9* 36.5* 38.2   MCV 95.0 94.1 95.3   MCH 30.7 31.2 30.9   MCHC 32.3* 33.2* 32.5*   RDW 46.5 45.7 47.3   PLATELETCT 273 293 315   MPV 8.6* 8.8* 8.8*     Recent Labs     09/24/19 2010 09/26/19 0357   SODIUM 132* 134*   POTASSIUM 3.9 3.5*   CHLORIDE 101 101   CO2 22 23   GLUCOSE 92 69   BUN 10 8   CREATININE 0.46* 0.37*   CALCIUM 8.2* 8.3*                   Imaging  No orders to display        Assessment/Plan  * Post-InD of meningocele (complication of laminectomy) urinary tract infection  Assessment & Plan  Last culture Klebsiella.  Continue ceftriaxone  Urine culture preliminary growing lactose fermenting gram-negative rods  Appears to be pending speciation and sensitivities  Improved, switch to PO antibiotics.      s/p laminectomy c/b pseudomeningocele s/p InD  Assessment & Plan  Status post I&D.  Management per neurosurgery  DVT prophylaxis per NSG    Dementia without behavioral disturbance- (present on admission)  Assessment & Plan  Confusion is worsening today.  Continue donepezil  Haldol as needed as a chemical restraint  Frequent reorientation  IV normal saline for rehydration  PT/OT may need rehab again    Alzheimer's dementia with behavioral disturbance  Assessment & Plan  COntinue donezepil.    Dehydration  Assessment & Plan  Dry  mouth.  Blood pressure is soft  Will give additional bolus 500 cc normal saline, continue normal saline maintenance  Improved.       VTE prophylaxis: Heparin SQ as per NSG  Reviewed vitals, labs, imaging, staff notes.  Discussed assessment and plan with Ramya Bhakta amd family  Discussed with RN, PARTH

## 2019-09-27 NOTE — CARE PLAN
Problem: Nutritional:  Goal: Achieve adequate nutritional intake  Description  Patient will consume 50% of meals   Outcome: PROGRESSING SLOWER THAN EXPECTED  PO variable (0-25% of meals, % of Boost oral nutrition supplement)

## 2019-09-27 NOTE — PROGRESS NOTES
Neurosurgery Progress Note    Subjective:  Doing well, HOB slightly elevated   States HA improved since yesterday  Denies new pain, numbness, weakness  Denies  positional HA, N/V, dizziness, chest pain, SOB, difficulty breathing, chills    Exam:  VSS  A&Oriented to person, place, time  NM: 5/5 hip flexion, knee extension, knee flexion, plantar flexion, dorsiflexion, EHL  Sensation intact and equal throughout all four extremities.   Abdomen: soft, non-tender  Non-labored breathing on room air, normal respiratory effort  Capillary refill in all four extremities <2 seconds  No LE edema, erythema, cyanosis, clubbing  Calves non-tender to compression bilat  Incision CDI, no halo sign, no drainage, incision is flat   Minimal LBP TTP     BP  Min: 116/52  Max: 127/57  Pulse  Av  Min: 73  Max: 82  Resp  Av.3  Min: 16  Max: 17  Temp  Av.6 °C (97.8 °F)  Min: 36.3 °C (97.4 °F)  Max: 36.7 °C (98 °F)  SpO2  Av.7 %  Min: 90 %  Max: 91 %    No data recorded    Recent Labs     19  0357 19  0421   WBC 10.0 7.6 6.2   RBC 3.78* 3.88* 4.01*   HEMOGLOBIN 11.6* 12.1 12.4   HEMATOCRIT 35.9* 36.5* 38.2   MCV 95.0 94.1 95.3   MCH 30.7 31.2 30.9   MCHC 32.3* 33.2* 32.5*   RDW 46.5 45.7 47.3   PLATELETCT 273 293 315   MPV 8.6* 8.8* 8.8*     Recent Labs     19  0357   SODIUM 132* 134*   POTASSIUM 3.9 3.5*   CHLORIDE 101 101   CO2 22 23   GLUCOSE 92 69   BUN 10 8   CREATININE 0.46* 0.37*   CALCIUM 8.2* 8.3*               Intake/Output       19 - 1959 19 - 19 Total [id number] Total       Intake    P.O.  240  -- 240  --  -- --    P.O. 240 -- 240 -- -- --    I.V.  1100  -- 1100  1350  -- 1350    Volume (mL) (NS infusion) 1100 -- 1100 1350 -- 1350    IV Piggyback  100  -- 100  --  -- --    Volume (mL) (cefTRIAXone (ROCEPHIN) 2 g in  mL IVPB) 100 -- 100 -- -- --    Total Intake 1440 -- 1440 1350 -- 1350        Output    Urine  1100  1200 2300  --  -- --    Output (mL) (Urethral Catheter Latex) 1100 1200 2300 -- -- --    Total Output 1100 1200 2300 -- -- --       Net I/O     340 -1200 -860 1350 -- 1350            Intake/Output Summary (Last 24 hours) at 9/27/2019 0855  Last data filed at 9/27/2019 0700  Gross per 24 hour   Intake 2790 ml   Output 2300 ml   Net 490 ml            • HYDROcodone-acetaminophen  1-2 Tab Q6HRS PRN   • dicyclomine  20 mg QDAY PRN   • cefTRIAXone (ROCEPHIN) IV  2 g Q24HRS   • haloperidol lactate  2-5 mg Q4HRS PRN   • artificial tears  1 Application Q8HRS   • carboxymethylcellulose  1 Drop PRN   • donepezil  20 mg DAILY   • gabapentin  100 mg BID   • Pharmacy Consult Request  1 Each PHARMACY TO DOSE   • MD ALERT...DO NOT ADMINISTER NSAIDS or ASPIRIN unless ORDERED By Neurosurgery  1 Each PRN   • docusate sodium  100 mg BID   • senna-docusate  1 Tab Nightly   • senna-docusate  1 Tab Q24HRS PRN   • polyethylene glycol/lytes  1 Packet BID PRN   • magnesium hydroxide  30 mL QDAY PRN   • bisacodyl  10 mg Q24HRS PRN   • fleet  1 Each Once PRN   • NS   Continuous   • acetaminophen  500 mg Q6HRS PRN   • diphenhydrAMINE  25 mg Q6HRS PRN    Or   • diphenhydrAMINE  25 mg Q6HRS PRN   • ondansetron  4 mg Q4HRS PRN   • ondansetron  4 mg Q4HRS PRN   • cyclobenzaprine  10 mg Q8HRS PRN   • benzocaine-menthol  1 Lozenge Q2HRS PRN   • calcium carbonate  500 mg BID   • vitamin D  5,000 Units DAILY   • heparin  5,000 Units Q12HRS   • morphine injection  2 mg Q3HRS PRN   • hydrALAZINE  10 mg Q HOUR PRN       Assessment and Plan:  POD #4 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  Case discussed with Dr. Lorenz

## 2019-09-27 NOTE — DISCHARGE PLANNING
Received Choice form at 0343  Agency/Facility Name: 1. [facility]. [facility]. [facility]  Referral sent per Choice form @ 1365

## 2019-09-27 NOTE — CARE PLAN
Problem: Communication  Goal: The ability to communicate needs accurately and effectively will improve  Outcome: PROGRESSING AS EXPECTED     Problem: Safety  Goal: Will remain free from falls  Outcome: PROGRESSING AS EXPECTED     Problem: Venous Thromboembolism (VTW)/Deep Vein Thrombosis (DVT) Prevention:  Goal: Patient will participate in Venous Thrombosis (VTE)/Deep Vein Thrombosis (DVT)Prevention Measures  Outcome: PROGRESSING AS EXPECTED

## 2019-09-28 LAB
ANION GAP SERPL CALC-SCNC: 7 MMOL/L (ref 0–11.9)
BACTERIA SPEC ANAEROBE CULT: NORMAL
BUN SERPL-MCNC: 10 MG/DL (ref 8–22)
CALCIUM SERPL-MCNC: 8.2 MG/DL (ref 8.5–10.5)
CHLORIDE SERPL-SCNC: 109 MMOL/L (ref 96–112)
CO2 SERPL-SCNC: 22 MMOL/L (ref 20–33)
CREAT SERPL-MCNC: 0.41 MG/DL (ref 0.5–1.4)
GLUCOSE SERPL-MCNC: 83 MG/DL (ref 65–99)
POTASSIUM SERPL-SCNC: 3.9 MMOL/L (ref 3.6–5.5)
SIGNIFICANT IND 70042: NORMAL
SITE SITE: NORMAL
SODIUM SERPL-SCNC: 138 MMOL/L (ref 135–145)
SOURCE SOURCE: NORMAL

## 2019-09-28 PROCEDURE — 700105 HCHG RX REV CODE 258: Performed by: PHYSICIAN ASSISTANT

## 2019-09-28 PROCEDURE — A9270 NON-COVERED ITEM OR SERVICE: HCPCS | Performed by: PHYSICIAN ASSISTANT

## 2019-09-28 PROCEDURE — A9270 NON-COVERED ITEM OR SERVICE: HCPCS | Performed by: NEUROLOGICAL SURGERY

## 2019-09-28 PROCEDURE — 700112 HCHG RX REV CODE 229: Performed by: PHYSICIAN ASSISTANT

## 2019-09-28 PROCEDURE — 770001 HCHG ROOM/CARE - MED/SURG/GYN PRIV*

## 2019-09-28 PROCEDURE — A9270 NON-COVERED ITEM OR SERVICE: HCPCS | Performed by: INTERNAL MEDICINE

## 2019-09-28 PROCEDURE — 700111 HCHG RX REV CODE 636 W/ 250 OVERRIDE (IP): Performed by: PHYSICIAN ASSISTANT

## 2019-09-28 PROCEDURE — 99231 SBSQ HOSP IP/OBS SF/LOW 25: CPT | Performed by: INTERNAL MEDICINE

## 2019-09-28 PROCEDURE — 36415 COLL VENOUS BLD VENIPUNCTURE: CPT

## 2019-09-28 PROCEDURE — 80048 BASIC METABOLIC PNL TOTAL CA: CPT

## 2019-09-28 PROCEDURE — 700102 HCHG RX REV CODE 250 W/ 637 OVERRIDE(OP): Performed by: NEUROLOGICAL SURGERY

## 2019-09-28 PROCEDURE — 97161 PT EVAL LOW COMPLEX 20 MIN: CPT | Performed by: PHYSICAL THERAPIST

## 2019-09-28 PROCEDURE — 700102 HCHG RX REV CODE 250 W/ 637 OVERRIDE(OP): Performed by: PHYSICIAN ASSISTANT

## 2019-09-28 PROCEDURE — 700102 HCHG RX REV CODE 250 W/ 637 OVERRIDE(OP): Performed by: INTERNAL MEDICINE

## 2019-09-28 RX ORDER — PSEUDOEPHEDRINE HCL 30 MG
100 TABLET ORAL 2 TIMES DAILY
Qty: 60 CAP
Start: 2019-09-28 | End: 2019-09-30

## 2019-09-28 RX ORDER — CYCLOBENZAPRINE HCL 10 MG
10 TABLET ORAL EVERY 8 HOURS PRN
Qty: 30 TAB | Refills: 0
Start: 2019-09-28 | End: 2019-09-30

## 2019-09-28 RX ORDER — LACTOBACILLUS RHAMNOSUS GG 10B CELL
1 CAPSULE ORAL
Qty: 30 CAP
Start: 2019-09-28 | End: 2019-09-30

## 2019-09-28 RX ORDER — ENEMA 19; 7 G/133ML; G/133ML
1 ENEMA RECTAL
Start: 2019-09-28 | End: 2019-09-30

## 2019-09-28 RX ORDER — POLYETHYLENE GLYCOL 3350 17 G/17G
17 POWDER, FOR SOLUTION ORAL 2 TIMES DAILY PRN
Refills: 3
Start: 2019-09-28 | End: 2019-09-30

## 2019-09-28 RX ORDER — DICYCLOMINE HCL 20 MG
20 TABLET ORAL
Qty: 120 TAB
Start: 2019-09-28 | End: 2019-09-30

## 2019-09-28 RX ORDER — HALOPERIDOL 5 MG/ML
2-5 INJECTION INTRAMUSCULAR EVERY 4 HOURS PRN
Refills: 0
Start: 2019-09-28 | End: 2019-09-30

## 2019-09-28 RX ORDER — AMOXICILLIN 250 MG
1 CAPSULE ORAL DAILY
Qty: 30 TAB | Refills: 0
Start: 2019-09-28 | End: 2019-09-30

## 2019-09-28 RX ORDER — DIPHENHYDRAMINE HCL 25 MG
25 TABLET ORAL EVERY 6 HOURS PRN
Qty: 30 TAB | Refills: 0
Start: 2019-09-28 | End: 2019-09-30

## 2019-09-28 RX ORDER — CALCIUM CARBONATE 500 MG/1
500 TABLET, CHEWABLE ORAL 2 TIMES DAILY
Qty: 30 TAB
Start: 2019-09-28 | End: 2019-09-30

## 2019-09-28 RX ORDER — ONDANSETRON 4 MG/1
4 TABLET, ORALLY DISINTEGRATING ORAL EVERY 4 HOURS PRN
Qty: 10 TAB | Refills: 0
Start: 2019-09-28 | End: 2019-09-30

## 2019-09-28 RX ORDER — CEFUROXIME AXETIL 500 MG/1
500 TABLET ORAL EVERY 12 HOURS
Refills: 0
Start: 2019-09-28 | End: 2019-09-30

## 2019-09-28 RX ORDER — BISACODYL 10 MG
10 SUPPOSITORY, RECTAL RECTAL
Refills: 0
Start: 2019-09-28 | End: 2019-09-30

## 2019-09-28 RX ORDER — HYDROCODONE BITARTRATE AND ACETAMINOPHEN 5; 325 MG/1; MG/1
1-2 TABLET ORAL EVERY 8 HOURS PRN
Start: 2019-09-28 | End: 2019-09-30

## 2019-09-28 RX ADMIN — ANTACID TABLETS 500 MG: 500 TABLET, CHEWABLE ORAL at 04:38

## 2019-09-28 RX ADMIN — CEFUROXIME AXETIL 500 MG: 500 TABLET ORAL at 17:38

## 2019-09-28 RX ADMIN — GABAPENTIN 100 MG: 100 CAPSULE ORAL at 04:33

## 2019-09-28 RX ADMIN — CARBOXYMETHYLCELLULOSE SODIUM 1 DROP: 5 SOLUTION/ DROPS OPHTHALMIC at 17:39

## 2019-09-28 RX ADMIN — HYDROCODONE BITARTRATE AND ACETAMINOPHEN 1 TABLET: 5; 325 TABLET ORAL at 20:21

## 2019-09-28 RX ADMIN — DONEPEZIL HYDROCHLORIDE 20 MG: 5 TABLET, FILM COATED ORAL at 04:32

## 2019-09-28 RX ADMIN — MINERAL OIL AND PETROLATUM 1 APPLICATION: 150; 830 OINTMENT OPHTHALMIC at 20:28

## 2019-09-28 RX ADMIN — CARBOXYMETHYLCELLULOSE SODIUM 1 DROP: 5 SOLUTION/ DROPS OPHTHALMIC at 04:38

## 2019-09-28 RX ADMIN — HYDROCODONE BITARTRATE AND ACETAMINOPHEN 1 TABLET: 5; 325 TABLET ORAL at 04:36

## 2019-09-28 RX ADMIN — DOCUSATE SODIUM 100 MG: 100 CAPSULE, LIQUID FILLED ORAL at 17:38

## 2019-09-28 RX ADMIN — SODIUM CHLORIDE: 9 INJECTION, SOLUTION INTRAVENOUS at 02:52

## 2019-09-28 RX ADMIN — CYCLOBENZAPRINE 10 MG: 10 TABLET, FILM COATED ORAL at 04:35

## 2019-09-28 RX ADMIN — HEPARIN SODIUM 5000 UNITS: 5000 INJECTION, SOLUTION INTRAVENOUS; SUBCUTANEOUS at 17:38

## 2019-09-28 RX ADMIN — GABAPENTIN 100 MG: 100 CAPSULE ORAL at 17:38

## 2019-09-28 RX ADMIN — HEPARIN SODIUM 5000 UNITS: 5000 INJECTION, SOLUTION INTRAVENOUS; SUBCUTANEOUS at 04:37

## 2019-09-28 RX ADMIN — SENNOSIDES, DOCUSATE SODIUM 1 TABLET: 50; 8.6 TABLET, FILM COATED ORAL at 20:22

## 2019-09-28 RX ADMIN — MINERAL OIL AND PETROLATUM 1 APPLICATION: 150; 830 OINTMENT OPHTHALMIC at 04:40

## 2019-09-28 RX ADMIN — DOCUSATE SODIUM 100 MG: 100 CAPSULE, LIQUID FILLED ORAL at 04:35

## 2019-09-28 RX ADMIN — ANTACID TABLETS 500 MG: 500 TABLET, CHEWABLE ORAL at 17:38

## 2019-09-28 RX ADMIN — Medication 1 CAPSULE: at 09:34

## 2019-09-28 RX ADMIN — CEFUROXIME AXETIL 500 MG: 500 TABLET ORAL at 04:38

## 2019-09-28 RX ADMIN — VITAMIN D, TAB 1000IU (100/BT) 5000 UNITS: 25 TAB at 04:33

## 2019-09-28 RX ADMIN — MAGNESIUM HYDROXIDE 30 ML: 400 SUSPENSION ORAL at 04:35

## 2019-09-28 ASSESSMENT — COGNITIVE AND FUNCTIONAL STATUS - GENERAL
TURNING FROM BACK TO SIDE WHILE IN FLAT BAD: A LITTLE
CLIMB 3 TO 5 STEPS WITH RAILING: A LITTLE
MOBILITY SCORE: 18
WALKING IN HOSPITAL ROOM: A LITTLE
STANDING UP FROM CHAIR USING ARMS: A LITTLE
SUGGESTED CMS G CODE MODIFIER MOBILITY: CK
MOVING TO AND FROM BED TO CHAIR: A LITTLE
MOVING FROM LYING ON BACK TO SITTING ON SIDE OF FLAT BED: A LITTLE

## 2019-09-28 ASSESSMENT — GAIT ASSESSMENTS
GAIT LEVEL OF ASSIST: MINIMAL ASSIST
DISTANCE (FEET): 50
ASSISTIVE DEVICE: FRONT WHEEL WALKER

## 2019-09-28 NOTE — THERAPY
"Physical Therapy Evaluation completed.   Bed Mobility:  Supine to Sit: (pt up sitting at start of tx)  Transfers: Sit to Stand: Minimal Assist  Gait: Level Of Assist: Minimal Assist with Front-Wheel Walker       Plan of Care: Will benefit from Physical Therapy 3 times per week  Discharge Recommendations: Equipment: Front-Wheel Walker. Post-acute therapy Discharge to a transitional care facility for continued skilled therapy services.    See \"Rehab Therapy-Acute\" Patient Summary Report for complete documentation.     "

## 2019-09-28 NOTE — CARE PLAN
Problem: Communication  Goal: The ability to communicate needs accurately and effectively will improve  Outcome: PROGRESSING AS EXPECTED     Problem: Safety  Goal: Will remain free from injury  Outcome: PROGRESSING AS EXPECTED  Goal: Will remain free from falls  Outcome: PROGRESSING AS EXPECTED  Note:   Free of falls.  Fall precautions in place.  Frequent checks on patient as she has not always been using the call light.       Problem: Infection  Goal: Will remain free from infection  Outcome: PROGRESSING AS EXPECTED     Problem: Venous Thromboembolism (VTW)/Deep Vein Thrombosis (DVT) Prevention:  Goal: Patient will participate in Venous Thrombosis (VTE)/Deep Vein Thrombosis (DVT)Prevention Measures  Outcome: PROGRESSING AS EXPECTED  Note:   SCDs while in bed.  Patient ambulating.       Problem: Bowel/Gastric:  Goal: Normal bowel function is maintained or improved  Outcome: PROGRESSING AS EXPECTED  Goal: Will not experience complications related to bowel motility  Outcome: PROGRESSING AS EXPECTED     Problem: Knowledge Deficit  Goal: Knowledge of disease process/condition, treatment plan, diagnostic tests, and medications will improve  Outcome: PROGRESSING AS EXPECTED  Goal: Knowledge of the prescribed therapeutic regimen will improve  Outcome: PROGRESSING AS EXPECTED     Problem: Discharge Barriers/Planning  Goal: Patient's continuum of care needs will be met  Outcome: PROGRESSING AS EXPECTED     Problem: Pain Management  Goal: Pain level will decrease to patient's comfort goal  Outcome: PROGRESSING AS EXPECTED     Problem: Fluid Volume:  Goal: Will maintain balanced intake and output  Outcome: PROGRESSING AS EXPECTED     Problem: Respiratory:  Goal: Respiratory status will improve  Outcome: PROGRESSING AS EXPECTED     Problem: Psychosocial Needs:  Goal: Level of anxiety will decrease  Outcome: PROGRESSING AS EXPECTED     Problem: Skin Integrity  Goal: Risk for impaired skin integrity will decrease  Outcome:  PROGRESSING AS EXPECTED     Problem: Mobility  Goal: Risk for activity intolerance will decrease  Outcome: PROGRESSING AS EXPECTED  Note:   Patient ambulating with a walker and SBA.  Steady on feet.       Problem: Urinary Elimination:  Goal: Ability to reestablish a normal urinary elimination pattern will improve  Outcome: PROGRESSING SLOWER THAN EXPECTED  Note:   Mahoney removed this morning.  Patient not able to urinate yet.  Will bladder scan and notify MD PRN.

## 2019-09-28 NOTE — CARE PLAN
Problem: Infection  Goal: Will remain free from infection  Outcome: PROGRESSING AS EXPECTED  Intervention: Assess signs and symptoms of infection  Note:   Patient and sitting caregiver educated regarding infection control including hand hygiene, personal cares. Patient educated regarding s/s of infection including pain, fever, heat and redness at incision site. Patient encouraged to discuss any concerns with RN or care team. Patient and caregiver educated regarding antibiotics in use. Patient verbalized understanding.        Problem: Pain Management  Goal: Pain level will decrease to patient's comfort goal  Outcome: PROGRESSING AS EXPECTED  Intervention: Follow pain managment plan developed in collaboration with patient and Interdisciplinary Team  Note:   Patient educated regarding pharmacological and non-pharmacological pain management. Review of PRN pain management, medication schedule, pain scale, and reassessment.

## 2019-09-28 NOTE — PROGRESS NOTES
Hospital Medicine Daily Progress Note    Date of Service  9/28/2019    Chief Complaint  86 y.o. female admitted 9/23/2019 with lower back pain and headache.    Hospital Course    History of dementia.  S/p lumbar laminectomy with discectomy L2-5 by Dr. Lorenz on 8/16.  C/b pseudomeningocele; admitted by Dr. Lorenz to the inpatient for InD.  S/p InD of pseudomeningocele by Dr. Lorenz 9/23.  Urinalysis revealed UTI. Neurosurgery consulted hospitalist for UTI.  Antibiotics started.        Interval Problem Update  9/25. Not complaining of fever or chills. She is a nonhistorian. Her family  Member did say she doesn't wipe her genitalia the right way so she is prone to UTI, her last one was Klebsiella UTI. It was also felt that her pseudomeningocele was a result of noncompliance with PT/OT at rehab.  9/26. Tolerated procedure. Remains baseline confusion but better mood as family is present. Likely needs SNF.  9/27. Pain improved. She has dementia/cognitive deficits but not agitated or in discomfort at rest. Deconditioned.  9/28. Still has severe cognitive deficits but more color to her, more energy and engaging in simple conversation.     Consultants/Specialty  Hospitalist.  Dr. oLrenz is the attending.    Code Status  full    Disposition  PT/OT pending  May need SNF or rehab.    Review of Systems  Review of Systems   Unable to perform ROS: Mental acuity        Physical Exam  Temp:  [36.6 °C (97.9 °F)-37.3 °C (99.1 °F)] 36.6 °C (97.9 °F)  Pulse:  [71-85] 84  Resp:  [16-20] 18  BP: (102-142)/(52-62) 142/62  SpO2:  [90 %-95 %] 95 %    Physical Exam   Constitutional: She appears well-developed.   Elderly, frail     HENT:   Head: Normocephalic and atraumatic.   Eyes: Conjunctivae are normal. No scleral icterus.   Neck: Normal range of motion. Neck supple.   Cardiovascular: Normal rate and regular rhythm. Exam reveals no gallop and no friction rub.   No murmur heard.  Pulmonary/Chest: Effort normal and breath sounds  normal. No respiratory distress. She has no wheezes. She has no rales.   Abdominal: Soft. Bowel sounds are normal. She exhibits no distension. There is no tenderness. There is no rebound and no guarding.   Musculoskeletal: She exhibits tenderness (Low back, improved.). She exhibits no edema.   Neurological: She is alert.   Severe cognitive deficits.   Skin: Skin is warm.   Psychiatric: She has a normal mood and affect. Her behavior is normal.   Conversing       Fluids    Intake/Output Summary (Last 24 hours) at 9/28/2019 1543  Last data filed at 9/28/2019 0900  Gross per 24 hour   Intake --   Output 1875 ml   Net -1875 ml       Laboratory  Recent Labs     09/26/19  0357 09/27/19  0421   WBC 7.6 6.2   RBC 3.88* 4.01*   HEMOGLOBIN 12.1 12.4   HEMATOCRIT 36.5* 38.2   MCV 94.1 95.3   MCH 31.2 30.9   MCHC 33.2* 32.5*   RDW 45.7 47.3   PLATELETCT 293 315   MPV 8.8* 8.8*     Recent Labs     09/26/19  0357 09/28/19  0420   SODIUM 134* 138   POTASSIUM 3.5* 3.9   CHLORIDE 101 109   CO2 23 22   GLUCOSE 69 83   BUN 8 10   CREATININE 0.37* 0.41*   CALCIUM 8.3* 8.2*                   Imaging  No orders to display        Assessment/Plan  * Post-InD of meningocele (complication of laminectomy) urinary tract infection  Assessment & Plan  Last culture Klebsiella.  Continue ceftriaxone  Urine culture preliminary growing lactose fermenting gram-negative rods  Appears to be pending speciation and sensitivities  Improved, switch to PO antibiotics.  PT/OT recommends SNF      s/p laminectomy c/b pseudomeningocele s/p InD  Assessment & Plan  Status post I&D.  Management per neurosurgery  DVT prophylaxis per NSG    Dementia without behavioral disturbance- (present on admission)  Assessment & Plan  Confusion is worsening today.  Continue donepezil  Haldol as needed as a chemical restraint  Frequent reorientation  IV normal saline for rehydration  PT/OT may need rehab again    Alzheimer's dementia with behavioral disturbance  Assessment &  Plan  COntinue donezepil.    Dehydration  Assessment & Plan  Dry mouth.  Blood pressure is soft  Will give additional bolus 500 cc normal saline, continue normal saline maintenance  Improved.       VTE prophylaxis: Heparin SQ as per NSG  Reviewed vitals, labs, imaging, staff notes.  Discussed assessment and plan with Ramya Bhakta amd family  Discussed with RN, SW

## 2019-09-29 PROBLEM — R32 URINARY INCONTINENCE: Status: ACTIVE | Noted: 2019-09-29

## 2019-09-29 PROCEDURE — 97165 OT EVAL LOW COMPLEX 30 MIN: CPT

## 2019-09-29 PROCEDURE — A9270 NON-COVERED ITEM OR SERVICE: HCPCS | Performed by: PHYSICIAN ASSISTANT

## 2019-09-29 PROCEDURE — 700102 HCHG RX REV CODE 250 W/ 637 OVERRIDE(OP): Performed by: INTERNAL MEDICINE

## 2019-09-29 PROCEDURE — 700112 HCHG RX REV CODE 229: Performed by: PHYSICIAN ASSISTANT

## 2019-09-29 PROCEDURE — 700111 HCHG RX REV CODE 636 W/ 250 OVERRIDE (IP): Performed by: NEUROLOGICAL SURGERY

## 2019-09-29 PROCEDURE — 700102 HCHG RX REV CODE 250 W/ 637 OVERRIDE(OP): Performed by: PHYSICIAN ASSISTANT

## 2019-09-29 PROCEDURE — 700102 HCHG RX REV CODE 250 W/ 637 OVERRIDE(OP): Performed by: NEUROLOGICAL SURGERY

## 2019-09-29 PROCEDURE — 700111 HCHG RX REV CODE 636 W/ 250 OVERRIDE (IP): Performed by: PHYSICIAN ASSISTANT

## 2019-09-29 PROCEDURE — A9270 NON-COVERED ITEM OR SERVICE: HCPCS | Performed by: NEUROLOGICAL SURGERY

## 2019-09-29 PROCEDURE — 99232 SBSQ HOSP IP/OBS MODERATE 35: CPT | Performed by: INTERNAL MEDICINE

## 2019-09-29 PROCEDURE — 770001 HCHG ROOM/CARE - MED/SURG/GYN PRIV*

## 2019-09-29 PROCEDURE — A9270 NON-COVERED ITEM OR SERVICE: HCPCS | Performed by: INTERNAL MEDICINE

## 2019-09-29 PROCEDURE — 51798 US URINE CAPACITY MEASURE: CPT

## 2019-09-29 RX ORDER — HEPARIN SODIUM 5000 [USP'U]/ML
5000 INJECTION, SOLUTION INTRAVENOUS; SUBCUTANEOUS EVERY 8 HOURS
Status: DISCONTINUED | OUTPATIENT
Start: 2019-09-29 | End: 2019-09-30 | Stop reason: HOSPADM

## 2019-09-29 RX ORDER — TAMSULOSIN HYDROCHLORIDE 0.4 MG/1
0.4 CAPSULE ORAL
Status: DISCONTINUED | OUTPATIENT
Start: 2019-09-29 | End: 2019-09-30 | Stop reason: HOSPADM

## 2019-09-29 RX ADMIN — HYDROCODONE BITARTRATE AND ACETAMINOPHEN 1 TABLET: 5; 325 TABLET ORAL at 04:03

## 2019-09-29 RX ADMIN — CARBOXYMETHYLCELLULOSE SODIUM 1 DROP: 5 SOLUTION/ DROPS OPHTHALMIC at 04:05

## 2019-09-29 RX ADMIN — HYDROCODONE BITARTRATE AND ACETAMINOPHEN 1 TABLET: 5; 325 TABLET ORAL at 10:03

## 2019-09-29 RX ADMIN — ANTACID TABLETS 500 MG: 500 TABLET, CHEWABLE ORAL at 04:05

## 2019-09-29 RX ADMIN — POLYETHYLENE GLYCOL 3350 1 PACKET: 17 POWDER, FOR SOLUTION ORAL at 20:54

## 2019-09-29 RX ADMIN — MINERAL OIL AND PETROLATUM 1 APPLICATION: 150; 830 OINTMENT OPHTHALMIC at 04:05

## 2019-09-29 RX ADMIN — CARBOXYMETHYLCELLULOSE SODIUM 1 DROP: 5 SOLUTION/ DROPS OPHTHALMIC at 13:57

## 2019-09-29 RX ADMIN — Medication 1 CAPSULE: at 10:03

## 2019-09-29 RX ADMIN — ANTACID TABLETS 500 MG: 500 TABLET, CHEWABLE ORAL at 16:18

## 2019-09-29 RX ADMIN — GABAPENTIN 100 MG: 100 CAPSULE ORAL at 16:18

## 2019-09-29 RX ADMIN — GABAPENTIN 100 MG: 100 CAPSULE ORAL at 04:05

## 2019-09-29 RX ADMIN — CEFUROXIME AXETIL 500 MG: 500 TABLET ORAL at 04:05

## 2019-09-29 RX ADMIN — HYDROCODONE BITARTRATE AND ACETAMINOPHEN 1 TABLET: 5; 325 TABLET ORAL at 16:18

## 2019-09-29 RX ADMIN — HEPARIN SODIUM 5000 UNITS: 5000 INJECTION, SOLUTION INTRAVENOUS; SUBCUTANEOUS at 20:54

## 2019-09-29 RX ADMIN — MINERAL OIL AND PETROLATUM 1 APPLICATION: 150; 830 OINTMENT OPHTHALMIC at 20:54

## 2019-09-29 RX ADMIN — DOCUSATE SODIUM 100 MG: 100 CAPSULE, LIQUID FILLED ORAL at 16:18

## 2019-09-29 RX ADMIN — TAMSULOSIN HYDROCHLORIDE 0.4 MG: 0.4 CAPSULE ORAL at 13:57

## 2019-09-29 RX ADMIN — DONEPEZIL HYDROCHLORIDE 20 MG: 5 TABLET, FILM COATED ORAL at 04:04

## 2019-09-29 RX ADMIN — CEFUROXIME AXETIL 500 MG: 500 TABLET ORAL at 16:18

## 2019-09-29 RX ADMIN — SENNOSIDES, DOCUSATE SODIUM 1 TABLET: 50; 8.6 TABLET, FILM COATED ORAL at 20:54

## 2019-09-29 RX ADMIN — DOCUSATE SODIUM 100 MG: 100 CAPSULE, LIQUID FILLED ORAL at 04:05

## 2019-09-29 RX ADMIN — HEPARIN SODIUM 5000 UNITS: 5000 INJECTION, SOLUTION INTRAVENOUS; SUBCUTANEOUS at 04:07

## 2019-09-29 RX ADMIN — CARBOXYMETHYLCELLULOSE SODIUM 1 DROP: 5 SOLUTION/ DROPS OPHTHALMIC at 16:24

## 2019-09-29 RX ADMIN — VITAMIN D, TAB 1000IU (100/BT) 5000 UNITS: 25 TAB at 04:06

## 2019-09-29 RX ADMIN — HEPARIN SODIUM 5000 UNITS: 5000 INJECTION, SOLUTION INTRAVENOUS; SUBCUTANEOUS at 16:19

## 2019-09-29 ASSESSMENT — COGNITIVE AND FUNCTIONAL STATUS - GENERAL
DAILY ACTIVITIY SCORE: 18
SUGGESTED CMS G CODE MODIFIER DAILY ACTIVITY: CK
HELP NEEDED FOR BATHING: A LITTLE
PERSONAL GROOMING: A LITTLE
DRESSING REGULAR LOWER BODY CLOTHING: A LOT
TOILETING: A LITTLE
DRESSING REGULAR UPPER BODY CLOTHING: A LITTLE

## 2019-09-29 ASSESSMENT — ACTIVITIES OF DAILY LIVING (ADL): TOILETING: INDEPENDENT

## 2019-09-29 NOTE — CARE PLAN
Problem: Pain Management  Goal: Pain level will decrease to patient's comfort goal  Outcome: PROGRESSING AS EXPECTED  Intervention: Follow pain managment plan developed in collaboration with patient and Interdisciplinary Team  Note:   Patient educated regarding pharmacological and non-pharmacological pain management. Review of PRN pain management, medication schedule, pain scale, and reassessment.        Problem: Urinary Elimination:  Goal: Ability to reestablish a normal urinary elimination pattern will improve  Outcome: PROGRESSING SLOWER THAN EXPECTED  Intervention: Assess and monitor for signs and symptoms of urinary retention  Flowsheets (Taken 9/28/2019 2110)  Urinary Retention: Bladder Scan per MD Order; Bladder Area Distended; Discomfort / Pressure  Note:   Patient bladder scanned, straight cath per order. Toilet every 2 hours. Measure void, bladder scan for post-void residual.

## 2019-09-29 NOTE — DISCHARGE PLANNING
F/U for Rehab. Awaiting OT eval to assist with determination for post acute needs. Therapy team aware of OT need.

## 2019-09-29 NOTE — PROGRESS NOTES
Hospital Medicine Daily Progress Note    Date of Service  9/29/2019    Chief Complaint  86 y.o. female admitted 9/23/2019 with lower back pain and headache.    Hospital Course    History of dementia.  S/p lumbar laminectomy with discectomy L2-5 by Dr. Lorenz on 8/16.  C/b pseudomeningocele; admitted by Dr. Lorenz to the inpatient for InD.  S/p InD of pseudomeningocele by Dr. Lorenz 9/23.  Urinalysis revealed UTI. Neurosurgery consulted hospitalist for UTI.  Antibiotics started.        Interval Problem Update  9/25. Not complaining of fever or chills. She is a nonhistorian. Her family  Member did say she doesn't wipe her genitalia the right way so she is prone to UTI, her last one was Klebsiella UTI. It was also felt that her pseudomeningocele was a result of noncompliance with PT/OT at rehab.  9/26. Tolerated procedure. Remains baseline confusion but better mood as family is present. Likely needs SNF.  9/27. Pain improved. She has dementia/cognitive deficits but not agitated or in discomfort at rest. Deconditioned.  9/28. Still has severe cognitive deficits but more color to her, more energy and engaging in simple conversation.   9/29. Cognitive deficits but talking more. She did complain of incontinence. I explained this is due to pain medications, antispasmodics and neurologic surgery    Consultants/Specialty  Hospitalist.  Dr. Lorenz is the attending.    Code Status  full    Disposition  PT/OT pending  May need SNF or rehab. Defer to attending    Review of Systems  Review of Systems   Unable to perform ROS: Mental acuity        Physical Exam  Temp:  [36.8 °C (98.3 °F)-37.2 °C (99 °F)] 36.8 °C (98.3 °F)  Pulse:  [83-89] 89  Resp:  [16-18] 18  BP: (123-140)/(51-59) 124/59  SpO2:  [90 %-96 %] 93 %    Physical Exam   Constitutional: She appears well-developed.   Elderly, frail     HENT:   Head: Normocephalic and atraumatic.   Eyes: Conjunctivae are normal. No scleral icterus.   Neck: Normal range of motion.  Neck supple.   Cardiovascular: Normal rate and regular rhythm. Exam reveals no gallop and no friction rub.   No murmur heard.  Pulmonary/Chest: Effort normal and breath sounds normal. No respiratory distress. She has no wheezes. She has no rales.   Abdominal: Soft. Bowel sounds are normal. She exhibits no distension. There is no tenderness. There is no rebound and no guarding.   Genitourinary:   Genitourinary Comments: Incontinence   Musculoskeletal: She exhibits tenderness (Low back, improved.). She exhibits no edema.   Neurological: She is alert.   Severe cognitive deficits.  Currently conversing, doing more participation   Skin: Skin is warm.   Psychiatric: She has a normal mood and affect. Her behavior is normal.   Conversing       Fluids    Intake/Output Summary (Last 24 hours) at 9/29/2019 1137  Last data filed at 9/29/2019 0900  Gross per 24 hour   Intake --   Output 1700 ml   Net -1700 ml       Laboratory  Recent Labs     09/27/19  0421   WBC 6.2   RBC 4.01*   HEMOGLOBIN 12.4   HEMATOCRIT 38.2   MCV 95.3   MCH 30.9   MCHC 32.5*   RDW 47.3   PLATELETCT 315   MPV 8.8*     Recent Labs     09/28/19  0420   SODIUM 138   POTASSIUM 3.9   CHLORIDE 109   CO2 22   GLUCOSE 83   BUN 10   CREATININE 0.41*   CALCIUM 8.2*                   Imaging  No orders to display        Assessment/Plan  * Post-InD of meningocele (complication of laminectomy) urinary tract infection  Assessment & Plan  Last culture Klebsiella.  Continue ceftriaxone  Urine culture preliminary growing lactose fermenting gram-negative rods  Appears to be pending speciation and sensitivities  Improved, switch to PO antibiotics.  PT/OT recommends SNF  9/29 SNF ordered      s/p laminectomy c/b pseudomeningocele s/p InD  Assessment & Plan  Status post I&D.  Management per neurosurgery  DVT prophylaxis per NSG    Dementia without behavioral disturbance- (present on admission)  Assessment & Plan  Confusion is worsening today.  Continue donepezil  Haldol as  needed as a chemical restraint  Frequent reorientation  IV normal saline for rehydration  PT/OT may need rehab again    Urinary incontinence  Assessment & Plan  Ordered Flomax    Alzheimer's dementia with behavioral disturbance  Assessment & Plan  COntinue donezepil.    Dehydration  Assessment & Plan  Dry mouth.  Blood pressure is soft  Will give additional bolus 500 cc normal saline, continue normal saline maintenance  Improved.       VTE prophylaxis: Heparin SQ as per NSG  Reviewed vitals, labs, imaging, staff notes.  Discussed assessment and plan with Ramya Bhakta  Discussed with RN, SW

## 2019-09-29 NOTE — THERAPY
"Occupational Therapy Evaluation completed.   Functional Status:  Pt pleasant found coming out of bathroom with CNA.  Pt able to stand at sink with FWW & CGA for 12 min to wash hands, brush teeth & hair with V/Q's for sequencing.  Pt is forgetful & required repeated V/Q's for sequencing.  Pt did require Min A for LB dressing.  Reviewed spinal precautions during ADL's however pt unable to retain new learning.  Pt left sitting up in bedside chair for lunch with family visiting.  Plan of Care: Will benefit from Occupational Therapy 3 times per week  Discharge Recommendations:  Equipment: Will Continue to Assess for Equipment Needs. Post-acute therapy Discharge to a transitional care facility for continued skilled therapy services.    See \"Rehab Therapy-Acute\" Patient Summary Report for complete documentation.    "

## 2019-09-29 NOTE — DISCHARGE PLANNING
Physiatry Dr. Doran recommending pt appropriate for acute inpatient rehab.  to [facility] liaison requesting consideration for IRF level care. Will follow for auth determination/medical clearance in anticipation of transition to [facility], potentially Monday.

## 2019-09-29 NOTE — CARE PLAN
Problem: Safety  Goal: Will remain free from injury  Outcome: PROGRESSING AS EXPECTED     Problem: Pain Management  Goal: Pain level will decrease to patient's comfort goal  Outcome: PROGRESSING AS EXPECTED     Problem: Mobility  Goal: Risk for activity intolerance will decrease  Outcome: PROGRESSING AS EXPECTED     Problem: Urinary Elimination:  Goal: Ability to reestablish a normal urinary elimination pattern will improve  Outcome: PROGRESSING SLOWER THAN EXPECTED

## 2019-09-29 NOTE — PROGRESS NOTES
Pt S/E. Niece at bedside. Doing well. Pain improved. More conversant today. No new issues.    PE:  AOx3, NAD  Cassandra, SLTI x4  Good strength in all 4  CN III-XII grossly intact  Incision clean/Dry/intact with silver dressing over it. No collection of fluid seen    A/P:  S/P Pseudomeningocele repair in Lumbar    Pain control  PT/OT  SCDs/IS/Heparin DVT ppx  DC to inpatient Rehab is accepted. Otherwise back to facility she came from and not SNF. Niece and patient agree with this plan  Continue abx for UTI  Agree with flomax for urine retention.  She retains urine at baseline per niece.

## 2019-09-29 NOTE — PROGRESS NOTES
Pt aaox2-3. Pt c/o back pain, Norco given Q 6 hours PRN with + results. MADDOX 4/5. Denies N/T. Up with SBA with FWW. Pt voiding small amounts in BR. Pt stated this AM she did not feel like she was emptying bladder all the way. Bladder scan results >400mL. Straight cath for 400mL at 0900. MD aware of urinary retention and Flomax ordered. Bladder scan Q shift per Dr. Newton. Back incison with silver mepilex- CDI. Reviewed poc with pt- needs reiforcement. Bed alarm in use. Call light in reach.

## 2019-09-29 NOTE — PROGRESS NOTES
Mobility Note     Surgery patient?: No  Date of surgery: 9/23/19  Ambulated 50 ft on day of surgery? (N/A if today is not date of surgery): NA   Number of times ambulated 50 feet or greater today: 4+  Patient has been up to chair, edge of bed or HOB 90 degrees for all meals?: Yes  Goal met? (goal is ambulating at least 50 feet 2 times on day shift, one time on night shift): Yes  If patient did not meet mobility goal, why?: NA

## 2019-09-29 NOTE — CONSULTS
Medical chart review completed.     Patient is a 86 y.o. year-old female with a past medical history significant for Arthritis, Glaucoma, Hx of hepatitis, and recent back surgery with L2-L5 lumbar laminectomy admitted to [facility] on 9/23/2019  8:10 AM with low back pain and headache.  Per report patient had a L2-L5 laminectomy with Dr. Lorenz on 8/16/19, Patient underwent procedure and was [facility] to [facility].  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  Patient had scheduled I&D of pseudomeningocele with Dr. Lorenz on 9/23/19.  Urine culture positive for klebsiella, patient on Ceftin.  Patient was previously living at an assisted living on the independent side.     Patient was last evaluated by PT on 9/28/19 and was Ashlyn for gait.  Patient has not been evaluated by OT due to post-operative state with bedrest.       PMH:  Past Medical History:   Diagnosis Date   • Acute pain of right knee 3/6/2017   • Arthritis    • Cancer (HCC)     basel cell skin   • CATARACT     right eye   • Dry cough    • Glaucoma    • Hepatitis 1955   • Hepatitis A 1955   • Jaundice 1955   • Pain     back       PSH:  Past Surgical History:   Procedure Laterality Date   • IRRIGATION AND DEBRIDEMENT, WOUND, AFTER PROCEDURE INVOH N/A 9/23/2019    Procedure: IRRIGATION AND DEBRIDEMENT, WOUND, AFTER PROCEDURE INVOLVING LUMBAR SPINE BY POSTERIOR APPROACH-CEREBROSPINAL FLUID LEAK REPAIR;  Surgeon: Bhanu Lorenz D.O.;  Location: SURGERY [facility];  Service: Neurosurgery   • IRRIGATION & DEBRIDEMENT NEURO N/A 9/23/2019    Procedure: IRRIGATION AND DEBRIDEMENT, WOUND;  Surgeon: Bhanu Lorenz D.O.;  Location: SURGERY [facility];  Service: Neurosurgery   • LUMBAR LAMINECTOMY DISKECTOMY  8/16/2019    Procedure: LAMINECTOMY, SPINE, LUMBAR, WITH [id number];  Surgeon: Bhanu Lorenz D.O.;  Location: SURGERY [facility];  Service: Neurosurgery   • OTHER  2017    removal skin cancer -back and arm    • CATARACT PHACO WITH IOL  12/14/2011    Performed by ALEJANDRA HEALY at SURGERY SAME DAY [facility] ORS   • EYE ENUCLEATION  November 2007    left eye   • HEMORRHOIDECTOMY         FAMILY HISTORY:  Family History   Problem Relation Age of Onset   • Heart Disease Mother    • Lung Disease Father    • Stroke Brother    • Cancer Sister         breast   • Heart Disease Sister    • Stroke Maternal Grandfather        MEDICATIONS:  Current Facility-Administered Medications   Medication Dose   • carboxymethylcellulose (REFRESH PLUS) 0.5 % ophthalmic drops 1 Drop  1 Drop   • cefUROXime (CEFTIN) tablet 500 mg  500 mg   • lactobacillus rhamnosus (CULTURELLE) capsule 1 Cap  1 Cap   • HYDROcodone-acetaminophen (NORCO) 5-325 MG per tablet 1-2 Tab  1-2 Tab   • dicyclomine (BENTYL) tablet 20 mg  20 mg   • haloperidol lactate (HALDOL) injection 2-5 mg  2-5 mg   • artificial tears (EYE LUBRICANT) ophth ointment 1 Application  1 Application   • donepezil (ARICEPT) tablet 20 mg  20 mg   • gabapentin (NEURONTIN) capsule 100 mg  100 mg   • Pharmacy Consult Request ...Pain Management Review 1 Each  1 Each   • MD ALERT...DO NOT ADMINISTER NSAIDS or ASPIRIN unless ORDERED By Neurosurgery 1 Each  1 Each   • docusate sodium (COLACE) capsule 100 mg  100 mg   • senna-docusate (PERICOLACE or SENOKOT S) 8.6-50 MG per tablet 1 Tab  1 Tab   • senna-docusate (PERICOLACE or SENOKOT S) 8.6-50 MG per tablet 1 Tab  1 Tab   • polyethylene glycol/lytes (MIRALAX) PACKET 1 Packet  1 Packet   • magnesium hydroxide (MILK OF MAGNESIA) suspension 30 mL  30 mL   • bisacodyl (DULCOLAX) suppository 10 mg  10 mg   • fleet enema 133 mL  1 Each   • diphenhydrAMINE (BENADRYL) tablet/capsule 25 mg  25 mg    Or   • diphenhydrAMINE (BENADRYL) injection 25 mg  25 mg   • ondansetron (ZOFRAN) syringe/vial injection 4 mg  4 mg   • ondansetron (ZOFRAN ODT) dispertab 4 mg  4 mg   • cyclobenzaprine (FLEXERIL) tablet 10 mg  10 mg   • benzocaine-menthol (CEPACOL) lozenge 1 Lozenge  1  Lozenge   • calcium carbonate (TUMS) chewable tab 500 mg  500 mg   • vitamin D (cholecalciferol) tablet 5,000 Units  5,000 Units   • heparin injection 5,000 Units  5,000 Units   • morphine (pf) 4 mg/ml injection 2 mg  2 mg   • hydrALAZINE (APRESOLINE) injection 10 mg  10 mg       ALLERGIES:  Prednisone and Seasonal    PSYCHOSOCIAL HISTORY:  Living Site:  Assisted living residence  Living With:  self  Caregiver's availability:  Not Applicable  Number of stairs:  Not Applicable  Substance use history:  Denies      The patient presents functional deficits in mobility and self-care as well as cognitive deficits, and Moderate  de-conditioning. Pre-morbidly, this patient lived in a single level home with None steps to enter,alone and able to care for self  The patient was evaluated by acute care Physical Therapy; currently requiring minimal assistance for mobility and not tested assistance for ADLs, also with ongoing cognitive deficits. The patient's current diet is Regular with Thin liquids.     Will need to have OT evaluation but will most likely need assistance. Will need TCC to discuss dispo status back to assisted living with Niece. If patient has OT needs, the patient is a Good candidate for an acute inpatient rehabilitation program with a coordinated program of care at an intensity and frequency not available at a lower level of care.     Note: This recommendation requires that patient has at least CGA/Minimal Assistance needs in at least two therapy disciplines.  If patient progresses to no longer need CGA/Rey with at least two therapy disciplines they may be more appropriate for Skilled nursing facility versus home with home health.      This recommendation is substantiated by the patient's current medical condition with intervention and assessment of medical issues requiring an acute level of care for patient's safety and maximum outcome. A coordinated program of care will be provided by an interdisciplinary  team including physical therapy, occupational therapy, speech language pathology, physiatry, rehab nursing and rehab psychology. Rehab goals include improved cognition, mobility, self-care management, strength and conditioning/endurance, pain management, bowel and bladder management, mood and affect, and safety with independent home management including caregiver training. Estimated length of stay is approximately 10-14 days. Rehab potential: Very Good. Disposition: to pre-morbid independent living setting with supportive care of independent living. We will continue to follow with you in anticipation of discharge to acute inpatient rehabilitation when medically stable to do so at the discretion of the attending physician. Thank you for allowing us to participate in this patient's care. Please call with any questions regarding this recommendation.    Robinson Doran M.D.

## 2019-09-30 ENCOUNTER — HOSPITAL ENCOUNTER (INPATIENT)
Facility: REHABILITATION | Age: 84
LOS: 8 days | DRG: 093 | End: 2019-10-08
Attending: PHYSICAL MEDICINE & REHABILITATION | Admitting: PHYSICAL MEDICINE & REHABILITATION
Payer: MEDICARE

## 2019-09-30 VITALS
WEIGHT: 143.3 LBS | OXYGEN SATURATION: 94 % | HEIGHT: 58 IN | DIASTOLIC BLOOD PRESSURE: 61 MMHG | BODY MASS INDEX: 30.08 KG/M2 | HEART RATE: 85 BPM | RESPIRATION RATE: 18 BRPM | SYSTOLIC BLOOD PRESSURE: 137 MMHG | TEMPERATURE: 98.4 F

## 2019-09-30 PROCEDURE — A9270 NON-COVERED ITEM OR SERVICE: HCPCS | Performed by: PHYSICIAN ASSISTANT

## 2019-09-30 PROCEDURE — 94760 N-INVAS EAR/PLS OXIMETRY 1: CPT

## 2019-09-30 PROCEDURE — 99232 SBSQ HOSP IP/OBS MODERATE 35: CPT | Performed by: INTERNAL MEDICINE

## 2019-09-30 PROCEDURE — 700102 HCHG RX REV CODE 250 W/ 637 OVERRIDE(OP): Performed by: INTERNAL MEDICINE

## 2019-09-30 PROCEDURE — 700111 HCHG RX REV CODE 636 W/ 250 OVERRIDE (IP): Performed by: PHYSICAL MEDICINE & REHABILITATION

## 2019-09-30 PROCEDURE — 700112 HCHG RX REV CODE 229: Performed by: PHYSICIAN ASSISTANT

## 2019-09-30 PROCEDURE — A9270 NON-COVERED ITEM OR SERVICE: HCPCS | Performed by: PHYSICAL MEDICINE & REHABILITATION

## 2019-09-30 PROCEDURE — 700111 HCHG RX REV CODE 636 W/ 250 OVERRIDE (IP): Performed by: NEUROLOGICAL SURGERY

## 2019-09-30 PROCEDURE — 99223 1ST HOSP IP/OBS HIGH 75: CPT | Mod: AI | Performed by: PHYSICAL MEDICINE & REHABILITATION

## 2019-09-30 PROCEDURE — 770010 HCHG ROOM/CARE - REHAB SEMI PRIVAT*

## 2019-09-30 PROCEDURE — 700102 HCHG RX REV CODE 250 W/ 637 OVERRIDE(OP): Performed by: PHYSICAL MEDICINE & REHABILITATION

## 2019-09-30 PROCEDURE — 700102 HCHG RX REV CODE 250 W/ 637 OVERRIDE(OP): Performed by: PHYSICIAN ASSISTANT

## 2019-09-30 PROCEDURE — A9270 NON-COVERED ITEM OR SERVICE: HCPCS | Performed by: INTERNAL MEDICINE

## 2019-09-30 RX ORDER — DONEPEZIL HYDROCHLORIDE 5 MG/1
20 TABLET, FILM COATED ORAL DAILY
Status: DISCONTINUED | OUTPATIENT
Start: 2020-09-01 | End: 2019-10-08 | Stop reason: HOSPADM

## 2019-09-30 RX ORDER — POLYETHYLENE GLYCOL 3350 17 G/17G
1 POWDER, FOR SOLUTION ORAL DAILY
Status: DISCONTINUED | OUTPATIENT
Start: 2019-10-01 | End: 2019-09-30 | Stop reason: HOSPADM

## 2019-09-30 RX ORDER — OXYCODONE HYDROCHLORIDE 10 MG/1
10 TABLET ORAL
Status: DISCONTINUED | OUTPATIENT
Start: 2019-09-30 | End: 2019-10-08 | Stop reason: HOSPADM

## 2019-09-30 RX ORDER — OXYCODONE HYDROCHLORIDE 5 MG/1
5 TABLET ORAL
Status: DISCONTINUED | OUTPATIENT
Start: 2019-09-30 | End: 2019-10-08 | Stop reason: HOSPADM

## 2019-09-30 RX ORDER — ACETAMINOPHEN 325 MG/1
650 TABLET ORAL EVERY 4 HOURS PRN
Status: DISCONTINUED | OUTPATIENT
Start: 2019-09-30 | End: 2019-10-08 | Stop reason: HOSPADM

## 2019-09-30 RX ORDER — HEPARIN SODIUM 5000 [USP'U]/ML
5000 INJECTION, SOLUTION INTRAVENOUS; SUBCUTANEOUS EVERY 8 HOURS
Status: DISCONTINUED | OUTPATIENT
Start: 2019-09-30 | End: 2019-09-30

## 2019-09-30 RX ORDER — HYDRALAZINE HYDROCHLORIDE 25 MG/1
25 TABLET, FILM COATED ORAL EVERY 8 HOURS PRN
Status: DISCONTINUED | OUTPATIENT
Start: 2019-09-30 | End: 2019-10-08 | Stop reason: HOSPADM

## 2019-09-30 RX ORDER — POLYETHYLENE GLYCOL 3350 17 G/17G
1 POWDER, FOR SOLUTION ORAL
Status: DISCONTINUED | OUTPATIENT
Start: 2019-09-30 | End: 2019-10-08 | Stop reason: HOSPADM

## 2019-09-30 RX ORDER — TRAMADOL HYDROCHLORIDE 50 MG/1
50 TABLET ORAL EVERY 4 HOURS PRN
Status: DISCONTINUED | OUTPATIENT
Start: 2019-09-30 | End: 2019-10-08 | Stop reason: HOSPADM

## 2019-09-30 RX ORDER — ALUMINA, MAGNESIA, AND SIMETHICONE 2400; 2400; 240 MG/30ML; MG/30ML; MG/30ML
20 SUSPENSION ORAL
Status: DISCONTINUED | OUTPATIENT
Start: 2019-09-30 | End: 2019-10-08 | Stop reason: HOSPADM

## 2019-09-30 RX ORDER — CARBOXYMETHYLCELLULOSE SODIUM 10 MG/ML
1 GEL OPHTHALMIC 3 TIMES DAILY
Status: DISCONTINUED | OUTPATIENT
Start: 2019-09-30 | End: 2019-10-01

## 2019-09-30 RX ORDER — AMOXICILLIN 250 MG
2 CAPSULE ORAL 2 TIMES DAILY
Status: DISCONTINUED | OUTPATIENT
Start: 2019-09-30 | End: 2019-10-08 | Stop reason: HOSPADM

## 2019-09-30 RX ORDER — LACTOBACILLUS RHAMNOSUS GG 10B CELL
1 CAPSULE ORAL
Status: DISCONTINUED | OUTPATIENT
Start: 2019-10-01 | End: 2019-10-08 | Stop reason: HOSPADM

## 2019-09-30 RX ORDER — ECHINACEA PURPUREA EXTRACT 125 MG
2 TABLET ORAL PRN
Status: DISCONTINUED | OUTPATIENT
Start: 2019-09-30 | End: 2019-10-08 | Stop reason: HOSPADM

## 2019-09-30 RX ORDER — AMOXICILLIN 250 MG
1 CAPSULE ORAL DAILY
Status: DISCONTINUED | OUTPATIENT
Start: 2019-09-30 | End: 2019-09-30 | Stop reason: HOSPADM

## 2019-09-30 RX ORDER — GABAPENTIN 100 MG/1
100 CAPSULE ORAL 2 TIMES DAILY
Status: DISCONTINUED | OUTPATIENT
Start: 2019-09-30 | End: 2019-10-01

## 2019-09-30 RX ORDER — BISACODYL 10 MG
10 SUPPOSITORY, RECTAL RECTAL
Status: DISCONTINUED | OUTPATIENT
Start: 2019-09-30 | End: 2019-10-08 | Stop reason: HOSPADM

## 2019-09-30 RX ORDER — POLYVINYL ALCOHOL 14 MG/ML
1 SOLUTION/ DROPS OPHTHALMIC PRN
Status: DISCONTINUED | OUTPATIENT
Start: 2019-09-30 | End: 2019-10-08 | Stop reason: HOSPADM

## 2019-09-30 RX ORDER — TAMSULOSIN HYDROCHLORIDE 0.4 MG/1
0.4 CAPSULE ORAL
Status: DISCONTINUED | OUTPATIENT
Start: 2019-10-01 | End: 2019-10-01

## 2019-09-30 RX ORDER — ONDANSETRON 4 MG/1
4 TABLET, ORALLY DISINTEGRATING ORAL 4 TIMES DAILY PRN
Status: DISCONTINUED | OUTPATIENT
Start: 2019-09-30 | End: 2019-10-08 | Stop reason: HOSPADM

## 2019-09-30 RX ORDER — ONDANSETRON 2 MG/ML
4 INJECTION INTRAMUSCULAR; INTRAVENOUS 4 TIMES DAILY PRN
Status: DISCONTINUED | OUTPATIENT
Start: 2019-09-30 | End: 2019-10-08 | Stop reason: HOSPADM

## 2019-09-30 RX ORDER — TRAZODONE HYDROCHLORIDE 50 MG/1
50 TABLET ORAL
Status: DISCONTINUED | OUTPATIENT
Start: 2019-09-30 | End: 2019-10-08 | Stop reason: HOSPADM

## 2019-09-30 RX ADMIN — HEPARIN SODIUM 5000 UNITS: 5000 INJECTION, SOLUTION INTRAVENOUS; SUBCUTANEOUS at 14:35

## 2019-09-30 RX ADMIN — CARBOXYMETHYLCELLULOSE SODIUM 1 DROP: 10 GEL OPHTHALMIC at 20:43

## 2019-09-30 RX ADMIN — GABAPENTIN 100 MG: 100 CAPSULE ORAL at 04:01

## 2019-09-30 RX ADMIN — ANTACID TABLETS 500 MG: 500 TABLET, CHEWABLE ORAL at 04:01

## 2019-09-30 RX ADMIN — SENNOSIDES, DOCUSATE SODIUM 2 TABLET: 50; 8.6 TABLET, FILM COATED ORAL at 15:36

## 2019-09-30 RX ADMIN — CARBOXYMETHYLCELLULOSE SODIUM 1 DROP: 5 SOLUTION/ DROPS OPHTHALMIC at 04:02

## 2019-09-30 RX ADMIN — GABAPENTIN 100 MG: 100 CAPSULE ORAL at 20:42

## 2019-09-30 RX ADMIN — VITAMIN D, TAB 1000IU (100/BT) 5000 UNITS: 25 TAB at 04:01

## 2019-09-30 RX ADMIN — SENNOSIDES, DOCUSATE SODIUM 2 TABLET: 50; 8.6 TABLET, FILM COATED ORAL at 20:42

## 2019-09-30 RX ADMIN — CEFUROXIME AXETIL 500 MG: 500 TABLET ORAL at 04:02

## 2019-09-30 RX ADMIN — MINERAL OIL AND PETROLATUM 1 APPLICATION: 150; 830 OINTMENT OPHTHALMIC at 04:02

## 2019-09-30 RX ADMIN — TAMSULOSIN HYDROCHLORIDE 0.4 MG: 0.4 CAPSULE ORAL at 08:09

## 2019-09-30 RX ADMIN — MINERAL OIL, PETROLATUM 1 APPLICATION: 425; 568 OINTMENT OPHTHALMIC at 20:43

## 2019-09-30 RX ADMIN — Medication 1 CAPSULE: at 08:09

## 2019-09-30 RX ADMIN — HEPARIN SODIUM 5000 UNITS: 5000 INJECTION, SOLUTION INTRAVENOUS; SUBCUTANEOUS at 04:01

## 2019-09-30 RX ADMIN — CARBOXYMETHYLCELLULOSE SODIUM 1 DROP: 10 GEL OPHTHALMIC at 14:00

## 2019-09-30 RX ADMIN — DOCUSATE SODIUM 100 MG: 100 CAPSULE, LIQUID FILLED ORAL at 04:01

## 2019-09-30 RX ADMIN — DONEPEZIL HYDROCHLORIDE 20 MG: 5 TABLET, FILM COATED ORAL at 04:01

## 2019-09-30 ASSESSMENT — LIFESTYLE VARIABLES
EVER_SMOKED: NEVER
ON A TYPICAL DAY WHEN YOU DRINK ALCOHOL HOW MANY DRINKS DO YOU HAVE: 0
ALCOHOL_USE: NO
HOW MANY TIMES IN THE PAST YEAR HAVE YOU HAD 5 OR MORE DRINKS IN A DAY: 0
EVER HAD A DRINK FIRST THING IN THE MORNING TO STEADY YOUR NERVES TO GET RID OF A HANGOVER: NO
EVER FELT BAD OR GUILTY ABOUT YOUR DRINKING: NO
TOTAL SCORE: 0
TOTAL SCORE: 0
HAVE PEOPLE ANNOYED YOU BY CRITICIZING YOUR DRINKING: NO
HAVE YOU EVER FELT YOU SHOULD CUT DOWN ON YOUR DRINKING: NO
AVERAGE NUMBER OF DAYS PER WEEK YOU HAVE A DRINK CONTAINING ALCOHOL: 0
TOTAL SCORE: 0
DOES PATIENT WANT TO STOP DRINKING: NO
CONSUMPTION TOTAL: NEGATIVE

## 2019-09-30 NOTE — FLOWSHEET NOTE
09/30/19 1334   Type of Assessment   Assessment Yes   Patient History   Pulmonary Diagnosis no   Surgical Procedures laminectomy and discectomy followed by debride   Home O2 No   Home Treatments/Frequency No   COPD Risk Screening   Do you have a history of COPD? No   Smoking History   Have you ever smoked Never   Level Of Consciousness   Level of Consciousness Alert   Respiratory WDL   Respiratory (WDL) X   Chest Exam   Respiration 18   Pulse 80   Oximetry   #Pulse Oximetry (Single Determination) Yes   Oxygen   Home O2 Use Prior To Admission? No   Pulse Oximetry 98 %   O2 Daily Delivery Respiratory  Room Air with O2 Available

## 2019-09-30 NOTE — PROGRESS NOTES
Mobility Note     Surgery patient?: No  Date of surgery: 9/23/19  Ambulated 50 ft on day of surgery? (N/A if today is not date of surgery): NA            Number of times ambulated 50 feet or greater today: 3+  Patient has been up to chair, edge of bed or HOB 90 degrees for all meals?: Yes  Goal met? (goal is ambulating at least 50 feet 2 times on day shift, one time on night shift): Yes  If patient did not meet mobility goal, why?: NA

## 2019-09-30 NOTE — CARE PLAN
Problem: Safety  Goal: Will remain free from injury  Outcome: PROGRESSING AS EXPECTED  Intervention: Provide assistance with mobility  Flowsheets (Taken 9/29/2019 2110)  Assistance: Standby Assist  Ambulation Tolerance: Tolerates Well  Note:   Patient educated regarding fall risks. Environmental hazards reviewed such as SCDs, spills. Patient directed to use call light for assistance before getting out of bed or ambulating. Patient requires redirection and reinforcement of education.        Problem: Infection  Goal: Will remain free from infection  Outcome: PROGRESSING AS EXPECTED  Intervention: Assess signs and symptoms of infection  Note:   Patient educated regarding infection control including hand hygiene, personal cares. Patient educated regarding s/s of infection including pain, fever, heat and redness at incision site. Patient encouraged to discuss any concerns with RN or care team. Patient requires reenforcement of education.        Problem: Pain Management  Goal: Pain level will decrease to patient's comfort goal  Outcome: PROGRESSING AS EXPECTED  Intervention: Follow pain managment plan developed in collaboration with patient and Interdisciplinary Team  Note:   Patient educated regarding pharmacological and non-pharmacological pain management. Review of PRN pain management, medication schedule, pain scale, and reassessment.

## 2019-09-30 NOTE — DISCHARGE PLANNING
Dr. Doran has accepted Ramya.  Transport has been arranged for 1100.  Aurora Daley (CM) and Horace (BSN) are aware.

## 2019-09-30 NOTE — PREADMISSION SCREENING NOTE
Pre-Admission Screening Form    Patient Information:   Name: Ramya Bhakta     MRN: [id number]       : 9/15/1933      Age: 86 y.o.   Gender: female      Race: White [7]       Marital Status: Single [1]  Family Contact: Lisa Pinzon        Relationship: Relative [11]  Home Phone: [phone]           Cell Phone:   Advanced Directives: None  Code Status:  FULL  Current Attending Provider: Bhanu Lorenz D.O.  Referring Physician: Dr. Newton   Physiatrist Consult: Dr. Doran    Referral Date: 19  Primary Payor Source:  [facility] [employer]  Secondary Payor Source:  [facility]    Medical Information:   Date of Admission to Acute Care [place]2019  Room Number: S140/00  Rehabilitation Diagnosis: 08.9 Other Orthopedic  Immunization History   Administered Date(s) Administered   • Influenza Vaccine Adult HD 2014, 2015, 2016, 2017, 10/01/2018   • Influenza Vaccine Quad Inj (Pf) 10/26/2010, 2011   • Influenza Vaccine Quad Inj (Preserved) 2012, 2013   • Pneumococcal Conjugate Vaccine (Prevnar/PCV-13) 2016   • Pneumococcal polysaccharide vaccine (PPSV-23) 2017   • Tdap Vaccine 2011     Allergies   Allergen Reactions   • Prednisone Unspecified     Pt reports dizziness on this med   • Seasonal      Past Medical History:   Diagnosis Date   • Acute pain of right knee 3/6/2017   • Arthritis    • Cancer (HCC)     basel cell skin   • CATARACT     right eye   • Dry cough    • Glaucoma    • Hepatitis    • Hepatitis A    • Jaundice    • Pain     back     Past Surgical History:   Procedure Laterality Date   • IRRIGATION AND DEBRIDEMENT, WOUND, AFTER PROCEDURE INVOH N/A 2019    Procedure: IRRIGATION AND DEBRIDEMENT, WOUND, AFTER PROCEDURE INVOLVING LUMBAR SPINE BY POSTERIOR APPROACH-CEREBROSPINAL FLUID LEAK REPAIR;  Surgeon: Bhanu Lorenz D.O.;  Location: SURGERY [facility];  Service: Neurosurgery   • IRRIGATION & DEBRIDEMENT NEURO N/A 2019     Procedure: IRRIGATION AND DEBRIDEMENT, WOUND;  Surgeon: Bhanu Lorenz D.O.;  Location: SURGERY [facility];  Service: Neurosurgery   • LUMBAR LAMINECTOMY DISKECTOMY  8/16/2019    Procedure: LAMINECTOMY, SPINE, LUMBAR, WITH [id number];  Surgeon: Bhanu Lorenz D.O.;  Location: SURGERY [facility];  Service: Neurosurgery   • OTHER  2017    removal skin cancer -back and arm   • CATARACT PHACO WITH IOL  12/14/2011    Performed by ALEJANDRA HEALY at SURGERY SAME DAY [facility]   • EYE ENUCLEATION  November 2007    left eye   • HEMORRHOIDECTOMY         History Leading to Admission, Conditions that Caused the Need for Rehab (CMS):     Dr. Doran (Physiatry) recommendations:  The patient presents functional deficits in mobility and self-care as well as cognitive deficits, and Moderate  de-conditioning. Pre-morbidly, this patient lived in a single level home with None steps to enter,alone and able to care for self  The patient was evaluated by acute care Physical Therapy; currently requiring minimal assistance for mobility and not tested assistance for ADLs, also with ongoing cognitive deficits. The patient's current diet is Regular with Thin liquids.      Will need to have OT evaluation but will most likely need assistance. Will need TCC to discuss dispo status back to assisted living with [facility]. If patient has OT needs, the patient is a Good candidate for an acute inpatient rehabilitation program with a coordinated program of care at an intensity and frequency not available at a lower level of care.      Note: This recommendation requires that patient has at least CGA/Minimal Assistance needs in at least two therapy disciplines.  If patient progresses to no longer need CGA/Rey with at least two therapy disciplines they may be more appropriate for Skilled nursing facility versus home with home health.       This recommendation is substantiated by the patient's current medical condition with intervention  and assessment of medical issues requiring an acute level of care for patient's safety and maximum outcome. A coordinated program of care will be provided by an interdisciplinary team including physical therapy, occupational therapy, speech language pathology, physiatry, rehab nursing and rehab psychology. Rehab goals include improved cognition, mobility, self-care management, strength and conditioning/endurance, pain management, bowel and bladder management, mood and affect, and safety with independent home management including caregiver training. Estimated length of stay is approximately 10-14 days. Rehab potential: Very Good. Disposition: to pre-morbid independent living setting with supportive care of independent living. We will continue to follow with you in anticipation of discharge to acute inpatient rehabilitation when medically stable to do so at the discretion of the attending physician. Thank you for allowing us to participate in this patient's care. Please call with any questions regarding this recommendation.     Dr. Godwin (Hospitalist) recommendations:  Assessment/Plan  UTI (urinary tract infection)  Assessment & Plan  Continue ceftriaxone  Urine culture preliminary growing lactose fermenting gram-negative rods  Follow final urine culture     Dementia without behavioral disturbance- (present on admission)  Assessment & Plan  Confusion is worsening today.  Continue donepezil  Haldol as needed as a chemical restraint  Frequent reorientation  IV normal saline for rehydration     Pseudomeningocele  Assessment & Plan  Status post I&D.  Management per neurosurgery     Dehydration  Assessment & Plan  Dry mouth.  Blood pressure is soft  Will give additional bolus 500 cc normal saline, continue normal saline maintenance     Lumbar Laminectomy     DATE OF SURGERY: 8/16/2019  SURGEON: Bhanu Daley D.O.   ASSISTANT: Adeola Orr  PREOPERATIVE DIAGNOSIS: Symptomatic spinal stenosis, L2 through L5  .  POSTOPERATIVE DIAGNOSIS: Symptomatic spinal stenosis, L2 through L5 .  PROCEDURE PERFORMED: Decompressive bilateral laminectomy, foraminotomy, L2 through L5 .     ANESTHESIA: GETA.  ESTIMATED BLOOD LOSS: 200 cc.  FINDINGS: Severe ligamentum hypertrophy with lateral recess canal stenosis, L3 through L5. Moderate stenosis at L2-3. Good decompression. CSF leak at L3-5 due to very severe stenosis. No CSF seen leaking at closure.  DRAINS: No drain placed due to duratomy.  COMPLICATIONS: None.  DISPOSITION: Stable to the PACU.     INDICATIONS FOR THE PROCEDURE  History: Ms. Bhakta is a 85 year old femaler who presents with signs, symptoms and radiographic evidence of back pain, lumbar radiculopathy, weakness, poor coordination, sensory changes of leg, right worse than left. Her niece was with the patient at every visit. The patient has severe stenosis in Lumbar spine from L3-5 and moderate stenosis at L2-3. She has attempted all conservative treatments with no help. She is worsening in her ambulation per the patietn and niece. She walks less and when she does walk she has severe pain in back and right LE radiculopathy. I discussed the option of surgery with a laminectomy of L2-L5 to relieve the stenosi. Due to the patients age I particularly emphasized the risks of surgery and explained that the laminectomy is mainly to address her leg pain and that she may continue to have back pain. The patient who is very pleasant but somewhat forgetful according to her niece who she lives with, and her niece stated they understood the risks and wish to proceed with the procedure.  DIAGNOSTIC STUDY: MRI of the lumbar spine showed stenosis in the lumbar spine from L2-5, with sever stenosis from L3-5. Given the progression of the symptoms, the patient decided to proceed with laminectomy and decompression of the L2-5 nerve root/s.     DATE OF SURGERY: 9/23/2019  SURGEON: Bhanu Daley   ASSISTANT: Adeola Dueñas  PREOPERATIVE  DIAGNOSIS: Pseudomeningocele post lumbar laminectomy  POSTOPERATIVE DIAGNOSIS:Pseudomeningocele post lumbar laminectomy  PROCEDURE PERFORMED: Incision and Drainage of pseudomeningocele with CSF leak repair.     ANESTHESIA: GETA.  ESTIMATED BLOOD LOSS: 50 cc.  FINDINGS: Subcutaneous collection of CSF with pseudomeningocele. Able to patch CSF leak and close fascia water tight seal.  DRAINS: None  COMPLICATIONS: None.  DISPOSITION: Stable to the PACU      INDICATIONS FOR THE PROCEDURE  HISTORY: 86 yr old female who originally had intolerable pain in LLE limiting her daily activities. Lives with niece. She could not take the pain anymore. Underwent L2-L5 laminectomy for decompression of nerve roots with sever stenosis. Pain in LLE resolved, able to ambulate more, but developed a pseudomeningocele with a subcutaneous collection of CSF. Denies any postitional headaches. Has pain in the back in fluid collection area. Extensive discussion was had with patient and niece about options. The patient has baseline dementia and is forgetful at times. But conversant and AOx3. After discussion it was decided to attempt to eliminate the fluid collection subcutaneously and may have to leave the subfascial pseudomenigocele since she is asymptomatic from the leak.     L-Spine MRI 09-04-19:  1.  Interval posterior decompression at L2-L5  2.  Posterior decompression bed fluid collection with deep and superficial components with the deep component extending circumferentially about the epidural space at L4-S1. This collection could be infected or sterile.  3.  Possible dural defect at L2-L3 raising the possibility of pseudomeningocele  4.   Severe thecal sac narrowing at L4-L5 and L5-S1 with moderate thecal sac narrowing at L2-L3 and L3-L4  5.  Multilevel multifactorial degenerative changes  6.  Other areas of central canal and neural foraminal narrowing as described above  7.  Chronic T11, T12, L1 and L4 vertebral compression  "fractures     Co-morbidities: See PMH  Potential Risk - Complications: Contractures, Deep Vein Thrombosis, Incontinence, Malnutrition, Pain, Pneumonia, Pressure Ulcer and Urinary Tract Infection  Level of Risk: High    Ongoing Medical Management Needed (Medical/Nursing Needs):   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 09/24/2019     Priority: High   • s/p laminectomy c/b pseudomeningocele s/p InD 09/24/2019     Priority: High   • Dementia without behavioral disturbance 06/18/2018     Priority: Medium   • 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    Current Vital Signs:   Temperature: 36.9 °C (98.4 °F) Pulse: 84 Respiration: 16 Blood Pressure : 123/54  Weight: 65 kg (143 lb 4.8 oz) Height: 147.3 cm (4' 10\")  Pulse Oximetry: 96 % O2 (LPM): 0      Completed Laboratory Reports:  Recent Labs     09/28/19  0420   SODIUM 138   POTASSIUM 3.9   BUN 10   CREATININE 0.41*   GLUCOSE 83     Additional Labs: Not Applicable    Prior Living Situation:   Housing / Facility: Assisted Living Residence(pt [facility] at the [facility] on the independent side)  Steps Into Home: 0  Steps In Home: 0  Lives with - Patient's Self Care Capacity: Other (Comments)  Equipment Owned: 4-Wheel Walker, Tub / Shower Seat, Grab Bar(s) In Tub / Shower    Prior Level of Function / Living Situation:   Physical Therapy: Prior Services: Intermittent Physical Support for ADL Per Service  Housing / Facility: Assisted Living Residence(pt lives at the [facility] on the independent side)  Steps Into Home: 0  Steps In Home: " 0  Bathroom Set up: Walk In Shower, Grab Bars, Shower Chair  Equipment Owned: 4-Wheel Walker, Tub / Shower Seat, Grab Bar(s) In Tub / Shower  Lives with - Patient's Self Care Capacity: Other (Comments)  Bed Mobility: Independent  Transfer Status: Independent  Ambulation: Independent  Assistive Devices Used: None  Current Level of Function:   Level Of Assist: Minimal Assist  Assistive Device: Front Wheel Walker  Distance (Feet): 50  Deviation: Decreased Heel Strike, Decreased Toe Off, Shuffled Gait, Step To  # of Stairs Climbed: 0  Weight Bearing Status: FWB  Supine to Sit: (up with CNA on arrival)  Sit to Supine: (pt left sitting up in chair for lunch)  Scooting: Stand by Assist  Rolling: (N/T)  Sit to Stand: Minimal Assist  Bed, Chair, Wheelchair Transfer: Minimal Assist  Toilet Transfers: Minimal Assist  Transfer Method: Stand Pivot  Sitting in Chair: 35  Sitting Edge of Bed: 12  Standin  Occupational Therapy:   Self Feeding: Independent  Grooming / Hygiene: Independent  Bathing: Independent  Dressing: Independent  Toileting: Independent  Medication Management: Requires Assist  Laundry: Requires Assist  Kitchen Mobility: Independent  Finances: Requires Assist  Home Management: Requires Assist  Shopping: Requires Assist  Prior Level Of Mobility: Independent With Device in Community  Prior Services: Intermittent Physical Support for ADL Per Service  Housing / Facility: Assisted Living Residence([facility] the [facility] on the independent side)  Occupation (Pre-Hospital Vocational): Retired Due To Age  Current Level of Function:   Eating: Supervision  Upper Body Dressing: Minimal Assist  Lower Body Dressing: Moderate Assist  Toileting: Minimal Assist  Speech Language Pathology:      Rehabilitation Prognosis/Potential: Good  Estimated Length of Stay: 10-14 days    Nursing:   Orientation : Disoriented to Time, Disoriented to Event  Straight cath    Scope/Intensity of Services Recommended:  Physical Therapy: 1.5 hr  / day  5 days / week. Therapeutic Interventions Required: Maximize Endurance, Mobility, Strength and Safety  Occupational Therapy: 1.5 hr / day 5 days / week. Therapeutic Interventions Required: Maximize Self Care, ADLs, IADLs and Energy Conservation  Rehabilitation Nursin/7. Therapeutic Interventions Required: Monitor Pain, Skin, Wound(s), Vital Signs, Intake and Output, Labs and Safety  Rehabilitation Physician: 3 - 5 days / week. Therapeutic Interventions Required: Medical Management    Rehabilitation Goals and Plan (Expected frequency & duration of treatment in the IRF):   Return to the Community, Modified Independent Level of Care and Outpatient Support  Anticipated Date of Rehabilitation Admission: 19  Patient/Family oriented IRF level of care/facility/plan: Yes  Patient/Family willing to participate in IRF care/facility/plan: Yes  Patient able to tolerate IRF level of care proposed: Yes  Patient has potential to benefit IRF level of care proposed: Yes  Comments: Not Applicable    Special Needs or Precautions - Medical Necessity:  Safety Concerns/Precautions:  Fall Risk / High Risk for Falls, Balance, Cognition and Bed / Chair Alarm  Complex Wound Care: Surgical  Pain Management  IV Site: Peripheral  Current Medications:    Current Facility-Administered Medications Ordered in Epic   Medication Dose Route Frequency Provider Last Rate Last Dose   • [START ON 10/1/2019] polyethylene glycol/lytes (MIRALAX) PACKET 1 Packet  1 Packet Oral DAILY Eli Ordoñeze, A.P.N.       • senna-docusate (PERICOLACE or SENOKOT S) 8.6-50 MG per tablet 1 Tab  1 Tab Oral DAILY Eli Chinchilla, A.P.N.       • tamsulosin (FLOMAX) capsule 0.4 mg  0.4 mg Oral AFTER BREAKFAST Austin Newton M.D.   0.4 mg at 19 0809   • heparin injection 5,000 Units  5,000 Units Subcutaneous Q8HRS Bhanu Lorenz D.O.   5,000 Units at 19 0401   • carboxymethylcellulose (REFRESH PLUS) 0.5 % ophthalmic drops 1 Drop  1 Drop Right Eye  TID Bhanu Lorenz, D.O.   1 Drop at 09/30/19 0402   • lactobacillus rhamnosus (CULTURELLE) capsule 1 Cap  1 Cap Oral QDAY with Breakfast Austin Newton M.D.   1 Cap at 09/30/19 0809   • HYDROcodone-acetaminophen (NORCO) 5-325 MG per tablet 1-2 Tab  1-2 Tab Oral Q6HRS PRN Bhanu Lorenz, D.O.   1 Tab at 09/29/19 1618   • dicyclomine (BENTYL) tablet 20 mg  20 mg Oral QDAY PRN Bhanu Lorenz, D.O.   20 mg at 09/26/19 1309   • haloperidol lactate (HALDOL) injection 2-5 mg  2-5 mg Intramuscular Q4HRS PRN Timothy Godwin M.D.       • artificial tears (EYE LUBRICANT) ophth ointment 1 Application  1 Application Both Eyes Q8HRS Adeoladeloris Dueñas, P.A.-C.   1 Application at 09/30/19 0402   • donepezil (ARICEPT) tablet 20 mg  20 mg Oral DAILY Adeoladeloris Dueñas, P.A.-C.   20 mg at 09/30/19 0401   • gabapentin (NEURONTIN) capsule 100 mg  100 mg Oral BID Adeoladeloris Dueñas, P.A.-C.   100 mg at 09/30/19 0401   • Pharmacy Consult Request ...Pain Management Review 1 Each  1 Each Other PHARMACY TO DOSE Adeoladeloris Dueñas, P.A.-C.       • MD ALERT...DO NOT ADMINISTER NSAIDS or ASPIRIN unless ORDERED By Neurosurgery 1 Each  1 Each Other PRN Adeolasa RUPINDER Dueñas, P.A.-C.       • docusate sodium (COLACE) capsule 100 mg  100 mg Oral BID Adeoladeloris Dueñas, P.A.-C.   100 mg at 09/30/19 0401   • senna-docusate (PERICOLACE or SENOKOT S) 8.6-50 MG per tablet 1 Tab  1 Tab Oral Nightly Adeolasa RUPINDER Dueñas, P.A.-C.   1 Tab at 09/29/19 2054   • magnesium hydroxide (MILK OF MAGNESIA) suspension 30 mL  30 mL Oral QDAY PRN Adeola K. Nishihira, P.A.-C.   30 mL at 09/28/19 0435   • bisacodyl (DULCOLAX) suppository 10 mg  10 mg Rectal Q24HRS PRN ELFEGO Giang.A.-C.       • fleet enema 133 mL  1 Each Rectal Once PRN ELFEGO Giang.A.-C.       • diphenhydrAMINE (BENADRYL) tablet/capsule 25 mg  25 mg Oral Q6HRS PRN Adeola Dueñas P.A.-C.        Or   • diphenhydrAMINE (BENADRYL) injection 25 mg  25 mg Intravenous Q6HRS PRN  Adeola Dueñas, P.A.-C.       • ondansetron (ZOFRAN) syringe/vial injection 4 mg  4 mg Intravenous Q4HRS PRN Adeola RUPINDER Dueñas, P.A.-C.   4 mg at 09/23/19 1738   • ondansetron (ZOFRAN ODT) dispertab 4 mg  4 mg Oral Q4HRS PRN Adeoladeloris Dueñas, P.A.-C.       • vitamin D (cholecalciferol) tablet 5,000 Units  5,000 Units Oral DAILY Adeolasa RUPINDER Dueñas, P.A.-C.   5,000 Units at 09/30/19 0401   • hydrALAZINE (APRESOLINE) injection 10 mg  10 mg Intravenous Q HOUR PRN Adeoladeloris Dueñas, P.A.-C.         No current [facility]-ordered outpatient medications on file.     Diet:   DIET ORDERS (From admission to next 24h)     Start     Ordered    09/25/19 1317  Supplements  ONCE     Question:  Which Supplement  Answer:  Per RD    09/25/19 1317    09/23/19 1654  Diet Order Regular  ALL MEALS     Question Answer Comment   Diet: Regular    Miscellaneous modifications: [employer]         09/23/19 1654                Anticipated Discharge Destination / Patient/Family Goal:  Destination: Assisted Living Support System: [facility]  Anticipated home health services: OT and PT  Previously used HH service/ provider: Not Applicable  Anticipated DME Needs: [employer]  Outpatient Services: OT and PT  Alternative resources to address additional identified needs:     [place] [facility] [place]    Pre-Screen Completed: 9/30/2019 9:37 AM Eris Reese L.P.N.

## 2019-09-30 NOTE — PROGRESS NOTES
Hospital Medicine Daily Progress Note    Date of Service  9/30/2019    Chief Complaint  86 y.o. female admitted 9/23/2019 with lower back pain and headache.    Hospital Course    History of dementia.  S/p lumbar laminectomy with discectomy L2-5 by Dr. Lorenz on 8/16.  C/b pseudomeningocele; admitted by Dr. Lorenz to the inpatient for InD.  S/p InD of pseudomeningocele by Dr. Lorenz 9/23.  Urinalysis revealed UTI. Neurosurgery consulted hospitalist for UTI.  Antibiotics started.        Interval Problem Update  9/25. Not complaining of fever or chills. She is a nonhistorian. Her family  Member did say she doesn't wipe her genitalia the right way so she is prone to UTI, her last one was Klebsiella UTI. It was also felt that her pseudomeningocele was a result of noncompliance with PT/OT at rehab.  9/26. Tolerated procedure. Remains baseline confusion but better mood as family is present. Likely needs SNF.  9/27. Pain improved. She has dementia/cognitive deficits but not agitated or in discomfort at rest. Deconditioned.  9/28. Still has severe cognitive deficits but more color to her, more energy and engaging in simple conversation.   9/29. Cognitive deficits but talking more. She did complain of incontinence. I explained this is due to pain medications, antispasmodics and neurologic surgery  9/30. Plan to discharge to rehab today. Completed a course of cephalosporin for UTI. Currently afebrile and asymptomatic in terms of infection.    Consultants/Specialty  Hospitalist.  Dr. Lorenz is the attending.    Code Status  full    Disposition  PT/OT pending  May need SNF or rehab. Defer to attending    Review of Systems  Review of Systems   Unable to perform ROS: Mental acuity        Physical Exam  Temp:  [36.9 °C (98.4 °F)-37.1 °C (98.7 °F)] 36.9 °C (98.4 °F)  Pulse:  [78-85] 85  Resp:  [16-18] 18  BP: (122-137)/(49-61) 137/61  SpO2:  [94 %-96 %] 94 %    Physical Exam   Constitutional: She appears well-developed.    Elderly, frail     HENT:   Head: Normocephalic and atraumatic.   Eyes: Conjunctivae are normal. No scleral icterus.   Neck: Normal range of motion. Neck supple.   Cardiovascular: Normal rate and regular rhythm. Exam reveals no gallop and no friction rub.   No murmur heard.  Pulmonary/Chest: Effort normal and breath sounds normal. No respiratory distress. She has no wheezes. She has no rales.   Abdominal: Soft. Bowel sounds are normal. She exhibits no distension. There is no tenderness. There is no rebound and no guarding.   Genitourinary:   Genitourinary Comments: Incontinence   Musculoskeletal: She exhibits tenderness (Low back, improved.). She exhibits no edema.   Neurological: She is alert.   Severe cognitive deficits.  She is more with it today and can make conversation.   Skin: Skin is warm.   Psychiatric: She has a normal mood and affect. Her behavior is normal.   Conversing       Fluids    Intake/Output Summary (Last 24 hours) at 9/30/2019 1104  Last data filed at 9/30/2019 0340  Gross per 24 hour   Intake 1240 ml   Output 1100 ml   Net 140 ml       Laboratory      Recent Labs     09/28/19  0420   SODIUM 138   POTASSIUM 3.9   CHLORIDE 109   CO2 22   GLUCOSE 83   BUN 10   CREATININE 0.41*   CALCIUM 8.2*                   Imaging  No orders to display        Assessment/Plan  * Post-InD of meningocele (complication of laminectomy) urinary tract infection  Assessment & Plan  Last culture Klebsiella.  Continue ceftriaxone  Urine culture preliminary growing lactose fermenting gram-negative rods  Appears to be pending speciation and sensitivities  Improved, switch to PO antibiotics.  PT/OT recommends SNF  9/29 SNF ordered  9/30 Rehab accepted      s/p laminectomy c/b pseudomeningocele s/p InD  Assessment & Plan  Status post I&D.  Management per neurosurgery  DVT prophylaxis per NSG    Dementia without behavioral disturbance- (present on admission)  Assessment & Plan  Confusion is worsening today.  Continue  donepezil  Haldol as needed as a chemical restraint  Frequent reorientation  IV normal saline for rehydration  PT/OT may need rehab again    Urinary incontinence  Assessment & Plan  Ordered Flomax  Follow up with outpatient Urology.    Alzheimer's dementia with behavioral disturbance  Assessment & Plan  COntinue donezepil.    Dehydration  Assessment & Plan  Dry mouth.  Blood pressure is soft  Will give additional bolus 500 cc normal saline, continue normal saline maintenance  Improved.       VTE prophylaxis: Heparin SQ as per NSG  Reviewed vitals, labs, imaging, staff notes.  Discussed assessment and plan with Ramya Bhakta  Discussed with RN, SW, CM

## 2019-09-30 NOTE — DISCHARGE PLANNING
Insurance has authorized [facility].  Dr. Newton/Dr. Lorenz have medically cleared.  Case is under review by Dr. Doran.

## 2019-09-30 NOTE — DISCHARGE SUMMARY
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    HISTORY OF PRESENT ILLNESS:  This patient underwent an L2-L5 laminectomy with   decompression of the nerve roots for severe lumbar stenosis, neural foraminal   stenosis on 08/16/2019.  She developed an asymptomatic pseudomeningocele and was   brought back to the [facility] for an incision and   drainage procedure with repair of the CSF  on 09/23/2019.  There   were no intraoperative or postoperative complications.    Today, on examination, her incision is clean, dry, and intact with staples in   place.  There is some minimal nicolle-incisional swelling as expected.  The patient denies   any positional headache.  She is ambulatory with standby assistance.  She   denies any significant lower extremity pain or paresthesia.    She has baseline urinary retention and was started on Flomax.  She also has   completed a course of cefdinir for urinary tract infection.  The last dose  will be administered today on 09/30/2019.    The remainder of her examination is without any abnormal findings.    Specifically, she does not have any lower extremity edema.  She was started on   heparin subQ injections per protocol.  Tomorrow on 10/01/2019, she may be   started on Lovenox in lieu of heparin as per facility or attending provider discretion.    Her pain has been well controlled with p.r.n. Norco 5 mg.    DISCHARGE INSTRUCTIONS:  1.  Keep the incision clean, dry and intact and covered with a dressing.  2.  Follow up with advanced neurosurgery outpatient clinic at 2 weeks postop   counting from the day of surgery.  3.  Heparin DVT prophylaxis may be replaced by Lovenox DVT prophylaxis on   10/01/2019 as indicated.    IMPRESSION AND PLAN:  1.  Status post successful repair of pseudomeningocele from prior lumbar spine   surgery on 08/16/2019 without complications.  2.  Followup needs as outlined below.    The  patient normally lives in an assisted living type facility.  Today, she   may be discharged back to this facility unless she will be accepted at the   acute inpatient rehabilitation facility here at [facility]. She should follow up   In the outpatient clinic around the two week postop vibha.       ____________________________________     ADRIANO STAPLES / LYNETTE    DD:  09/30/2019 08:53:01  DT:  09/30/2019 14:02:43    D#:  [id number]  Job#:  [id number]    cc: NATHALY ALVA MD

## 2019-09-30 NOTE — DISCHARGE INSTRUCTIONS
Discharge Instructions    Discharged to other by medical transportation with escort. Discharged via wheelchair, hospital escort: Yes.  Special equipment needed: Not Applicable    Be sure to schedule a follow-up appointment with your primary care doctor or any specialists as instructed.     Discharge Plan:   Diet Plan: Discussed  Activity Level: Discussed  Confirmed Follow up Appointment: Patient to Call and Schedule Appointment  Confirmed Symptoms Management: Discussed  Medication Reconciliation Updated: No (Comments)  Influenza Vaccine Indication: Indicated: 65 years and older    I understand that a diet low in cholesterol, fat, and sodium is recommended for good health. Unless I have been given specific instructions below for another diet, I accept this instruction as my diet prescription.   Other diet: Regualr    Special Instructions:    No ASA, Nsaid x 2 weeks post op   Protect incision with dressing   F/u outpatient clinic Advanced Neurosurgery 2 weeks post op    · Is patient discharged on Warfarin / Coumadin?   No     Depression / Suicide Risk    As you are discharged from this [facility] facility, it is important to learn how to keep safe from harming yourself.    Recognize the warning signs:  · Abrupt changes in personality, positive or negative- including increase in energy   · Giving away possessions  · Change in eating patterns- significant weight changes-  positive or negative  · Change in sleeping patterns- unable to sleep or sleeping all the time   · Unwillingness or inability to communicate  · Depression  · Unusual sadness, discouragement and loneliness  · Talk of wanting to die  · Neglect of personal appearance   · Rebelliousness- reckless behavior  · Withdrawal from people/activities they love  · Confusion- inability to concentrate     If you or a loved one observes any of these behaviors or has concerns about self-harm, here's what you can do:  · Talk about it- your feelings and reasons for  harming yourself  · Remove any means that you might use to hurt yourself (examples: pills, rope, extension cords, firearm)  · Get professional help from the community (Mental Health, Substance Abuse, psychological counseling)  · Do not be alone:Call your Safe Contact- someone whom you trust who will be there for you.  · Call your local CRISIS HOTLINE 350-0745 or 308-096-5199  · Call your local Children's Mobile Crisis Response Team [place] (910) 350-2994 or www.BrandYourself  · Call the toll free National Suicide Prevention Hotlines   · National Suicide Prevention Lifeline 829-808-JQKJ (4145)  · National Hope Line Network 800-SUICIDE (241-8145)

## 2019-10-01 LAB
25(OH)D3 SERPL-MCNC: 26 NG/ML (ref 30–100)
ALBUMIN SERPL BCP-MCNC: 2.7 G/DL (ref 3.2–4.9)
ALBUMIN/GLOB SERPL: 1.1 G/DL
ALP SERPL-CCNC: 72 U/L (ref 30–99)
ALT SERPL-CCNC: 13 U/L (ref 2–50)
ANION GAP SERPL CALC-SCNC: 5 MMOL/L (ref 0–11.9)
AST SERPL-CCNC: 19 U/L (ref 12–45)
BASOPHILS # BLD AUTO: 0.8 % (ref 0–1.8)
BASOPHILS # BLD: 0.03 K/UL (ref 0–0.12)
BILIRUB SERPL-MCNC: 0.4 MG/DL (ref 0.1–1.5)
BUN SERPL-MCNC: 9 MG/DL (ref 8–22)
CALCIUM SERPL-MCNC: 8.8 MG/DL (ref 8.5–10.5)
CHLORIDE SERPL-SCNC: 107 MMOL/L (ref 96–112)
CO2 SERPL-SCNC: 27 MMOL/L (ref 20–33)
CREAT SERPL-MCNC: 0.55 MG/DL (ref 0.5–1.4)
EOSINOPHIL # BLD AUTO: 0 K/UL (ref 0–0.51)
EOSINOPHIL NFR BLD: 0 % (ref 0–6.9)
ERYTHROCYTE [DISTWIDTH] IN BLOOD BY AUTOMATED COUNT: 49.4 FL (ref 35.9–50)
EST. AVERAGE GLUCOSE BLD GHB EST-MCNC: 105 MG/DL
GLOBULIN SER CALC-MCNC: 2.5 G/DL (ref 1.9–3.5)
GLUCOSE SERPL-MCNC: 83 MG/DL (ref 65–99)
HBA1C MFR BLD: 5.3 % (ref 0–5.6)
HCT VFR BLD AUTO: 34.6 % (ref 37–47)
HGB BLD-MCNC: 11.2 G/DL (ref 12–16)
IMM GRANULOCYTES # BLD AUTO: 0.01 K/UL (ref 0–0.11)
IMM GRANULOCYTES NFR BLD AUTO: 0.3 % (ref 0–0.9)
LYMPHOCYTES # BLD AUTO: 1.39 K/UL (ref 1–4.8)
LYMPHOCYTES NFR BLD: 37.1 % (ref 22–41)
MCH RBC QN AUTO: 31.1 PG (ref 27–33)
MCHC RBC AUTO-ENTMCNC: 32.4 G/DL (ref 33.6–35)
MCV RBC AUTO: 96.1 FL (ref 81.4–97.8)
MONOCYTES # BLD AUTO: 0.52 K/UL (ref 0–0.85)
MONOCYTES NFR BLD AUTO: 13.9 % (ref 0–13.4)
NEUTROPHILS # BLD AUTO: 1.8 K/UL (ref 2–7.15)
NEUTROPHILS NFR BLD: 47.9 % (ref 44–72)
NRBC # BLD AUTO: 0 K/UL
NRBC BLD-RTO: 0 /100 WBC
PLATELET # BLD AUTO: 272 K/UL (ref 164–446)
PMV BLD AUTO: 8.8 FL (ref 9–12.9)
POTASSIUM SERPL-SCNC: 4 MMOL/L (ref 3.6–5.5)
PROT SERPL-MCNC: 5.2 G/DL (ref 6–8.2)
RBC # BLD AUTO: 3.6 M/UL (ref 4.2–5.4)
SODIUM SERPL-SCNC: 139 MMOL/L (ref 135–145)
TSH SERPL DL<=0.005 MIU/L-ACNC: 1.04 UIU/ML (ref 0.38–5.33)
WBC # BLD AUTO: 3.8 K/UL (ref 4.8–10.8)

## 2019-10-01 PROCEDURE — 36415 COLL VENOUS BLD VENIPUNCTURE: CPT

## 2019-10-01 PROCEDURE — 770010 HCHG ROOM/CARE - REHAB SEMI PRIVAT*

## 2019-10-01 PROCEDURE — 700111 HCHG RX REV CODE 636 W/ 250 OVERRIDE (IP): Performed by: PHYSICAL MEDICINE & REHABILITATION

## 2019-10-01 PROCEDURE — 80053 COMPREHEN METABOLIC PANEL: CPT

## 2019-10-01 PROCEDURE — A9270 NON-COVERED ITEM OR SERVICE: HCPCS | Performed by: PHYSICAL MEDICINE & REHABILITATION

## 2019-10-01 PROCEDURE — 85025 COMPLETE CBC W/AUTO DIFF WBC: CPT

## 2019-10-01 PROCEDURE — 99232 SBSQ HOSP IP/OBS MODERATE 35: CPT | Performed by: PHYSICAL MEDICINE & REHABILITATION

## 2019-10-01 PROCEDURE — 97161 PT EVAL LOW COMPLEX 20 MIN: CPT

## 2019-10-01 PROCEDURE — 97166 OT EVAL MOD COMPLEX 45 MIN: CPT

## 2019-10-01 PROCEDURE — 82306 VITAMIN D 25 HYDROXY: CPT

## 2019-10-01 PROCEDURE — 97116 GAIT TRAINING THERAPY: CPT

## 2019-10-01 PROCEDURE — 97535 SELF CARE MNGMENT TRAINING: CPT

## 2019-10-01 PROCEDURE — 97110 THERAPEUTIC EXERCISES: CPT

## 2019-10-01 PROCEDURE — 700102 HCHG RX REV CODE 250 W/ 637 OVERRIDE(OP): Performed by: PHYSICAL MEDICINE & REHABILITATION

## 2019-10-01 PROCEDURE — 92610 EVALUATE SWALLOWING FUNCTION: CPT

## 2019-10-01 PROCEDURE — 83036 HEMOGLOBIN GLYCOSYLATED A1C: CPT

## 2019-10-01 PROCEDURE — 84443 ASSAY THYROID STIM HORMONE: CPT

## 2019-10-01 RX ORDER — DICYCLOMINE HYDROCHLORIDE 10 MG/1
10 CAPSULE ORAL EVERY 6 HOURS PRN
Status: DISCONTINUED | OUTPATIENT
Start: 2019-10-01 | End: 2019-10-08 | Stop reason: HOSPADM

## 2019-10-01 RX ORDER — GABAPENTIN 100 MG/1
100 CAPSULE ORAL 3 TIMES DAILY
Status: DISCONTINUED | OUTPATIENT
Start: 2019-10-01 | End: 2019-10-08 | Stop reason: HOSPADM

## 2019-10-01 RX ORDER — CARBOXYMETHYLCELLULOSE SODIUM 10 MG/ML
1 GEL OPHTHALMIC 3 TIMES DAILY
Status: DISCONTINUED | OUTPATIENT
Start: 2019-10-01 | End: 2019-10-08 | Stop reason: HOSPADM

## 2019-10-01 RX ORDER — DOXAZOSIN 2 MG/1
2 TABLET ORAL
Status: DISCONTINUED | OUTPATIENT
Start: 2019-10-01 | End: 2019-10-03

## 2019-10-01 RX ADMIN — CARBOXYMETHYLCELLULOSE SODIUM 1 DROP: 10 GEL OPHTHALMIC at 08:40

## 2019-10-01 RX ADMIN — ACETAMINOPHEN 650 MG: 325 TABLET, FILM COATED ORAL at 11:36

## 2019-10-01 RX ADMIN — GABAPENTIN 100 MG: 100 CAPSULE ORAL at 08:35

## 2019-10-01 RX ADMIN — SENNOSIDES, DOCUSATE SODIUM 2 TABLET: 50; 8.6 TABLET, FILM COATED ORAL at 19:59

## 2019-10-01 RX ADMIN — VITAMIN D, TAB 1000IU (100/BT) 5000 UNITS: 25 TAB at 08:35

## 2019-10-01 RX ADMIN — GABAPENTIN 100 MG: 100 CAPSULE ORAL at 19:59

## 2019-10-01 RX ADMIN — MINERAL OIL, PETROLATUM 1 APPLICATION: 425; 568 OINTMENT OPHTHALMIC at 06:23

## 2019-10-01 RX ADMIN — Medication 1 CAPSULE: at 08:35

## 2019-10-01 RX ADMIN — MINERAL OIL, PETROLATUM 1 APPLICATION: 425; 568 OINTMENT OPHTHALMIC at 22:00

## 2019-10-01 RX ADMIN — DOXAZOSIN 2 MG: 2 TABLET ORAL at 11:51

## 2019-10-01 RX ADMIN — CARBOXYMETHYLCELLULOSE SODIUM 1 DROP: 10 GEL OPHTHALMIC at 14:58

## 2019-10-01 RX ADMIN — TRAMADOL HYDROCHLORIDE 50 MG: 50 TABLET, COATED ORAL at 14:55

## 2019-10-01 RX ADMIN — MINERAL OIL, PETROLATUM 1 APPLICATION: 425; 568 OINTMENT OPHTHALMIC at 14:58

## 2019-10-01 RX ADMIN — CARBOXYMETHYLCELLULOSE SODIUM 1 DROP: 10 GEL OPHTHALMIC at 19:59

## 2019-10-01 ASSESSMENT — BRIEF INTERVIEW FOR MENTAL STATUS (BIMS)
ASKED TO RECALL BED: NO, COULD NOT RECALL
WHAT YEAR IS IT: MISSED BY 2 - 5 YEARS
BIMS SUMMARY SCORE: 3
ASKED TO RECALL SOCK: NO, COULD NOT RECALL
INITIAL REPETITION OF BED BLUE SOCK - FIRST ATTEMPT: 2
ASKED TO RECALL BLUE: NO, COULD NOT RECALL
WHAT MONTH IS IT: MISSED BY > 1 MONTH
WHAT DAY OF THE WEEK IS IT: INCORRECT

## 2019-10-01 ASSESSMENT — ACTIVITIES OF DAILY LIVING (ADL): TOILETING: INDEPENDENT

## 2019-10-01 ASSESSMENT — PATIENT HEALTH QUESTIONNAIRE - PHQ9
SUM OF ALL RESPONSES TO PHQ9 QUESTIONS 1 AND 2: 0
2. FEELING DOWN, DEPRESSED, IRRITABLE, OR HOPELESS: NOT AT ALL
1. LITTLE INTEREST OR PLEASURE IN DOING THINGS: NOT AT ALL

## 2019-10-01 NOTE — CARE PLAN
Problem: Safety  Goal: Will remain free from falls  Note:   Moved to room 110 for fall prevention due to history of severe baseline dementia

## 2019-10-01 NOTE — THERAPY
Physical Therapy   Initial Evaluation     Patient Name: Ramya Bhakta  Age:  86 y.o., Sex:  female  Medical Record #: [id number]  Today's Date: 10/1/2019     Subjective    Pt resting in bed, willing to participate     Objective       10/01/19 0931   Prior Living Situation   Housing / Facility Assisted Living Residence   Steps Into Home 0   Steps In Home 0   Elevator Yes   Equipment Owned 4-Wheel Walker   Lives with - Patient's Self Care Capacity Alone and Able to Care For Self  (independent living facility/ meals provided)   Prior Level of Functional Mobility   Bed Mobility Independent   Transfer Status Independent   Ambulation Independent   Distance Ambulation (Feet)   (household and community)   Assistive Devices Used 4-Wheel Walker   Stairs Unable To Determine At This Time   Comments Pt with impaired memory/ poor historian   IRF-ABY:  Prior Functioning: Everyday Activities   Self Care Independent   Indoor Mobility (Ambulation) Independent   Prior Device Use None of the given options  (4WW as needed/ otherwise pt reports no device)   Pain 0 - 10 Group   Location Back   Location Orientation Lower   Therapist Pain Assessment During Activity;Post Activity;Nurse Notified   Cognition    Comments poor short term memory/ baseline dementia   Passive ROM Lower Body   Passive ROM Lower Body WDL   Active ROM Lower Body    Active ROM Lower Body  WDL   Strength Lower Body   Lower Body Strength  X   Gross Strength Generalized Weakness, Equal Bilaterally   Comments grossly 3+ proximal, 5/5 distal   Sensation Lower Body   Lower Extremity Sensation   Not Tested   Comments poor attention/ poor ability to follow directions/ appears grossly intact   Balance Assessment   Sitting Balance (Static) Fair +   Sitting Balance (Dynamic) Fair +   Standing Balance (Static) Fair -   Standing Balance (Dynamic) Fair -   Weight Shift Sitting Fair   Weight Shift Standing Fair   Bed Mobility    Supine to Sit Stand by Assist   Sit to Supine Stand by  Assist   Sit to Stand Contact Guard Assist   Scooting Stand by Assist   Rolling Supervised   Neurological Concerns   Neurological Concerns No   Coordination Lower Body    Coordination Lower Body  WDL   IRF-ABY:  Roll Left and Right   Assistance Needed Supervision;Incidental touching   CARE Score 4   Discharge Goal:  Assistance Needed Independent   Discharge Goal:  Score 6   IRF-ABY:  Sit to Lying   Assistance Needed Supervision;Incidental touching   CARE Score 4   Discharge Goal:  Assistance Needed Independent   Discharge Goal:  Score 6   IRF-ABY:  Lying to Sitting on Side of Bed   Assistance Needed Supervision;Incidental touching   CARE Score 4   Discharge Goal:  Assistance Needed Independent   Discharge Goal:  Score 6   IRF-ABY:  Sit to Stand   Assistance Needed Supervision;Incidental touching   CARE Score 4   Discharge Goal:  Assistance Needed Independent   Discahrge Goal:  Score 6   IRF-ABY:  Chair/Bed-to-Chair Transfer   Assistance Needed Supervision;Physical assistance   Physical Assistance Level Less than 25%   CARE Score 3   Discharge Goal:  Assistance Needed Independent;Adaptive equipment   Discharge Goal:  Score 6   IRF-ABY:  Car Transfer   Reason if not Attempted Safety concerns   CARE Score 88   Discharge Goal:  Assistance Needed Supervision;Incidental touching   Discharge Goal:  Score 4   IRF ABY:  Walking   Does the Patient Walk? Yes   IRF ABY:  Walk 10 Feet   Assistance Needed Adaptive equipment;Incidental touching   Physical Assistance Level No physical assistance or only touching/steadying assist   CARE Score 4   Discharge Goal:  Assistance Needed Independent;Adaptive equipment   Discharge Goal:  Score 6   IRF-ABY:  Walk 50 Feet with Two Turns   Assistance Needed Adaptive equipment;Physical assistance   Physical Assistance Level Less than 25%   CARE Score 3   Discharge Goal:  Assistance Needed Independent;Adaptive equipment   Discharge Goal:  Score 6   IRF-ABY:  Walk 150 Feet   Assistance Needed  Adaptive equipment;Physical assistance   Physical Assistance Level Less than 25%   CARE Score 3   Discharge Goal:  Assistance Needed Independent;Adaptive equipment   Discharge Goal:  Score 6   IRF ABY:  Walking 10 Feet on Uneven Surfaces   Reason if not Attempted Safety concerns   CARE Score 88   Discharge Goal:  Assistance Needed Adaptive equipment;Supervision   Discharge Goal:  Score 4   IRF ABY:  1 Step (Curb)   Reason if not Attempted Safety concerns   CARE Score 88   Discharge Goal:  Assistance Needed Adaptive equipment;Supervision   Discharge Goal:  Score 4   IRF-ABY:  4 Steps   Assistance Needed Adaptive equipment;Physical assistance   Physical Assistance Level Less than 25%   CARE Score 3   Discharge Goal:  Assistance Needed Adaptive equipment;Supervision   Discharge Goal:  Score 4   IRF ABY:  12 Steps   Assistance Needed Adaptive equipment;Physical assistance   Physical Assistance Level Less than 25%   CARE Score 3   Discharge Goal:  Assistance Needed Independent;Supervision   Discharge Goal:  Score -   IRF ABY:  Picking Up Object   Reason if not Attempted Safety concerns   CARE Score 88   Discharge Goal:  Assistance Needed Adaptive equipment;Supervision   Discharge Goal:  Score 4   IRF-ABY:  Wheel 50 Feet with Two Turns   Indicate the Type of Wheelchair or Scooter Used Manual   Assistance Needed Supervision   CARE Score 4   Discharge Goal:  Assistance Needed Independent   Discharge Goal:  Score 6   IRF-ABY:  Wheel 150 Feet   Indicate the Type of Wheelchair or Scooter Used Manual   Assistance Needed Supervision   CARE Score 4   Discharge Goal:  Assistance Needed Independent   Discharge Goal:  Score 6   Problem List    Problems Impaired Bed Mobility;Impaired Transfers;Impaired Ambulation;Decreased Activity Tolerance;Safety Awareness Deficits / Cognition;Limited Knowledge of Post-Op Precautions  (poor memory/ dementia)   Precautions   Precautions Fall Risk   Current Discharge Plan   Current Discharge Plan  Return to Prior Living Situation   Interdisciplinary Plan of Care Collaboration   IDT Collaboration with  Nursing   Patient Position at End of Therapy Seated;Chair Alarm On;Self Releasing Lap Belt Applied;Call Light within Reach;Tray Table within Reach   Collaboration Comments notified nursing pt back in room, ambualtes with CGA using FWW   Benefit   Therapy Benefit Patient Would Benefit from Inpatient Rehabilitation Physical Therapy to Maximize Functional [place] with ADLs, IADLs and Mobility.   PT Total Time Spent   PT Individual Total Time Spent (Mins) 60   PT Charge Group   Charges Yes   PT Gait Training 1   PT Therapeutic Exercise 1   PT Evaluation PT Evaluation Low       FIM Bed/Chair/Wheelchair Transfers Score: 4 - Minimal Assistance  Bed/Chair/Wheelchair Transfers Description:  Adaptive equipment, Increased time, Verbal cueing, Set-up of equipment(CGA/ steadying assist and cues for back precautions)    FIM Walking Score:  4 - Minimal Assistance  Walking Description:  Assist device/equipment, Walker, Safety concerns, Requires incidental assist(CGA with  ft)    FIM Wheelchair Score:  5 - Standby Prompting/Supervision or Set-up  Wheelchair Description:  Adaptive equipment, Extra time, Verbal cueing, Supervision for safety    FIM Stairs Score:  4 - Minimal Assistance  Stairs Description:  (CGA/ steadying assist with B hand rails 4 steps x 3 trials)    Gait/ endurance training with  ft x 4 with CGA for safety  Seated exercises completed 3 x 10 for hip abd/ add/ marching/ LAQ's/ ankle pumps    Assessment  Patient is 86 y.o. female with a diagnosis of I & D of pseudo-meningiocele 9/23 following recent L2-5 laminectomy 8/16 .  Additional factors influencing patient status / progress (ie: cognitive factors, co-morbidities, social support, etc): include baseline dementia, arthritis, glaucoma and poor memory/safety deficits.      Plan  Recommend Physical Therapy  minutes per day 5-6 days per  "week for 2-3 weeks for the following treatments:  PT Group Therapy, PT Gait Training, PT Self Care/Home Eval, PT Therapeutic Exercises, PT TENS Application, PT Neuro Re-Ed/Balance, PT Therapeutic Activity, PT Manual Therapy and PT Evaluation.    Goals:  Long term and short term goals have been discussed with patient and they are in agreement.    Physical Therapy Problems     Problem: Balance     Dates: Start: 10/01/19       Description:     Goal: STG-Within one week, patient will     Dates: Start: 10/01/19       Description: 1) Individualized goal: Maintain standing to complete standing exercises program 1 x 10 with UE support at // bars  2) Interventions:  PT Group Therapy, PT Gait Training, PT Self Care/Home Eval, PT Therapeutic Exercises, PT TENS Application, PT Neuro Re-Ed/Balance, PT Therapeutic Activity, PT Manual Therapy and PT Evaluation                   Problem: Mobility     Dates: Start: 10/01/19       Description:     Goal: STG-Within one week, patient will ambulate community distances     Dates: Start: 10/01/19       Description: 1) Individualized goal:  SBA with FWW to complete 6 minute walk test  2) Interventions:  PT Group Therapy, PT Gait Training, PT Self Care/Home Eval, PT Therapeutic Exercises, PT TENS Application, PT Neuro Re-Ed/Balance, PT Therapeutic Activity, PT Manual Therapy and PT Evaluation             Goal: STG-Within one week, patient will ambulate up/down a curb     Dates: Start: 10/01/19       Description: 1) Individualized goal:  CGA with FWW for 6\" rise  2) Interventions:  PT Group Therapy, PT Gait Training, PT Self Care/Home Eval, PT Therapeutic Exercises, PT TENS Application, PT Neuro Re-Ed/Balance, PT Therapeutic Activity, PT Manual Therapy and PT Evaluation                   Problem: Mobility Transfers     Dates: Start: 10/01/19       Description:     Goal: STG-Within one week, patient will transfer bed to chair     Dates: Start: 10/01/19       Description: 1) Individualized " goal:  SPV for safety / verbal cues  2) Interventions:  PT Group Therapy, PT Gait Training, PT Self Care/Home Eval, PT Therapeutic Exercises, PT TENS Application, PT Neuro Re-Ed/Balance, PT Therapeutic Activity, PT Manual Therapy and PT Evaluation                   Problem: PT-Long Term Goals     Dates: Start: 10/01/19       Description:     Goal: LTG-By discharge, patient will tolerate standing     Dates: Start: 10/01/19       Description: 1) Individualized goal:  With UE support at // bars to complete standing exercise program 3 x 10  2) Interventions:  PT Group Therapy, PT Gait Training, PT Self Care/Home Eval, PT Therapeutic Exercises, PT TENS Application, PT Neuro Re-Ed/Balance, PT Therapeutic Activity, PT Manual Therapy and PT Evaluation             Goal: LTG-By discharge, patient will ambulate     Dates: Start: 10/01/19       Description: 1) Individualized goal:  SPV for pathfinding with 4WW 200 ft x 2 from room<>meals  2) Interventions:  PT Group Therapy, PT Gait Training, PT Self Care/Home Eval, PT Therapeutic Exercises, PT TENS Application, PT Neuro Re-Ed/Balance, PT Therapeutic Activity, PT Manual Therapy and PT Evaluation             Goal: LTG-By discharge, patient will transfer one surface to another     Dates: Start: 10/01/19       Description: 1) Individualized goal:  Modified independent with 4WW as needed  2) Interventions:  PT Group Therapy, PT Gait Training, PT Self Care/Home Eval, PT Therapeutic Exercises, PT TENS Application, PT Neuro Re-Ed/Balance, PT Therapeutic Activity, PT Manual Therapy and PT Evaluation             Goal: LTG-By discharge, patient will ambulate up/down flight of stairs     Dates: Start: 10/01/19       Description: 1) Individualized goal:  SPV for safety with hand rail  2) Interventions:  PT Group Therapy, PT Gait Training, PT Self Care/Home Eval, PT Therapeutic Exercises, PT TENS Application, PT Neuro Re-Ed/Balance, PT Therapeutic Activity, PT Manual Therapy and PT  Evaluation             Goal: LTG-By discharge, patient will transfer in/out of a car     Dates: Start: 10/01/19       Description: 1) Individualized goal:  SPV from ambulatory level with 4WW  2) Interventions:  PT Group Therapy, PT Gait Training, PT Self Care/Home Eval, PT Therapeutic Exercises, PT TENS Application, PT Neuro Re-Ed/Balance, PT Therapeutic Activity, PT Manual Therapy and PT Evaluation

## 2019-10-01 NOTE — PROGRESS NOTES
"Rehab Progress Note     Encounter Date: 10/1/2019    CC: Dementia, neck pain, back pain    Interval Events (Subjective)  Patient sitting up in wheelchair in hallway. She reports she is confused why she is in the hospital. She asks the same questions throughout interview. No family at bedside. Reviewed labs and vitals. Patient reports back pain.  Patient with mild anemia and low vitamin D on supplement.      Objective:  VITAL SIGNS: /63   Pulse 84   Temp 36.6 °C (97.9 °F) (Temporal)   Resp 18   Ht 1.499 m (4' 11\")   Wt 63 kg (138 lb 12.8 oz)   SpO2 97%   BMI 28.03 kg/m²   Gen: NAD  Psych: Mood and affect appropriate  CV: RRR, no edema  Resp: CTAB, no upper airway sounds  Abd: NTND  Neuro: AOx1, 5/5 BUE pushing wheelchair    Recent Results (from the past 72 hour(s))   CBC with Differential    Collection Time: 10/01/19  5:18 AM   Result Value Ref Range    WBC 3.8 (L) 4.8 - 10.8 K/uL    RBC 3.60 (L) 4.20 - 5.40 M/uL    Hemoglobin 11.2 (L) 12.0 - 16.0 g/dL    Hematocrit 34.6 (L) 37.0 - 47.0 %    MCV 96.1 81.4 - 97.8 fL    MCH 31.1 27.0 - 33.0 pg    MCHC 32.4 (L) 33.6 - 35.0 g/dL    RDW 49.4 35.9 - 50.0 fL    Platelet Count 272 164 - 446 K/uL    MPV 8.8 (L) 9.0 - 12.9 fL    Neutrophils-Polys 47.90 44.00 - 72.00 %    Lymphocytes 37.10 22.00 - 41.00 %    Monocytes 13.90 (H) 0.00 - 13.40 %    Eosinophils 0.00 0.00 - 6.90 %    Basophils 0.80 0.00 - 1.80 %    Immature Granulocytes 0.30 0.00 - 0.90 %    Nucleated RBC 0.00 /100 WBC    Neutrophils (Absolute) 1.80 (L) 2.00 - 7.15 K/uL    Lymphs (Absolute) 1.39 1.00 - 4.80 K/uL    Monos (Absolute) 0.52 0.00 - 0.85 K/uL    Eos (Absolute) 0.00 0.00 - 0.51 K/uL    Baso (Absolute) 0.03 0.00 - 0.12 K/uL    Immature Granulocytes (abs) 0.01 0.00 - 0.11 K/uL    NRBC (Absolute) 0.00 K/uL   Comp Metabolic Panel (CMP)    Collection Time: 10/01/19  5:18 AM   Result Value Ref Range    Sodium 139 135 - 145 mmol/L    Potassium 4.0 3.6 - 5.5 mmol/L    Chloride 107 96 - 112 mmol/L    " Co2 27 20 - 33 mmol/L    Anion Gap 5.0 0.0 - 11.9    Glucose 83 65 - 99 mg/dL    Bun 9 8 - 22 mg/dL    Creatinine 0.55 0.50 - 1.40 mg/dL    Calcium 8.8 8.5 - 10.5 mg/dL    AST(SGOT) 19 12 - 45 U/L    ALT(SGPT) 13 2 - 50 U/L    Alkaline Phosphatase 72 30 - 99 U/L    Total Bilirubin 0.4 0.1 - 1.5 mg/dL    Albumin 2.7 (L) 3.2 - 4.9 g/dL    Total Protein 5.2 (L) 6.0 - 8.2 g/dL    Globulin 2.5 1.9 - 3.5 g/dL    A-G Ratio 1.1 g/dL   HEMOGLOBIN A1C    Collection Time: 10/01/19  5:18 AM   Result Value Ref Range    Glycohemoglobin 5.3 0.0 - 5.6 %    Est Avg Glucose 105 mg/dL   TSH with Reflex to FT4    Collection Time: 10/01/19  5:18 AM   Result Value Ref Range    TSH 1.040 0.380 - 5.330 uIU/mL   Vitamin D, 25-hydroxy (blood)    Collection Time: 10/01/19  5:18 AM   Result Value Ref Range    25-Hydroxy   Vitamin D 25 26 (L) 30 - 100 ng/mL   ESTIMATED GFR    Collection Time: 10/01/19  5:18 AM   Result Value Ref Range    GFR If African American >60 >60 mL/min/1.73 m 2    GFR If Non African American >60 >60 mL/min/1.73 m 2       Current Facility-Administered Medications   Medication Frequency   • doxazosin (CARDURA) tablet 2 mg Q DAY   • carboxymethylcellulose 1 % GEL 1 Drop TID   • dicyclomine (BENTYL) cap 10 mg **pt own suppy** Q6HRS PRN   • Respiratory Care per Protocol Continuous RT   • Pharmacy Consult Request ...Pain Management Review 1 Each PHARMACY TO DOSE   • oxyCODONE immediate-release (ROXICODONE) tablet 5 mg Q3HRS PRN   • oxyCODONE immediate release (ROXICODONE) tablet 10 mg Q3HRS PRN   • tramadol (ULTRAM) 50 MG tablet 50 mg Q4HRS PRN   • hydrALAZINE (APRESOLINE) tablet 25 mg Q8HRS PRN   • acetaminophen (TYLENOL) tablet 650 mg Q4HRS PRN   • senna-docusate (PERICOLACE or SENOKOT S) 8.6-50 MG per tablet 2 Tab BID    And   • polyethylene glycol/lytes (MIRALAX) PACKET 1 Packet QDAY PRN    And   • magnesium hydroxide (MILK OF MAGNESIA) suspension 30 mL QDAY PRN    And   • bisacodyl (DULCOLAX) suppository 10 mg QDAY PRN    • artificial tears ophthalmic solution 1 Drop PRN   • benzocaine-menthol (CEPACOL) lozenge 1 Lozenge Q2HRS PRN   • mag hydrox-al hydrox-simeth (MAALOX PLUS ES or MYLANTA DS) suspension 20 mL Q2HRS PRN   • ondansetron (ZOFRAN ODT) dispertab 4 mg 4X/DAY PRN    Or   • ondansetron (ZOFRAN) syringe/vial injection 4 mg 4X/DAY PRN   • traZODone (DESYREL) tablet 50 mg QHS PRN   • sodium chloride (OCEAN) 0.65 % nasal spray 2 Spray PRN   • artificial tears (EYE LUBRICANT) ophth ointment 1 Application Q8HRS   • [START ON 9/1/2020] donepezil (ARICEPT) tablet 20 mg DAILY   • gabapentin (NEURONTIN) capsule 100 mg BID   • lactobacillus rhamnosus (CULTURELLE) capsule 1 Cap QDAY with Breakfast   • vitamin D (cholecalciferol) tablet 5,000 Units DAILY       Orders Placed This Encounter   Procedures   • Diet Order Regular (boost with each meal. No fish except tuna salad.)     Standing Status:   Standing     Number of Occurrences:   1     Order Specific Question:   Diet:     Answer:   Regular [1]     Comments:   boost with each meal. No fish except tuna salad.     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  Patient with shoulder and back pain.  -PT and OT for mobility and ADLs     Dysphagia - Concern per bedside nurse.  Downgrade to Dysphagia 2 and NTL.   -SLP for swallow evaluation     Back pain - Patient on Gabapentin 100 mg BID and PRN oxycodone  -Increase gabapentin to 100 mg  TID.     Anemia - Mild on admission at 11.2, continue to monitor most likely just post-operative.     Dementia - Patient on Aricept on transfer.      Urinary retention - Patient on Flomax on transfer     Vitamin D - Patient on 5000 U daily. 26 on admission, unclear if has been getting the supplement during hospital stay.      DVT ppx - OK to change to Lovenox on 10/1/19.     Total time:  28 minutes.  I spent greater than 50% of the time for patient care and coordination on this date, including unit/floor time, and face-to-face time with the patient as per assessment and plan above.  New anemia, ongoing confusion from dementia, and increase gabapentin for back pain.     Robinson Doran M.D.

## 2019-10-01 NOTE — THERAPY
"Occupational Therapy   Initial Evaluation     Patient Name: Ramya Bhakta  Age:  86 y.o., Sex:  female  Medical Record #: [id number]  Today's Date: 10/1/2019     Subjective    \"Where am I exactly?\" x5 during session     Objective       10/01/19 0701   Prior Living Situation   Prior Services Home-Independent   Housing / Facility Assisted Living Residence  (on the independent side of [facility])   Steps Into Home 0   Steps In Home 0   Bathroom Set up   (pt unable to recall)   Equipment Owned 4-Wheel Walker   Lives with - Patient's Self Care Capacity Alone and Able to Care For Self   Prior Level of ADL Function   Self Feeding Independent   Grooming / Hygiene Independent   Bathing Independent   Dressing Independent   Toileting Independent   Comments Per chart review pt required intermittent support for ADLs, pt unable to recall needing assist   Prior Level of IADL Function   Prior Level Of Mobility Independent With Device in Community;Independent Without Device in Home  (per pt uses 4WW to/from meals, does not use in apt)   Driving / Transportation Relatives / Others Provide Transportation   Occupation (Pre-Hospital Vocational) Retired Due To Age  (formerly in the [employer])   Leisure Interests   (cards/games at [facility], hanging with her \"gang\")   IRF-ABY:  Prior Functioning: Everyday Activities   Self Care Independent   Indoor Mobility (Ambulation) Independent   Stairs Independent   Functional Cognition Independent   Prior Device Use   (4WW intermittent use)   Cognition    Cognition / Consciousness X   Orientation Level Not Oriented to Place;Not Oriented to Reason;Not Oriented to Year;Not Oriented to Month;Not Oriented to Day   Level of Consciousness Alert   Safety Awareness Impaired   New Learning Impaired   Attention Impaired   Comments baseline dementia, pt with poor short term memory, poor capacity for retaining information within a given session   IRF-ABY:  Cognitive Pattern Assessment   Cognitive Pattern " "Assessment Used BIMS   IRF-ABY:  Brief Interview for Mental Status (BIMS)   Repetition of Three Words (First Attempt) 2   Temporal Orientation: Able to Report the Correct Year Missed by 2 - 5 years   Temporal Orientation: Able to Report the Correct Month Missed by > 1 month   Temporal Orientation: Able to Report the Correct Day of the Week Incorrect   Able to Recall \"Sock\" No, could not recall   Able to Recall \"Blue\" No, could not recall   Able to Recall \"Bed\" No, could not recall   BIMS Summary Score 3   Vision Screen   Vision Not tested  (wears glasses)   Passive ROM Upper Body   Passive ROM Upper Body WDL   Active ROM Upper Body   Active ROM Upper Body  WDL   Dominant Hand Right   Strength Upper Body   Upper Body Strength  WDL   Sensation Upper Body   Upper Extremity Sensation  Not Tested   Upper Body Muscle Tone   Upper Body Muscle Tone  Not Tested   Balance Assessment   Sitting Balance (Static) Fair +   Sitting Balance (Dynamic) Fair   Standing Balance (Static) Fair -   Standing Balance (Dynamic) Poor +   Weight Shift Sitting Fair   Weight Shift Standing Fair   Bed Mobility    Supine to Sit Stand by Assist   Sit to Stand Contact Guard Assist   Scooting Stand by Assist   Coordination Upper Body   Coordination WDL   IRF-ABY:  Oral Hygiene   Assistance Needed Verbal cue;Supervision   Physical Assistance Level No physical assistance   CARE Score 4   Discharge Goal:  Assistance Needed Independent   Discharge Goal:  Physical Assistance Level No physical assistance or only touching/steadying assist   Discharge Goal:  Score 6   IRF-ABY:  Shower/Bathe Self   Assistance Needed Incidental touching   Physical Assistance Level No physical assistance or only touching/steadying assist   CARE Score 4   Discharge Goal:  Assistance Needed Supervision   Discharge Goal:  Physical Assistance Level No physical assistance or only touching/steadying assist   Discharge Goal Score 4   IRF-ABY:  Upper Body Dressing   Assistance Needed " Set-up / clean-up;Verbal cues   Physical Assistance Level No physical assistance or only touching/steadying assist   CARE Score 4   Discharge Goal:  Assistance Needed Independent   Discharge Goal:  Physical Assistance Level No physical assistance or only touching/steadying assist   Dischage Goal:  Score 6   IRF-ABY:  Lower Body Dressing   Assistance Needed Physical assistance   Physical Assistance Level Less than 25%   CARE Score 3   Discharge Goal:  Assistance Needed Adaptive equipment   Discharge Goal:  Physical Assistance Level No physical assistance or only touching/steadying assist   Discharge Goal:  Score 6   IRF ABY:  Putting On/Taking Off Footwear   Assistance Needed Physical assistance   Physical Assistance Level Less than 25%   CARE Score 3   Discharge Goal:  Assistance Needed Independent   Discharge Goal:  Physical Assistance Level No physical assistance or only touching/steadying assist   Discharge Goal:  Score 6   IRF-ABY:  Toileting Hygiene   Assistance Needed Supervision   Physical Assistance Level No physical assistance or only touching/steadying assist   CARE Score 4   Discharge Goal:  Assistance Needed Independent   Discharge Goal:  Physical Assistance Level No physical assistance or only touching/steadying assist   Discharge Goal:  Score 6   IRF-ABY:  Toilet Transfer   Assistance Needed Physical assistance   Physical Assistance Level Less than 25%   CARE Score 3   Discharge Goal:  Assistance Needed Adaptive equipment   Discharge Goal:  Physical Assistance Level No physical assistance or only touching/steadying assist   Discahrge Goal:  Score 6   IRF-ABY:  Hearing, Speech, and Vision   Expression of Ideas and Wants 3   Understanding Verbal and Non-Verbal Content 3   Problem List   Problem List Decreased Active Daily Living Skills;Decreased Homemaking Skills;Decreased Functional Mobility;Decreased Activity Tolerance;Safety Awareness Deficits / Cognition;Impaired Cognitive Function;Limited Knowledge  of Post Op Precautions;Impaired Postural Control / Balance   Precautions   Precautions Fall Risk   Comments lumbar surgery, dementia   Current Discharge Plan   Current Discharge Plan Return to Prior Living Situation   Benefit    Therapy Benefit Patient Would Benefit from Inpatient Rehab Occupational Therapy to Maximize [place] with ADLs, IADLs and Functional Mobility.   Interdisciplinary Plan of Care Collaboration   IDT Collaboration with  Physical Therapist   Patient Position at End of Therapy Seated;Call Light within Reach   Collaboration Comments re: pt status and mobility   Equipment Needs   Assistive Device / DME Grab Bars In Shower / Tub;Grab Bars By Toilet   OT Total Time Spent   OT Individual Total Time Spent (Mins) 60   OT Charge Group   Charges Yes   OT Self Care / ADL 1   OT Evaluation OT Evaluation Mod     FIM Grooming Score:  5 - Standby Prompting/Supervision or Set-up  Grooming Description:  (SBA standing at sink)    FIM Bathing Score:  4 - Minimal Assistance  Bathing Description:  Grab bar, Tub bench, Hand held shower, Increased time, Supervision for safety, Verbal cueing(CGA for standing components, pt completed primarily in standing, did not adhere to safety recs for GB use, verbal cues to recall which body parts she had already washed)    FIM Upper Body Dressin - Standby Prompting/Supervision or Set-up  Upper Body Dressing Description:  Set-up of equipment    FIM Lower Body Dressing Score:  4 - Minimal Assistance  Lower Body Dressing Description:  (close SBA standing components, min A to tie shoes, use of xleg method for pain management)    FIM Toileting Body Dressin - Minimal Assistance  Toileting Description:  Verbal cueing, Supervision for safety, Increased time, Grab bar(CGA standing components)    FIM Bed/Chair/Wheelchair Transfers Score:    Bed/Chair/Wheelchair Transfers Description:       FIM Toilet Transfer Score:  4 - Minimal Assistance  Toilet Transfer Description:  Grab  bar, Increased time, Supervision for safety, Verbal cueing(w/c > toilet)    FIM Tub/Shower Transfers Score:  4 - Minimal Assistance  Tub/Shower Transfers Description:  Grab bar(ambulated from toilet to shower no AD)    Assessment  Patient is 86 y.o. female admitted with recent L2-5 lumbar laminectomy on 8/16/19, discharged to SNF but re-admitted on 9/23 with increased low back pain and headache, now s/p I&D of pseudomeningocele on 9/23/19.  Additional factors influencing patient status / progress (ie: cognitive factors, co-morbidities, social support, etc): baseline dementia, arthritis, glaucoma.      At time of OT evaluation patient presents below baseline of independent requiring min A for ADLs and mobility, she is primarily impacted by impaired balance, impaired activity tolerance 2/2 pain, and baseline short term memory deficits impacting ability to carryover education and training.     Plan  Recommend Occupational Therapy  minutes per day 5-7 days per week for 7-14 days for the following treatments:  OT Group Therapy, OT Self Care/ADL, OT Cognitive Skill Dev, OT Community Reintegration, OT Manual Ther Technique, OT Neuro Re-Ed/Balance, OT Therapeutic Activity, OT Evaluation and OT Therapeutic Exercise.    Goals:  Long term and short term goals have been discussed with patient and they are in agreement.    Occupational Therapy Goals     Problem: Bathing     Dates: Start: 10/01/19       Description:     Goal: STG-Within one week, patient will bathe     Dates: Start: 10/01/19       Description: 1) Individualized Goal:  With supervision, min verbal cues for safety strategies  2) Interventions:  OT Group Therapy, OT Self Care/ADL, OT Cognitive Skill Dev, OT Community Reintegration, OT Manual Ther Technique, OT Neuro Re-Ed/Balance, OT Therapeutic Activity, OT Evaluation and OT Therapeutic Exercise                   Problem: Dressing     Dates: Start: 10/01/19       Description:     Goal: STG-Within one week,  patient will dress LB     Dates: Start: 10/01/19       Description: 1) Individualized Goal:  With supervision  2) Interventions:  OT Group Therapy, OT Self Care/ADL, OT Cognitive Skill Dev, OT Community Reintegration, OT Manual Ther Technique, OT Neuro Re-Ed/Balance, OT Therapeutic Activity, OT Evaluation and OT Therapeutic Exercise                   Problem: Functional Transfers     Dates: Start: 10/01/19       Description:     Goal: STG-Within one week, patient will     Dates: Start: 10/01/19       Description: 1) Individualized Goal:  Ambulate to/from bathroom and complete toilet and shower transfers with SBA using least restrictive adaptive device  2) Interventions:  OT Group Therapy, OT Self Care/ADL, OT Cognitive Skill Dev, OT Community Reintegration, OT Manual Ther Technique, OT Neuro Re-Ed/Balance, OT Therapeutic Activity, OT Evaluation and OT Therapeutic Exercise                   Problem: OT Long Term Goals     Dates: Start: 10/01/19       Description:     Goal: LTG-By discharge, patient will complete basic self care tasks     Dates: Start: 10/01/19       Description: 1) Individualized Goal:  With supervision to modified independence  2) Interventions:  OT Group Therapy, OT Self Care/ADL, OT Cognitive Skill Dev, OT Community Reintegration, OT Manual Ther Technique, OT Neuro Re-Ed/Balance, OT Therapeutic Activity, OT Evaluation and OT Therapeutic Exercise             Goal: LTG-By discharge, patient will perform bathroom transfers     Dates: Start: 10/01/19       Description: 1) Individualized Goal:  With supervision to modified independence  2) Interventions:  OT Group Therapy, OT Self Care/ADL, OT Cognitive Skill Dev, OT Community Reintegration, OT Manual Ther Technique, OT Neuro Re-Ed/Balance, OT Therapeutic Activity, OT Evaluation and OT Therapeutic Exercise                 Problem: Toileting     Dates: Start: 10/01/19       Description:     Goal: STG-Within one week, patient will complete toileting  tasks     Dates: Start: 10/01/19       Description: 1) Individualized Goal:  With supervision  2) Interventions:  OT Group Therapy, OT Self Care/ADL, OT Cognitive Skill Dev, OT Community Reintegration, OT Manual Ther Technique, OT Neuro Re-Ed/Balance, OT Therapeutic Activity, OT Evaluation and OT Therapeutic Exercise

## 2019-10-01 NOTE — THERAPY
Speech Language Pathology   Initial Assessment     Patient Name: Ramya Bhakta  AGE:  86 y.o., SEX:  female  Medical Record #: [id number]  Today's Date: 10/1/2019     Subjective    Patient is a 86 y.o. year-old female with a past medical history significant for Arthritis, Glaucoma, Hx of hepatitis, and recent back surgery with L2-L5 lumbar laminectomy admitted to [facility] on 9/23/2019  8:10 AM with low back pain and headache.  Per report patient had a L2-L5 laminectomy with Dr. Lorenz on 8/16/19, Patient underwent procedure and was discharged to [facility].  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 Pt with baseline dementia and pt's niece reports coughing with thin liquids.       Objective       10/01/19 0801   Prior Living Situation   Prior Services Other (Comments)  (Independent living facility )   Housing / Facility Assisted Living Residence   Lives with - Patient's Self Care Capacity Alone and Able to Care For Self   Prior Level Of Function   Communication Impaired  (baseline dementia )   Swallow Unknown   Dentition Intact   Dentures None   Hearing Within Functional Limits for Evaluation   Hearing Aid None   Occupation (Pre-Hospital Vocational) Retired Due To Age   Tracheostomy   Tracheostomy No   Speech Mechanisms / Voice Production   Speech Mechanisms / Voice Production (WDL) X   Facial Weakness Right Within Functional Limits (6-7)   Facial Weakness Left Within Functional Limits (6-7)   Oral Movements Are Voluntary And Coordinated Within Functional Limits (6-7)   Single Word Intelligibility Within Functional Limits (6-7)   Sentence Level Intelligibility Within Functional Limits (6-7)   Conversational Intelligibility Within Functional Limits (6-7)   Labial Function   Labial Function (WDL) WDL   Lingual Function   Lingual Function (WDL) WDL   Jaw Function   Jaw Function (WDL) WDL   Velar Function   Velar Function (WDL) WDL   Laryngeal Function   Laryngeal Function (WDL) X    Voice Quality Within Functional Limits   Volutional Cough Minimal   Excursion Upon Swallow Complete   Swallowing   Swallowing (WDL) X   Puree Within Functional Limits   Thin Liquid Minimal   Single Swallow Thin / Nectar (Cup) Minimal;Thin Liquid   Masticated Foods Within Functional Limits   Dysphagia Strategies / Recommendations   Strategies / Interventions Recommended (Yes / No) Yes   Compensatory Strategies Direct Supervision During Meals;No Talking During Eating / Drinking   Diet / Liquid Recommendation Dysphagia III;Thin Liquid   Medication Administration  Crush all Medications in Puree   Therapy Interventions Dysphagia Therapy By Speech Language Pathologist;Therapeutic Dining For Meals;MBSS Evaluation   Barriers To Oral Feeding   Barriers To Oral Feeding Impaired Cognition / Attention;Impulsivity / Impaired Safety   Cervical Ausculatation Not Tested   Pre-Feeding Oral Stimulation Trial Not Tested   IRF-ABY:  Swallowing/Nutritional Status   Swallowing/Nutritional Status Modified food consistency;Supervision during eating   IRF-ABY:  Eating   Assistance Needed Set-up / clean-up;Supervision   [place] Physical Assistance Level No physical assistance or only touching/steadying assist   CARE Score 4   Discharge Goal:  Assistance Needed Independent   Discharge Goal:  Physical Assistance Level No physical assistance or only touching/steadying assist   Discharge Goal:  Score 6   Outcome Measures   Outcome Measures Utilized MASA   MASA ([place] Assessment of Swallowing Ability)   Alertness 10   Cooperation 8   Auditory Comprehension 8   Respiration 10   Respiratory Rate for Swallow 5   Dysphasia 5   Dyspraxia 5   Dysarthria 5   Saliva 5   Lip Seal 5   Tongue Movement 10   Tongue Strength 10   Tongue Coordination 10   Oral Preparation 10   Gag 5   Palate 10   Bolus Clearance 10   Oral Transit 10   Cough Reflex 3   Voluntary Cough 10   Voice 10   Trache 10   Pharygneal Phase 10   Pharyngeal Response 5   Total Score 189    Dysphagia Severity No abnormality detected   Aspiration Severity No abnormality detected   Problem List   Problem List Dysphagia;Dementia   Current Discharge Plan   Current Discharge Plan Return to Prior Living Situation   Benefit   Therapy Benefit Patient would benefit from Inpatient Rehab Speech-Language Pathology to address above identified deficits.   Interdisciplinary Plan of Care Collaboration   IDT Collaboration with  Physician;   Patient Position at End of Therapy Seated   Collaboration Comments Recommending 30 minutes ST for dysphagia management, MBSS to be scheduled on 10/3   Speech Language Pathologist Assigned   Assigned SLP / Pager # [phone], [phone] (swallow only)    SLP Total Time Spent   SLP Individual Total Time Spent (Mins) 60   SLP Charge Group   SLP Oral Pharyngeal Evaluation Oral Pharyngeal Evaluation       FIM Eating Score:  5 - Standby Prompting/Supervision or Set-up  Eating Description:  Increased time, Modified diet, Supervision for safety, Verbal cueing    FIM Comprehension Score:  4 - Minimal Assistance  Comprehension Description:  Glasses, Verbal cues    FIM Expression Score:  4 - Minimal Assistance  Expression Description:  Verbal cueing    FIM Social Interaction Score:  4 - Minimal Assistance  Social Interaction Description:  Increased time, Verbal cues    FIM Problem Solving Score:  4 - Minimal Assistance  Problem Solving Description:  Verbal cueing, Therapy schedule, Increased time, Bed/chair alarm    FIM Memory Score:  2 - Max Assistance  Memory Description:  Verbal cueing, Therapy schedule, Seat belt, Bed/chair alarm, Supervision    Assessment    Patient is 86 y.o. female with a diagnosis of pseudomeningocele.  Additional factors influencing patient status/progress (ie: cognitive factors, co-morbidities, social support, etc): Baseline dementia, dysphagia.      Clinical swallow evaluation completed.  Pt was given the MASA and scored a 189 indicating no abnormality detected.  Pt  trialed teaspoons of water and demonstrated an immediate cough on 1/3 trials.  Pt refused to drink NTL's during this meal and required continuous education for the purpose of NTL's (unable to retain information d/t baseline dementia).  Pt was given thin liquids at the end of this meal and tolerated cup sips of juice and coffee without any overt s/sx of aspiration/penetration.  Pt also tolerated dysphagia 3 textures without difficulty noted.  Recommend pt be seen for 30 minutes a day to target dysphagia management, MBSS to be completed on 10/3 with goals to be added to her care plan as appropriate.  Medications crushed (baseline).      Plan  Recommend Speech Therapy 30-60 minutes per day 5-6 days per week for 1 weeks for the following treatments:  SLP Swallowing Dysfunction Treatment, SLP Oral Pharyngeal Evaluation, SLP Video Swallow Evaluation and SLP Group Treatment.    Goals:  Long term and short term goals have been discussed with patient and they are in agreement.    Speech Therapy Problems     Problem: Speech/Swallowing LTGs     Dates: Start: 10/01/19       Description:     Goal: LTG-By discharge, patient will safely swallow     Dates: Start: 10/01/19       Description: 1) Individualized goal:  The least restrictive diet for safe oral intake of daily meals   2) Interventions:  SLP Swallowing Dysfunction Treatment, SLP Oral Pharyngeal Evaluation, SLP Video Swallow Evaluation and SLP Group Treatment                   Problem: Swallowing STGs     Dates: Start: 10/01/19       Description:     Goal: STG-Within one week, patient will safely swallow     Dates: Start: 10/01/19       Description: 1) Individualized goal:  Mechanical soft textures and thin liquids without any overt s/sx of aspiration/penetration.    2) Interventions:  SLP Swallowing Dysfunction Treatment, SLP Oral Pharyngeal Evaluation, SLP Video Swallow Evaluation and SLP Group Treatment             Goal: STG-Within one week, patient will     Dates:  Start: 10/01/19       Description: 1) Individualized goal:  Participate in a MBSS with goals added as appropriate   2) Interventions:  SLP Swallowing Dysfunction Treatment, SLP Oral Pharyngeal Evaluation and SLP Video Swallow Evaluation

## 2019-10-01 NOTE — DISCHARGE PLANNING
CASE MANAGEMENT INITIAL ASSESSMENT    Admit Date:  9/30/2019     I met with Ramya and her niece Lisa to discuss role of case management / discharge planning / team conference.   Patient is a  86 y.o. female transferred from [facility]. Patient underwent laminectomy with Dr. Haddad on 8/16. The patient was transferred to [facility] and spent about 28 days there per niece. Patient underwent I&D of lumbar region on 9/23 and transferred to [facility] for further therapies.   Patient lives at [facility]. She is mostly independent there but does receive medication management. Home health was assisting her with bathing 2x per week. Patient's nieces Lisa and Angelita are join POA. Patient is disoriented on assessment and frequently asks why she is in the hospital and if she is returning to her home.     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     Priority: Low   • Urinary incontinence 09/29/2019   • Alzheimer's dementia with behavioral disturbance (HCC) 09/25/2019   • Dehydration 09/24/2019   • Lumbar stenosis 08/19/2019   • Vitamin D deficiency 12/18/2014   • GERD (gastroesophageal reflux disease) 09/05/2014   • Osteoporosis 07/11/2011   • Dyslipidemia 03/09/2011     Prior Living Situation:  Housing / Facility: Assisted Living Residence  Lives with - Patient's Self Care Capacity: Attendant / Paid Care Giver    Prior Level of Function:  Medication Management: Requires Assist  Finances: Requires Assist  Home Management: Requires Assist  Prior Level Of Mobility: Independent With Device in  Community, Independent Without Device in Home(per pt uses 4WW to/from meals, does not use in apt)  Driving / Transportation: Relatives / Others Provide Transportation  Pt. Description Of Current ADL Function: Mobility Problems, Activities Of Daily Living / Self Care Restrictions    Support Systems:  Primary : Lisa Pinzon  Other support systems: Angelita Thomas  Advance Directives: Yes  Power of  (Name & Phone): Lisa Pinzon [phone] and Angelita Thomas [phone]    Previous Services Utilized:   Equipment Owned: 4-Wheel Walker, Sock Aid, Reacher  Prior Services: Other (Comments)(Facility with intermittent assistance for meds and bathing)    Other Information:  Occupation (Pre-Hospital Vocational): Retired Due To Age     Primary Payor Source: [employer]  Secondary Payor Source: Other (Comments)  Primary Care Practitioner : Basil Banks M.D.  Other MDs: Dr. Haddad    Patient / Family Goal:  Patient / Family Goal: To return home to [facility]    Additional Case Management Questions:  Have you ever received case management services for yourself or a family member? Yes    Do you feel you have and an understanding of what services  provide? Yes- with education and educational letter.    Do you have any additional questions regarding case management?    No.      CASE MANAGEMENT PLAN OF CARE   Individualized Goals:   1. Patient will be safe to discharge to assisted living facility.   2. Patient will orient to hospital setting.     Barriers:   1. Cognitive limitations    Plan:  1. Continue to follow patient through hospitalization and provide discharge planning in collaboration with patient, family, physicians and ancillary services.     2. Utilize community resources to ensure a safe discharge.

## 2019-10-01 NOTE — CARE PLAN
Problem: Infection  Goal: Will remain free from infection  Outcome: PROGRESSING AS EXPECTED  Note:   No s/s of infection present      Problem: Venous Thromboembolism (VTW)/Deep Vein Thrombosis (DVT) Prevention:  Goal: Patient will participate in Venous Thrombosis (VTE)/Deep Vein Thrombosis (DVT)Prevention Measures  Outcome: PROGRESSING AS EXPECTED  Note:   Pt receives Lovenox SC injections for DVT prophylaxis      Problem: Pain Management  Goal: Pain level will decrease to patient's comfort goal  Outcome: PROGRESSING AS EXPECTED  Note:   No reports of pain at this time. Will continue to monitor through shift with hourly rounds.

## 2019-10-01 NOTE — H&P
REHABILITATION HISTORY AND PHYSICAL/POST ADMISSION PHYSICAL EVALUATION    Date of Admission: 9/30/2019  8:26 PM  Ramya Bhakta  RH10/[id number]    [facility] Code / Diagnosis to Support: 08.9 Other Orthopedic  Etiologic Diagnosis: Pseudomeningocele    CC: Dementia, neck pain and back pain    HPI:  Patient is a 86 y.o. year-old female with a past medical history significant for Arthritis, Glaucoma, Hx of hepatitis, and recent back surgery with L2-L5 lumbar laminectomy admitted to [facility] on 9/23/2019  8:10 AM with low back pain and headache.  Per report patient had a L2-L5 laminectomy with Dr. Lorenz on 8/16/19, Patient underwent procedure and was discharged to [facility].  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  Patient had scheduled I&D of pseudomeningocele with Dr. Lorenz on 9/23/19.  Urine culture positive for klebsiella, patient on Ceftin.  Patient was previously living at an assisted living on the independent side.      Patient was last evaluated by PT on 9/28/19 and was Ashlyn for gait.  Patient has not been evaluated by OT on 9/29/19 and was min-modA for ADLs.     Patient tolerated transfer over. Per niece patient has baseline dementia. Patient agrees that she forgets things all the time and relies on her niece for help.  Per the niece she lives in the independent side of assistive living. They both report she has difficult following commands at time. She reports pain in bilateral shoulders since the surgery as well as low back pain. She reports she cannot remember it is getting better. Per niece she has not had any other complaints besides that it is too cold in the rooms.     REVIEW OF SYSTEMS:     Not reliable due to dementia.     PMH:  Past Medical History:   Diagnosis Date   • Acute pain of right knee 3/6/2017   • Arthritis    • Cancer (HCC)     basel cell skin   • CATARACT     right eye   • Dry cough    • Glaucoma    • Hepatitis 1955   • Hepatitis A 1955   • Jaundice 1955   •  Pain     back       PSH:  Past Surgical History:   Procedure Laterality Date   • IRRIGATION AND DEBRIDEMENT, WOUND, AFTER PROCEDURE INVOH N/A 9/23/2019    Procedure: IRRIGATION AND DEBRIDEMENT, WOUND, AFTER PROCEDURE INVOLVING LUMBAR SPINE BY POSTERIOR APPROACH-CEREBROSPINAL FLUID LEAK REPAIR;  Surgeon: Bhanu Lorenz D.O.;  Location: SURGERY [facility];  Service: Neurosurgery   • IRRIGATION & DEBRIDEMENT NEURO N/A 9/23/2019    Procedure: IRRIGATION AND DEBRIDEMENT, WOUND;  Surgeon: Bhanu Lorenz D.O.;  Location: SURGERY [facility];  Service: Neurosurgery   • LUMBAR LAMINECTOMY DISKECTOMY  8/16/2019    Procedure: LAMINECTOMY, SPINE, LUMBAR, WITH [id number];  Surgeon: Bhanu Lorenz D.O.;  Location: SURGERY [facility];  Service: Neurosurgery   • OTHER  2017    removal skin cancer -back and arm   • CATARACT PHACO WITH IOL  12/14/2011    Performed by ALEJANDRA HEALY at SURGERY SAME DAY [facility] ORS   • EYE ENUCLEATION  November 2007    left eye   • HEMORRHOIDECTOMY         FAMILY HISTORY:  Family History   Problem Relation Age of Onset   • Heart Disease Mother    • Lung Disease Father    • Stroke Brother    • Cancer Sister         breast   • Heart Disease Sister    • Stroke Maternal Grandfather         MEDICATIONS:  Current Facility-Administered Medications   Medication Dose   • Respiratory Care per Protocol     • Pharmacy Consult Request ...Pain Management Review 1 Each  1 Each   • oxyCODONE immediate-release (ROXICODONE) tablet 5 mg  5 mg   • oxyCODONE immediate release (ROXICODONE) tablet 10 mg  10 mg   • tramadol (ULTRAM) 50 MG tablet 50 mg  50 mg   • hydrALAZINE (APRESOLINE) tablet 25 mg  25 mg   • acetaminophen (TYLENOL) tablet 650 mg  650 mg   • senna-docusate (PERICOLACE or SENOKOT S) 8.6-50 MG per tablet 2 Tab  2 Tab    And   • polyethylene glycol/lytes (MIRALAX) PACKET 1 Packet  1 Packet    And   • magnesium hydroxide (MILK OF MAGNESIA) suspension 30 mL  30 mL    And   • bisacodyl (DULCOLAX)  suppository 10 mg  10 mg   • artificial tears ophthalmic solution 1 Drop  1 Drop   • benzocaine-menthol (CEPACOL) lozenge 1 Lozenge  1 Lozenge   • mag hydrox-al hydrox-simeth (MAALOX PLUS ES or MYLANTA DS) suspension 20 mL  20 mL   • ondansetron (ZOFRAN ODT) dispertab 4 mg  4 mg    Or   • ondansetron (ZOFRAN) syringe/vial injection 4 mg  4 mg   • traZODone (DESYREL) tablet 50 mg  50 mg   • sodium chloride (OCEAN) 0.65 % nasal spray 2 Spray  2 Spray   • carboxymethylcellulose 1 % GEL 1 Drop  1 Drop   • artificial tears (EYE LUBRICANT) ophth ointment 1 Application  1 Application   • [START ON 9/1/2020] donepezil (ARICEPT) tablet 20 mg  20 mg   • gabapentin (NEURONTIN) capsule 100 mg  100 mg   • heparin injection 5,000 Units  5,000 Units   • [START ON 10/1/2019] lactobacillus rhamnosus (CULTURELLE) capsule 1 Cap  1 Cap   • [START ON 10/1/2019] tamsulosin (FLOMAX) capsule 0.4 mg  0.4 mg   • [START ON 10/1/2019] vitamin D (cholecalciferol) tablet 5,000 Units  5,000 Units       ALLERGIES:  Prednisone and Seasonal    PSYCHOSOCIAL HISTORY:  Housing / Facility: Assisted Living Residence(pt lives at the [facility] on the independent side)  Steps Into Home: 0  Steps In Home: 0  Lives with - Patient's Self Care Capacity: Other (Comments)  Equipment Owned: 4-Wheel Walker, Tub / Shower Seat, Grab Bar(s) In Tub / Shower     Prior Level of Function / Living Situation:   Physical Therapy: Prior Services: Intermittent Physical Support for ADL Per Service  Housing / Facility[facility]isted Living Residence[facility]  Steps Into Home: 0  Steps In Home: 0  Bathroom Set up: Walk In Shower, Grab Bars, Shower Chair  Equipment Owned: 4-Wheel Walker, Tub / Shower Seat, Grab Bar(s) In Tub / Shower  Lives with - Patient's Self Care Capacity: Other (Comments)  Bed Mobility: Independent  Transfer Status: Independent  Ambulation: Independent  Assistive Devices Used: None  Current Level of Function:   Level Of  "Assist: Minimal Assist  Assistive Device: Front Wheel Walker  Distance (Feet): 50  Deviation: Decreased Heel Strike, Decreased Toe Off, Shuffled Gait, Step To  # of Stairs Climbed: 0  Weight Bearing Status: FWB  Supine to Sit: (up with CNA on arrival)  Sit to Supine: (pt left sitting up in chair for lunch)  Scooting: Stand by Assist  Rolling: (N/T)  Sit to Stand: Minimal Assist  Bed, Chair, Wheelchair Transfer: Minimal Assist  Toilet Transfers: Minimal Assist  Transfer Method: Stand Pivot  Sitting in Chair: 35  Sitting Edge of Bed: 12  Standin  Occupational Therapy:   Self Feeding: Independent  Grooming / Hygiene: Independent  Bathing: Independent  Dressing: Independent  Toileting: Independent  Medication Management: Requires Assist  Laundry: Requires Assist  Kitchen Mobility: Independent  Finances: Requires Assist  Home Management: Requires Assist  Shopping: Requires Assist  Prior Level Of Mobility: Independent With Device in Community  Prior Services: Intermittent Physical Support for ADL Per Service  Housing / Facility: Assisted Living Residence(pt lives at the [facility] on the independent side)  Occupation (Pre-Hospital Vocational): Retired Due To Age  Current Level of Function:   Eating: Supervision  Upper Body Dressing: Minimal Assist  Lower Body Dressing: Moderate Assist  Toileting: Minimal Assist    CURRENT LEVEL OF FUNCTION:   Same as level of function prior to admission to [facility]    PHYSICAL EXAM:     VITAL SIGNS:   height is 1.499 m (4' 11\") and weight is 63 kg (138 lb 12.8 oz). Her oral temperature is 36.6 °C (97.9 °F). Her blood pressure is 113/71 and her pulse is 84. Her respiration is 18 and oxygen saturation is 97%.     GENERAL: No apparent distress  HEENT: Normocephalic/atraumatic, EOMI and PERRL  CARDIAC: Regular rate and rhythm, normal S1, S2   LUNGS: Clear to auscultation   ABDOMINAL: bowel sounds present, soft, nontender and nondistended    EXTREMITIES: no " contractures, spasticity, or edema.  No calf tenderness bilaterally, 2+ bilateral DP/PT pulses.  NEURO:  Mental status: answers questions appropriately follows commands; AOx2  Speech: fluent, no aphasia or dysarthria  CRANIAL NERVES: CN 2-12 intact except poor hearing bilaterally  Motor:  4/5 BUE  4/5 BLE except 3/5 at HF at wheelchair level  Sensory: intact to light touch through out  DTRs: 1+ in bilateral biceps    RADIOLOGY:  MRI 9/4/19  Impression       1.  Interval posterior decompression at L2-L5  2.  Posterior decompression bed fluid collection with deep and superficial components with the deep component extending circumferentially about the epidural space at L4-S1. This collection could be infected or sterile.  3.  Possible dural defect at L2-L3 raising the possibility of pseudomeningocele  4.   Severe thecal sac narrowing at L4-L5 and L5-S1 with moderate thecal sac narrowing at L2-L3 and L3-L4  5.  Multilevel multifactorial degenerative changes  6.  Other areas of central canal and neural foraminal narrowing as described above  7.  Chronic T11, T12, L1 and L4 vertebral compression fractures         LABS:  Recent Labs     09/28/19  0420   SODIUM 138   POTASSIUM 3.9   CHLORIDE 109   CO2 22   GLUCOSE 83   BUN 10   CREATININE 0.41*   CALCIUM 8.2*                 PRIMARY REHAB DIAGNOSIS:    This patient is a 86 y.o. female admitted for acute inpatient rehabilitation with Pseudomeningocele.    IMPAIRMENTS:   Cognitive  ADLs/IADLs  Mobility  Swallow    SECONDARY DIAGNOSIS/MEDICAL CO-MORBIDITIES AFFECTING FUNCTION:  Alzheimer's dementia   Hypocalcemia  Urinary retention  Low vitamin D    RELEVANT CHANGES SINCE PREADMISSION EVALUATION:    Status unchanged    The patient's rehabilitation potential is Very Good  The patient's medical prognosis is good    PLAN:   Discussion and Recommendations:   1. The patient requires an acute inpatient rehabilitation program with a coordinated program of care at an intensity and  frequency not available at a lower level of care. This recommendation is substantiated by the patient's medical physicians who recommend that the patient's intervention and assessment of medical issues needs to be done at an acute level of care for patient's safety and maximum outcome.   2. A coordinated program of care will be supplied by an interdisciplinary team of physical therapy, occupational therapy, rehab physician, rehab nursing, and, if needed, speech therapy and rehab psychology. Rehab team presents a patient-specific rehabilitation and education program concentrating on prevention of future problems related to accessibility, mobility, skin, bowel, bladder, sexuality, and psychosocial and medical/surgical problems.   3. Need for Rehabilitation Physician: The rehab physician will be evaluating the patient on a multi-weekly basis to help coordinate the program of care. The rehab physician communicates between medical physicians, therapists, and nurses to maximize the patient's potential outcome. Specific areas in which the rehab physician will be providing daily assessment include the following:   A. Assessing the patient's heart rate and blood pressure response (vitals monitoring) to activity and making adjustments in medications or conservative measures as needed.   B. The rehab physician will be assessing the frequency at which the program can be increased to allow the patient to reach optimal functional outcome.   C. The rehab physician will also provide assessments in daily skin care, especially in light of patient's impairments in mobility.   D. The rehab physician will provide special expertise in understanding how to work with functional impairment and recommend appropriate interventions, compensatory techniques, and education that will facilitate the patient's outcome.   4. Rehab R.N.   The rehab RN will be working with patient to carry over in room mobility and activities of daily living when the  patient is not in 3 hours of skilled therapy. Rehab nursing will be working in conjunction with rehab physician to address all the medical issues above and continue to assess laboratory work and discuss abnormalities with the treating physicians, assess vitals, and response to activity, and discuss and report abnormalities with the rehab physician. Rehab RN will also continue daily skin care, supervise bladder/bowel program, instruct in medication administration, and ensure patient safety.   5. Rehab Therapy: Therapies to treat at intensity and frequency of (may change after completion of evaluation by all therapeutic disciplines):       PT:  Physical therapy to address mobility, transfer, gait training and evaluation for adaptive equipment needs 1 hour/day at least 5 days/week for the duration of the ELOS (see below)       OT:  Occupational therapy to address ADLs, self-care, home management training, functional mobility/transfers and assistive device evaluation, and community re-integration 1  hour/day at least 5 days/week for the duration of the ELOS (see below).        ST/Dysphagia:  Speech therapy to address speech, language, and cognitive deficits as well as swallowing difficulties with retraining/dysphagia management and community re-integration with comprehension, expression, cognitive training 1  hour/day at least 5 days/week for the duration of the ELOS (see below).     Medical management / Rehabilitation Issues/ Adverse Potential as part of rehabilitation plan     REHABILITATION ISSUES/ADVERSE POTENTIAL:  1.  Lumbar pseudomeningocele - Patient s/p I&D with Dr. Lorenz on 9/23/19.  Patient with shoulder and back pain. Patient demonstrates functional deficits in strength, balance, coordination, and ADL's. Patient is admitted to [facility] for comprehensive rehabilitation therapy as described below.   Rehabilitation nursing monitors bowel and bladder control, educates on medication  administration, co-morbidities and monitors patient safety.    2.  Neurostimulants: None at this time but continue to assess daily for need to initiate should status change.    3.  DVT prophylaxis:  Patient is on Heparin for anticoagulation upon transfer. Encourage OOB. Monitor daily for signs and symptoms of DVT including but not limited to swelling and pain to prevent the development of DVT that may interfere with therapies.    4.  GI prophylaxis:  On prilosec to prevent gastritis/dyspepsia which may interfere with therapies.    5.  Pain: No issues with pain currently / Controlled with APAP/Tramadol    6.  Nutrition/Dysphagia: Dietician monitors nutrient intake, recommend supplements prn and provide nutrition education to pt/family to promote optimal nutrition for wound healing/recovery.     7.  Bladder/bowel:  Start bowel and bladder program as described below, to prevent constipation, urinary retention (which may lead to UTI), and urinary incontinence (which will impact upon pt's functional independence).   - Post void bladder scans, I&O cath for PVRs >400  - up to commode after meal     8.  Skin/dermal ulcer prophylaxis: Monitor for new skin conditions with q.2 h. turns as required to prevent the development of skin breakdown.     9.  Cognition/Behavior:  Psychologist Dr. Germain provides adjustment counseling to illness and psychosocial barriers that may be potential barriers to rehabilitation.     10. Respiratory therapy: RT performs O2 management prn, breathing retraining, pulmonary hygiene and bronchospasm management prn to optimize participation in therapies.     MEDICAL CO-MORBIDITIES/ADVERSE POTENTIAL AFFECTING FUNCTION:   Lumbar pseudomeningocele - Patient s/p I&D with Dr. Lorenz on 9/23/19.  Patient with shoulder and back pain.  -PT and OT for mobility and ADLs    Dysphagia - Concern per bedside nurse.  Downgrade to Dysphagia 2 and NTL.   -SLP for swallow evaluation    Back pain - Patient on  Gabapentin 100 mg BID and PRN oxycodone    Dementia - Patient on Aricept on transfer.     Urinary retention - Patient on Flomax on transfer    Vitamin D - Patient on 5000 U daily.    DVT ppx - OK to change to Lovenox on 10/1/19.     I personally performed a complete drug regimen review and no potential clinically significant medication issues were identified.     Pt was seen today for 77 min, and entire time spent in face-to-face contact was >50% in counseling and coordination of care as detailed in A/P above.        GOALS/EXPECTED LEVEL OF FUNCTION BASED ON CURRENT MEDICAL AND FUNCTIONAL STATUS (may change based on patient's medical status and rate of impairment recovery):  Transfers:   Modified Independent  Mobility/Gait:   Modified Independent  ADL's:   Modified Independent  Cognition:  supervs  Swallowing:  Dysphagia 3    DISPOSITION: Discharge to pre-morbid independent living setting with the supportive care of patient's family and caregiver(s).    ELOS: 7-14 days

## 2019-10-02 PROCEDURE — 97110 THERAPEUTIC EXERCISES: CPT

## 2019-10-02 PROCEDURE — 97116 GAIT TRAINING THERAPY: CPT

## 2019-10-02 PROCEDURE — 97535 SELF CARE MNGMENT TRAINING: CPT

## 2019-10-02 PROCEDURE — 97530 THERAPEUTIC ACTIVITIES: CPT

## 2019-10-02 PROCEDURE — 92526 ORAL FUNCTION THERAPY: CPT

## 2019-10-02 PROCEDURE — 700102 HCHG RX REV CODE 250 W/ 637 OVERRIDE(OP): Performed by: PHYSICAL MEDICINE & REHABILITATION

## 2019-10-02 PROCEDURE — A9270 NON-COVERED ITEM OR SERVICE: HCPCS | Performed by: PHYSICAL MEDICINE & REHABILITATION

## 2019-10-02 PROCEDURE — 99232 SBSQ HOSP IP/OBS MODERATE 35: CPT | Performed by: PHYSICAL MEDICINE & REHABILITATION

## 2019-10-02 PROCEDURE — 770010 HCHG ROOM/CARE - REHAB SEMI PRIVAT*

## 2019-10-02 PROCEDURE — 97112 NEUROMUSCULAR REEDUCATION: CPT

## 2019-10-02 RX ADMIN — MINERAL OIL, PETROLATUM 1 APPLICATION: 425; 568 OINTMENT OPHTHALMIC at 19:18

## 2019-10-02 RX ADMIN — DOXAZOSIN 2 MG: 2 TABLET ORAL at 08:23

## 2019-10-02 RX ADMIN — GABAPENTIN 100 MG: 100 CAPSULE ORAL at 08:23

## 2019-10-02 RX ADMIN — SENNOSIDES, DOCUSATE SODIUM 2 TABLET: 50; 8.6 TABLET, FILM COATED ORAL at 19:16

## 2019-10-02 RX ADMIN — SENNOSIDES, DOCUSATE SODIUM 2 TABLET: 50; 8.6 TABLET, FILM COATED ORAL at 08:23

## 2019-10-02 RX ADMIN — Medication 1 CAPSULE: at 08:23

## 2019-10-02 RX ADMIN — GABAPENTIN 100 MG: 100 CAPSULE ORAL at 15:34

## 2019-10-02 RX ADMIN — MINERAL OIL, PETROLATUM 1 APPLICATION: 425; 568 OINTMENT OPHTHALMIC at 15:35

## 2019-10-02 RX ADMIN — CARBOXYMETHYLCELLULOSE SODIUM 1 DROP: 10 GEL OPHTHALMIC at 08:27

## 2019-10-02 RX ADMIN — VITAMIN D, TAB 1000IU (100/BT) 5000 UNITS: 25 TAB at 08:23

## 2019-10-02 RX ADMIN — MINERAL OIL, PETROLATUM 1 APPLICATION: 425; 568 OINTMENT OPHTHALMIC at 05:40

## 2019-10-02 RX ADMIN — GABAPENTIN 100 MG: 100 CAPSULE ORAL at 19:16

## 2019-10-02 RX ADMIN — CARBOXYMETHYLCELLULOSE SODIUM 1 DROP: 10 GEL OPHTHALMIC at 15:35

## 2019-10-02 RX ADMIN — CARBOXYMETHYLCELLULOSE SODIUM 1 DROP: 10 GEL OPHTHALMIC at 19:16

## 2019-10-02 ASSESSMENT — BALANCE ASSESSMENTS
LOOK OVER LEFT AND RIGHT SHOULDERS WHILE STANDING: 1
REACHING FORWARD WITH OUTSTRETCHED ARM WHILE STANDING: 4
PICK UP OBJECT FROM THE FLOOR FROM A STANDING POSITION: 3
PLACE ALTERNATE FOOT ON STEP OR STOOL WHILE STANDING UNSUPPORTED: 2
STANDING UNSUPPORTED: 4
STANDING UNSUPPORTED WITH FEET TOGETHER: 4
TRANSFERS: 4
LONG VERSION TOTAL SCORE (MAX 56): 43
STANDING UNSUPPORTED WITH EYES CLOSED: 3
STANDING UNSUPPORTED ONE FOOT IN FRONT: 3
SITTING TO STANDING: 4
STANDING TO SITTING: 4
TURN 360 DEGREES: 1
LONG VERSION TOTAL SCORE (MAX 56): 43
STANDING ON ONE LEG: 2
SITTING UNSUPPORTED: 4

## 2019-10-02 ASSESSMENT — 6 MINUTE WALK TEST (6MWT)
LEVEL OF ASSISTANCE: SUPERVISION
NUMBER OF RESTS: 0
GAIT SPEED - METERS PER SECOND: .73
TOTAL DISTANCE WALKED (FT): 864

## 2019-10-02 NOTE — PROGRESS NOTES
"Rehab Progress Note     Encounter Date: 10/2/2019    CC: Dementia, neck pain, back pain    Interval Events (Subjective)  Patient sitting up in room. She reports she is doing well. Again notes that she always feels cold.  Reports she wants to go home to the [facility]. Discussed that she has to be safe to go to the independent living. Denies NVD. Denies SOB.     Objective:  VITAL SIGNS: /55   Pulse 83   Temp 36.7 °C (98.1 °F) (Temporal)   Resp 16   Ht 1.499 m (4' 11\")   Wt 63 kg (138 lb 12.8 oz)   SpO2 97%   BMI 28.03 kg/m²   Gen: NAD  Psych: Mood and affect appropriate  CV: RRR, no edema  Resp: CTAB, no upper airway sounds  Abd: NTND  Neuro: AOx1, 5/5 BUE pushing wheelchair  Unchanged from 10/1/19    Recent Results (from the past 72 hour(s))   CBC with Differential    Collection Time: 10/01/19  5:18 AM   Result Value Ref Range    WBC 3.8 (L) 4.8 - 10.8 K/uL    RBC 3.60 (L) 4.20 - 5.40 M/uL    Hemoglobin 11.2 (L) 12.0 - 16.0 g/dL    Hematocrit 34.6 (L) 37.0 - 47.0 %    MCV 96.1 81.4 - 97.8 fL    MCH 31.1 27.0 - 33.0 pg    MCHC 32.4 (L) 33.6 - 35.0 g/dL    RDW 49.4 35.9 - 50.0 fL    Platelet Count 272 164 - 446 K/uL    MPV 8.8 (L) 9.0 - 12.9 fL    Neutrophils-Polys 47.90 44.00 - 72.00 %    Lymphocytes 37.10 22.00 - 41.00 %    Monocytes 13.90 (H) 0.00 - 13.40 %    Eosinophils 0.00 0.00 - 6.90 %    Basophils 0.80 0.00 - 1.80 %    Immature Granulocytes 0.30 0.00 - 0.90 %    Nucleated RBC 0.00 /100 WBC    Neutrophils (Absolute) 1.80 (L) 2.00 - 7.15 K/uL    Lymphs (Absolute) 1.39 1.00 - 4.80 K/uL    Monos (Absolute) 0.52 0.00 - 0.85 K/uL    Eos (Absolute) 0.00 0.00 - 0.51 K/uL    Baso (Absolute) 0.03 0.00 - 0.12 K/uL    Immature Granulocytes (abs) 0.01 0.00 - 0.11 K/uL    NRBC (Absolute) 0.00 K/uL   Comp Metabolic Panel (CMP)    Collection Time: 10/01/19  5:18 AM   Result Value Ref Range    Sodium 139 135 - 145 mmol/L    Potassium 4.0 3.6 - 5.5 mmol/L    Chloride 107 96 - 112 mmol/L    Co2 27 20 - 33 mmol/L    " Anion Gap 5.0 0.0 - 11.9    Glucose 83 65 - 99 mg/dL    Bun 9 8 - 22 mg/dL    Creatinine 0.55 0.50 - 1.40 mg/dL    Calcium 8.8 8.5 - 10.5 mg/dL    AST(SGOT) 19 12 - 45 U/L    ALT(SGPT) 13 2 - 50 U/L    Alkaline Phosphatase 72 30 - 99 U/L    Total Bilirubin 0.4 0.1 - 1.5 mg/dL    Albumin 2.7 (L) 3.2 - 4.9 g/dL    Total Protein 5.2 (L) 6.0 - 8.2 g/dL    Globulin 2.5 1.9 - 3.5 g/dL    A-G Ratio 1.1 g/dL   HEMOGLOBIN A1C    Collection Time: 10/01/19  5:18 AM   Result Value Ref Range    Glycohemoglobin 5.3 0.0 - 5.6 %    Est Avg Glucose 105 mg/dL   TSH with Reflex to FT4    Collection Time: 10/01/19  5:18 AM   Result Value Ref Range    TSH 1.040 0.380 - 5.330 uIU/mL   Vitamin D, 25-hydroxy (blood)    Collection Time: 10/01/19  5:18 AM   Result Value Ref Range    25-Hydroxy   Vitamin D 25 26 (L) 30 - 100 ng/mL   ESTIMATED GFR    Collection Time: 10/01/19  5:18 AM   Result Value Ref Range    GFR If African American >60 >60 mL/min/1.73 m 2    GFR If Non African American >60 >60 mL/min/1.73 m 2       Current Facility-Administered Medications   Medication Frequency   • doxazosin (CARDURA) tablet 2 mg Q DAY   • carboxymethylcellulose 1 % GEL 1 Drop TID   • dicyclomine (BENTYL) cap 10 mg **pt own suppy** Q6HRS PRN   • gabapentin (NEURONTIN) capsule 100 mg TID   • Respiratory Care per Protocol Continuous RT   • oxyCODONE immediate-release (ROXICODONE) tablet 5 mg Q3HRS PRN   • oxyCODONE immediate release (ROXICODONE) tablet 10 mg Q3HRS PRN   • tramadol (ULTRAM) 50 MG tablet 50 mg Q4HRS PRN   • hydrALAZINE (APRESOLINE) tablet 25 mg Q8HRS PRN   • acetaminophen (TYLENOL) tablet 650 mg Q4HRS PRN   • senna-docusate (PERICOLACE or SENOKOT S) 8.6-50 MG per tablet 2 Tab BID    And   • polyethylene glycol/lytes (MIRALAX) PACKET 1 Packet QDAY PRN    And   • magnesium hydroxide (MILK OF MAGNESIA) suspension 30 mL QDAY PRN    And   • bisacodyl (DULCOLAX) suppository 10 mg QDAY PRN   • artificial tears ophthalmic solution 1 Drop PRN   •  benzocaine-menthol (CEPACOL) lozenge 1 Lozenge Q2HRS PRN   • mag hydrox-al hydrox-simeth (MAALOX PLUS ES or MYLANTA DS) suspension 20 mL Q2HRS PRN   • ondansetron (ZOFRAN ODT) dispertab 4 mg 4X/DAY PRN    Or   • ondansetron (ZOFRAN) syringe/vial injection 4 mg 4X/DAY PRN   • traZODone (DESYREL) tablet 50 mg QHS PRN   • sodium chloride (OCEAN) 0.65 % nasal spray 2 Spray PRN   • artificial tears (EYE LUBRICANT) ophth ointment 1 Application Q8HRS   • [START ON 9/1/2020] donepezil (ARICEPT) tablet 20 mg DAILY   • lactobacillus rhamnosus (CULTURELLE) capsule 1 Cap QDAY with Breakfast   • vitamin D (cholecalciferol) tablet 5,000 Units DAILY       Orders Placed This Encounter   Procedures   • Diet Order Regular (boost with each meal. No fish except tuna salad.)     Standing Status:   Standing     Number of Occurrences:   1     Order Specific Question:   Diet:     Answer:   Regular [1]     Comments:   boost with each meal. No fish except tuna salad.     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  Patient with shoulder and back pain.  -PT and OT for mobility and ADLs     Dysphagia - Concern per bedside nurse.  Downgrade to Dysphagia 2 and NTL.   -SLP for swallow evaluation. Do not recommend MBSS, upgraded to dysphagia 3 and thins.      Back pain - Patient on Gabapentin 100 mg BID and PRN oxycodone  -Increase gabapentin to 100 mg TID.     Anemia - Mild on admission at  11.2, continue to monitor most likely just post-operative.     Dementia - Patient on Aricept on transfer.      Urinary retention - Patient on Flomax on transfer     Vitamin D - Patient on 5000 U daily. 26 on admission, unclear if has been getting the supplement during hospital stay.      DVT ppx - OK to change to Lovenox on 10/1/19.     Total time:  25 minutes.  I spent greater than 50% of the time for patient care, counseling, and coordination on this date, including unit/floor time, and face-to-face time with the patient as per interval events and assessment and plan above. Topics discussed included discharge planning, swallow progress and decreased pain complaints.     Robinson Doran M.D.

## 2019-10-02 NOTE — CARE PLAN
Problem: Bathing  Goal: STG-Within one week, patient will bathe  Description  1) Individualized Goal:  With supervision, min verbal cues for safety strategies  2) Interventions:  OT Group Therapy, OT Self Care/ADL, OT Cognitive Skill Dev, OT Community Reintegration, OT Manual Ther Technique, OT Neuro Re-Ed/Balance, OT Therapeutic Activity, OT Evaluation and OT Therapeutic Exercise     Outcome: NOT MET  Note:     Min assist   Evaluated day prior to this documentation    Limited by decreased cognition  back  precaution adherence   decreased safety      Problem: Dressing  Goal: STG-Within one week, patient will dress LB  Description  1) Individualized Goal:  With supervision  2) Interventions:  OT Group Therapy, OT Self Care/ADL, OT Cognitive Skill Dev, OT Community Reintegration, OT Manual Ther Technique, OT Neuro Re-Ed/Balance, OT Therapeutic Activity, OT Evaluation and OT Therapeutic Exercise     Outcome: NOT MET  Note:    Min to SBA  Evaluated day prior to this documentation    Limited by decreased cognition  back  precaution adherence   decreased safety      Problem: Toileting  Goal: STG-Within one week, patient will complete toileting tasks  Description  1) Individualized Goal:  With supervision  2) Interventions:  OT Group Therapy, OT Self Care/ADL, OT Cognitive Skill Dev, OT Community Reintegration, OT Manual Ther Technique, OT Neuro Re-Ed/Balance, OT Therapeutic Activity, OT Evaluation and OT Therapeutic Exercise     Outcome: NOT MET  Note:    Min to SBA  Evaluated day prior to this documentation    Limited by decreased cognition  back  precaution adherence   decreased safety      Problem: Functional Transfers  Goal: STG-Within one week, patient will  Description  1) Individualized Goal:  Ambulate to/from bathroom and complete toilet and shower transfers with SBA using least restrictive adaptive device  2) Interventions:  OT Group Therapy, OT Self Care/ADL, OT Cognitive Skill Dev, OT Community Reintegration,  OT Manual Ther Technique, OT Neuro Re-Ed/Balance, OT Therapeutic Activity, OT Evaluation and OT Therapeutic Exercise     Outcome: NOT MET  Note:    CGA to SBA   Evaluated day prior to this documentation    Limited by decreased cognition  back  precaution adherence   decreased safety

## 2019-10-02 NOTE — THERAPY
Physical Therapy   Daily Treatment     Patient Name: Ramya Bhakta  Age:  86 y.o., Sex:  female  Medical Record #: [id number]  Today's Date: 10/2/2019     Precautions  Precautions: Spinal / Back Precautions , Fall Risk(note  Back precautions order not in chart )  Comments: dementia     Subjective    Pt reports she is ready to work!     Objective       10/02/19 0931   Lower Extremity Outcome Measures   Lower Extremity Outcome Measures Utilized [place] Balance Scale;6 Minute Walk Test   6 Minute Walk Test   Distance (feet) 864   Gait Speed (meters per second) 0.73   Number of Rests 0   Level of Assistance 5   [place] Balance Scale   Sitting Unsupported (Score 0-4) 4   Change Of Positon: Sitting To Standing (Score 0-4) 4   Change Of Positon: Standing To Sitting (Score 0-4) 4   Transfers (Score 0-4) 4   Standing Unsupported (Score 0-4) 4   Standing With Eyes Closed (Score 0-4) 3   Standing With Feet Together (Score 0-4) 4   Tandem Standing (Score 0-4) 3   Standing On One Leg (Score 0-4) 2   Turning Trunk (Feet Fixed) (Score 0-4) 1   Retrieving Objects From Floor (Score 0-4) 3   Turning 360 Degrees (Score 0-4) 1   Stool Stepping (Score 0-4) 2   Reaching Forward While Standing (Score 0-4) 4   [place] Balance Total Score (0-56) 43   Interdisciplinary Plan of Care Collaboration   Patient Position at End of Therapy Seated;Chair Alarm On;Self Releasing Lap Belt Applied;Call Light within Reach;Tray Table within Reach   PT Total Time Spent   PT Individual Total Time Spent (Mins) 60   PT Charge Group   PT Gait Training 1   PT Therapeutic Exercise 1   PT Neuromuscular Re-Education / Balance 1   PT Therapeutic Activities 1       FIM Walking Score:  5 - Standby Prompting/Supervision or Set-up  Walking Description:  Supervision for safety, Verbal cueing, Safety concerns, Extra time(300 ft x 2 to and from room, plus 864 ft with 6MWT, no AD hroughout)    FIM Wheelchair Score:  5 - Standby Prompting/Supervision or Set-up  Wheelchair  Description:  Extra time, Supervision for safety, Verbal cueing, Safety concerns      Assessment    Pt demos high fall risk via [place] and gait speed, may benefit from AD at DC. But does demo SPV level walking with no AD this session with occasional LOB, pt able to self correct no min A needed.    Plan    Continue gait training and determine if AD needed, higher level balance challenges,

## 2019-10-02 NOTE — THERAPY
Speech Language Pathology  Daily Treatment     Patient Name: Ramya Bhakta  Age:  86 y.o., Sex:  female  Medical Record #: [id number]  Today's Date: 10/2/2019     Subjective    Pt seen during functional meal setting, pleasant and cooperative.      Objective     10/02/19 1134   Interdisciplinary Plan of Care Collaboration   IDT Collaboration with  Therapy Tech   Patient Position at End of Therapy Seated   Collaboration Comments transfer of care to therapy tech   SLP Total Time Spent   SLP Individual Total Time Spent (Mins) 30   Charge Group   Charges Yes   SLP Swallowing Dysfunction Treatment Swallowing Dysfunction Treatment           Assessment    Pt tolerated Dys 3/thin liquids with no overt s/sx of pen/asp. Clear vocal quality, no oral residue. Pt benefits from simple instructions and low stim environment during meals.     Plan    Trial regular textures, advance if appropriate.

## 2019-10-02 NOTE — THERAPY
"Occupational Therapy  Daily Treatment     Patient Name: Ramya Bhakta  Age:  86 y.o., Sex:  female  Medical Record #: [id number]  Today's Date: 10/2/2019     Precautions  Precautions: (P) Spinal / Back Precautions , Fall Risk  Comments: (P) dementia     Safety   ADL Safety : Requires Supervision for Safety  Bathroom Safety: Requires Supervision for Safety    Subjective    \" I don't even  Know where I am .\"     Objective       10/02/19 1401   Precautions   Precautions Spinal / Back Precautions ;Fall Risk   Comments dementia    Sitting Upper Body Exercises   Upper Extremity Bike Level 2 Resistance  (x 5 minutes x  2 motmoed )   Interdisciplinary Plan of Care Collaboration   IDT Collaboration with  Family / Caregiver   Patient Position at End of Therapy Seated  (hand off to  OT for continued tx. )   Collaboration Comments Niece present observing tx  providing enocuragement as needed.    OT Total Time Spent   OT Individual Total Time Spent (Mins) 30   OT Charge Group   OT Therapy Activity 1   OT Therapeutic Exercise  1     Oriented patient to  Current location and street where this hospital is located.    Issued reacher  Patient reported she was familiar with the equipment.  She practiced picking up a few items from the floor while seated in chair.            Educated regarding use of Ice pack to assist with back pain mgmt.  Patient reluctant as she does not like being cold  After education she was willing to trial use of Ice pack.          Assessment    Presents with 1 to 2 + edema in both LEs.     Niece asking if therapy can address as it was home health OTs plan to address edema  Prior to patient hospitalization.   Plan     ADL  Related mobility  Strength/endurance building   Assess  for  And address edema management     "

## 2019-10-02 NOTE — CARE PLAN
Problem: Safety  Goal: Will remain free from falls  Intervention: Implement fall precautions  Note:   Use of call light encouraged to make needs known.Bed in low position, non skid socks/shoes on when out of bed.Sitter at bedside.Pt's room close to the nursing station.Call light within reach.Will continue to monitor and assess needs and safety.     Problem: Bowel/Gastric:  Goal: Normal bowel function is maintained or improved  Intervention: Educate patient and significant other/support system about signs and symptoms of constipation and interventions to implement  Note:   Pt is continent of bowel.Scheduled senna given at hs.LBM 10/01.Will continue to monitor and assess for s/sx of constipation.

## 2019-10-02 NOTE — THERAPY
"Occupational Therapy  Daily Treatment     Patient Name: Ramya Bhakta  Age:  86 y.o., Sex:  female  Medical Record #: [id number]  Today's Date: 10/2/2019     Precautions  Precautions: (P) Spinal / Back Precautions , Fall Risk(note  Back precautions order not in chart )  Comments: (P) dementia     Safety   ADL Safety : (P) Requires Supervision for Safety  Bathroom Safety: (P) Requires Supervision for Safety    Subjective    \"  My legs are wobbly.\"  Stated while up walking in room with no device.  Noted improvement when redirected to use FWW      Objective       10/02/19 0701   Precautions   Precautions Spinal / Back Precautions ;Fall Risk  (note  Back precautions order not in chart )   Comments dementia    Safety    ADL Safety  Requires Supervision for Safety   Bathroom Safety Requires Supervision for Safety   Interdisciplinary Plan of Care Collaboration   Patient Position at End of Therapy Seated  (in dining room for breakfast )   OT Total Time Spent   OT Individual Total Time Spent (Mins) 45   OT Charge Group   OT Self Care / ADL 3       FIM Grooming Score:  5 - Standby Prompting/Supervision or Set-up  Grooming Description:  ( supervision standing at sin k )    FIM Upper Body Dressin - Standby Prompting/Supervision or Set-up  Upper Body Dressing Description:       FIM Lower Body Dressing Score:  5 - Standby Prompting/Supervsion or Set-up  Lower Body Dressing Description:       FIM Toiletin - Standby Prompting/Supervision or Set-up  Toileting Description:       FIM Toilet Transfer Score:  5 - Standby Prompting/Supervision or Set-up  Toilet Transfer Description:         Assessment      Completing ADL routine at A  . She verbalized \" I'm not supposed to bend over \"  Just prior to bending over and picking up shoes from floor.     donned LB clothing  Figure 4 sitting.       Plan    ADL ,  related mobility  Strength/endurance and balance activity  Incorporating Back precautions to  Promote healing and reduce " pain.   Introduce  use of reacher  For item retrieval from floor.

## 2019-10-02 NOTE — THERAPY
"Occupational Therapy  Daily Treatment     Patient Name: Ramya Bhakta  Age:  86 y.o., Sex:  female  Medical Record #: [id number]  Today's Date: 10/2/2019     Precautions  Precautions: (P) Spinal / Back Precautions , Fall Risk  Comments: (P) dementia    Safety   ADL Safety : Requires Supervision for Safety  Bathroom Safety: Requires Supervision for Safety    Subjective    \"I don't know where I slept last night\"     Objective       10/02/19 1431   Precautions   Precautions Spinal / Back Precautions ;Fall Risk   Comments dementia   Sitting Upper Body Exercises   Internal Shoulder Rotation 2 sets of 10;Right ;Left;Weight (See Comments for lbs)   External Shoulder Rotation 2 sets of 10;Right ;Left;Weight (See Comments for lbs)   Bicep Curls 2 sets of 10;Right ;Left;Weight (See Comments for lbs)   Pronation / Supination 2 sets of 10;Right ;Left;Weight (See Comments for lbs)   Other Exercise 2x10 fwd/bwd arm circles with 2# weight.   Comments 2# dumbbell used for BUE therex.   Interdisciplinary Plan of Care Collaboration   IDT Collaboration with  Family / Caregiver   Patient Position at End of Therapy Seated;Family / Friend in Room   Collaboration Comments niece present for OT session   OT Total Time Spent   OT Individual Total Time Spent (Mins) 30   OT Charge Group   OT Therapeutic Exercise  2       Assessment    Pt completed BUE therex with no c/o pain and maintained spinal prec throughout. Pt con't to be disoriented and requires cues for current location, purpose of therapy, and safety.     Plan    ADL  Related mobility  Strength/endurance building   Assess  for  And address edema management     "

## 2019-10-02 NOTE — CARE PLAN
Problem: Communication  Goal: The ability to communicate needs accurately and effectively will improve  Outcome: PROGRESSING AS EXPECTED  Note:   Pt has baseline dementia, AO x1. Frequently demonstrates confusion and forgetfulness but is able to express needs; niece Margaret often present and encourages pt to use call light. Will continue to monitor.     Problem: Skin Integrity  Goal: Risk for impaired skin integrity will decrease  Outcome: PROGRESSING AS EXPECTED  Note:   Pt's skin is clean, dry, and fragile. Lower back incision with staples covered by island dressing healing without s/s of infection; sacral excoriation present. Will continue to monitor.

## 2019-10-02 NOTE — REHAB-OT IDT TEAM NOTE
Occupational Therapy   Activities of Daily Living  Eating Initial:  5 - Standby Prompting/Supervision or Set-up  Eating Current:  5 - Standby Prompting/Supervision or Set-up   Eating Description:  Increased time, Modified diet, Supervision for safety, Verbal cueing  Grooming Initial:  5 - Standby Prompting/Supervision or Set-up  Grooming Current:  5 - Standby Prompting/Supervision or Set-up   Grooming Description:  ( supervision standing at sin k )  Bathing Initial:  4 - Minimal Assistance  Bathing Current:  4 - Minimal Assistance   Bathing Description:  Grab bar, Tub bench, Hand held shower, Increased time, Supervision for safety, Verbal cueing(CGA for standing components, pt completed primarily in standing, did not adhere to safety recs for GB use, verbal cues to recall which body parts she had already washed)  Upper Body Dressing Initial:  5 - Standby Prompting/Supervision or Set-up  Upper Body Dressing Current:  5 - Standby Prompting/Supervision or Set-up   Upper Body Dressing Description:  Set-up of equipment  Lower Body Dressing Initial:  4 - Minimal Assistance  Lower Body Dressing Current:  5 - Standby Prompting/Supervsion or Set-up   Lower Body Dressing Description:  5 - Standby Prompting/Supervsion or Set-up  Toileting Initial:  4 - Minimal Assistance  Toileting Current:  5 - Standby Prompting/Supervision or Set-up   Toileting Description:  Verbal cueing, Supervision for safety, Increased time, Grab bar(CGA standing components)  Toilet Transfer Initial:  4 - Minimal Assistance  Toilet Transfer Current:  5 - Standby Prompting/Supervision or Set-up   Toilet Transfer Description:  5 - Standby Prompting/Supervision or Set-up  Tub / Shower Transfer Initial:  4 - Minimal Assistance  Tub / Shower Transfer Current:  4 - Minimal Assistance   Tub / Shower Transfer Description:  Grab bar(ambulated from toilet to shower no AD)  IADL:  Not yet addressed   Family Training/Education:  Not yet addressed   DME/DC  Recommendations:  Shower seat   And grab bar     Strengths:  Able to follow instructions, Effective communication skills, Motivated for self care and independence, Pleasant and cooperative, Supportive family and Willingly participates in therapeutic activities  Barriers:  Decreased endurance, Generalized weakness and Other: decreased cognition limits application of back precautions and  AE use      # of short term goals set= 4    # of short term goals met= 0    Occupational Therapy Goals     Problem: Bathing     Dates: Start: 10/01/19       Description:     Goal: STG-Within one week, patient will bathe     Dates: Start: 10/01/19       Description: 1) Individualized Goal:  With supervision, min verbal cues for safety strategies  2) Interventions:  OT Group Therapy, OT Self Care/ADL, OT Cognitive Skill Dev, OT Community Reintegration, OT Manual Ther Technique, OT Neuro Re-Ed/Balance, OT Therapeutic Activity, OT Evaluation and OT Therapeutic Exercise       Note:     Goal Note filed on 10/02/19 1147 by Amarjit Khoruy, C.O.T.A.      Min assist   Evaluated day prior to this documentation    Limited by decreased cognition  back  precaution adherence   decreased safety                         Problem: Dressing     Dates: Start: 10/01/19       Description:     Goal: STG-Within one week, patient will dress LB     Dates: Start: 10/01/19       Description: 1) Individualized Goal:  With supervision  2) Interventions:  OT Group Therapy, OT Self Care/ADL, OT Cognitive Skill Dev, OT Community Reintegration, OT Manual Ther Technique, OT Neuro Re-Ed/Balance, OT Therapeutic Activity, OT Evaluation and OT Therapeutic Exercise       Note:     Goal Note filed on 10/02/19 1147 by Amarjit Khoury, C.O.T.A.     Min to SBA  Evaluated day prior to this documentation    Limited by decreased cognition  back  precaution adherence   decreased safety                         Problem: Functional Transfers     Dates: Start: 10/01/19       Description:      Goal: STG-Within one week, patient will     Dates: Start: 10/01/19       Description: 1) Individualized Goal:  Ambulate to/from bathroom and complete toilet and shower transfers with SBA using least restrictive adaptive device  2) Interventions:  OT Group Therapy, OT Self Care/ADL, OT Cognitive Skill Dev, OT Community Reintegration, OT Manual Ther Technique, OT Neuro Re-Ed/Balance, OT Therapeutic Activity, OT Evaluation and OT Therapeutic Exercise       Note:     Goal Note filed on 10/02/19 1147 by AWILDA [email or web address]     CGA to SBA   Evaluated day prior to this documentation    Limited by decreased cognition  back  precaution adherence   decreased safety                         Problem: OT Long Term Goals     Dates: Start: 10/01/19       Description:     Goal: LTG-By discharge, patient will complete basic self care tasks     Dates: Start: 10/01/19       Description: 1) Individualized Goal:  With supervision to modified independence  2) Interventions:  OT Group Therapy, OT Self Care/ADL, OT Cognitive Skill Dev, OT Community Reintegration, OT Manual Ther Technique, OT Neuro Re-Ed/Balance, OT Therapeutic Activity, OT Evaluation and OT Therapeutic Exercise             Goal: LTG-By discharge, patient will perform bathroom transfers     Dates: Start: 10/01/19       Description: 1) Individualized Goal:  With supervision to modified independence  2) Interventions:  OT Group Therapy, OT Self Care/ADL, OT Cognitive Skill Dev, OT Community Reintegration, OT Manual Ther Technique, OT Neuro Re-Ed/Balance, OT Therapeutic Activity, OT Evaluation and OT Therapeutic Exercise                 Problem: Toileting     Dates: Start: 10/01/19       Description:     Goal: STG-Within one week, patient will complete toileting tasks     Dates: Start: 10/01/19       Description: 1) Individualized Goal:  With supervision  2) Interventions:  OT Group Therapy, OT Self Care/ADL, OT Cognitive Skill Dev, OT Community Reintegration, OT  Manual Ther Technique, OT Neuro Re-Ed/Balance, OT Therapeutic Activity, OT Evaluation and OT Therapeutic Exercise       Note:     Goal Note filed on 10/02/19 1147 by BINTA Coker     Min to SBA  Evaluated day prior to this documentation    Limited by decreased cognition  back  precaution adherence   decreased safety                               Section completed by:  BINTA Coker/Chester Valiente M.S., OT/L

## 2019-10-02 NOTE — REHAB-CM IDT TEAM NOTE
Case Management    DC Planning  DC destination/dispostion:  Patient lives at [facility] non assisted.  Her nieces are her DPOA's.    DC Needs: Patient has medication management at [facility] only.  She does receive bathing assistance from home health.  She uses a fww an has accessibility at her apartment.  She will need follow up with neurosurgery and PCP.    Barriers to discharge:   Memory issues.    Strengths: very supportive nieces. Good support from her facility living.    Section completed by:  Iris Cheng R.N.

## 2019-10-03 ENCOUNTER — APPOINTMENT (OUTPATIENT)
Dept: PAIN MANAGEMENT | Facility: REHABILITATION | Age: 84
DRG: 093 | End: 2019-10-03
Attending: PHYSICAL MEDICINE & REHABILITATION
Payer: MEDICARE

## 2019-10-03 ENCOUNTER — APPOINTMENT (OUTPATIENT)
Dept: RADIOLOGY | Facility: REHABILITATION | Age: 84
DRG: 093 | End: 2019-10-03
Attending: PHYSICAL MEDICINE & REHABILITATION
Payer: MEDICARE

## 2019-10-03 PROCEDURE — 92611 MOTION FLUOROSCOPY/SWALLOW: CPT

## 2019-10-03 PROCEDURE — 92526 ORAL FUNCTION THERAPY: CPT

## 2019-10-03 PROCEDURE — 74230 X-RAY XM SWLNG FUNCJ C+: CPT

## 2019-10-03 PROCEDURE — 97110 THERAPEUTIC EXERCISES: CPT

## 2019-10-03 PROCEDURE — 97530 THERAPEUTIC ACTIVITIES: CPT

## 2019-10-03 PROCEDURE — 99233 SBSQ HOSP IP/OBS HIGH 50: CPT | Performed by: PHYSICAL MEDICINE & REHABILITATION

## 2019-10-03 PROCEDURE — 700102 HCHG RX REV CODE 250 W/ 637 OVERRIDE(OP): Performed by: PHYSICAL MEDICINE & REHABILITATION

## 2019-10-03 PROCEDURE — A9270 NON-COVERED ITEM OR SERVICE: HCPCS | Performed by: PHYSICAL MEDICINE & REHABILITATION

## 2019-10-03 PROCEDURE — 770010 HCHG ROOM/CARE - REHAB SEMI PRIVAT*

## 2019-10-03 PROCEDURE — 97116 GAIT TRAINING THERAPY: CPT

## 2019-10-03 RX ORDER — DOXAZOSIN 2 MG/1
4 TABLET ORAL
Status: DISCONTINUED | OUTPATIENT
Start: 2019-10-04 | End: 2019-10-04

## 2019-10-03 RX ADMIN — CARBOXYMETHYLCELLULOSE SODIUM 1 DROP: 10 GEL OPHTHALMIC at 19:50

## 2019-10-03 RX ADMIN — SENNOSIDES, DOCUSATE SODIUM 2 TABLET: 50; 8.6 TABLET, FILM COATED ORAL at 19:52

## 2019-10-03 RX ADMIN — VITAMIN D, TAB 1000IU (100/BT) 5000 UNITS: 25 TAB at 08:17

## 2019-10-03 RX ADMIN — DOXAZOSIN 2 MG: 2 TABLET ORAL at 08:17

## 2019-10-03 RX ADMIN — GABAPENTIN 100 MG: 100 CAPSULE ORAL at 08:17

## 2019-10-03 RX ADMIN — GABAPENTIN 100 MG: 100 CAPSULE ORAL at 19:49

## 2019-10-03 RX ADMIN — GABAPENTIN 100 MG: 100 CAPSULE ORAL at 14:35

## 2019-10-03 RX ADMIN — MINERAL OIL, PETROLATUM 1 APPLICATION: 425; 568 OINTMENT OPHTHALMIC at 21:00

## 2019-10-03 RX ADMIN — Medication 1 CAPSULE: at 08:17

## 2019-10-03 RX ADMIN — SENNOSIDES, DOCUSATE SODIUM 2 TABLET: 50; 8.6 TABLET, FILM COATED ORAL at 08:17

## 2019-10-03 RX ADMIN — CARBOXYMETHYLCELLULOSE SODIUM 1 DROP: 10 GEL OPHTHALMIC at 14:35

## 2019-10-03 RX ADMIN — CARBOXYMETHYLCELLULOSE SODIUM 1 DROP: 10 GEL OPHTHALMIC at 09:00

## 2019-10-03 NOTE — CARE PLAN
Problem: Speech/Swallowing LTGs  Goal: LTG-By discharge, patient will safely swallow  Description  1) Individualized goal:  The least restrictive diet for safe oral intake of daily meals   2) Interventions:  SLP Swallowing Dysfunction Treatment, SLP Oral Pharyngeal Evaluation, SLP Video Swallow Evaluation and SLP Group Treatment     Outcome: MET  Note:   Pt is currently tolerating regular textures and thin liquids      Problem: Swallowing STGs  Goal: STG-Within one week, patient will safely swallow  Description  1) Individualized goal:  Mechanical soft textures and thin liquids without any overt s/sx of aspiration/penetration.    2) Interventions:  SLP Swallowing Dysfunction Treatment, SLP Oral Pharyngeal Evaluation, SLP Video Swallow Evaluation and SLP Group Treatment     Outcome: MET  Note:   Pt is currently tolerating regular textures and thin liquids  Goal: STG-Within one week, patient will  Description  1) Individualized goal:  Participate in a MBSS with goals added as appropriate   2) Interventions:  SLP Swallowing Dysfunction Treatment, SLP Oral Pharyngeal Evaluation and SLP Video Swallow Evaluation     Outcome: MET  Note:   MBSS completed

## 2019-10-03 NOTE — CARE PLAN
Problem: Communication  Goal: The ability to communicate needs accurately and effectively will improve  Outcome: PROGRESSING AS EXPECTED  Note:   Pt is able to communicate her needs effectively to staff.      Problem: Safety  Goal: Will remain free from injury  Outcome: PROGRESSING SLOWER THAN EXPECTED  Note:   Pt is impulsive and a high fall risk. Pt in room close to nursing station with alarms in place for safety. Use of call light reinforced with each encounter.     Problem: Pain Management  Goal: Pain level will decrease to patient's comfort goal  Outcome: PROGRESSING AS EXPECTED  Note:   No reports of pain at this time. Will continue to monitor through shift with hourly rounds.

## 2019-10-03 NOTE — DISCHARGE PLANNING
Telephone call to patient's niece/Lisa WALLIS following Team Conference today to relate progress and targeted discharge date of 10/8/19. Patient will be returning to [facility], a senior living facility and niece will provide transportation. Lisa chose [facility], a prior provider for recommended home health RN/PT/OT/HHA. [facility] is [facility] preferred provider for DME - [facility].

## 2019-10-03 NOTE — REHAB-COLLABORATIVE ONGOING IDT TEAM NOTE
Weekly Interdisciplinary Team Conference Note    Weekly Interdisciplinary Team Conference # 1  Date:  10/3/2019    Clinicians present and reporting at team conference include the following:   MD: BRENDAN Doran MD    RN:  Kerrie Winston, CAM    PT:   Adia Stanley PT, BSPT  OT:  Chester Valiente MS, OT/L   ST:  Bravo Yeboah MS, CCC-SLP  CM:  Asiya Gutierrez MSW  REC:  None  RT:  None  RPh:  Nadine Hall, RPh  Other:   None  All reporting clinicians have a working knowledge of this patient's plan of care.    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 **        Nursing  Diet/Nutrition:  Regular, Dysphagia III-Mechanical Soft and Thin Liquids  Medication Administration:  Whole with Liquid Wash  % consumed at meals in last 24 hours:  Consumed 25-75% of meals per documentation.  Meal/Snack Consumption for the past 24 hrs:   Oral Nutrition   10/02/19 1800 Dinner;Between 25-50% Consumed   10/02/19 1200 Lunch;Between 25-50% Consumed   10/02/19 0900 Breakfast;Between 50-75% Consumed     Snack schedule:  None  Appetite:  Good  Admit Weight:  Weight: 63 kg (138 lb 12.8 oz)  Weight Last 7 Days   [63 kg (138 lb 12.8 oz)] 63 kg (138 lb 12.8 oz) (09/30 1145)    Pain Issues:    Location:  --  --          Severity:  Denies   Scheduled pain medications:  gabapentin (NEURONTIN)      PRN pain medications used in last 24 hours:  None   Non Pharmacologic Interventions:  distraction, emotional support, relaxation, repositioned and rest  Effectiveness of pain management plan:  good=patient states acceptable comfort after interventions    Bowel:    Bowel Assist Initial Score:  5 - Standby Prompting/Supervision or Set-up  Bowel Assist Current Score:  5 - Standby Prompting/Supervision or Set-up  Bowl Accidents in last 7 days:  0  Last bowel movement: 10/02/19(per report)  Stool Description: Large, Soft     Usual bowel pattern:  daily  Scheduled bowel medications:  senna-docusate (PERICOLACE or SENOKOT S)   PRN bowel medications used in last 24 hours:  None  Nursing Interventions:  Increased time, Scheduled medication, Supervision, Verbal cueing, Set-up(NOT WITNESSED - entry based off chart review and report)  Effectiveness of bowel program:   good=regular, predictable, controlled emptying of bowel     Bladder:    Bladder Assist Initial Score:  5 - Standby Prompting/Supervision or Set-up  Bladder Assist Current Score:  5 - Standby Prompting/Supervision or Set-up  Bladder Accidents in last 7 days:  0  PVR range for past 24-48 hours:  []  ()  Intermittent Catheterization:  no  Medications affecting bladder:  None    Time void schedule/voiding pattern:  Voiding every 2-4 hours  Interventions:  Increased time, Supervision, Verbal cueing, Time void patient initiated(set-up)  Effectiveness of bladder training:  Fair=sometimes needs prompting to double void to complete bladder emptying    Wound:       Patient Lines/Drains/Airways Status    Active Current Wounds     Name: Placement date: Placement time: Site: Days:    Wound 10/01/19 Pressure Injury Buttocks;Coccyx scab left buttock, abrasion right buttock  10/01/19   0540   --  1            Midline Back Incision - with staples and dressed with island dressing          Interventions:  Monitoring for s/s of infection. Barrier cream PRN to buttocks.   Effectiveness of intervention:  wound is unchanged     Sleep/wake cycle:   Average hours slept:  Sleeps 3-4 hours without waking  Sleep medication usage:  None    Patient/Family Training/Education:  Fall Prevention, General Self Care, Medication Management, Safe Transfers, Safety, Skin Care and Wound Care  Discharge Supply Recommendations:  Blood Pressure Monitor  Strengths: Willingly participates in therapeutic activities and Able to follow instructions   Barriers:   Confused, Fatigue, Generalized weakness, Impulsive and Limited mobility       Nursing Problems     Problem: Bowel/Gastric:     Description:     Goal: Normal bowel function is maintained or improved     Description:           Goal: Will not experience complications related to bowel motility     Description:                 Problem: Communication     Description:     Goal: The ability to communicate needs accurately and effectively will improve     Description:                 Problem: Discharge Barriers/Planning     Description:     Goal: Patient's continuum of care needs will be met     Description:                 Problem: Infection     Description:     Goal: Will remain free from infection     Description:                 Problem: Knowledge Deficit     Description:     Goal: Knowledge of disease process/condition, treatment plan, diagnostic tests, and medications will improve     Description:           Goal: Knowledge of the prescribed therapeutic regimen will improve     Description:                 Problem: Pain Management     Description:     Goal: Pain level will decrease to patient's comfort goal     Description:                 Problem: Respiratory:     Description:     Goal: Respiratory status will improve     Description:                 Problem: Safety     Description:     Goal: Will remain free from injury     Description:           Goal: Will remain free  from falls     Description:                 Problem: Skin Integrity     Description:     Goal: Risk for impaired skin integrity will decrease     Description:                 Problem: Urinary Elimination:     Description:     Goal: Ability to reestablish a normal urinary elimination pattern will improve     Description:                 Problem: Venous Thromboembolism (VTW)/Deep Vein Thrombosis (DVT) Prevention:     Description:     Goal: Patient will participate in Venous Thrombosis (VTE)/Deep Vein Thrombosis (DVT)Prevention Measures     Description:                        Long Term Goals:   At discharge patient will be able to function safely at home and in the community with support.    Section completed by:  Garrett Blanco R.N.           Mobility  Bed mobility:   SPV  Bed /Chair/Wheelchair Transfer Initial:  4 - Minimal Assistance  Bed /Chair/Wheelchair Transfer Current:  5 - Standby Prompting/Supervision or Set-up   Bed/Chair/Wheelchair Transfer Description:  Verbal cueing, Supervision for safety  Walk Initial:  4 - Minimal Assistance  Walk Current:  5 - Standby Prompting/Supervision or Set-up   Walk Description:  Walker, Verbal cueing, Supervision for safety  Wheelchair Initial:  5 - Standby Prompting/Supervision or Set-up  Wheelchair Current:  5 - Standby Prompting/Supervision or Set-up   Wheelchair Description:  Extra time, Supervision for safety, Verbal cueing, Safety concerns  Stairs Initial:  4 - Minimal Assistance  Stairs Current: 4 - Minimal Assistance   Stairs Description: (CGA/ steadying assist with B hand rails 4 steps x 3 trials)  Patient/Family Training/Education:  On-going gait/ endurance/ safety education  DME/DC Recommendations:  FWW, 24 Hr SPV, home health PT  Strengths:  Able to follow instructions, Independent PLOF, Making steady progress towards goals, Manages pain appropriately, Motivated for self care and independence, Pleasant and cooperative, Supportive family and Willingly  "participates in therapeutic activities  Barriers:   Dementia, Impulsive, Poor carryover of learning and Other: impaired back precautions  # of short term goals set= 4  # of short term goals met=3  Physical Therapy Problems     Problem: Mobility     Dates: Start: 10/01/19       Description:     Goal: STG-Within one week, patient will ambulate up/down a curb     Dates: Start: 10/01/19       Description: 1) Individualized goal:  CGA with FWW for 6\" rise  2) Interventions:  PT Group Therapy, PT Gait Training, PT Self Care/Home Eval, PT Therapeutic Exercises, PT TENS Application, PT Neuro Re-Ed/Balance, PT Therapeutic Activity, PT Manual Therapy and PT Evaluation                   Problem: PT-Long Term Goals     Dates: Start: 10/01/19       Description:     Goal: LTG-By discharge, patient will tolerate standing     Dates: Start: 10/01/19       Description: 1) Individualized goal:  With UE support at // bars to complete standing exercise program 3 x 10  2) Interventions:  PT Group Therapy, PT Gait Training, PT Self Care/Home Eval, PT Therapeutic Exercises, PT TENS Application, PT Neuro Re-Ed/Balance, PT Therapeutic Activity, PT Manual Therapy and PT Evaluation             Goal: LTG-By discharge, patient will ambulate     Dates: Start: 10/01/19       Description: 1) Individualized goal:  SPV for pathfinding with 4WW 200 ft x 2 from room<>meals  2) Interventions:  PT Group Therapy, PT Gait Training, PT Self Care/Home Eval, PT Therapeutic Exercises, PT TENS Application, PT Neuro Re-Ed/Balance, PT Therapeutic Activity, PT Manual Therapy and PT Evaluation             Goal: LTG-By discharge, patient will transfer one surface to another     Dates: Start: 10/01/19       Description: 1) Individualized goal:  Modified independent with 4WW as needed  2) Interventions:  PT Group Therapy, PT Gait Training, PT Self Care/Home Eval, PT Therapeutic Exercises, PT TENS Application, PT Neuro Re-Ed/Balance, PT Therapeutic Activity, PT Manual " Therapy and PT Evaluation             Goal: LTG-By discharge, patient will ambulate up/down flight of stairs     Dates: Start: 10/01/19       Description: 1) Individualized goal:  SPV for safety with hand rail  2) Interventions:  PT Group Therapy, PT Gait Training, PT Self Care/Home Eval, PT Therapeutic Exercises, PT TENS Application, PT Neuro Re-Ed/Balance, PT Therapeutic Activity, PT Manual Therapy and PT Evaluation             Goal: LTG-By discharge, patient will transfer in/out of a car     Dates: Start: 10/01/19       Description: 1) Individualized goal:  SPV from ambulatory level with 4WW  2) Interventions:  PT Group Therapy, PT Gait Training, PT Self Care/Home Eval, PT Therapeutic Exercises, PT TENS Application, PT Neuro Re-Ed/Balance, PT Therapeutic Activity, PT Manual Therapy and PT Evaluation                           Section completed by:  CHIP GillespieT, ATP    Activities of Daily Living  Eating Initial:  5 - Standby Prompting/Supervision or Set-up  Eating Current:  5 - Standby Prompting/Supervision or Set-up   Eating Description:  Increased time, Modified diet, Supervision for safety, Verbal cueing  Grooming Initial:  5 - Standby Prompting/Supervision or Set-up  Grooming Current:  5 - Standby Prompting/Supervision or Set-up   Grooming Description:  ( supervision standing at sin k )  Bathing Initial:  4 - Minimal Assistance  Bathing Current:  4 - Minimal Assistance   Bathing Description:  Grab bar, Tub bench, Hand held shower, Increased time, Supervision for safety, Verbal cueing(CGA for standing components, pt completed primarily in standing, did not adhere to safety recs for GB use, verbal cues to recall which body parts she had already washed)  Upper Body Dressing Initial:  5 - Standby Prompting/Supervision or Set-up  Upper Body Dressing Current:  5 - Standby Prompting/Supervision or Set-up   Upper Body Dressing Description:  Set-up of equipment  Lower Body Dressing Initial:  4 - Minimal  Assistance  Lower Body Dressing Current:  5 - Standby Prompting/Supervsion or Set-up   Lower Body Dressing Description:  5 - Standby Prompting/Supervsion or Set-up  Toileting Initial:  4 - Minimal Assistance  Toileting Current:  5 - Standby Prompting/Supervision or Set-up   Toileting Description:  Verbal cueing, Supervision for safety, Increased time, Grab bar(CGA standing components)  Toilet Transfer Initial:  4 - Minimal Assistance  Toilet Transfer Current:  5 - Standby Prompting/Supervision or Set-up   Toilet Transfer Description:  5 - Standby Prompting/Supervision or Set-up  Tub / Shower Transfer Initial:  4 - Minimal Assistance  Tub / Shower Transfer Current:  4 - Minimal Assistance   Tub / Shower Transfer Description:  Grab bar(ambulated from toilet to shower no AD)  IADL:  Not yet addressed   Family Training/Education:  Not yet addressed   DME/DC Recommendations:  Shower seat   And grab bar     Strengths:  Able to follow instructions, Effective communication skills, Motivated for self care and independence, Pleasant and cooperative, Supportive family and Willingly participates in therapeutic activities  Barriers:  Decreased endurance, Generalized weakness and Other: decreased cognition limits application of back precautions and  AE use      # of short term goals set= 4    # of short term goals met= 0    Occupational Therapy Goals     Problem: Bathing     Dates: Start: 10/01/19       Description:     Goal: STG-Within one week, patient will bathe     Dates: Start: 10/01/19       Description: 1) Individualized Goal:  With supervision, min verbal cues for safety strategies  2) Interventions:  OT Group Therapy, OT Self Care/ADL, OT Cognitive Skill Dev, OT Community Reintegration, OT Manual Ther Technique, OT Neuro Re-Ed/Balance, OT Therapeutic Activity, OT Evaluation and OT Therapeutic Exercise       Note:     Goal Note filed on 10/02/19 1147 by Amarjit Khoury, C.O.T.A.      Min assist   Evaluated day prior to  this documentation    Limited by decreased cognition  back  precaution adherence   decreased safety                         Problem: Dressing     Dates: Start: 10/01/19       Description:     Goal: STG-Within one week, patient will dress LB     Dates: Start: 10/01/19       Description: 1) Individualized Goal:  With supervision  2) Interventions:  OT Group Therapy, OT Self Care/ADL, OT Cognitive Skill Dev, OT Community Reintegration, OT Manual Ther Technique, OT Neuro Re-Ed/Balance, OT Therapeutic Activity, OT Evaluation and OT Therapeutic Exercise       Note:     Goal Note filed on 10/02/19 1147 by Amarjit Khoury C.O.T.A.     Min to SBA  Evaluated day prior to this documentation    Limited by decreased cognition  back  precaution adherence   decreased safety                         Problem: Functional Transfers     Dates: Start: 10/01/19       Description:     Goal: STG-Within one week, patient will     Dates: Start: 10/01/19       Description: 1) Individualized Goal:  Ambulate to/from bathroom and complete toilet and shower transfers with SBA using least restrictive adaptive device  2) Interventions:  OT Group Therapy, OT Self Care/ADL, OT Cognitive Skill Dev, OT Community Reintegration, OT Manual Ther Technique, OT Neuro Re-Ed/Balance, OT Therapeutic Activity, OT Evaluation and OT Therapeutic Exercise       Note:     Goal Note filed on 10/02/19 1147 by Amarjit Khoury, C.O.T.A.     CGA to SBA   Evaluated day prior to this documentation    Limited by decreased cognition  back  precaution adherence   decreased safety                         Problem: OT Long Term Goals     Dates: Start: 10/01/19       Description:     Goal: LTG-By discharge, patient will complete basic self care tasks     Dates: Start: 10/01/19       Description: 1) Individualized Goal:  With supervision to modified independence  2) Interventions:  OT Group Therapy, OT Self Care/ADL, OT Cognitive Skill Dev, OT Community Reintegration, OT Manual Ther  Technique, OT Neuro Re-Ed/Balance, OT Therapeutic Activity, OT Evaluation and OT Therapeutic Exercise             Goal: LTG-By discharge, patient will perform bathroom transfers     Dates: Start: 10/01/19       Description: 1) Individualized Goal:  With supervision to modified independence  2) Interventions:  OT Group Therapy, OT Self Care/ADL, OT Cognitive Skill Dev, OT Community Reintegration, OT Manual Ther Technique, OT Neuro Re-Ed/Balance, OT Therapeutic Activity, OT Evaluation and OT Therapeutic Exercise                 Problem: Toileting     Dates: Start: 10/01/19       Description:     Goal: STG-Within one week, patient will complete toileting tasks     Dates: Start: 10/01/19       Description: 1) Individualized Goal:  With supervision  2) Interventions:  OT Group Therapy, OT Self Care/ADL, OT Cognitive Skill Dev, OT Community Reintegration, OT Manual Ther Technique, OT Neuro Re-Ed/Balance, OT Therapeutic Activity, OT Evaluation and OT Therapeutic Exercise       Note:     Goal Note filed on 10/02/19 1147 by Amarjit Khoury C.O.T.A.     Min to SBA  Evaluated day prior to this documentation    Limited by decreased cognition  back  precaution adherence   decreased safety                               Section completed by:  Amarjit Khoury C.O.T.A./Chester Valiente M.S., OT/L    Cognitive Linquistic Functions  Comprehension Initial:  3 - Moderate Assistance  Comprehension Current:  4 - Minimal Assistance   Comprehension Description:  Glasses, Verbal cues  Expression Initial:  4 - Minimal Assistance  Expression Current:  5 - Stand-by Prompting/Supervision or Set-up   Expression Description:  Verbal cueing  Social Interaction Initial:  4 - Minimal Assistance  Social Interaction Current:  4 - Minimal Assistance   Social Interaction Description:  Increased time, Verbal cues  Problem Solving Initial:  4 - Minimal Assistance  Problem Solving Current:  4 - Minimal Assistance   Problem Solving Description:  Verbal  cueing, Therapy schedule, Increased time, Bed/chair alarm, Seat belt  Memory Initial:  2 - Max Assistance  Memory Current:  2 - Max Assistance   Memory Description:  Therapy schedule, Seat belt, Bed/chair alarm, Verbal cueing  Executive Functioning / Organization Initial:   NA  Executive Functioning / Organization Current:   NA   Executive Functioning Desciption:  NA  Swallowing  Swallowing Status:  Pt is currently tolerating regular textures and thin liquids.  MBSS completed on 10/3, no aspiration noted on MBSS.  Recommend d/c t-dine and ST at this time    Orders Placed This Encounter   Procedures   • Diet Order Regular (boost with each meal. No fish except tuna salad.)     Standing Status:   Standing     Number of Occurrences:   1     Order Specific Question:   Diet:     Answer:   Regular [1]     Comments:   boost with each meal. No fish except tuna salad.     Behavior Modification Plan  Keep instructions simple/concrete, Redirect to task/topic, Provide reasonable choices, Analyze tasks (break down into smaller steps) and Reinforce participation in desired tasks  Assistive Technology  Memory aides: and Low tech: Calendar, planner, schedule, alarms/timers, pill organizer, post-it notes, lists  Family Training/Education:  Education on MBSS results reviewed with pt's daughter   DC Recommendations:   DC ST at this time   Strengths:  Pleasant and cooperative, Supportive family and Willingly participates in therapeutic activities  Barriers:  Confused, Dementia and Poor carryover of learning  # of short term goals set=2  # of short term goals met=2  Speech Therapy Problems (Active)      There are no active problems.                 Section completed by:  Bravo Yeboah MS,CCC-SLP          REHAB-Pharmacy IDT Team Note by Jhon Bagley RPH at 10/2/2019  5:13 PM  Version 1 of 1    Author:  Jhon Bagley RPH Service:  -- Author Type:  Pharmacist    Filed:  10/2/2019  5:14 PM Date of Service:  10/2/2019  5:13 PM  Status:  Signed    :  Jhon Bagley RPH (Pharmacist)         Pharmacy   Pharmacy  Antibiotics/Antifungals/Antivirals:  Medication:      Active Orders (From admission, onward)    None        Route:         Non  Stop Date:  N/A  Reason:   Antihypertensives/Cardiac:  Medication:    Orders (72h ago, onward)     Start     Ordered    10/01/19 1200  doxazosin (CARDURA) tablet 2 mg  Q DAY      10/01/19 1131    09/30/19 1223  hydrALAZINE (APRESOLINE) tablet 25 mg  EVERY 8 HOURS PRN      09/30/19 1223              Patient Vitals for the past 24 hrs:   BP Pulse   10/02/19 1300 117/55 83   10/02/19 0600 105/52 78   10/01/19 1915 120/64 93     Anticoagulation:  Medication:  Not applicable  INR:      Other key medications:     A review of the medication list reveals no issues at this time. Patient is currently on antihypertensive(s). Recommend home blood pressure monitoring/recording if antihypertensive(s) regimen(s) continue.    Section completed by:  Jhon Bagley RPH[WR.1]        Attribution Mac     WR.1 - Jhon Bagley RPH on 10/2/2019  5:13 PM                  DC Planning  DC destination/dispostion:  Patient lives at [facility] non assisted.  Her nieces are her DPOA's.    DC Needs: Patient has medication management at [facility] only.  She does receive bathing assistance from home health.  She uses a fww an has accessibility at her apartment.  She will need follow up with neurosurgery and PCP.    Barriers to discharge:   Memory issues.    Strengths: very supportive nieces. Good support from her facility living.    Section completed by:  Iris Cheng R.N.      Physician Summary  BRENDAN Doran MD  participated and led team conference discussion.

## 2019-10-03 NOTE — THERAPY
Physical Therapy   Daily Treatment     Patient Name: Ramya Bhakta  Age:  86 y.o., Sex:  female  Medical Record #: [id number]  Today's Date: 10/3/2019     Precautions  Precautions: (P) Spinal / Back Precautions , Fall Risk  Comments: (P) dementia    Subjective    Pt visiting with niece, willing to participate.     Objective       10/03/19 1401   Precautions   Precautions Spinal / Back Precautions ;Fall Risk   Comments dementia   Sitting Lower Body Exercises   Sitting Lower Body Exercises Yes   Ankle Pumps 2 sets of 15   Hip Abduction Medium Resistance Theraband;2 sets of 15   Hip Adduction 2 sets of 15   Long Arc Quad 2 sets of 15   Marching 2 sets of 15   Other Exercises pt completed seated exercises with 3# ankle wts/ verbal and visual cues for posture/ core stabilization    PT Total Time Spent   PT Individual Total Time Spent (Mins) 30   PT Charge Group   PT Gait Training 1   PT Therapeutic Exercise 1       FIM Walking Score:  5 - Standby Prompting/Supervision or Set-up  Walking Description:  Walker, Safety concerns, Verbal cueing(SPV with  ft x 3)      Assessment    Pt completed gait/ stairs and strength training as noted without difficulty. Limited safety due to poor memory/ poor carryover with back precautions    Plan    Gait/ endurance/ strength/ safety and back care education

## 2019-10-03 NOTE — CARE PLAN
"  Problem: Mobility  Goal: STG-Within one week, patient will ambulate up/down a curb  Description  1) Individualized goal:  CGA with FWW for 6\" rise  2) Interventions:  PT Group Therapy, PT Gait Training, PT Self Care/Home Eval, PT Therapeutic Exercises, PT TENS Application, PT Neuro Re-Ed/Balance, PT Therapeutic Activity, PT Manual Therapy and PT Evaluation     Outcome: NOT MET     Problem: Balance  Goal: STG-Within one week, patient will  Description  1) Individualized goal: Maintain standing to complete standing exercises program 1 x 10 with UE support at // bars  2) Interventions:  PT Group Therapy, PT Gait Training, PT Self Care/Home Eval, PT Therapeutic Exercises, PT TENS Application, PT Neuro Re-Ed/Balance, PT Therapeutic Activity, PT Manual Therapy and PT Evaluation     Outcome: MET     Problem: Mobility  Goal: STG-Within one week, patient will ambulate community distances  Description  1) Individualized goal:  SBA with FWW to complete 6 minute walk test  2) Interventions:  PT Group Therapy, PT Gait Training, PT Self Care/Home Eval, PT Therapeutic Exercises, PT TENS Application, PT Neuro Re-Ed/Balance, PT Therapeutic Activity, PT Manual Therapy and PT Evaluation     Outcome: MET     Problem: Mobility Transfers  Goal: STG-Within one week, patient will transfer bed to chair  Description  1) Individualized goal:  SPV for safety / verbal cues  2) Interventions:  PT Group Therapy, PT Gait Training, PT Self Care/Home Eval, PT Therapeutic Exercises, PT TENS Application, PT Neuro Re-Ed/Balance, PT Therapeutic Activity, PT Manual Therapy and PT Evaluation     Outcome: MET     "

## 2019-10-03 NOTE — PROGRESS NOTES
"Rehab Progress Note     Encounter Date: 10/3/2019    CC: Dementia, neck pain, back pain    Interval Events (Subjective)  Patient sitting up in room. She reports she is doing well. Denies pain. Has some itching in her low back. Discussed with niece and most likely her staples. Discussed 10 days post-op so will remove the staples after tehrapy. Discussed itching generally resolved afterwards.  Denies NVD. Discussed with niece that we would have IDT today.    IDT Team Meeting 10/3/2019    IRobinson M.D., was present and led the interdisciplinary team conference on 10/3/2019.  I led the IDT conference and agree with the IDT conference documentation and plan of care as noted below.     RN: Very impulsive  Diet Dys 3   % Meal     Pain    Sleep    Bowel Continent   Bladder Continent   In's & Out's    Limited by safety issues    PT:  Bed Mobility    Transfers    Mobility supervs-CGA when impulsive   Stairs Ashlyn working on curbs   Back fatigue     OT:  Eating    Grooming    Bathing Ashlyn   UB Dressing    LB Dressing supervs   Toileting supervs   Shower & Transfer supervs   Needs cues to follow spinal precautions  Shower chair; grab    SLP:  MBSS - improved. No issues with water although family has concern  Advanced to regular    CM:  Continues to work on disposition and DME needs.      DC/Disposition:  10/8/19    Objective:  VITAL SIGNS: /86   Pulse 98   Temp 36.6 °C (97.8 °F) (Oral)   Resp 17   Ht 1.499 m (4' 11\")   Wt 63 kg (138 lb 12.8 oz)   SpO2 97%   BMI 28.03 kg/m²   Gen: NAD  Psych: Mood and affect appropriate  CV: RRR, no edema  Resp: CTAB, no upper airway sounds  Abd: NTND  Neuro: AOx1, following simple commands.    Recent Results (from the past 72 hour(s))   CBC with Differential    Collection Time: 10/01/19  5:18 AM   Result Value Ref Range    WBC 3.8 (L) 4.8 - 10.8 K/uL    RBC 3.60 (L) 4.20 - 5.40 M/uL    Hemoglobin 11.2 (L) 12.0 - 16.0 g/dL    Hematocrit 34.6 (L) 37.0 - 47.0 %    " MCV 96.1 81.4 - 97.8 fL    MCH 31.1 27.0 - 33.0 pg    MCHC 32.4 (L) 33.6 - 35.0 g/dL    RDW 49.4 35.9 - 50.0 fL    Platelet Count 272 164 - 446 K/uL    MPV 8.8 (L) 9.0 - 12.9 fL    Neutrophils-Polys 47.90 44.00 - 72.00 %    Lymphocytes 37.10 22.00 - 41.00 %    Monocytes 13.90 (H) 0.00 - 13.40 %    Eosinophils 0.00 0.00 - 6.90 %    Basophils 0.80 0.00 - 1.80 %    Immature Granulocytes 0.30 0.00 - 0.90 %    Nucleated RBC 0.00 /100 WBC    Neutrophils (Absolute) 1.80 (L) 2.00 - 7.15 K/uL    Lymphs (Absolute) 1.39 1.00 - 4.80 K/uL    Monos (Absolute) 0.52 0.00 - 0.85 K/uL    Eos (Absolute) 0.00 0.00 - 0.51 K/uL    Baso (Absolute) 0.03 0.00 - 0.12 K/uL    Immature Granulocytes (abs) 0.01 0.00 - 0.11 K/uL    NRBC (Absolute) 0.00 K/uL   Comp Metabolic Panel (CMP)    Collection Time: 10/01/19  5:18 AM   Result Value Ref Range    Sodium 139 135 - 145 mmol/L    Potassium 4.0 3.6 - 5.5 mmol/L    Chloride 107 96 - 112 mmol/L    Co2 27 20 - 33 mmol/L    Anion Gap 5.0 0.0 - 11.9    Glucose 83 65 - 99 mg/dL    Bun 9 8 - 22 mg/dL    Creatinine 0.55 0.50 - 1.40 mg/dL    Calcium 8.8 8.5 - 10.5 mg/dL    AST(SGOT) 19 12 - 45 U/L    ALT(SGPT) 13 2 - 50 U/L    Alkaline Phosphatase 72 30 - 99 U/L    Total Bilirubin 0.4 0.1 - 1.5 mg/dL    Albumin 2.7 (L) 3.2 - 4.9 g/dL    Total Protein 5.2 (L) 6.0 - 8.2 g/dL    Globulin 2.5 1.9 - 3.5 g/dL    A-G Ratio 1.1 g/dL   HEMOGLOBIN A1C    Collection Time: 10/01/19  5:18 AM   Result Value Ref Range    Glycohemoglobin 5.3 0.0 - 5.6 %    Est Avg Glucose 105 mg/dL   TSH with Reflex to FT4    Collection Time: 10/01/19  5:18 AM   Result Value Ref Range    TSH 1.040 0.380 - 5.330 uIU/mL   Vitamin D, 25-hydroxy (blood)    Collection Time: 10/01/19  5:18 AM   Result Value Ref Range    25-Hydroxy   Vitamin D 25 26 (L) 30 - 100 ng/mL   ESTIMATED GFR    Collection Time: 10/01/19  5:18 AM   Result Value Ref Range    GFR If African American >60 >60 mL/min/1.73 m 2    GFR If Non  >60 >60  mL/min/1.73 m 2       Current Facility-Administered Medications   Medication Frequency   • doxazosin (CARDURA) tablet 2 mg Q DAY   • carboxymethylcellulose 1 % GEL 1 Drop TID   • dicyclomine (BENTYL) cap 10 mg **pt own suppy** Q6HRS PRN   • gabapentin (NEURONTIN) capsule 100 mg TID   • Respiratory Care per Protocol Continuous RT   • oxyCODONE immediate-release (ROXICODONE) tablet 5 mg Q3HRS PRN   • oxyCODONE immediate release (ROXICODONE) tablet 10 mg Q3HRS PRN   • tramadol (ULTRAM) 50 MG tablet 50 mg Q4HRS PRN   • hydrALAZINE (APRESOLINE) tablet 25 mg Q8HRS PRN   • acetaminophen (TYLENOL) tablet 650 mg Q4HRS PRN   • senna-docusate (PERICOLACE or SENOKOT S) 8.6-50 MG per tablet 2 Tab BID    And   • polyethylene glycol/lytes (MIRALAX) PACKET 1 Packet QDAY PRN    And   • magnesium hydroxide (MILK OF MAGNESIA) suspension 30 mL QDAY PRN    And   • bisacodyl (DULCOLAX) suppository 10 mg QDAY PRN   • artificial tears ophthalmic solution 1 Drop PRN   • benzocaine-menthol (CEPACOL) lozenge 1 Lozenge Q2HRS PRN   • mag hydrox-al hydrox-simeth (MAALOX PLUS ES or MYLANTA DS) suspension 20 mL Q2HRS PRN   • ondansetron (ZOFRAN ODT) dispertab 4 mg 4X/DAY PRN    Or   • ondansetron (ZOFRAN) syringe/vial injection 4 mg 4X/DAY PRN   • traZODone (DESYREL) tablet 50 mg QHS PRN   • sodium chloride (OCEAN) 0.65 % nasal spray 2 Spray PRN   • artificial tears (EYE LUBRICANT) ophth ointment 1 Application Q8HRS   • [START ON 9/1/2020] donepezil (ARICEPT) tablet 20 mg DAILY   • lactobacillus rhamnosus (CULTURELLE) capsule 1 Cap QDAY with Breakfast   • vitamin D (cholecalciferol) tablet 5,000 Units DAILY       Orders Placed This Encounter   Procedures   • Diet Order Regular (boost with each meal. No fish except tuna salad.)     Standing Status:   Standing     Number of Occurrences:   1     Order Specific Question:   Diet:     Answer:   Regular [1]     Comments:   boost with each meal. No fish except tuna salad.       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  Patient with shoulder and back pain.  -PT and OT for mobility and ADLs  -Staple removal      Dysphagia - Concern per bedside nurse.  Downgrade to Dysphagia 2 and NTL.   -SLP for swallow evaluation. Do not recommend MBSS, upgraded to dysphagia 3 and thins.      Back pain - Patient on Gabapentin 100 mg BID and PRN oxycodone  -Increase gabapentin to 100 mg TID.     Anemia - Mild on admission at 11.2, continue to monitor most likely just post-operative.     Abdominal Pain - Patient's family brought in dicyclomine for abdominal cramping. OK'ed with pharmacy     Dementia - Patient on Aricept on transfer.      Urinary retention - Patient on Flomax on transfer, changed to doxazosin due to crushing medications. Increase to 4 mg Cardura      Vitamin D - Patient on 5000 U daily. 26 on admission, unclear if has been getting the supplement during hospital stay.      DVT ppx - OK to change to Lovenox on 10/1/19. Ambulated > 400 feet. Discontinue Lovenox    Total time:  37 minutes.  I spent greater than 50% of the time for patient care, counseling, and coordination on this date, including unit/floor time, and face-to-face time with the patient as per interval events and assessment and plan above. Topics discussed included remove staples, urinary retention so increase Cardura, and ambulating discontinue Lovenox. Discussed discharge planning in IDT and with niece. Patient was discussed separately in IDT today; please see details above.    Robinson Doran M.D.

## 2019-10-03 NOTE — THERAPY
Physical Therapy   Daily Treatment     Patient Name: Ramya Bhakta  Age:  86 y.o., Sex:  female  Medical Record #: [id number]  Today's Date: 10/3/2019     Precautions  Precautions: Spinal / Back Precautions , Fall Risk  Comments: dementia    Subjective    Pt present and willing to participate     Objective       10/03/19 1031   Precautions   Precautions Spinal / Back Precautions ;Fall Risk   Comments dementia   Standing Lower Body Exercises   Standing Lower Body Exercises Yes   Hip Flexion 1 set of 10   Hip Extension 1 set of 10   Hip Abduction 1 set of 10   Marching 1 set of 10   Heel Rise 1 set of 10   Toe Rise 1 set of 10   Mini Squat 1 set of 10   Other Exercises Cues for upright posture/ abdomainl bracing. UE support at // bars.    PT Total Time Spent   PT Individual Total Time Spent (Mins) 30   PT Charge Group   PT Gait Training 1   PT Therapeutic Exercise 1       FIM Bed/Chair/Wheelchair Transfers Score: 5 - Standby Prompting/Supervision or Set-up  Bed/Chair/Wheelchair Transfers Description:  Verbal cueing, Supervision for safety    FIM Walking Score:  5 - Standby Prompting/Supervision or Set-up  Walking Description:  Walker, Verbal cueing, Supervision for safety    Pt ambulates with FWW and  ft x 3    Assessment    Pt continues to require SPV for safety and fall prevention with mobility tasks due to impaired cognition/ carryover for back precautions/ protection. Reports increased LLE pain with WB during standing exercises    Plan    Gait/ endurance/ strength/ safety and back care education

## 2019-10-03 NOTE — CARE PLAN
Problem: Safety  Goal: Will remain free from injury  Outcome: PROGRESSING SLOWER THAN EXPECTED   Pt does not use call light for assistance, Impulsive and   Problem: Infection  Goal: Will remain free from infection  Outcome: PROGRESSING AS EXPECTED   Patient remains free of infection as evidenced by normal vital signs and breath sounds. Will continue monitoring.   Problem: Pain Management  Goal: Pain level will decrease to patient's comfort goal  Outcome: PROGRESSING AS EXPECTED   Patient able to verbalize pain level and verbalize an acceptable level of pain. steady from transfers.  Patient encouraged to call for assistance and not attempt self transfer . Able to verbalize needs.

## 2019-10-03 NOTE — REHAB-PHARMACY IDT TEAM NOTE
Pharmacy   Pharmacy  Antibiotics/Antifungals/Antivirals:  Medication:      Active Orders (From admission, onward)    None        Route:         Non  Stop Date:  N/A  Reason:   Antihypertensives/Cardiac:  Medication:    Orders (72h ago, onward)     Start     Ordered    10/01/19 1200  doxazosin (CARDURA) tablet 2 mg  Q DAY      10/01/19 1131    09/30/19 1223  hydrALAZINE (APRESOLINE) tablet 25 mg  EVERY 8 HOURS PRN      09/30/19 1223              Patient Vitals for the past 24 hrs:   BP Pulse   10/02/19 1300 117/55 83   10/02/19 0600 105/52 78   10/01/19 1915 120/64 93     Anticoagulation:  Medication:  Not applicable  INR:      Other key medications:     A review of the medication list reveals no issues at this time. Patient is currently on antihypertensive(s). Recommend home blood pressure monitoring/recording if antihypertensive(s) regimen(s) continue.    Section completed by:  Jhon Bagley [facility]

## 2019-10-03 NOTE — THERAPY
Occupational Therapy  Daily Treatment     Patient Name: Ramya Bhakta  Age:  86 y.o., Sex:  female  Medical Record #: [id number]  Today's Date: 10/3/2019     Precautions  Precautions: (P) Fall Risk, Spinal / Back Precautions   Comments: (P) dementia    Safety   ADL Safety : Requires Supervision for Safety  Bathroom Safety: Requires Supervision for Safety    Subjective    Patient sitting in w/c in room and agreeable to OT     Objective       10/03/19 0931   Precautions   Precautions Fall Risk;Spinal / Back Precautions    Comments dementia   Cognition    Level of Consciousness Alert   Sitting Upper Body Exercises   Shoulder Press 2 sets of 10;Right ;Left;Weight (See Comments for lbs)   Bicep Curls 3 sets of 10;Right ;Left;Weight (See Comments for lbs)   Comments 3 lb weight   IADL Treatments   Kitchen Mobility Education Patient completed kitchen mobility using 4ww and SBA/supervision.  Able to retrieve items from high/low cupboards, counter and various appliances with good safety.   Sitting Lower Body Exercises   Sitting Lower Body Exercises Yes   Nustep Time (See Comments)  (Nustep level 3 x 15 minutes w/ B UE/LE)   Interdisciplinary Plan of Care Collaboration   IDT Collaboration with  Physical Therapist   Patient Position at End of Therapy Seated;Self Releasing Lap Belt Applied   Collaboration Comments hand off to PT   OT Total Time Spent   OT Individual Total Time Spent (Mins) 60   OT Charge Group   OT Therapy Activity 2   OT Therapeutic Exercise  2       W/C mobility supervised in hallways/gym for pathfinding.    Assessment    Patient doing well with mobility and endurance.  Needs reminders about where she is at times.    Plan     ADL  Related mobility  Strength/endurance building   Assess  for  And address edema management

## 2019-10-03 NOTE — THERAPY
Speech Language Pathology  Video Swallow     Patient Name:  Ramya Bhakta  AGE:  86 y.o., SEX:  female  Medical Record #:  [id number]  Today's Date: 10/3/2019     Objective       10/03/19 0831   History / Background Information   Prior Level of Function for Eating / Swallowing WFL, difficulty swallowing large pills at baseline    Diagnosis L2-L5 lumbar laminectomy   Onset Date Of Dysphagia 9/23/19   Dysphagia Symptoms Warranting Video Swallow nursing reported coughing with thin liquids    Dentition Intact   Procedure   Patient Seated in  wheelchair    Seated at (Degrees) 90   Views Completed Lateral;Anterior / Posterior   Fluoroscopy Time 121.2 sec   Consistencies / Presentation Method   Consistencies / Presentation Method Tested   Puree Teaspoon   Thin Liquids Cup   Mechanical Soft / Mixed Teaspoon   Solid Teaspoon   Oral Phase   Oral Phase X   Puree Premature Spillage Into Valleculae   Thin Liquids Premature Spillage Into Valleculea   Mechanical Soft / Mixed Premature Spillage Into Valleculea   Solid Premature Spillage Into Valleculea   Pharyngeal Phase   Pharyngeal Phase X   Puree Residue In Valleculae   Esophageal Phase   Esophageal Phase X   Puree Esophageal Reflux   Thin Liquids Esophageal Reflux   Mechanical Soft / Mixed Esophageal Reflux   Solid Esophageal Reflux   Compensatory Strategies Attempted   Compensatory Strategies Attempted Yes   Multiple Swallows Effective in clearing mild pharyngeal residue   Liquid Wash After Swallow Effective in clearing mild vallecular residue    Impression   Dysphagia Present No   Prognosis   Prognosis for Improvement Not Applicable   Barriers to Improvement Dementia, unable to follow strategies    Positive Indicators for Improvement No aspiration/penetration noted    Recommendations   Diet / Liquid Recommendation Regular;Thin Liquid   Medication Administration  Crush all Medications in Puree;Float Whole with Puree;Other (See Comments)  (Crush large medications, float  smaller medications in puree)   Strategies / Precautions Sitting Upright at 90 Degrees while Eating;Stay Upright at Least 30 Minutes After Meals;Oral Care After Meals;Monitor Temperature and Lung Sounds   Interventions Dysphagia Therapy by SLP;Therapeutic Dining Program for Meals;Patient / Caregiver Education / Training   SLP Contact Information (Name / Extension) Bravo Larsendoreen ESCOBAR, [facility]-SLP/ ex [id number]   Video Tape Number [id number]   SLP Total Time Spent   SLP Individual Total Time Spent (Mins) 30   Charge Group   SLP Video Swallow / FEES Videofluoroscopic Evaluation       Assessment    MBSS completed.  Pt trailed thin liquids (cup sip), pudding (2.5 mL), applesauce (5 mL), mech soft and regular textures.  Pt presented with a swallowing function that is WFL's for he age with the following characteristics noted: Premature spillage to the valleculae (all textures), Mild vallecular residue with 5 mL puree textures and a small amount of esophageal reflux with all textures.  Pt was noted to have a bony protrusion around the level of C6 which seemed to contribute to the small amount of reflux (all material refluxed was from a small amount of residue that did not pass down the esophagus d/t this bony protrusion).  Pt declined to swallow a barium capsule d/t the size.      Plan    Recommend pt be upgraded to regular textures and thin liquids with small medications floated in puree and large medications crushed.

## 2019-10-03 NOTE — REHAB-PT IDT TEAM NOTE
"Physical Therapy   Mobility  Bed mobility:   SPV  Bed /Chair/Wheelchair Transfer Initial:  4 - Minimal Assistance  Bed /Chair/Wheelchair Transfer Current:  5 - Standby Prompting/Supervision or Set-up   Bed/Chair/Wheelchair Transfer Description:  Verbal cueing, Supervision for safety  Walk Initial:  4 - Minimal Assistance  Walk Current:  5 - Standby Prompting/Supervision or Set-up   Walk Description:  Walker, Verbal cueing, Supervision for safety  Wheelchair Initial:  5 - Standby Prompting/Supervision or Set-up  Wheelchair Current:  5 - Standby Prompting/Supervision or Set-up   Wheelchair Description:  Extra time, Supervision for safety, Verbal cueing, Safety concerns  Stairs Initial:  4 - Minimal Assistance  Stairs Current: 4 - Minimal Assistance   Stairs Description: (CGA/ steadying assist with B hand rails 4 steps x 3 trials)  Patient/Family Training/Education:  On-going gait/ endurance/ safety education  DME/DC Recommendations:  FWW, 24 Hr SPV, home health PT  Strengths:  Able to follow instructions, Independent PLOF, Making steady progress towards goals, Manages pain appropriately, Motivated for self care and independence, Pleasant and cooperative, Supportive family and Willingly participates in therapeutic activities  Barriers:   Dementia, Impulsive, Poor carryover of learning and Other: impaired back precautions  # of short term goals set= 4  # of short term goals met=3  Physical Therapy Problems     Problem: Mobility     Dates: Start: 10/01/19       Description:     Goal: STG-Within one week, patient will ambulate up/down a curb     Dates: Start: 10/01/19       Description: 1) Individualized goal:  CGA with FWW for 6\" rise  2) Interventions:  PT Group Therapy, PT Gait Training, PT Self Care/Home Eval, PT Therapeutic Exercises, PT TENS Application, PT Neuro Re-Ed/Balance, PT Therapeutic Activity, PT Manual Therapy and PT Evaluation                   Problem: PT-Long Term Goals     Dates: Start: 10/01/19       " Description:     Goal: LTG-By discharge, patient will tolerate standing     Dates: Start: 10/01/19       Description: 1) Individualized goal:  With UE support at // bars to complete standing exercise program 3 x 10  2) Interventions:  PT Group Therapy, PT Gait Training, PT Self Care/Home Eval, PT Therapeutic Exercises, PT TENS Application, PT Neuro Re-Ed/Balance, PT Therapeutic Activity, PT Manual Therapy and PT Evaluation             Goal: LTG-By discharge, patient will ambulate     Dates: Start: 10/01/19       Description: 1) Individualized goal:  SPV for pathfinding with 4WW 200 ft x 2 from room<>meals  2) Interventions:  PT Group Therapy, PT Gait Training, PT Self Care/Home Eval, PT Therapeutic Exercises, PT TENS Application, PT Neuro Re-Ed/Balance, PT Therapeutic Activity, PT Manual Therapy and PT Evaluation             Goal: LTG-By discharge, patient will transfer one surface to another     Dates: Start: 10/01/19       Description: 1) Individualized goal:  Modified independent with 4WW as needed  2) Interventions:  PT Group Therapy, PT Gait Training, PT Self Care/Home Eval, PT Therapeutic Exercises, PT TENS Application, PT Neuro Re-Ed/Balance, PT Therapeutic Activity, PT Manual Therapy and PT Evaluation             Goal: LTG-By discharge, patient will ambulate up/down flight of stairs     Dates: Start: 10/01/19       Description: 1) Individualized goal:  SPV for safety with hand rail  2) Interventions:  PT Group Therapy, PT Gait Training, PT Self Care/Home Eval, PT Therapeutic Exercises, PT TENS Application, PT Neuro Re-Ed/Balance, PT Therapeutic Activity, PT Manual Therapy and PT Evaluation             Goal: LTG-By discharge, patient will transfer in/out of a car     Dates: Start: 10/01/19       Description: 1) Individualized goal:  SPV from ambulatory level with 4WW  2) Interventions:  PT Group Therapy, PT Gait Training, PT Self Care/Home Eval, PT Therapeutic Exercises, PT TENS Application, PT Neuro  Re-Ed/Balance, PT Therapeutic Activity, PT Manual Therapy and PT Evaluation                           Section completed by:  Adelina Stanley, BSPT, ATP

## 2019-10-03 NOTE — THERAPY
Speech Language Pathology  Daily Treatment     Patient Name: Ramya Bhakta  Age:  86 y.o., Sex:  female  Medical Record #: [id number]  Today's Date: 10/3/2019     Subjective    Pt was pleasant and cooperative during this ST session      Objective       10/03/19 0801   SLP Total Time Spent   SLP Individual Total Time Spent (Mins) 30   Charge Group   SLP Swallowing Dysfunction Treatment Swallowing Dysfunction Treatment       FIM Eating Score:  5 - Standby Prompting/Supervision or Set-up  Eating Description:  Increased time, Modified diet, Supervision for safety, Verbal cueing    FIM Comprehension Score:  4 - Minimal Assistance  Comprehension Description:  Glasses, Verbal cues    FIM Expression Score:  5 - Stand-by Prompting/Supervision or Set-up  Expression Description:  Verbal cueing    FIM Social Interaction Score:  4 - Minimal Assistance  Social Interaction Description:  Increased time, Verbal cues    FIM Problem Solving Score:  4 - Minimal Assistance  Problem Solving Description:  Verbal cueing, Therapy schedule, Increased time, Bed/chair alarm, Seat belt    FIM Memory Score:  2 - Max Assistance  Memory Description:  Therapy schedule, Seat belt, Bed/chair alarm, Verbal cueing      Assessment    Pt consumed dysphagia 3 textures and thin liquids without any overt s/sx of aspiration/penetration.  MBSS to be completed this morning, regular trials ordered for lunch.      Plan    Complete MBSS, trial regular textures at lunch.  Discharge from t-dine and ST if pt does well on MBSS and is able to consume regular trials without difficulty.

## 2019-10-03 NOTE — REHAB-NURSING IDT TEAM NOTE
Nursing   Nursing  Diet/Nutrition:  Regular, Dysphagia III-Mechanical Soft and Thin Liquids  Medication Administration:  Whole with Liquid Wash  % consumed at meals in last 24 hours:  Consumed 25-75% of meals per documentation.  Meal/Snack Consumption for the past 24 hrs:   Oral Nutrition   10/02/19 1800 Dinner;Between 25-50% Consumed   10/02/19 1200 Lunch;Between 25-50% Consumed   10/02/19 0900 Breakfast;Between 50-75% Consumed     Snack schedule:  None  Appetite:  Good  Admit Weight:  Weight: 63 kg (138 lb 12.8 oz)  Weight Last 7 Days   [63 kg (138 lb 12.8 oz)] 63 kg (138 lb 12.8 oz) (09/30 1145)    Pain Issues:    Location:  --  --         Severity:  Denies   Scheduled pain medications:  gabapentin (NEURONTIN)      PRN pain medications used in last 24 hours:  None   Non Pharmacologic Interventions:  distraction, emotional support, relaxation, repositioned and rest  Effectiveness of pain management plan:  good=patient states acceptable comfort after interventions    Bowel:    Bowel Assist Initial Score:  5 - Standby Prompting/Supervision or Set-up  Bowel Assist Current Score:  5 - Standby Prompting/Supervision or Set-up  Bowl Accidents in last 7 days:  0  Last bowel movement: 10/02/19(per report)  Stool Description: Large, Soft     Usual bowel pattern:  daily  Scheduled bowel medications:  senna-docusate (PERICOLACE or SENOKOT S)   PRN bowel medications used in last 24 hours:  None  Nursing Interventions:  Increased time, Scheduled medication, Supervision, Verbal cueing, Set-up(NOT WITNESSED - entry based off chart review and report)  Effectiveness of bowel program:   good=regular, predictable, controlled emptying of bowel     Bladder:    Bladder Assist Initial Score:  5 - Standby Prompting/Supervision or Set-up  Bladder Assist Current Score:  5 - Standby Prompting/Supervision or Set-up  Bladder Accidents in last 7 days:  0  PVR range for past 24-48 hours:  []  ()  Intermittent Catheterization:   no  Medications affecting bladder:  None    Time void schedule/voiding pattern:  Voiding every 2-4 hours  Interventions:  Increased time, Supervision, Verbal cueing, Time void patient initiated(set-up)  Effectiveness of bladder training:  Fair=sometimes needs prompting to double void to complete bladder emptying    Wound:       Patient Lines/Drains/Airways Status    Active Current Wounds     Name: Placement date: Placement time: Site: Days:    Wound 10/01/19 Pressure Injury Buttocks;Coccyx scab left buttock, abrasion right buttock  10/01/19   0540   --  1            Midline Back Incision - with staples and dressed with island dressing         Interventions:  Monitoring for s/s of infection. Barrier cream PRN to buttocks.   Effectiveness of intervention:  wound is unchanged     Sleep/wake cycle:   Average hours slept:  Sleeps 3-4 hours without waking  Sleep medication usage:  None    Patient/Family Training/Education:  Fall Prevention, General Self Care, Medication Management, Safe Transfers, Safety, Skin Care and Wound Care  Discharge Supply Recommendations:  Blood Pressure Monitor  Strengths: Willingly participates in therapeutic activities and Able to follow instructions   Barriers:   Confused, Fatigue, Generalized weakness, Impulsive and Limited mobility       Nursing Problems     Problem: Bowel/Gastric:     Description:     Goal: Normal bowel function is maintained or improved     Description:           Goal: Will not experience complications related to bowel motility     Description:                 Problem: Communication     Description:     Goal: The ability to communicate needs accurately and effectively will improve     Description:                 Problem: Discharge Barriers/Planning     Description:     Goal: Patient's continuum of care needs will be met     Description:                 Problem: Infection     Description:     Goal: Will remain free from infection     Description:                 Problem:  Knowledge Deficit     Description:     Goal: Knowledge of disease process/condition, treatment plan, diagnostic tests, and medications will improve     Description:           Goal: Knowledge of the prescribed therapeutic regimen will improve     Description:                 Problem: Pain Management     Description:     Goal: Pain level will decrease to patient's comfort goal     Description:                 Problem: Respiratory:     Description:     Goal: Respiratory status will improve     Description:                 Problem: Safety     Description:     Goal: Will remain free from injury     Description:           Goal: Will remain free from falls     Description:                 Problem: Skin Integrity     Description:     Goal: Risk for impaired skin integrity will decrease     Description:                 Problem: Urinary Elimination:     Description:     Goal: Ability to reestablish a normal urinary elimination pattern will improve     Description:                 Problem: Venous Thromboembolism (VTW)/Deep Vein Thrombosis (DVT) Prevention:     Description:     Goal: Patient will participate in Venous Thrombosis (VTE)/Deep Vein Thrombosis (DVT)Prevention Measures     Description:                        Long Term Goals:   At discharge patient will be able to function safely at home and in the community with support.    Section completed by:  Garrett Blanco R.N.

## 2019-10-03 NOTE — REHAB-SLP IDT TEAM NOTE
Speech Therapy   Cognitive Linquistic Functions  Comprehension Initial:  3 - Moderate Assistance  Comprehension Current:  4 - Minimal Assistance   Comprehension Description:  Glasses, Verbal cues  Expression Initial:  4 - Minimal Assistance  Expression Current:  5 - Stand-by Prompting/Supervision or Set-up   Expression Description:  Verbal cueing  Social Interaction Initial:  4 - Minimal Assistance  Social Interaction Current:  4 - Minimal Assistance   Social Interaction Description:  Increased time, Verbal cues  Problem Solving Initial:  4 - Minimal Assistance  Problem Solving Current:  4 - Minimal Assistance   Problem Solving Description:  Verbal cueing, Therapy schedule, Increased time, Bed/chair alarm, Seat belt  Memory Initial:  2 - Max Assistance  Memory Current:  2 - Max Assistance   Memory Description:  Therapy schedule, Seat belt, Bed/chair alarm, Verbal cueing  Executive Functioning / Organization Initial:   NA  Executive Functioning / Organization Current:   NA   Executive Functioning Desciption:  NA  Swallowing  Swallowing Status:  Pt is currently tolerating regular textures and thin liquids.  MBSS completed on 10/3, no aspiration noted on MBSS.  Recommend d/c t-dine and ST at this time    Orders Placed This Encounter   Procedures   • Diet Order Regular (boost with each meal. No fish except tuna salad.)     Standing Status:   Standing     Number of Occurrences:   1     Order Specific Question:   Diet:     Answer:   Regular [1]     Comments:   boost with each meal. No fish except tuna salad.     Behavior Modification Plan  Keep instructions simple/concrete, Redirect to task/topic, Provide reasonable choices, Analyze tasks (break down into smaller steps) and Reinforce participation in desired tasks  Assistive Technology  Memory aides: and Low tech: Calendar, planner, schedule, alarms/timers, pill organizer, post-it notes, lists  Family Training/Education:  Education on MBSS results reviewed with pt's  daughter   DC Recommendations:   DC ST at this time   Strengths:  Pleasant and cooperative, Supportive family and Willingly participates in therapeutic activities  Barriers:  Confused, Dementia and Poor carryover of learning  # of short term goals set=2  # of short term goals met=2  Speech Therapy Problems (Active)      There are no active problems.                 Section completed by:  Bravo Yeboah MS,CCC-SLP

## 2019-10-04 PROCEDURE — 770010 HCHG ROOM/CARE - REHAB SEMI PRIVAT*

## 2019-10-04 PROCEDURE — 97116 GAIT TRAINING THERAPY: CPT

## 2019-10-04 PROCEDURE — 97110 THERAPEUTIC EXERCISES: CPT

## 2019-10-04 PROCEDURE — 97530 THERAPEUTIC ACTIVITIES: CPT

## 2019-10-04 PROCEDURE — 700102 HCHG RX REV CODE 250 W/ 637 OVERRIDE(OP): Performed by: PHYSICAL MEDICINE & REHABILITATION

## 2019-10-04 PROCEDURE — A9270 NON-COVERED ITEM OR SERVICE: HCPCS | Performed by: PHYSICAL MEDICINE & REHABILITATION

## 2019-10-04 PROCEDURE — 99232 SBSQ HOSP IP/OBS MODERATE 35: CPT | Performed by: PHYSICAL MEDICINE & REHABILITATION

## 2019-10-04 PROCEDURE — 97535 SELF CARE MNGMENT TRAINING: CPT

## 2019-10-04 RX ORDER — TAMSULOSIN HYDROCHLORIDE 0.4 MG/1
0.8 CAPSULE ORAL
Status: DISCONTINUED | OUTPATIENT
Start: 2019-10-04 | End: 2019-10-04

## 2019-10-04 RX ORDER — TAMSULOSIN HYDROCHLORIDE 0.4 MG/1
0.8 CAPSULE ORAL
Status: DISCONTINUED | OUTPATIENT
Start: 2019-10-05 | End: 2019-10-05

## 2019-10-04 RX ADMIN — CARBOXYMETHYLCELLULOSE SODIUM 1 DROP: 10 GEL OPHTHALMIC at 09:48

## 2019-10-04 RX ADMIN — CARBOXYMETHYLCELLULOSE SODIUM 1 DROP: 10 GEL OPHTHALMIC at 21:24

## 2019-10-04 RX ADMIN — GABAPENTIN 100 MG: 100 CAPSULE ORAL at 09:47

## 2019-10-04 RX ADMIN — MINERAL OIL, PETROLATUM 1 APPLICATION: 425; 568 OINTMENT OPHTHALMIC at 22:00

## 2019-10-04 RX ADMIN — CARBOXYMETHYLCELLULOSE SODIUM 1 DROP: 10 GEL OPHTHALMIC at 14:28

## 2019-10-04 RX ADMIN — TRAMADOL HYDROCHLORIDE 50 MG: 50 TABLET, COATED ORAL at 14:26

## 2019-10-04 RX ADMIN — SENNOSIDES, DOCUSATE SODIUM 2 TABLET: 50; 8.6 TABLET, FILM COATED ORAL at 21:24

## 2019-10-04 RX ADMIN — Medication 1 CAPSULE: at 09:47

## 2019-10-04 RX ADMIN — GABAPENTIN 100 MG: 100 CAPSULE ORAL at 21:24

## 2019-10-04 RX ADMIN — GABAPENTIN 100 MG: 100 CAPSULE ORAL at 14:22

## 2019-10-04 RX ADMIN — SENNOSIDES, DOCUSATE SODIUM 2 TABLET: 50; 8.6 TABLET, FILM COATED ORAL at 09:47

## 2019-10-04 RX ADMIN — VITAMIN D, TAB 1000IU (100/BT) 5000 UNITS: 25 TAB at 09:47

## 2019-10-04 RX ADMIN — DOXAZOSIN 4 MG: 2 TABLET ORAL at 09:47

## 2019-10-04 NOTE — CARE PLAN
Problem: Safety  Goal: Will remain free from injury  Note:   Patient remains impulsive. Pt in room near nurse's station. Patient educated on the importance of using call light for assistance, patient verbalized understanding. Call light and belongings within reach, bed in lowest and locked position, bed rails up x2, alarms in place, and non skid socks on. Hourly rounding in place.      Problem: Bowel/Gastric:  Goal: Normal bowel function is maintained or improved  Note:   Last BM 10/3/19, denies s/s of constipation. Patient taking bowel meds as scheduled. Bowel sounds normoactive in all 4 quadrants, abdomen soft and non-tender, no distention noted.

## 2019-10-04 NOTE — THERAPY
"Occupational Therapy  Daily Treatment     Patient Name: Ramya Bhakta  Age:  86 y.o., Sex:  female  Medical Record #: [id number]  Today's Date: 10/4/2019     Precautions  Precautions: (P) Spinal / Back Precautions , Fall Risk  Comments: (P)  Dementia     Safety   ADL Safety : Requires Supervision for Safety  Bathroom Safety: Requires Supervision for Safety    Subjective    \" I don't even know where I am .\"     reoriented patient to current location / situation      Objective       10/04/19 0701   Precautions   Precautions Spinal / Back Precautions ;Fall Risk   Comments  Dementia    Interdisciplinary Plan of Care Collaboration   Patient Position at End of Therapy Seated  ( in dining room for breakfast )   OT Total Time Spent   OT Individual Total Time Spent (Mins) 60   OT Charge Group   OT Self Care / ADL 4      FIM Eating  7   FIM Grooming Score:  5 - Standby Prompting/Supervision or Set-up  Grooming Description:  ( indirect supevision standing at sink.  one reminder that she had yet to perform oral care.    brush hair )    FIM Bathing Score:  5 - Standby Prompting/Supervision or Set-up  Bathing Description:       FIM Upper Body Dressin - Standby Prompting/Supervision or Set-up  Upper Body Dressing Description:  ( including clothing retrieval at FWW level )    FIM Lower Body Dressing Score:  5 - Standby Prompting/Supervsion or Set-up  Lower Body Dressing Description:  (supervision   cltohing retrieval at FWW level.  bend over to tie laces  provided cue to try and cross legs to  tie laces )    FIM Toiletin - Independent  Toileting Description:       FIM Toilet Transfer Score:  6 - Modified Independent  Toilet Transfer Description:  Grab bar(FWW)    FIM Tub/Shower Transfers Score:  5 - Standby Prompting/Supervision or Set-up  Tub/Shower Transfers Description:  Grab bar(indirect supervision  )    FIM Memory Score:  2 - Max Assistance  Memory Description:  ( Max cues for recall of tasks to be performed  " thorugh out the one hour session . )     Tried DARIANA hose for edema management patient adamantly declined to trial after one stocking donned stating it was too tight.   Attempted to trial one size larger but she declined.   Assessment    Indirect supervision with  Cues for orientation to location / situation    Cues for reorientation to task performance.     Plan     continue ADL  Related mobility , strength/endurance building    LE edema mgmt

## 2019-10-04 NOTE — DISCHARGE PLANNING
DME referral sent to [facility].  HH referral sent to [facility] per choice form.  Awaiting responses.

## 2019-10-04 NOTE — THERAPY
"Physical Therapy   Daily Treatment     Patient Name: Ramya Bhakta  Age:  86 y.o., Sex:  female  Medical Record #: [id number]  Today's Date: 10/4/2019     Precautions  Precautions: (P) Spinal / Back Precautions   Comments: (P) dementia    Subjective    \"my back hurts and my legs are tired\"     Objective       10/04/19 1031   Precautions   Precautions Spinal / Back Precautions    Comments dementia   Standing Lower Body Exercises   Hip Flexion 2 sets of 10   Hip Extension 2 sets of 10   Hip Abduction 2 sets of 10   Marching 2 sets of 10   Heel Rise 2 sets of 10   Toe Rise 2 sets of 10   Mini Squat 2 sets of 10   Other Exercises UE support at // bars, seated rest between sets/ cues for upright posture   Bed Mobility    Supine to Sit Supervised   Sit to Supine Supervised   Sit to Stand Supervised   Scooting Supervised   Rolling Supervised   Interdisciplinary Plan of Care Collaboration   IDT Collaboration with  Nursing;Certified Nursing Assistant   Collaboration Comments regarding pt's frequent request to urinate but not doing anything.    PT Total Time Spent   PT Individual Total Time Spent (Mins) 30   PT Charge Group   PT Therapeutic Exercise 2       Pt ambulates to/from PT with FWW and SPV for direction 300 ft x 2    Assessment    Pt tolerated standing exercises with 1 seated rest despite c/o back pain.     Plan    Gait/ endurance/ strength/ safety and back care education    "

## 2019-10-04 NOTE — PROGRESS NOTES
"Rehab Progress Note     Encounter Date: 10/4/2019    CC: Dementia, neck pain, back pain    Interval Events (Subjective)  Patient sitting up in room. She reports she is doing well. Discussed staple removal and she reports she does not remember but denies itching at this time. Discussed with CM and niece will discuss with the [facility] and would appreciate home health. Discussed would order home health. Denies pain when sitting down but told staff she had pain with long walks.     IDT Team Meeting 10/3/2019  DC/Disposition:  10/8/19    Objective:  VITAL SIGNS: /69   Pulse 96   Temp 37 °C (98.6 °F) (Temporal)   Resp 17   Ht 1.499 m (4' 11\")   Wt 63 kg (138 lb 12.8 oz)   SpO2 96%   BMI 28.03 kg/m²   Gen: NAD  Psych: Mood and affect appropriate  CV: RRR, no edema  Resp: CTAB, no upper airway sounds  Abd: NTND  Neuro: AOx1, following simple commands.  Unchanged from 10/3/19    No results found for this or any previous visit (from the past 72 hour(s)).    Current Facility-Administered Medications   Medication Frequency   • doxazosin (CARDURA) tablet 4 mg Q DAY   • carboxymethylcellulose 1 % GEL 1 Drop TID   • dicyclomine (BENTYL) cap 10 mg **pt own suppy** Q6HRS PRN   • gabapentin (NEURONTIN) capsule 100 mg TID   • Respiratory Care per Protocol Continuous RT   • oxyCODONE immediate-release (ROXICODONE) tablet 5 mg Q3HRS PRN   • oxyCODONE immediate release (ROXICODONE) tablet 10 mg Q3HRS PRN   • tramadol (ULTRAM) 50 MG tablet 50 mg Q4HRS PRN   • hydrALAZINE (APRESOLINE) tablet 25 mg Q8HRS PRN   • acetaminophen (TYLENOL) tablet 650 mg Q4HRS PRN   • senna-docusate (PERICOLACE or SENOKOT S) 8.6-50 MG per tablet 2 Tab BID    And   • polyethylene glycol/lytes (MIRALAX) PACKET 1 Packet QDAY PRN    And   • magnesium hydroxide (MILK OF MAGNESIA) suspension 30 mL QDAY PRN    And   • bisacodyl (DULCOLAX) suppository 10 mg QDAY PRN   • artificial tears ophthalmic solution 1 Drop PRN   • benzocaine-menthol (CEPACOL) " lozenge 1 Lozenge Q2HRS PRN   • mag hydrox-al hydrox-simeth (MAALOX PLUS ES or MYLANTA DS) suspension 20 mL Q2HRS PRN   • ondansetron (ZOFRAN ODT) dispertab 4 mg 4X/DAY PRN    Or   • ondansetron (ZOFRAN) syringe/vial injection 4 mg 4X/DAY PRN   • traZODone (DESYREL) tablet 50 mg QHS PRN   • sodium chloride (OCEAN) 0.65 % nasal spray 2 Spray PRN   • artificial tears (EYE LUBRICANT) ophth ointment 1 Application Q8HRS   • [START ON 9/1/2020] donepezil (ARICEPT) tablet 20 mg DAILY   • lactobacillus rhamnosus (CULTURELLE) capsule 1 Cap QDAY with Breakfast   • vitamin D (cholecalciferol) tablet 5,000 Units DAILY       Orders Placed This Encounter   Procedures   • Diet Order Regular (boost with each meal. No fish except tuna salad.)     Standing Status:   Standing     Number of Occurrences:   1     Order Specific Question:   Diet:     Answer:   Regular [1]     Comments:   boost with each meal. No fish except tuna salad.       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  Patient with shoulder and back pain.  -PT and OT for mobility and ADLs  -Staple removal      Dysphagia - Concern per bedside nurse.  Downgrade to Dysphagia 2 and NTL.   -SLP for swallow evaluation. Do not recommend MBSS, upgraded to dysphagia 3 and thins.      Back pain - Patient on Gabapentin 100 mg BID and PRN oxycodone  -Increase gabapentin to 100 mg TID.     Anemia - Mild on admission at 11.2, continue to monitor most likely just post-operative.     Abdominal Pain - Patient's family brought in dicyclomine for abdominal cramping. OK'ed with pharmacy     Dementia - Patient on Aricept on transfer.      Urinary retention - Patient on  Flomax on transfer, changed to doxazosin due to crushing medications. Increase to 4 mg Cardura   -Ongoing retention, able to take some medications. Will change back to Flomax and increase to 0.8 mg as ongoing retention.      Vitamin D - Patient on 5000 U daily. 26 on admission, unclear if has been getting the supplement during hospital stay.      DVT ppx - OK to change to Lovenox on 10/1/19. Ambulated > 400 feet. Discontinue Lovenox    Dispo - Back to the [place] with home health early next week.     Total time:  27 minutes.  I spent greater than 50% of the time for patient care, counseling, and coordination on this date, including unit/floor time, and face-to-face time with the patient as per interval events and assessment and plan above. Topics discussed included ongoing urinary retention, OK'ed to take some medications, and will switch back to flomax for retention.     Robinson Doran M.D.

## 2019-10-04 NOTE — THERAPY
"Occupational Therapy  Daily Treatment     Patient Name: Ramya Bhakta  Age:  86 y.o., Sex:  female  Medical Record #: [id number]  Today's Date: 10/4/2019     Precautions  Precautions: (P) Spinal / Back Precautions   Comments: (P) dementia    Safety   ADL Safety : Requires Supervision for Safety  Bathroom Safety: Requires Supervision for Safety    Subjective    \" They've been like that for many years.\" referring to LE edema      Objective       10/04/19 1331   Precautions   Precautions Spinal / Back Precautions    Comments dementia   Pain 0 - 10 Group   Location Back   Location Orientation Lower   Pain Rating Scale (NPRS) 5   Therapist Pain Assessment Prior to Activity;During Activity;Post Activity;Nurse Notified;5   Sitting Upper Body Exercises   Upper Extremity Bike Level 3 Resistance  (x 5 minutes x 2 )   Interdisciplinary Plan of Care Collaboration   IDT Collaboration with  Nursing   Patient Position at End of Therapy Seated;Call Light within Reach;Tray Table within Reach;Family / Friend in Room   Collaboration Comments  provided and encouraged water intake per nursing request.   OT Total Time Spent   OT Individual Total Time Spent (Mins) 30   OT Charge Group   OT Therapy Activity 1   OT Therapeutic Exercise  1         Assessed Both LEs for edema mgmt intervention.    Edema presents as lipedema  Not lymph or post surgical.  Will  Relay to physician to formally assess.   No intervention needed at this time.     Assessment     mod cues for water intake   Min cues to reorient to limiting bending and twisting to assist with back pain reduction.    required  Up to 10 minutes of reasoning and redirection  To determine current level of back pain.   Plan     ADL   , related mobility and cognition   Strength/endurance building     "

## 2019-10-04 NOTE — DISCHARGE PLANNING
Per Shefali at [facility], referral accepted.  Per Tatianna at [facility], referral accepted.

## 2019-10-04 NOTE — THERAPY
"Physical Therapy   Daily Treatment     Patient Name: Ramya Bhakta  Age:  86 y.o., Sex:  female  Medical Record #: [id number]  Today's Date: 10/4/2019     Precautions  Precautions: Spinal / Back Precautions   Comments: dementia    Subjective    \"I feel like I need to pee, I know it's there. It just won't come out\"     Objective       10/04/19 0831   Precautions   Precautions Spinal / Back Precautions    Comments dementia   Sitting Lower Body Exercises   Other Exercises jose-med bike pedals x 10 minutes for LE endurance training   PT Total Time Spent   PT Individual Total Time Spent (Mins) 60   PT Charge Group   PT Gait Training 2   PT Therapeutic Exercise 1   PT Therapeutic Activities 1       FIM Toiletin - Independent  Toileting Description:       FIM Bed/Chair/Wheelchair Transfers Score: 5 - Standby Prompting/Supervision or Set-up  Bed/Chair/Wheelchair Transfers Description:       FIM Walking Score:  5 - Standby Prompting/Supervision or Set-up  Walking Description:  (SPV with  ft x 2, SBA without device x 150 ft but pt reports increased back pain/ fatigue without device this am. )      Assessment    Pt requires FWW for pain control due to back fatigue, requested toileting x 2 during PT but unable to urinate, nursing aware.    Plan    Gait/ endurance/ strength/ safety and back care education    "

## 2019-10-05 PROCEDURE — 99231 SBSQ HOSP IP/OBS SF/LOW 25: CPT | Performed by: PHYSICAL MEDICINE & REHABILITATION

## 2019-10-05 PROCEDURE — 97530 THERAPEUTIC ACTIVITIES: CPT

## 2019-10-05 PROCEDURE — 97116 GAIT TRAINING THERAPY: CPT

## 2019-10-05 PROCEDURE — 97110 THERAPEUTIC EXERCISES: CPT

## 2019-10-05 PROCEDURE — A9270 NON-COVERED ITEM OR SERVICE: HCPCS | Performed by: PHYSICAL MEDICINE & REHABILITATION

## 2019-10-05 PROCEDURE — 97535 SELF CARE MNGMENT TRAINING: CPT

## 2019-10-05 PROCEDURE — 770010 HCHG ROOM/CARE - REHAB SEMI PRIVAT*

## 2019-10-05 PROCEDURE — 700102 HCHG RX REV CODE 250 W/ 637 OVERRIDE(OP): Performed by: PHYSICAL MEDICINE & REHABILITATION

## 2019-10-05 RX ORDER — DOXAZOSIN 2 MG/1
4 TABLET ORAL
Status: DISCONTINUED | OUTPATIENT
Start: 2019-10-05 | End: 2019-10-08 | Stop reason: HOSPADM

## 2019-10-05 RX ADMIN — GABAPENTIN 100 MG: 100 CAPSULE ORAL at 20:31

## 2019-10-05 RX ADMIN — GABAPENTIN 100 MG: 100 CAPSULE ORAL at 15:27

## 2019-10-05 RX ADMIN — VITAMIN D, TAB 1000IU (100/BT) 5000 UNITS: 25 TAB at 08:22

## 2019-10-05 RX ADMIN — GABAPENTIN 100 MG: 100 CAPSULE ORAL at 08:23

## 2019-10-05 RX ADMIN — TAMSULOSIN HYDROCHLORIDE 0.8 MG: 0.4 CAPSULE ORAL at 08:23

## 2019-10-05 RX ADMIN — ACETAMINOPHEN 650 MG: 325 TABLET, FILM COATED ORAL at 20:31

## 2019-10-05 RX ADMIN — CARBOXYMETHYLCELLULOSE SODIUM 1 DROP: 10 GEL OPHTHALMIC at 13:52

## 2019-10-05 RX ADMIN — Medication 1 CAPSULE: at 08:23

## 2019-10-05 RX ADMIN — MINERAL OIL, PETROLATUM 1 APPLICATION: 425; 568 OINTMENT OPHTHALMIC at 20:35

## 2019-10-05 RX ADMIN — DICYCLOMINE HYDROCHLORIDE 10 MG: 10 CAPSULE ORAL at 13:50

## 2019-10-05 RX ADMIN — CARBOXYMETHYLCELLULOSE SODIUM 1 DROP: 10 GEL OPHTHALMIC at 20:35

## 2019-10-05 RX ADMIN — ACETAMINOPHEN 650 MG: 325 TABLET, FILM COATED ORAL at 10:38

## 2019-10-05 RX ADMIN — SENNOSIDES, DOCUSATE SODIUM 2 TABLET: 50; 8.6 TABLET, FILM COATED ORAL at 08:23

## 2019-10-05 RX ADMIN — DOXAZOSIN 4 MG: 2 TABLET ORAL at 20:31

## 2019-10-05 RX ADMIN — CARBOXYMETHYLCELLULOSE SODIUM 1 DROP: 10 GEL OPHTHALMIC at 08:31

## 2019-10-05 RX ADMIN — MINERAL OIL, PETROLATUM 1 APPLICATION: 425; 568 OINTMENT OPHTHALMIC at 14:00

## 2019-10-05 NOTE — CARE PLAN
Problem: Communication  Goal: The ability to communicate needs accurately and effectively will improve  Outcome: PROGRESSING AS EXPECTED  Note:   Patient c/o back pain. Medicated with tylenol. Applied heat pack to area. Up and participated in therapy. Tolerated well.      Problem: Urinary Elimination:  Goal: Ability to reestablish a normal urinary elimination pattern will improve  Outcome: PROGRESSING AS EXPECTED  Note:   Patient is unable to swallow flomax this am. Med cannot be crushed. Tried with apple sauce and water but still. Dr Person notified and med changed to cardura.

## 2019-10-05 NOTE — CARE PLAN
Problem: Safety  Goal: Will remain free from injury  Outcome: PROGRESSING AS EXPECTED  Note:   Call light with in reach. Redirection to use call light for assistance. Non skid socks. Upper siderails up x2 while in bed. No complains of pain. No respiratory distress. Able to use call light x1 to go to restroom, assisted as needed. Bed alarm for safety awareness. Will continue to monitor.

## 2019-10-05 NOTE — THERAPY
"Occupational Therapy  Daily Treatment     Patient Name: Ramya Bhakta  Age:  86 y.o., Sex:  female  Medical Record #: [id number]  Today's Date: 10/5/2019     Precautions  Precautions: (P) Spinal / Back Precautions   Comments: (P) dementia    Safety   ADL Safety : Requires Supervision for Safety  Bathroom Safety: Requires Supervision for Safety    Subjective    \" I  Don't know if my back can handle it .\" referring to tx activity      Objective       10/05/19 1231   Precautions   Precautions Spinal / Back Precautions    Comments dementia   Sitting Upper Body Exercises   Chest Press 2 sets of 10;Bilateral   Shoulder Press 2 sets of 10;Bilateral   Internal Shoulder Rotation 1 set of 10;Right ;Left   External Shoulder Rotation 1 set of 10;Right ;Left   Bicep Curls 2 sets of 10;Bilateral   Pronation / Supination 2 sets of 10;Bilateral   Comments ther ex performed with 3lb weight    Interdisciplinary Plan of Care Collaboration   IDT Collaboration with  Family / Caregiver   Patient Position at End of Therapy Seated;Edge of Bed;Family / Friend in Room  ( niece assisting patient needs )   Collaboration Comments  spoke with niece regarding shower setup at the [facility].  reports  two build in seats but she has purchased as  shower seat and will ask the  OT regarding how to best setup  as well as have another grab bar installed.    OT Total Time Spent   OT Individual Total Time Spent (Mins) 30   OT Charge Group   OT Self Care / ADL 1   OT Therapeutic Exercise  1       FIM Toiletin - Independent  Toileting Description:       FIM Toilet Transfer Score:  6 - Modified Independent  Toilet Transfer Description:  Grab bar( FWW )      Assessment    Demonstrated consistent independence with toilet and transfer today.       Plan     ADL , related mobility     Shower / d/c FIMs      Issue UE HEP     "

## 2019-10-05 NOTE — THERAPY
Physical Therapy   Daily Treatment     Patient Name: Ramya Bhakta  Age:  86 y.o., Sex:  female  Medical Record #: [id number]  Today's Date: 10/5/2019     Precautions  Precautions: (P) Spinal / Back Precautions   Comments: dementia    Subjective    Pt present and ready for PT     Objective       10/05/19 1501   Precautions   Precautions Spinal / Back Precautions    Sitting Lower Body Exercises   Ankle Pumps 3 sets of 15   Hip Flexion 3 sets of 15   Hip Abduction 3 sets of 15   Hip Adduction 3 sets of 15   Long Arc Quad 3 sets of 15   Other Exercises 3# ankle wts   PT Total Time Spent   PT Individual Total Time Spent (Mins) 30   PT Charge Group   PT Gait Training 1   PT Therapeutic Exercise 1     Gait/ endurance training with FWW and  ft x 2    Assessment    Pt continues to participate and progress well with overall strength/ mobility tasks. No c/o back pain this session    Plan    Gait/ endurance/ strength/ safety and back care education

## 2019-10-05 NOTE — PROGRESS NOTES
"Rehab Progress Note     Encounter Date: 10/5/2019    CC: \"I can't swallow those pills\"    Interval Events (Subjective)  Patient recently switched to Flomax and was unable to take this AM. Per bedside RN, patient choked and coughed and expectorated capsule after it was given with apple sauce. Patient states she doesn't like to swallow them.     Objective:  VITAL SIGNS: /66   Pulse 95   Temp 36.9 °C (98.4 °F) (Oral)   Resp 17   Ht 1.499 m (4' 11\")   Wt 63 kg (138 lb 12.8 oz)   SpO2 98%   BMI 28.03 kg/m²     Gen: NAD  Psych: Mood & Affect appropriate   CV: Ext WWP  Resp: Breathing non labored  Abd: NTND  Neuro: Standing with FWW, conversational.     No results found for this or any previous visit (from the past 72 hour(s)).    Current Facility-Administered Medications   Medication Frequency   • doxazosin (CARDURA) tablet 4 mg QHS   • carboxymethylcellulose 1 % GEL 1 Drop TID   • dicyclomine (BENTYL) cap 10 mg **pt own suppy** Q6HRS PRN   • gabapentin (NEURONTIN) capsule 100 mg TID   • Respiratory Care per Protocol Continuous RT   • oxyCODONE immediate-release (ROXICODONE) tablet 5 mg Q3HRS PRN   • oxyCODONE immediate release (ROXICODONE) tablet 10 mg Q3HRS PRN   • tramadol (ULTRAM) 50 MG tablet 50 mg Q4HRS PRN   • hydrALAZINE (APRESOLINE) tablet 25 mg Q8HRS PRN   • acetaminophen (TYLENOL) tablet 650 mg Q4HRS PRN   • senna-docusate (PERICOLACE or SENOKOT S) 8.6-50 MG per tablet 2 Tab BID    And   • polyethylene glycol/lytes (MIRALAX) PACKET 1 Packet QDAY PRN    And   • magnesium hydroxide (MILK OF MAGNESIA) suspension 30 mL QDAY PRN    And   • bisacodyl (DULCOLAX) suppository 10 mg QDAY PRN   • artificial tears ophthalmic solution 1 Drop PRN   • benzocaine-menthol (CEPACOL) lozenge 1 Lozenge Q2HRS PRN   • mag hydrox-al hydrox-simeth (MAALOX PLUS ES or MYLANTA DS) suspension 20 mL Q2HRS PRN   • ondansetron (ZOFRAN ODT) dispertab 4 mg 4X/DAY PRN    Or   • ondansetron (ZOFRAN) syringe/vial injection 4 mg " 4X/DAY PRN   • traZODone (DESYREL) tablet 50 mg QHS PRN   • sodium chloride (OCEAN) 0.65 % nasal spray 2 Spray PRN   • artificial tears (EYE LUBRICANT) ophth ointment 1 Application Q8HRS   • [START ON 9/1/2020] donepezil (ARICEPT) tablet 20 mg DAILY   • lactobacillus rhamnosus (CULTURELLE) capsule 1 Cap QDAY with Breakfast   • vitamin D (cholecalciferol) tablet 5,000 Units DAILY       Orders Placed This Encounter   Procedures   • Diet Order Regular (boost with each meal. No fish except tuna salad.)     Standing Status:   Standing     Number of Occurrences:   1     Order Specific Question:   Diet:     Answer:   Regular [1]     Comments:   boost with each meal. No fish except tuna salad.     Assessment:  Active Hospital Problems    Diagnosis   • *s/p laminectomy c/b pseudomeningocele s/p InD   • Urinary incontinence   • Alzheimer's dementia with behavioral disturbance (HCC)   • Vitamin D deficiency   • GERD (gastroesophageal reflux disease)     Medical Decision Making and Plan:  Patient is admitted to [facility] for ongoing PT, OT and/or SLP.  Continue medical management per primary team with the following exception:    # Urinary Retention: Patient choking on Flomax capsule, which cannot be crushed. Had tolerated Cardura 4mg.   - Switch to Cardura 4 mg  - Despite cleared for reg diet, TL, whole pills, may require quick re-eval by SLP given unable to swallow Flomax capsule with applesauce.       In addition:     Total time:  15 minutes.  I spent greater than 50% of the time for patient care and coordination on this date, including unit/floor time, and face-to-face time with the patient as per assessment and plan above.    Dr. Yesica Person DO, MS  Department of Physical Medicine & Rehabilitation  [facility]  [facility]

## 2019-10-05 NOTE — THERAPY
Physical Therapy   Daily Treatment     Patient Name: Ramya Bhakta  Age:  86 y.o., Sex:  female  Medical Record #: [id number]  Today's Date: 10/5/2019     Precautions  Precautions: Spinal / Back Precautions   Comments: dementia    Subjective    Pt willing to participate.     Objective       10/05/19 1031   Precautions   Precautions Spinal / Back Precautions    Comments dementia   Sitting Lower Body Exercises   Other Exercises jose-med bike pedals x 10 minutes intensity level 2 for LE strength/ endurance training. Pt with MHP to low back during exercise with reported comfort   Standing Lower Body Exercises   Hip Flexion 3 sets of 10   Hip Extension 3 sets of 10   Hip Abduction 3 sets of 10   Marching 3 sets of 10   Heel Rise 3 sets of 10   Toe Rise 3 sets of 10   Mini Squat 3 sets of 10   Other Exercises Pt completed 2 x 10 with 1# ankle wts and 1 x 10 without wts. Pt requires seated rests between sets   PT Total Time Spent   PT Individual Total Time Spent (Mins) 60   PT Charge Group   PT Gait Training 2   PT Therapeutic Exercise 2       FIM Bed/Chair/Wheelchair Transfers Score: 5 - Standby Prompting/Supervision or Set-up  Bed/Chair/Wheelchair Transfers Description:  Supervision for safety    FIM Walking Score:  5 - Standby Prompting/Supervision or Set-up  Walking Description:  Walker, Supervision for safety(Pt ambulates 800 ft x 2 with FWW and verbal cues for upright posture/ way finding)    FIM Wheelchair Score:  5 - Standby Prompting/Supervision or Set-up  Wheelchair Description:  Supervision for safety(SPV with wayfinding with BUE's/ BLEs > 150+ ft)    FIM Stairs Score:  5 - Standby Prompting/Supervision or Set-up  Stairs Description:  Hand rails, Supervision for safety      Assessment    Pt continues to progress nicely, requires FWW for gait for back endurance/ pain control    Plan    Gait/ endurance/ strength/ safety and back care education

## 2019-10-05 NOTE — THERAPY
"Occupational Therapy  Daily Treatment     Patient Name: Ramya Bhakta  Age:  86 y.o., Sex:  female  Medical Record #: [id number]  Today's Date: 10/5/2019     Precautions  Precautions: Spinal / Back Precautions (no brace )  Comments: dementia     Safety   ADL Safety : Requires Supervision for Safety  Bathroom Safety: Requires Supervision for Safety    Subjective    \" I don't know where I am .\"  Oriented to current location     Objective       10/05/19 0701   Precautions   Precautions Spinal / Back Precautions   (no brace )   Comments dementia    Safety    ADL Safety  Requires Supervision for Safety   Bathroom Safety Requires Supervision for Safety   Interdisciplinary Plan of Care Collaboration   Patient Position at End of Therapy Seated  (in dining room for breakfast )   OT Total Time Spent   OT Individual Total Time Spent (Mins) 60   OT Charge Group   OT Self Care / ADL 2   OT Therapy Activity 2       FIM Eating Score:  7 - Independent  Eating Description:       FIM Grooming Score:  7 - Independent  Grooming Description:  (oral care  brush hair.  required no cues to  initiate task )    FIM Lower Body Dressing Score:  5 - Standby Prompting/Supervsion or Set-up  Lower Body Dressing Description:  (donned socks  independent  superivison cues for back precautions donning  shoes.  ( attempted to bend over to tie laces) )    FIM Toiletin - Independent  Toileting Description:       FIM Toilet Transfer Score:  6 - Modified Independent  Toilet Transfer Description:       FIM Walking Score:  5 - Standby Prompting/Supervision or Set-up  Walking Description:  (4ww room  to gym  and dining room  cues for pathfinding )     Dry transfer to and from shower seat in  Step in shower  Mod I using grab bar.       stood  X 5 minutes x 2 for laundry folding activity      Reciprocal step and toss  With ring toss x 17 reps x 2  With cues for technique    no LOB noted. Occasional placement of   One and on solid surface   Assessment     " completed tx no complaints.    cues for memory related to functional tasks     Plan     ADL  Related mobility and cognition  Strength/endurance building     Shower and dressing  Monday AM

## 2019-10-06 PROCEDURE — 770010 HCHG ROOM/CARE - REHAB SEMI PRIVAT*

## 2019-10-06 PROCEDURE — A9270 NON-COVERED ITEM OR SERVICE: HCPCS | Performed by: PHYSICAL MEDICINE & REHABILITATION

## 2019-10-06 PROCEDURE — 700102 HCHG RX REV CODE 250 W/ 637 OVERRIDE(OP): Performed by: PHYSICAL MEDICINE & REHABILITATION

## 2019-10-06 RX ADMIN — VITAMIN D, TAB 1000IU (100/BT) 5000 UNITS: 25 TAB at 08:16

## 2019-10-06 RX ADMIN — MINERAL OIL, PETROLATUM 1 APPLICATION: 425; 568 OINTMENT OPHTHALMIC at 05:49

## 2019-10-06 RX ADMIN — CARBOXYMETHYLCELLULOSE SODIUM 1 DROP: 10 GEL OPHTHALMIC at 13:08

## 2019-10-06 RX ADMIN — CARBOXYMETHYLCELLULOSE SODIUM 1 DROP: 10 GEL OPHTHALMIC at 19:52

## 2019-10-06 RX ADMIN — GABAPENTIN 100 MG: 100 CAPSULE ORAL at 19:49

## 2019-10-06 RX ADMIN — SENNOSIDES, DOCUSATE SODIUM 2 TABLET: 50; 8.6 TABLET, FILM COATED ORAL at 08:16

## 2019-10-06 RX ADMIN — ACETAMINOPHEN 650 MG: 325 TABLET, FILM COATED ORAL at 13:06

## 2019-10-06 RX ADMIN — CARBOXYMETHYLCELLULOSE SODIUM 1 DROP: 10 GEL OPHTHALMIC at 08:25

## 2019-10-06 RX ADMIN — DOXAZOSIN 4 MG: 2 TABLET ORAL at 19:50

## 2019-10-06 RX ADMIN — MINERAL OIL, PETROLATUM 1 APPLICATION: 425; 568 OINTMENT OPHTHALMIC at 19:59

## 2019-10-06 RX ADMIN — GABAPENTIN 100 MG: 100 CAPSULE ORAL at 08:16

## 2019-10-06 RX ADMIN — SENNOSIDES, DOCUSATE SODIUM 2 TABLET: 50; 8.6 TABLET, FILM COATED ORAL at 19:49

## 2019-10-06 RX ADMIN — Medication 1 CAPSULE: at 08:16

## 2019-10-06 RX ADMIN — GABAPENTIN 100 MG: 100 CAPSULE ORAL at 15:03

## 2019-10-06 NOTE — CARE PLAN
Problem: Safety  Goal: Will remain free from injury  Note:   Patient educated on importance of utilizing call light to minimize the potential of injury from falls. Patient verbalizes understanding. Patient consistently in need of redirecting and orienting - short term memory loss as a baseline.       Problem: Infection  Goal: Will remain free from infection  Note:   Education reinforced to wash hands thoroughly after using the restroom, prior to eating and throughout the day to minimize the potential of acquiring germs while in a facility setting. Patient engaged in conversation and verbalizes understanding.

## 2019-10-07 PROCEDURE — 97110 THERAPEUTIC EXERCISES: CPT

## 2019-10-07 PROCEDURE — 700102 HCHG RX REV CODE 250 W/ 637 OVERRIDE(OP): Performed by: PHYSICAL MEDICINE & REHABILITATION

## 2019-10-07 PROCEDURE — 97116 GAIT TRAINING THERAPY: CPT

## 2019-10-07 PROCEDURE — 770010 HCHG ROOM/CARE - REHAB SEMI PRIVAT*

## 2019-10-07 PROCEDURE — 99232 SBSQ HOSP IP/OBS MODERATE 35: CPT | Performed by: PHYSICAL MEDICINE & REHABILITATION

## 2019-10-07 PROCEDURE — 97535 SELF CARE MNGMENT TRAINING: CPT

## 2019-10-07 PROCEDURE — A9270 NON-COVERED ITEM OR SERVICE: HCPCS | Performed by: PHYSICAL MEDICINE & REHABILITATION

## 2019-10-07 RX ORDER — DICYCLOMINE HYDROCHLORIDE 10 MG/1
10 CAPSULE ORAL EVERY 6 HOURS PRN
Qty: 90 CAP | Refills: 2 | Status: SHIPPED | OUTPATIENT
Start: 2019-10-07 | End: 2020-06-29 | Stop reason: SDUPTHER

## 2019-10-07 RX ORDER — DOXAZOSIN MESYLATE 4 MG/1
4 TABLET ORAL
Qty: 30 TAB | Refills: 2 | Status: SHIPPED | OUTPATIENT
Start: 2019-10-07 | End: 2019-10-11

## 2019-10-07 RX ORDER — GABAPENTIN 100 MG/1
100 CAPSULE ORAL 3 TIMES DAILY
Qty: 90 CAP | Refills: 2 | Status: SHIPPED | OUTPATIENT
Start: 2019-10-07 | End: 2020-01-24 | Stop reason: SDUPTHER

## 2019-10-07 RX ADMIN — SENNOSIDES, DOCUSATE SODIUM 2 TABLET: 50; 8.6 TABLET, FILM COATED ORAL at 08:03

## 2019-10-07 RX ADMIN — ACETAMINOPHEN 650 MG: 325 TABLET, FILM COATED ORAL at 12:16

## 2019-10-07 RX ADMIN — DICYCLOMINE HYDROCHLORIDE 10 MG: 10 CAPSULE ORAL at 14:30

## 2019-10-07 RX ADMIN — CARBOXYMETHYLCELLULOSE SODIUM 1 DROP: 10 GEL OPHTHALMIC at 21:31

## 2019-10-07 RX ADMIN — VITAMIN D, TAB 1000IU (100/BT) 5000 UNITS: 25 TAB at 08:03

## 2019-10-07 RX ADMIN — DOXAZOSIN 4 MG: 2 TABLET ORAL at 21:44

## 2019-10-07 RX ADMIN — GABAPENTIN 100 MG: 100 CAPSULE ORAL at 15:00

## 2019-10-07 RX ADMIN — MINERAL OIL, PETROLATUM 1 APPLICATION: 425; 568 OINTMENT OPHTHALMIC at 21:50

## 2019-10-07 RX ADMIN — CARBOXYMETHYLCELLULOSE SODIUM 1 DROP: 10 GEL OPHTHALMIC at 15:00

## 2019-10-07 RX ADMIN — SENNOSIDES, DOCUSATE SODIUM 2 TABLET: 50; 8.6 TABLET, FILM COATED ORAL at 21:27

## 2019-10-07 RX ADMIN — GABAPENTIN 100 MG: 100 CAPSULE ORAL at 08:03

## 2019-10-07 RX ADMIN — Medication 1 CAPSULE: at 08:03

## 2019-10-07 RX ADMIN — CARBOXYMETHYLCELLULOSE SODIUM 1 DROP: 10 GEL OPHTHALMIC at 09:04

## 2019-10-07 RX ADMIN — GABAPENTIN 100 MG: 100 CAPSULE ORAL at 21:27

## 2019-10-07 ASSESSMENT — PATIENT HEALTH QUESTIONNAIRE - PHQ9
SUM OF ALL RESPONSES TO PHQ9 QUESTIONS 1 AND 2: 0
2. FEELING DOWN, DEPRESSED, IRRITABLE, OR HOPELESS: NOT AT ALL
2. FEELING DOWN, DEPRESSED, IRRITABLE, OR HOPELESS: NOT AT ALL
SUM OF ALL RESPONSES TO PHQ9 QUESTIONS 1 AND 2: 0
1. LITTLE INTEREST OR PLEASURE IN DOING THINGS: NOT AT ALL
1. LITTLE INTEREST OR PLEASURE IN DOING THINGS: NOT AT ALL

## 2019-10-07 NOTE — DISCHARGE PLANNING
Spoke w/ Lisa delaney via phone ([phone]) to verify she will be picking up patient tomorrow for discharge & transporting to [facility] [place] w/ Dr. Lorenz. Reviewed IMM w/ rhett. Verbalized agreement & understanding of Medicare rights to appeal discharge.

## 2019-10-07 NOTE — THERAPY
Physical Therapy   Daily Treatment     Patient Name: Ramya Bhakta  Age:  86 y.o., Sex:  female  Medical Record #: [id number]  Today's Date: 10/7/2019     Precautions  Precautions: (P) Spinal / Back Precautions   Comments: Dementia     Subjective    Pt up in wc, willing to participate.      Objective       10/07/19 0931   Precautions   Precautions Spinal / Back Precautions    Sitting Lower Body Exercises   Ankle Pumps 3 sets of 15   Hip Flexion 3 sets of 15   Hip Abduction 3 sets of 15   Hip Adduction 3 sets of 15   Long Arc Quad 3 sets of 15   Marching 3 sets of 15   Other Exercises 3# ankle wts for seated exercise program. Tammy-med bike pedals x 10 minutes at intensity level 2 to complete 1.65 miles LE endurance training   PT Total Time Spent   PT Individual Total Time Spent (Mins) 60   PT Charge Group   PT Gait Training 2   PT Therapeutic Exercise 2     Gait / endurance training with FWW and SPV for pathfinding 800 ft x 3    FIM Bed/Chair/Wheelchair Transfers Score: 5 - Standby Prompting/Supervision or Set-up  Bed/Chair/Wheelchair Transfers Description:  Supervision for safety    FIM Walking Score:  5 - Standby Prompting/Supervision or Set-up  Walking Description:  Walker, Supervision for safety    FIM Wheelchair Score:  5 - Standby Prompting/Supervision or Set-up  Wheelchair Description:  Supervision for safety    FIM Stairs Score:  5 - Standby Prompting/Supervision or Set-up  Stairs Description:         Assessment    Pt continues with reports of low back pain but able to complete basic mobility/ gait and exercises as noted without assist. SPV required 2* impaired memory / dementia    Plan    Complete mobility IRF-ABY assessment/ review HEP/ back precautions for d/c tomorrow

## 2019-10-07 NOTE — THERAPY
"Occupational Therapy  Daily Treatment     Patient Name: Ramya Bhakta  Age:  86 y.o., Sex:  female  Medical Record #: [id number]  Today's Date: 10/7/2019     Precautions  Precautions: (P) Spinal / Back Precautions   Comments: (P)  dementia     Safety   ADL Safety : Requires Supervision for Safety  Bathroom Safety: Requires Supervision for Safety    Subjective    \" But the  Picture shows one hand.\"    Max difficulty following  Home exercise program  When written directions to specify  Technique did not match to the picture shown      Objective       10/07/19 1231   Precautions   Precautions Spinal / Back Precautions    Comments  dementia    Sitting Upper Body Exercises   Chest Press 2 sets of 10;Bilateral   Front Arm Raise 2 sets of 10;Bilateral   Shoulder Press 2 sets of 10;Bilateral   Internal Shoulder Rotation 2 sets of 10;Right ;Left   External Shoulder Rotation 2 sets of 10;Right ;Left   Bicep Curls 2 sets of 10;Right ;Left   Pronation / Supination 2 sets of 10;Right ;Left   Other Exercise 2 sets of 10;Bilateral  arm circles forward and backe.    Comments ther ex performed with 3lb weight    Interdisciplinary Plan of Care Collaboration   IDT Collaboration with  Family / Caregiver   Patient Position at End of Therapy Seated;Family / Friend in Room  ( CNA in to recorf vital signs. )   Collaboration Comments Shayy Gonzalez ( who demanded home exercise program) present during session but did not particiapte in performance of the home exericses.    OT Total Time Spent   OT Individual Total Time Spent (Mins) 30   OT Charge Group   OT Therapeutic Exercise  2           Assessment     Max assist for memory and problem solving related to performance of home exercise program.     Plan    [facility]    "

## 2019-10-07 NOTE — DISCHARGE PLANNING
Case Management Discharge Instructions        Discharge Location: Home with Home Health to [facility]   Agency Name / Address / Phone: [facility]      [address]      [place] [address]      [phone]   Home Health: Registered Nurse, Home Health Aide, Occupational Therapist, Physical Therapist     Medical Equipment Provider / Phone: [facility]        [phone]   Medical Equipment Ordered: Front Wheel Walker, youth size      Follow-up Information:    Bhanu Lorenz D.O. Neuro-surgeon    [address] [facility] [address]    [place] [address]    [phone]    Tuesday 10.8.2019, check in at 11:30am for 11:45am appointment     Basil Banks M.D. Primary Care Provider    [address] [place] [address] [address]    [place] [id number] [phone]    Friday 10.11.2019, appointment at 10:40am

## 2019-10-07 NOTE — CARE PLAN
Problem: Safety  Goal: Will remain free from injury  Outcome: PROGRESSING SLOWER THAN EXPECTED  Intervention: Provide assistance with mobility  Note:   Pt does not consistently use call light. She's confused and impulsive most of the time.Bed alarm, hourly rounding and room closed to nurses station for safety precautions. Will continue to monitor.     Problem: Pain Management  Goal: Pain level will decrease to patient's comfort goal  Intervention: Follow pain managment plan developed in collaboration with patient and Interdisciplinary Team  Note:   Pt denies pain or discomfort tonight. Sleeps on and off. Will continue to monitor.

## 2019-10-07 NOTE — CARE PLAN
Problem: Safety  Goal: Will remain free from injury  Outcome: PROGRESSING AS EXPECTED   Pt uses call light consistently and appropriately. Waits for assistance does not attempt self transfer this shift. Able to verbalize needs.   Problem: Infection  Goal: Will remain free from infection  Outcome: PROGRESSING AS EXPECTED   Patient remains free of infection as evidenced by normal vital signs and breath sounds. Will continue monitoring.   Problem: Urinary Elimination:  Goal: Ability to reestablish a normal urinary elimination pattern will improve  Outcome: PROGRESSING AS EXPECTED   Patient voiding adequate amounts of clear, yellow urine. Denies dysuria and flank pain: afebrile. Will continue to monitor.

## 2019-10-07 NOTE — PROGRESS NOTES
"Rehab Progress Note     Encounter Date: 10/7/2019    CC: Dementia, neck pain, back pain    Interval Events (Subjective)  Patient sitting in room visiting with rhett. She reports she is doing well. She continues to walk more and her endurance is improving. Per niece she has a doctors appointment in the morning that they would like to make it to. Discussed discharge medications and will send in prescription later today.  Discussed will have it set up for discharge in the morning. Patient is happy to be returning to the [facility].     IDT Team Meeting 10/3/2019  DC/Disposition:  10/8/19    Objective:  VITAL SIGNS: /70   Pulse (!) 108   Temp 36.4 °C (97.5 °F) (Oral)   Resp 18   Ht 1.499 m (4' 11\")   Wt 62.7 kg (138 lb 4.8 oz)   SpO2 95%   BMI 27.93 kg/m²   Gen: NAD  Psych: Mood and affect appropriate  CV: RRR, no edema  Resp: CTAB, no upper airway sounds  Abd: NTND  Neuro: AOx1, needs constant cues, left lean at times    No results found for this or any previous visit (from the past 72 hour(s)).    Current Facility-Administered Medications   Medication Frequency   • doxazosin (CARDURA) tablet 4 mg QHS   • carboxymethylcellulose 1 % GEL 1 Drop TID   • dicyclomine (BENTYL) cap 10 mg **pt own suppy** Q6HRS PRN   • gabapentin (NEURONTIN) capsule 100 mg TID   • Respiratory Care per Protocol Continuous RT   • oxyCODONE immediate-release (ROXICODONE) tablet 5 mg Q3HRS PRN   • oxyCODONE immediate release (ROXICODONE) tablet 10 mg Q3HRS PRN   • tramadol (ULTRAM) 50 MG tablet 50 mg Q4HRS PRN   • hydrALAZINE (APRESOLINE) tablet 25 mg Q8HRS PRN   • acetaminophen (TYLENOL) tablet 650 mg Q4HRS PRN   • senna-docusate (PERICOLACE or SENOKOT S) 8.6-50 MG per tablet 2 Tab BID    And   • polyethylene glycol/lytes (MIRALAX) PACKET 1 Packet QDAY PRN    And   • magnesium hydroxide (MILK OF MAGNESIA) suspension 30 mL QDAY PRN    And   • bisacodyl (DULCOLAX) suppository 10 mg QDAY PRN   • artificial tears ophthalmic solution 1 " Drop PRN   • benzocaine-menthol (CEPACOL) lozenge 1 Lozenge Q2HRS PRN   • mag hydrox-al hydrox-simeth (MAALOX PLUS ES or MYLANTA DS) suspension 20 mL Q2HRS PRN   • ondansetron (ZOFRAN ODT) dispertab 4 mg 4X/DAY PRN    Or   • ondansetron (ZOFRAN) syringe/vial injection 4 mg 4X/DAY PRN   • traZODone (DESYREL) tablet 50 mg QHS PRN   • sodium chloride (OCEAN) 0.65 % nasal spray 2 Spray PRN   • artificial tears (EYE LUBRICANT) ophth ointment 1 Application Q8HRS   • [START ON 9/1/2020] donepezil (ARICEPT) tablet 20 mg DAILY   • lactobacillus rhamnosus (CULTURELLE) capsule 1 Cap QDAY with Breakfast   • vitamin D (cholecalciferol) tablet 5,000 Units DAILY       Orders Placed This Encounter   Procedures   • Diet Order Regular (boost with each meal. No fish except tuna salad.)     Standing Status:   Standing     Number of Occurrences:   1     Order Specific Question:   Diet:     Answer:   Regular [1]     Comments:   boost with each meal. No fish except tuna salad.       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  Patient with shoulder and back pain.  -PT and OT for mobility and ADLs  -Staple removal      Dysphagia - Concern per bedside nurse.  Downgrade to Dysphagia 2 and NTL.   -SLP for swallow evaluation. Do not recommend MBSS, upgraded to dysphagia 3 and thins.      Back pain - Patient on Gabapentin 100 mg BID and PRN oxycodone  -Increase gabapentin to 100 mg TID.     Anemia - Mild on admission at 11.2, continue to monitor most likely just post-operative.     Abdominal Pain - Patient's family brought in dicyclomine for abdominal cramping. OK'ed with pharmacy     Dementia - Patient on Aricept on transfer.       Urinary retention - Patient on Flomax on transfer, changed to doxazosin due to crushing medications. Increase to 4 mg Cardura     Vitamin D - Patient on 5000 U daily. 26 on admission, unclear if has been getting the supplement during hospital stay.      DVT ppx - OK to change to Lovenox on 10/1/19. Ambulated > 400 feet. Discontinue Lovenox    Dispo - Back to the [facility] with home health on 10/8/19    Total time:  25 minutes.  I spent greater than 50% of the time for patient care, counseling, and coordination on this date, including unit/floor time, and face-to-face time with the patient as per interval events and assessment and plan above. Topics discussed included discharge planning, improved retention and discharge medications. Patient to discharge around 9-10 to make it to appointment.     Robinson Doran M.D.

## 2019-10-07 NOTE — CARE PLAN
Problem: Safety  Goal: Will remain free from injury  Outcome: PROGRESSING AS EXPECTED   Pt does not use call light for assistance, Impulsive and unsteady from transfers.  Patient encouraged to call for assistance and not attempt self transfer . Able to verbalize needs.   Problem: Infection  Goal: Will remain free from infection  Outcome: PROGRESSING AS EXPECTED   Patient remains free of infection as evidenced by normal vital signs and breath sounds. Will continue monitoring.   Problem: Pain Management  Goal: Pain level will decrease to patient's comfort goal  Outcome: PROGRESSING AS EXPECTED   Patient reports satisfactory pain control and decrease intensity after pharmacological pain management.

## 2019-10-07 NOTE — THERAPY
Physical Therapy   Daily Treatment     Patient Name: Ramya Bhakta  Age:  86 y.o., Sex:  female  Medical Record #: [id number]  Today's Date: 10/7/2019     Precautions  Precautions: (P) Spinal / Back Precautions   Comments: (P) dementia     Subjective    Pt up in bathroom, willing to participate     Objective       10/07/19 1401   Precautions   Precautions Spinal / Back Precautions    Comments dementia    Sitting Lower Body Exercises   Ankle Pumps 2 sets of 10   Hip Abduction 2 sets of 10   Hip Adduction 2 sets of 10   Long Arc Quad 2 sets of 10   Marching 2 sets of 10   Other Exercises HEP provided and reviewed with pt and niece.    PT Total Time Spent   PT Individual Total Time Spent (Mins) 30   PT Charge Group   PT Therapeutic Exercise 2       FIM Bed/Chair/Wheelchair Transfers Score: 5 - Standby Prompting/Supervision or Set-up  Bed/Chair/Wheelchair Transfers Description:  Supervision for safety    FIM Walking Score:  5 - Standby Prompting/Supervision or Set-up  Walking Description:  Walker, Supervision for safety    FIM Wheelchair Score:  5 - Standby Prompting/Supervision or Set-up  Wheelchair Description:  Supervision for safety    FIM Stairs Score:  5 - Standby Prompting/Supervision or Set-up  Stairs Description:         Assessment    Pt continues to report increased back pain/ fatigue when ambulating without 4WW. Discussed back protection with niece and recommendation to use 4WW to support posture / limit pain when fatigued.    Plan    D/c tomorrow with home health PT

## 2019-10-07 NOTE — THERAPY
"Occupational Therapy  Daily Treatment     Patient Name: Ramya Bhakta  Age:  86 y.o., Sex:  female  Medical Record #: [id number]  Today's Date: 10/7/2019     Precautions  Precautions: Spinal / Back Precautions   Comments: Dementia     Safety   ADL Safety : Requires Supervision for Safety  Bathroom Safety: Requires Supervision for Safety    Subjective    \"   I don't know where I'm going .\" referring to walking from room to dining room using 4WW     Objective        {FIM Scores:[id number]}    Assessment    ***    Plan    ***    "

## 2019-10-07 NOTE — THERAPY
"Occupational Therapy  Daily Treatment     Patient Name: Ramya Bhakta  Age:  86 y.o., Sex:  female  Medical Record #: [id number]  Today's Date: 10/7/2019     Precautions  Precautions: Spinal / Back Precautions   Comments: Dementia     Safety   ADL Safety : Requires Supervision for Safety  Bathroom Safety: Requires Supervision for Safety    Subjective    \" I don't know where I'm going .\" referring to using 4WW to walk from room to dining room      Objective       10/07/19 0701   Precautions   Precautions Spinal / Back Precautions    Comments Dementia    Interdisciplinary Plan of Care Collaboration   Patient Position at End of Therapy Seated  (in dining room for breakfast)   OT Total Time Spent   OT Individual Total Time Spent (Mins) 60   OT Charge Group   OT Self Care / ADL 4       FIM Eating Score:  7 - Independent  Eating Description:       FIM Grooming Score:  7 - Independent  Grooming Description:       FIM Bathing Score:  5 - Standby Prompting/Supervision or Set-up  Bathing Description:       FIM Upper Body Dressin - Standby Prompting/Supervision or Set-up  Upper Body Dressing Description:  (cues to orient where to find clothing and not wear the clothes she wore 2 days prior. )    FIM Lower Body Dressing Score:  5 - Standby Prompting/Supervsion or Set-up  Lower Body Dressing Description:  (cues to orient where to find clothing and not wear the clothes she wore 2 days prior. ))    FIM Toiletin - Independent  Toileting Description:       FIM Toilet Transfer Score:  6 - Modified Independent  Toilet Transfer Description:  Grab bar    FIM Tub/Shower Transfers Score:  6 - Modified Independent  Tub/Shower Transfers Description:  Grab bar    FIM Walking Score:  5 - Standby Prompting/Supervision or Set-up  Walking Description:  ( 4WW  room to dining room supervision  cues for pathfinding )    FIM Comprehension Score:  6 - Modified Independent  Comprehension Description:  Glasses, Hearing aids/amplifiers    FIM " Expression Score:  7 - Independent  Expression Description:       FIM Social Interaction Score:  5 - Stand-by Prompting/Supervision or Set-up  Social Interaction Description:       FIM Problem Solving Score:  4 - Minimal Assistance  Problem Solving Description:       FIM Memory Score:  2 - Max Assistance  Memory Description:         Assessment      Continued Max cues for memory related to daily routine.     Plan    Review and issue HEP  PM tx

## 2019-10-08 VITALS
TEMPERATURE: 98.3 F | DIASTOLIC BLOOD PRESSURE: 61 MMHG | RESPIRATION RATE: 16 BRPM | BODY MASS INDEX: 27.88 KG/M2 | HEART RATE: 94 BPM | SYSTOLIC BLOOD PRESSURE: 109 MMHG | HEIGHT: 59 IN | WEIGHT: 138.3 LBS | OXYGEN SATURATION: 94 %

## 2019-10-08 PROCEDURE — 99239 HOSP IP/OBS DSCHRG MGMT >30: CPT | Performed by: PHYSICAL MEDICINE & REHABILITATION

## 2019-10-08 PROCEDURE — A9270 NON-COVERED ITEM OR SERVICE: HCPCS | Performed by: PHYSICAL MEDICINE & REHABILITATION

## 2019-10-08 PROCEDURE — 700102 HCHG RX REV CODE 250 W/ 637 OVERRIDE(OP): Performed by: PHYSICAL MEDICINE & REHABILITATION

## 2019-10-08 RX ADMIN — Medication 1 CAPSULE: at 08:47

## 2019-10-08 RX ADMIN — DICYCLOMINE HYDROCHLORIDE 10 MG: 10 CAPSULE ORAL at 08:50

## 2019-10-08 RX ADMIN — GABAPENTIN 100 MG: 100 CAPSULE ORAL at 08:48

## 2019-10-08 RX ADMIN — MINERAL OIL, PETROLATUM 1 APPLICATION: 425; 568 OINTMENT OPHTHALMIC at 06:04

## 2019-10-08 RX ADMIN — CARBOXYMETHYLCELLULOSE SODIUM 1 DROP: 10 GEL OPHTHALMIC at 08:49

## 2019-10-08 RX ADMIN — VITAMIN D, TAB 1000IU (100/BT) 5000 UNITS: 25 TAB at 08:47

## 2019-10-08 ASSESSMENT — ACTIVITIES OF DAILY LIVING (ADL)
TOILETING_LEVEL_OF_ASSIST: REQUIRES SUPERVISION WITH TOILETING
SHOWER_TRANSFER_LEVEL_OF_ASSIST: REQUIRES SUPERVISION WITH SHOWER TRANSFER
TOILET_TRANSFER_LEVEL_OF_ASSIST: REQUIRES SUPERVISION WITH TOILET TRANSFER

## 2019-10-08 NOTE — DISCHARGE SUMMARY
Rehab Discharge Summary    Admission Date: 9/30/2019    Discharge Date: 10/8/2019    Attending Provider: Robinson Doran MD/PhD    Admission Diagnosis:   Active Hospital Problems    Diagnosis   • *s/p laminectomy c/b pseudomeningocele s/p InD   • Urinary incontinence   • Alzheimer's dementia with behavioral disturbance (HCC)   • Vitamin D deficiency   • GERD (gastroesophageal reflux disease)       Discharge Diagnosis:  Active Hospital Problems    Diagnosis   • *s/p laminectomy c/b pseudomeningocele s/p InD   • Urinary incontinence   • Alzheimer's dementia with behavioral disturbance (HCC)   • Vitamin D deficiency   • GERD (gastroesophageal reflux disease)       HPI per H&P:  Patient is a 86 y.o. year-old female with a past medical history significant for Arthritis, Glaucoma, Hx of hepatitis, and recent back surgery with L2-L5 lumbar laminectomy admitted to [facility] on 9/23/2019  8:10 AM with low back pain and headache.  Per report patient had a L2-L5 laminectomy with Dr. Lorenz on 8/16/19, Patient underwent procedure and was discharged to [facility].  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  Patient had scheduled I&D of pseudomeningocele with Dr. Lorenz on 9/23/19.  Urine culture positive for klebsiella, patient on Ceftin.  Patient was previously living at an assisted living on the independent side.      Patient was last evaluated by PT on 9/28/19 and was Ashlyn for gait.  Patient has not been evaluated by OT on 9/29/19 and was min-modA for ADLs.      Patient was admitted to [facility] on 9/30/2019.     Hospital Course by Problem List:  Lumbar pseudomeningocele - Patient s/p I&D with Dr. Lorenz on 9/23/19.  Patient with shoulder and back pain. Patient underwent acute inpatient rehabilitation from 9/30/19 to 10/8/19 with good improvement in mobility and ADLs. Staple removal prior to discharge.      Dysphagia - Concern per bedside nurse.  Downgrade to  Dysphagia 2 and NTL. SLP for swallow evaluation. Do not recommend MBSS, upgraded to dysphagia 3 and thins. Baseline.      Back pain - Patient on Gabapentin 100 mg BID and PRN oxycodone  -Increase gabapentin to 100 mg TID.     Anemia - Mild on admission at 11.2, continue to monitor most likely just post-operative. Stable     Abdominal Pain - Patient's family brought in dicyclomine for abdominal cramping. OK'ed with pharmacy      Dementia - Patient on Aricept on transfer.      Urinary retention - Patient on Flomax on transfer, changed to doxazosin due to crushing medications. Increase to 4 mg Cardura     Vitamin D - Patient on 5000 U daily. 26 on admission, unclear if has been getting the supplement during hospital stay.      DVT ppx - OK to change to Lovenox on 10/1/19. Ambulated > 400 feet. Discontinue Lovenox     Dispo - Back to the [place] with home health on 10/8/19    Functional Status at Discharge  Eatin - Independent  Eating Description:  Increased time, Modified diet, Supervision for safety, Verbal cueing  Groomin - Independent  Grooming Description:  (oral care  brush hair.  required no cues to  initiate task )  Bathin - Standby Prompting/Supervision or Set-up  Bathing Description:  Grab bar, Tub bench, Hand held shower( reminders that it is shower day and cues to perform the task . demonstrated improved  adherence to back precautions. )  Upper Body Dressin - Standby Prompting/Supervision or Set-up  Upper Body Dressing Description:  (cues to orient where to find clothing and not wear the clothes she wore 2 days prior. )  Lower Body Dressin - Standby Prompting/Supervsion or Set-up  Lower Body Dressing Description:  5 - Standby Prompting/Supervsion or Set-up  Discharge Location : Home(independent senior living )  Patient Discharging with Assist of: Family (Shayy Gonzalez )  Level of Supervision Required: Intermittent Supervision  Recommended Equipment for Discharge: None( DME needs in  place )  Recommended Services Upon Discharge: Home Health Occupational Therapy  Long Term Goals Met: complete basic  self care and related mobility supervision to mod I   Criteria for Termination of Services: Maximum Function Achieved for Inpatient Rehabilitation  Walk:  5 - Standby Prompting/Supervision or Set-up  Distance Walked:  Walks a minimum of 150 feet  Walk Description:  Walker, Supervision for safety  Wheelchair:  5 - Standby Prompting/Supervision or Set-up  Distance Propelled:  Propels a minimum of 150 feet   Wheelchair Description:  Supervision for safety  Stairs 5 - Standby Prompting/Supervision or Set-up  Stairs DescriptionHand rails, Supervision for safety  Discharge Location: Assisted Living  Patient Discharging with Assist of: Family;Caregiver  Level of Supervision Required Upon Discharge: Intermittent Supervision  Recommended Equipment for Discharge: 4-Wheeled Walker  Recommeded Services Upon Discharge: Home Health Physical Therapy  Long Term Goals Met: 4  Long Term Goals Not Met: 1  Reason(s) for Goals Not Met: Pt requires SPB with all mobility due to impaired safety awareness/ back precautions and poor memory/ dementia  Criteria for Termination of Services: Maximum Function Achieved for Inpatient Rehabilitation  Comprehension Mode:  Both  Comprehension:  6 - Modified Independent  Comprehension Description:  Glasses, Hearing aids/amplifiers  Expression Mode:  Vocal  Expression:  7 - Independent  Expression Description:  Verbal cueing  Social Interaction:  5 - Stand-by Prompting/Supervision or Set-up  Social Interaction Description:  Increased time, Verbal cues  Problem Solvin - Minimal Assistance  Problem Solving Description:  Verbal cueing, Therapy schedule, Increased time, Bed/chair alarm, Seat belt  Memory:  2 - Max Assistance  Memory Description:  ( Max cues for recall of tasks to be performed  thorugh out the one hour session . )       IRobinson M.D., personally  performed a complete drug regimen review and no potential clinically significant medication issues were identified.   Discharge Medication:     Medication List      START taking these medications      Instructions   Cholecalciferol 5000 units Tabs   Take 5,000 Units by mouth every day.  Dose:  5,000 Units     dicyclomine 10 MG Caps  Commonly known as:  BENTYL   Take 1 Cap by mouth every 6 hours as needed (abdominal cramp/discomfort).  Dose:  10 mg     doxazosin 4 MG Tabs  Commonly known as:  CARDURA   Take 1 Tab by mouth every bedtime.  Dose:  4 mg        CHANGE how you take these medications      Instructions   gabapentin 100 MG Caps  What changed:    · how much to take  · when to take this  · reasons to take this  Commonly known as:  NEURONTIN   Take 1 Cap by mouth 3 times a day.  Dose:  100 mg        CONTINUE taking these medications      Instructions   acetaminophen 325 MG Tabs  Commonly known as:  TYLENOL   Take 650 mg by mouth every four hours as needed.  Dose:  650 mg     artificial tears Oint ophth ointment  Commonly known as:  EYE LUBRICANT   Place 1 Application in both eyes every 8 hours.  Dose:  1 Application     donepezil 10 MG tablet  Commonly known as:  ARICEPT   Take 20 mg by mouth every day.  Dose:  20 mg     Omega 3 1200 MG Caps   Take 1 Tab by mouth every day.  Dose:  1 Tab     REFRESH TEARS OP   1 Drop by Ophthalmic route 2 Times a Day.  Dose:  1 Drop            Discharge Diet:  Regular, Dysphagia 3 with thin liquids    Discharge Activity:  As tolerated    Disposition:  Patient to discharge home to independent living facility.      Equipment:  W,    Follow-up & Discharge Instructions:  Follow up with your primary care provider (PCP) within 7-10 days of discharge to review your medications and take over your care.     If you develop chest pain, fever, chills, change in neurologic function (weakness, sensation changes, vision changes), or other concerning sxs, seek immediate medical attention or  call 911.      Condition on Discharge:  Good    More than 33 minutes was spent on discharging this patient, including face-to-face time, prescription management, and the dictation of this note.    Robinson Doran M.D.    Date of Service: 10/8/2019

## 2019-10-08 NOTE — DISCHARGE PLANNING
Case management Summary:   Spoke with Lisa delaney, via phone yesterday prior to discharge.   Reviewed all follow up appointments: PCP & NSGY.   Referral made to [facility] and they are have accepted referral and are ready to follow.    [facility] has delivered FWW to patient.    During hospitalization, I have provided support and education and have been available for questions and information during hours of operation, communicated with therapy team and MD along with providing links/resources  to outside services.    Patient verbalizes agreement with all plans and has an understanding of the next steps within the post acute services.     Individualized Goals:   1. Patient will be safe to discharge to assisted living facility.   2. Patient will orient to hospital setting.     Outcome:   1. Goal met & completed: discharging to PLOF & living status at [facility]/  referral in place.  2. Goal met & completed: poor carryover of learning, baseline dementia.

## 2019-10-08 NOTE — CARE PLAN
Problem: OT Long Term Goals  Goal: LTG-By discharge, patient will complete basic self care tasks  Description  1) Individualized Goal:  With supervision to modified independence  2) Interventions:  OT Group Therapy, OT Self Care/ADL, OT Cognitive Skill Dev, OT Community Reintegration, OT Manual Ther Technique, OT Neuro Re-Ed/Balance, OT Therapeutic Activity, OT Evaluation and OT Therapeutic Exercise     Outcome: MET  Note:    Completes tasks with supervision to mod I   Goal: LTG-By discharge, patient will perform bathroom transfers  Description  1) Individualized Goal:  With supervision to modified independence  2) Interventions:  OT Group Therapy, OT Self Care/ADL, OT Cognitive Skill Dev, OT Community Reintegration, OT Manual Ther Technique, OT Neuro Re-Ed/Balance, OT Therapeutic Activity, OT Evaluation and OT Therapeutic Exercise   Outcome: MET  Note:    Completes task with supervision to mod I

## 2019-10-08 NOTE — CONSULTS
DATE OF SERVICE:  10/02/2019    BEHAVIORAL MEDICINE EVALUATION    BRIEF HISTORY OF PRESENTING COMPLAINTS:  The patient is an 86-year-old single   white female who is referred for behavioral medicine evaluation by Dr. Doran.  The patient was transferred to rehab from acute where she was   [facility] with low back and headache.  The patient had   previously undergone an L2-L5 laminectomy on 08/16/2019.  She was discharged   to advance skilled nursing.  Postoperatively, she developed complications of   pseudomeningocele.  She had a scheduled I and D of the pseudomeningocele on   09/23/2019.  The patient underwent the procedure.  She was stabilized acutely   and then sent to rehab to address her general debility.    PAST MEDICAL HISTORY:  Significant for arthritis, basal cell skin cancer,   cataracts, glaucoma, hepatitis A, jaundice, back pain.    PSYCHOLOGICAL STATUS:  MENTAL STATUS EXAMINATION:  The patient is a well-nourished female of short to   medium stature who appeared her stated age of 86.  At presentation, the   patient was alert.  She was sitting in a wheelchair, talking to her niece when   approached.  She oriented marginally well to my presence.  The patient was   kempt in appearance.  She was dressed casually in street attire that was   appropriate to her age and setting.  The patient's manner of presentation was   cooperative and friendly.    The patient was poorly oriented to time, place, and date.  It was difficult   for her to answer specific questions.  When prompted and queued appropriately,   she was able to orient herself much better.  Her language was logical and   goal oriented for the most part, but her speech was very sparse and somewhat   hesitant.  Her concentration and memory functioning were poor.    The patient's affect was slightly constricted, stable, and mildly intense.    She related poorly to marginally well.  Her mood appeared fairly content and   appropriate  to the context.    There was no evidence of delusional or perceptual disturbance.  Also, the   patient showed no unusual pain or motor behavior during the interview.    SPECIFIC BEHAVIORAL COMPLAINTS:  Patient admitted to very mild symptoms of   depression.  She said the past few days she has felt somewhat sad.  She was   unable to identify any accompanying thoughts that would describe the content   of her thoughts very well; however, she reported no significant symptoms of   anxiety.  She denied that she felt angry.    The patient said she could not remember if she was sleeping well.  She did   report some pain, but she said her pain ranges between 0-10 and did not   identify exactly where she hurt.  She said as far she knows her energy and   appetite are okay.  She also said she believes, but she could not provide any   more detailed information that things were going well in her life as far as   her stress levels prior to her hospitalization.    The patient also reported as far she knows, she was experiencing no   interpersonal discord, family disharmony, or other problems including ETOH or   other drug abuse or any use of tobacco.    PSYCHIATRIC HISTORY:  The patient denied any history significant for   psychiatric disturbance or treatment.  There was also nothing in her chart   mentioned any psychiatric diagnoses or past history of problems.    PSYCHOMETRIC TESTING:  Patient was administered 2 psychometric tests and 2   screening instruments.  The PS/PC-R revealed no clinically significant   symptoms of a negative mood.  However, the patient did report some feelings of   sadness.  Her CDR survey showed no problems with level of consciousness.  Her   attention, however, was significantly diminished and her thinking impaired   for reasoning, judgment and decision making ability.  Her thinking was also   impoverished and limited in scope.  Patient's perception was poor for date,   time and place, orientating.  Her  speech was slow and sparse.  The patient   showed significant problems in recent and even long-term memory functioning.    She also identified deficits in behavioral activation and basic self-care.    The patient reported no significant mood disturbance.  She said as far as she   can remember, her appetite and energy level are good.    The patient was screened for any elder abuse or risk of suicide.  There is no   strong evidence for either problem.    SOCIAL HISTORY:  The patient lives in [facility], which is an assisted living   facility.  She has supportive family members who live nearby.  She is single.    IMPRESSION:  Dementia; adjustment disorder with depressed mood.    RECOMMENDATIONS:  The patient will be followed for status and supportive care.       ____________________________________     ABRAHAM VANCE, PHD    ROSANA / LYNETTE    DD:  10/07/2019 17:31:26  DT:  10/07/2019 21:07:40    D#:  [id number]  Job#:  [id number]

## 2019-10-08 NOTE — CARE PLAN
"  Problem: PT-Long Term Goals  Goal: LTG-By discharge, patient will transfer one surface to another  Description  1) Individualized goal:  Modified independent with 4WW as needed  2) Interventions:  PT Group Therapy, PT Gait Training, PT Self Care/Home Eval, PT Therapeutic Exercises, PT TENS Application, PT Neuro Re-Ed/Balance, PT Therapeutic Activity, PT Manual Therapy and PT Evaluation     Outcome: NOT MET     Problem: Mobility  Goal: STG-Within one week, patient will ambulate up/down a curb  Description  1) Individualized goal:  CGA with FWW for 6\" rise  2) Interventions:  PT Group Therapy, PT Gait Training, PT Self Care/Home Eval, PT Therapeutic Exercises, PT TENS Application, PT Neuro Re-Ed/Balance, PT Therapeutic Activity, PT Manual Therapy and PT Evaluation     Outcome: MET     Problem: PT-Long Term Goals  Goal: LTG-By discharge, patient will tolerate standing  Description  1) Individualized goal:  With UE support at // bars to complete standing exercise program 3 x 10  2) Interventions:  PT Group Therapy, PT Gait Training, PT Self Care/Home Eval, PT Therapeutic Exercises, PT TENS Application, PT Neuro Re-Ed/Balance, PT Therapeutic Activity, PT Manual Therapy and PT Evaluation     Outcome: MET  Goal: LTG-By discharge, patient will ambulate  Description  1) Individualized goal:  SPV for pathfinding with 4WW 200 ft x 2 from room<>meals  2) Interventions:  PT Group Therapy, PT Gait Training, PT Self Care/Home Eval, PT Therapeutic Exercises, PT TENS Application, PT Neuro Re-Ed/Balance, PT Therapeutic Activity, PT Manual Therapy and PT Evaluation     Outcome: MET  Goal: LTG-By discharge, patient will ambulate up/down flight of stairs  Description  1) Individualized goal:  SPV for safety with hand rail  2) Interventions:  PT Group Therapy, PT Gait Training, PT Self Care/Home Eval, PT Therapeutic Exercises, PT TENS Application, PT Neuro Re-Ed/Balance, PT Therapeutic Activity, PT Manual Therapy and PT Evaluation   "   Outcome: MET  Goal: LTG-By discharge, patient will transfer in/out of a car  Description  1) Individualized goal:  SPV from ambulatory level with 4WW  2) Interventions:  PT Group Therapy, PT Gait Training, PT Self Care/Home Eval, PT Therapeutic Exercises, PT TENS Application, PT Neuro Re-Ed/Balance, PT Therapeutic Activity, PT Manual Therapy and PT Evaluation     Outcome: MET

## 2019-10-08 NOTE — PROGRESS NOTES
Patient discharged to home per order.  Discharge instructions reviewed with patient and niece; they verbalize understanding and signed copies placed in chart.  Patient has all belongings; signed copy of form in chart.  Patient left facility at 1030

## 2019-10-08 NOTE — CARE PLAN
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 Pt free from injury and fall.     Problem: Infection  Goal: Will remain free from infection  Intervention: Assess signs and symptoms of infection  Note:   Assessed for ay s/s of infection.Afebrile . HR - 101 , denies sob, denies chest pain.  With ease in breathing. Voiding freely.

## 2019-10-08 NOTE — DISCHARGE INSTRUCTIONS
[facility] NURSING DISCHARGE INSTRUCTIONS    Blood Pressure : 109/61  Weight: 62.7 kg (138 lb 4.8 oz)  Nursing recommendations for Ramya Bhakta at time of discharge are as follows:  Client and Family Member verbalized understanding of all discharge instructions and prescriptions.     Review all your home medications and newly ordered medications with your doctor and/or pharmacist. Follow medication instructions as directed by your doctor and/or pharmacist.    Pain Management:   Discharge Pain Medication Instructions:  Comfort Goal: Play, Sleep Comfortably, Stay Alert, 0, Comfort at Rest, Comfort with Movement, Perform Activity  Notify your primary care provider if pain is unrelieved with these measures, if the pain is new, or increased in intensity.    Discharge Skin Characteristics: Warm, Dry  Discharge Skin Exam:      Skin / Wound Care Instructions: Please contact your primary care physician for any change in skin integrity.     If You Have Surgical Incisions / Wounds:  Monitor surgical site(s) for signs of increased swelling, redness or symptoms of drainage from the site or fever as this could indicate signs and symptoms of infection. If these symptoms are noted, notifiy your primary care provider.      Discharge Safety Instructions: Should Have ADULT SUPERVISION     Discharge Safety Concerns: Weakness, Impaired Judgement  The interdisciplinary team has made recommendation that you should have adult supervision in the house due to weakness  Anti-embolic stockings are required during the day and off at night to increase circulation to the lower extremities.    Discharge Diet: Regular Diet     Discharge Liquids: Thin Liquids  Discharge Bowel Function: Continent  Please contact your primary care physician for any changes in bowel habits.  Discharge Bowel Program:    Discharge Bladder Function: Continent  Discharge Urinary Devices:        Nursing Discharge Plan:   Influenza Vaccine  Indication: Patient Refuses    Case Management Discharge Instructions:   Discharge Location: Home with Home Health  Agency Name/Address/Phone: [facility]  -  phone [phone]  Home Health: Registered Nurse, Home Health Aide, Occupational Therapist, Physical Therapist  Outpatient Services:    DME Provider/Phone: [facility]  -  phone [phone]  Medical Equipment Ordered: Front Wheel Walker  Prescription Faxed to:        Discharge Medication Instructions:  Below are the medications your physician expects you to take upon discharge:      Depression / Suicide Risk    As you are discharged from this [facility] facility, it is important to learn how to keep safe from harming yourself.    Recognize the warning signs:  · Abrupt changes in personality, positive or negative- including increase in energy   · Giving away possessions  · Change in eating patterns- significant weight changes-  positive or negative  · Change in sleeping patterns- unable to sleep or sleeping all the time   · Unwillingness or inability to communicate  · Depression  · Unusual sadness, discouragement and loneliness  · Talk of wanting to die  · Neglect of personal appearance   · Rebelliousness- reckless behavior  · Withdrawal from people/activities they love  · Confusion- inability to concentrate     If you or a loved one observes any of these behaviors or has concerns about self-harm, here's what you can do:  · Talk about it- your feelings and reasons for harming yourself  · Remove any means that you might use to hurt yourself (examples: pills, rope, extension cords, firearm)  · Get professional help from the community (Mental Health, Substance Abuse, psychological counseling)  · Do not be alone:Call your Safe Contact- someone whom you trust who will be there for you.  · Call your local CRISIS HOTLINE 005-0592 or 033-029-5574  · Call your local Children's Mobile Crisis Response Team [place] (184) 650-2477 or  "www.Prepmatic  · Call the toll free National Suicide Prevention Hotlines   · National Suicide Prevention Lifeline 649-937-WAPC (8015)  · National Hope Line Network 800-SUICIDE (465-8610)    Prevent Falls in Your Home    \"Falling once doubles your chance of falling again\"        -Center for Disease Control and Prevention    Falls in the home can lead to serious injury (fractures, brain injuries), hospitalizations, increased medical costs, and could even be fatal.  The good news is, there are many precautions you can take to avoid falls in your home and help keep you safe:     · If prescribed an assistive device (walker, crutches), use as instructed by the healthcare provider\"   · Remove any tripping hazards from your home, including loose cords, throw rugs and clutter  · Keep a nightlight on in dark (hallways, bathrooms, etc)   · Get up slowly, to make sure you feel okay before getting up  · Be aware of any side effects of your medications: some medications may make you dizzy  · Place a non-skid rubber mat in your shower or tub-consider a shower bench or chair if unsteady on your feet  · Wear supportive shoes or non-skid socks when moving around  · Start an exercise program once approved by your provider.  If you are feeling weak following a hospital stay, talk to your doctor about home health or outpatient therapy programs designed to help rebuild your strength and endurance    Occupational Therapy Discharge Instructions for Ramya Bhakta    10/8/2019    Level of Assist Required for Eating: Able to Complete Eating without Assist  Level of Assist Required for Grooming: Requires Supervision with Grooming  Level of Assist Required for Dressing: Requires Supervision with Dressing  Level of Assist Required for Toileting: Requires Supervision with Toileting  Level of Assist Required for Toilet Transfer: Requires Supervision with Toilet Transfer  Level of Assist Required for Bathing: Requires Supervision with " Bathing  Level of Assist Required for Shower Transfer: Requires Supervision with Shower Transfer     Here at rehab  Ramya needs reminders as to if she has performed a task or not ..    Example    During morning routine she would ask  Did  I brush my teeth today? With in 5 minutes of performing the task.    Cues to change clothes   Cues for bathing on scheduled shower days.      I expect that she will perform better in her regular environment and a structured schedule and written reminders.       Physical Therapy Discharge Instructions for Ramya Bhakta    10/8/2019    Level of Assist Required for Ambulation: Supervision on Flat Surfaces, Supervision on Curbs, Supervision on Stairs  Distance Patient May Ambulate: as tolerated  Device Recommended for Ambulation: 4-Wheeled Walker  Level of Assist Required for Transfers: Requires No Assist  Device Recommended for Transfers: 4-Wheeled Walker  Home Exercise Program: Refer to Home Exercise Program Handout for Details

## 2019-10-09 ENCOUNTER — PATIENT OUTREACH (OUTPATIENT)
Dept: MEDICAL GROUP | Facility: MEDICAL CENTER | Age: 84
End: 2019-10-09

## 2019-10-11 ENCOUNTER — OFFICE VISIT (OUTPATIENT)
Dept: MEDICAL GROUP | Facility: MEDICAL CENTER | Age: 84
End: 2019-10-11
Payer: MEDICARE

## 2019-10-11 ENCOUNTER — HOSPITAL ENCOUNTER (OUTPATIENT)
Facility: MEDICAL CENTER | Age: 84
End: 2019-10-11
Attending: INTERNAL MEDICINE
Payer: MEDICARE

## 2019-10-11 VITALS
SYSTOLIC BLOOD PRESSURE: 120 MMHG | RESPIRATION RATE: 16 BRPM | OXYGEN SATURATION: 95 % | HEART RATE: 91 BPM | DIASTOLIC BLOOD PRESSURE: 58 MMHG | HEIGHT: 58 IN | TEMPERATURE: 98.1 F | WEIGHT: 128 LBS | BODY MASS INDEX: 26.87 KG/M2

## 2019-10-11 DIAGNOSIS — R33.9 URINE RETENTION: ICD-10-CM

## 2019-10-11 DIAGNOSIS — G96.198 PSEUDOMENINGOCELE: ICD-10-CM

## 2019-10-11 DIAGNOSIS — R30.0 DYSURIA: ICD-10-CM

## 2019-10-11 PROBLEM — E86.0 DEHYDRATION: Status: RESOLVED | Noted: 2019-09-24 | Resolved: 2019-10-11

## 2019-10-11 LAB
AMORPH CRY #/AREA URNS HPF: PRESENT /HPF
APPEARANCE UR: CLEAR
BACTERIA #/AREA URNS HPF: ABNORMAL /HPF
BILIRUB UR QL STRIP.AUTO: NEGATIVE
CAOX CRY #/AREA URNS HPF: ABNORMAL /HPF
COLOR UR: YELLOW
EPI CELLS #/AREA URNS HPF: ABNORMAL /HPF
GLUCOSE UR STRIP.AUTO-MCNC: NEGATIVE MG/DL
HYALINE CASTS #/AREA URNS LPF: ABNORMAL /LPF
KETONES UR STRIP.AUTO-MCNC: ABNORMAL MG/DL
LEUKOCYTE ESTERASE UR QL STRIP.AUTO: ABNORMAL
MICRO URNS: ABNORMAL
MUCOUS THREADS #/AREA URNS HPF: ABNORMAL /HPF
NITRITE UR QL STRIP.AUTO: POSITIVE
PH UR STRIP.AUTO: 6 [PH] (ref 5–8)
PROT UR QL STRIP: 100 MG/DL
RBC # URNS HPF: ABNORMAL /HPF
RBC UR QL AUTO: ABNORMAL
SP GR UR STRIP.AUTO: >=1.03
TRANS CELLS #/AREA URNS HPF: ABNORMAL /HPF
UROBILINOGEN UR STRIP.AUTO-MCNC: 0.2 MG/DL
WBC #/AREA URNS HPF: ABNORMAL /HPF

## 2019-10-11 PROCEDURE — 81001 URINALYSIS AUTO W/SCOPE: CPT

## 2019-10-11 PROCEDURE — 99214 OFFICE O/P EST MOD 30 MIN: CPT | Performed by: INTERNAL MEDICINE

## 2019-10-11 NOTE — PROGRESS NOTES
CC: Follow-up back surgery, complaining of dysuria and urinary retention.                                                                                                                                      HPI:   Ramya presents today with the following.    1. s/p laminectomy c/b pseudomeningocele s/p InD  Resents status post laminectomy with complication of meningocele status post another procedure problem resolving.  Back pain significantly improved.  She denies any fevers or chills breathing okay no leg swelling to speak of more than baseline.    2. Dysuria/ Urine retention  She does have problems with urinary retention post procedure as well as been treated for 3 different urinary tract infections.  She is having some pain with urination and again increased frequency and persistent retention.      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 90 Cap 2   • vitamin D 5000 units Tab Take 5,000 Units by mouth every day. 30 Tab 2   • dicyclomine (BENTYL) 10 MG Cap Take 1 Cap by mouth every 6 hours as needed (abdominal cramp/discomfort). 90 Cap 2   • Omega 3 1200 MG Cap Take 1 Tab by mouth every day.     • artificial tears (EYE LUBRICANT) Ointment ophth ointment  "Place 1 Application in both eyes every 8 hours.     • Carboxymethylcellulose Sodium (REFRESH TEARS OP) 1 Drop by Ophthalmic route 2 Times a Day.     • donepezil (ARICEPT) 10 MG tablet Take 20 mg by mouth every day.     • acetaminophen (TYLENOL) 325 MG Tab Take 650 mg by mouth every four hours as needed.       No current facility-administered medications for this visit.          Allergies as of 10/11/2019 - Reviewed 10/11/2019   Allergen Reaction Noted   • Prednisone Unspecified 08/11/2018   • Seasonal  08/15/2019        ROS: Denies Chest pain, SOB, LE edema.    /58 (BP Location: Left arm, Patient Position: Sitting, BP Cuff Size: Adult)   Pulse 91   Temp 36.7 °C (98.1 °F) (Temporal)   Resp 16   Ht 1.473 m (4' 10\")   Wt 58.1 kg (128 lb)   SpO2 95%   BMI 26.75 kg/m²     Physical Exam:  Gen:         Alert and oriented, No apparent distress.  Neck:        No Lymphadenopathy or Bruits.  Lungs:     Clear to auscultation bilaterally  CV:          Regular rate and rhythm. No murmurs, rubs or gallops.               Ext:          No clubbing, cyanosis, edema.      Assessment and Plan.   86 y.o. female with the following issues.    1. s/p laminectomy c/b pseudomeningocele s/p InD  Doing well from a surgical standpoint follow along with urologist.    2. Dysuria/Urine retention  Concern for persistent infection placing referral to urologist and is sent for urine test.  Will treat on positives.  She does report having to strain to urinate.  She will discuss with her surgeon if there is any possibilities of post surgically nerve issues causing retention.  - URINALYSIS,CULTURE IF INDICATED; Future  - REFERRAL TO UROLOGY      "

## 2019-10-14 ENCOUNTER — PATIENT OUTREACH (OUTPATIENT)
Dept: HEALTH INFORMATION MANAGEMENT | Facility: OTHER | Age: 84
End: 2019-10-14

## 2019-10-23 LAB
FUNGUS SPEC CULT: NORMAL
FUNGUS SPEC CULT: NORMAL
SIGNIFICANT IND 70042: NORMAL
SIGNIFICANT IND 70042: NORMAL
SITE SITE: NORMAL
SITE SITE: NORMAL
SOURCE SOURCE: NORMAL
SOURCE SOURCE: NORMAL

## 2019-10-30 ENCOUNTER — APPOINTMENT (RX ONLY)
Dept: URBAN - METROPOLITAN AREA CLINIC 4 | Facility: CLINIC | Age: 84
Setting detail: DERMATOLOGY
End: 2019-10-30

## 2019-10-30 DIAGNOSIS — Z85.828 PERSONAL HISTORY OF OTHER MALIGNANT NEOPLASM OF SKIN: ICD-10-CM

## 2019-10-30 DIAGNOSIS — L82.1 OTHER SEBORRHEIC KERATOSIS: ICD-10-CM

## 2019-10-30 DIAGNOSIS — D22 MELANOCYTIC NEVI: ICD-10-CM

## 2019-10-30 DIAGNOSIS — L81.4 OTHER MELANIN HYPERPIGMENTATION: ICD-10-CM

## 2019-10-30 DIAGNOSIS — D18.0 HEMANGIOMA: ICD-10-CM

## 2019-10-30 DIAGNOSIS — Z71.89 OTHER SPECIFIED COUNSELING: ICD-10-CM

## 2019-10-30 PROBLEM — D48.5 NEOPLASM OF UNCERTAIN BEHAVIOR OF SKIN: Status: ACTIVE | Noted: 2019-10-30

## 2019-10-30 PROBLEM — D18.01 HEMANGIOMA OF SKIN AND SUBCUTANEOUS TISSUE: Status: ACTIVE | Noted: 2019-10-30

## 2019-10-30 PROBLEM — D22.5 MELANOCYTIC NEVI OF TRUNK: Status: ACTIVE | Noted: 2019-10-30

## 2019-10-30 PROCEDURE — ? ADDITIONAL NOTES

## 2019-10-30 PROCEDURE — 99213 OFFICE O/P EST LOW 20 MIN: CPT | Mod: 25

## 2019-10-30 PROCEDURE — ? BIOPSY BY SHAVE METHOD

## 2019-10-30 PROCEDURE — 11102 TANGNTL BX SKIN SINGLE LES: CPT

## 2019-10-30 PROCEDURE — ? COUNSELING

## 2019-10-30 PROCEDURE — 11103 TANGNTL BX SKIN EA SEP/ADDL: CPT

## 2019-10-30 ASSESSMENT — LOCATION DETAILED DESCRIPTION DERM
LOCATION DETAILED: RIGHT DISTAL DORSAL FOREARM
LOCATION DETAILED: EPIGASTRIC SKIN
LOCATION DETAILED: LEFT RIB CAGE
LOCATION DETAILED: SUPERIOR THORACIC SPINE
LOCATION DETAILED: INFERIOR THORACIC SPINE
LOCATION DETAILED: LEFT MEDIAL UPPER BACK
LOCATION DETAILED: RIGHT SUPERIOR NASAL CHEEK

## 2019-10-30 ASSESSMENT — LOCATION SIMPLE DESCRIPTION DERM
LOCATION SIMPLE: RIGHT FOREARM
LOCATION SIMPLE: RIGHT CHEEK
LOCATION SIMPLE: LEFT UPPER BACK
LOCATION SIMPLE: UPPER BACK
LOCATION SIMPLE: ABDOMEN

## 2019-10-30 ASSESSMENT — LOCATION ZONE DERM
LOCATION ZONE: ARM
LOCATION ZONE: FACE
LOCATION ZONE: TRUNK

## 2019-10-30 NOTE — PROCEDURE: BIOPSY BY SHAVE METHOD
Was A Bandage Applied: Yes
Bill For Surgical Tray: no
Anesthesia Volume In Cc: 0.5
Type Of Destruction Used: Curettage
Consent: Written consent was obtained and risks were reviewed including but not limited to scarring, infection, bleeding, scabbing, incomplete removal, nerve damage and allergy to anesthesia.
Dressing: bandage
Detail Level: Detailed
Biopsy Method: Personna blade
Electrodesiccation And Curettage Text: The wound bed was treated with electrodesiccation and curettage after the biopsy was performed.
Curettage Text: The wound bed was treated with curettage after the biopsy was performed.
Billing Type: Third-Party Bill
Silver Nitrate Text: The wound bed was treated with silver nitrate after the biopsy was performed.
Hemostasis: Drysol and Electrocautery
Size Of Lesion In Cm: 0.7
Lab: [id number]
Depth Of Biopsy: dermis
X Size Of Lesion In Cm: 0
Anesthesia Type: 1% lidocaine with epinephrine
Cryotherapy Text: The wound bed was treated with cryotherapy after the biopsy was performed.
Post-Care Instructions: I reviewed with the patient in detail post-care instructions. Patient is to keep the biopsy site dry overnight, and then apply vaseline twice daily until healed.
Electrodesiccation Text: The wound bed was treated with electrodesiccation after the biopsy was performed.
Notification Instructions: Patient will be notified of biopsy results. However, patient instructed to call the office if not contacted within 2 weeks.
Biopsy Type: H and E
Lab Facility: 
Wound Care: Vaseline

## 2019-11-07 ENCOUNTER — PATIENT OUTREACH (OUTPATIENT)
Dept: HEALTH INFORMATION MANAGEMENT | Facility: OTHER | Age: 84
End: 2019-11-07

## 2019-11-07 DIAGNOSIS — F02.80 LATE ONSET ALZHEIMER'S DISEASE WITHOUT BEHAVIORAL DISTURBANCE (HCC): ICD-10-CM

## 2019-11-07 DIAGNOSIS — G30.1 LATE ONSET ALZHEIMER'S DISEASE WITHOUT BEHAVIORAL DISTURBANCE (HCC): ICD-10-CM

## 2019-11-08 NOTE — PROGRESS NOTES
An 86-year-old female was a local transfer admission to [facility] from 9/30/2019 to 10/8/2019 to treat Spondylosis without myelopathy or radiculopathy, lumbosacral region. The patient was discharged Home w/ Home Health. No additional medical conditions were listed. The patient was not under clinical case management.     The patient was ordered to follow-up with PCP. The patient had the following appointments:     1) 10/11/2019 @ 9:00 Basil Banks, general/family practice - CONFIRMED AS KEPT     2) 10/24/2019 @ 11:00 Abran Collins urology - CONFIRMED AS KEPT  The patient has no future appointments scheduled.    [facility] communicated with the patient/caregiver via phone throughout the case and collaborated with stakeholders on behalf of the patient regarding coordination of care. Patient lives in group home where they manage her medications and assist with ADLs. Patient also has a caregiver that assists with transportation and manages her healthcare. [place] requires PPS screening which was scored above 50%..     In summary, [facility] provided education to patient (health edu, benefits, resources, etc.) and worked closely with the patient's caregivers to assist the patient. As a result, patient adhered with Discharge Orders, patient attended follow up appointments, and patient successfully  avoided further complications.

## 2019-11-11 RX ORDER — DONEPEZIL HYDROCHLORIDE 10 MG/1
TABLET, FILM COATED ORAL
Qty: 180 TAB | Refills: 3 | Status: SHIPPED | OUTPATIENT
Start: 2019-11-11 | End: 2020-04-03

## 2019-11-12 ENCOUNTER — PATIENT OUTREACH (OUTPATIENT)
Dept: HEALTH INFORMATION MANAGEMENT | Facility: OTHER | Age: 84
End: 2019-11-12

## 2019-11-12 NOTE — PROGRESS NOTES
Outcome: Left Message [facility] Handoff Introduction     Please transfer to Patient Outreach Team at [phone] when patient returns call.    [facility] Verified: yes    Attempt # 1

## 2019-11-18 LAB
MYCOBACTERIUM SPEC CULT: NORMAL
RHODAMINE-AURAMINE STN SPEC: NORMAL
SIGNIFICANT IND 70042: NORMAL
SITE SITE: NORMAL
SOURCE SOURCE: NORMAL

## 2019-12-03 ENCOUNTER — APPOINTMENT (RX ONLY)
Dept: URBAN - METROPOLITAN AREA CLINIC 4 | Facility: CLINIC | Age: 84
Setting detail: DERMATOLOGY
End: 2019-12-03

## 2019-12-03 PROBLEM — C44.629 SQUAMOUS CELL CARCINOMA OF SKIN OF LEFT UPPER LIMB, INCLUDING SHOULDER: Status: ACTIVE | Noted: 2019-12-03

## 2019-12-03 PROCEDURE — ? EXCISION

## 2019-12-03 PROCEDURE — 13121 CMPLX RPR S/A/L 2.6-7.5 CM: CPT

## 2019-12-03 PROCEDURE — 11603 EXC TR-EXT MAL+MARG 2.1-3 CM: CPT

## 2019-12-03 NOTE — PROCEDURE: EXCISION
Estimated Blood Loss (Cc): minimal
Mucosal Advancement Flap Text: Given the location of the defect, shape of the defect and the proximity to free margins a mucosal advancement flap was deemed most appropriate. Incisions were made with a 15 blade scalpel in the appropriate fashion along the cutaneous vermilion border and the mucosal lip. The remaining actinically damaged mucosal tissue was excised.  The mucosal advancement flap was then elevated to the gingival sulcus with care taken to preserve the neurovascular structures and advanced into the primary defect. Care was taken to ensure that precise realignment of the vermilion border was achieved.
Island Pedicle Flap Text: The defect edges were debeveled with a #15 scalpel blade.  Given the location of the defect, shape of the defect and the proximity to free margins an island pedicle advancement flap was deemed most appropriate.  Using a sterile surgical marker, an appropriate advancement flap was drawn incorporating the defect, outlining the appropriate donor tissue and placing the expected incisions within the relaxed skin tension lines where possible.    The area thus outlined was incised deep to adipose tissue with a #15 scalpel blade.  The skin margins were undermined to an appropriate distance in all directions around the primary defect and laterally outward around the island pedicle utilizing iris scissors.  There was minimal undermining beneath the pedicle flap.
Show Pathology Comment Variable: Yes
Posterior Auricular Interpolation Flap Text: A decision was made to reconstruct the defect utilizing an interpolation axial flap and a staged reconstruction.  A telfa template was made of the defect.  This telfa template was then used to outline the posterior auricular interpolation flap.  The donor area for the pedicle flap was then injected with anesthesia.  The flap was excised through the skin and subcutaneous tissue down to the layer of the underlying musculature.  The pedicle flap was carefully excised within this deep plane to maintain its blood supply.  The edges of the donor site were undermined.   The donor site was closed in a primary fashion.  The pedicle was then rotated into position and sutured.  Once the tube was sutured into place, adequate blood supply was confirmed with blanching and refill.  The pedicle was then wrapped with xeroform gauze and dressed appropriately with a telfa and gauze bandage to ensure continued blood supply and protect the attached pedicle.
Excisional Biopsy Additional Text (Leave Blank If You Do Not Want): The margin was drawn around the clinically apparent lesion. An elliptical shape was then drawn on the skin incorporating the lesion and margins.  Incisions were then made along these lines to the appropriate tissue plane and the lesion was extirpated.
Partial Purse String (Intermediate) Text: Given the location of the defect and the characteristics of the surrounding skin an intermediate purse string closure was deemed most appropriate.  Undermining was performed circumferentially around the surgical defect.  A purse string suture was then placed and tightened. Wound tension of the circular defect prevented complete closure of the wound.
Repair Type: Complex
Complex Repair And Ftsg Text: The defect edges were debeveled with a #15 scalpel blade.  The primary defect was closed partially with a complex linear closure.  Given the location of the defect, shape of the defect and the proximity to free margins a full thickness skin graft was deemed most appropriate to repair the remaining defect.  The graft was trimmed to fit the size of the remaining defect.  The graft was then placed in the primary defect, oriented appropriately, and sutured into place.
Hatchet Flap Text: The defect edges were debeveled with a #15 scalpel blade.  Given the location of the defect, shape of the defect and the proximity to free margins a hatchet flap was deemed most appropriate.  Using a sterile surgical marker, an appropriate hatchet flap was drawn incorporating the defect and placing the expected incisions within the relaxed skin tension lines where possible.    The area thus outlined was incised deep to adipose tissue with a #15 scalpel blade.  The skin margins were undermined to an appropriate distance in all directions utilizing iris scissors.
Validate Previous Accession (Can Hide Previous Accession In Settings Tab): No
Island Pedicle Flap With Canthal Suspension Text: The defect edges were debeveled with a #15 scalpel blade.  Given the location of the defect, shape of the defect and the proximity to free margins an island pedicle advancement flap was deemed most appropriate.  Using a sterile surgical marker, an appropriate advancement flap was drawn incorporating the defect, outlining the appropriate donor tissue and placing the expected incisions within the relaxed skin tension lines where possible. The area thus outlined was incised deep to adipose tissue with a #15 scalpel blade.  The skin margins were undermined to an appropriate distance in all directions around the primary defect and laterally outward around the island pedicle utilizing iris scissors.  There was minimal undermining beneath the pedicle flap. A suspension suture was placed in the canthal tendon to prevent tension and prevent ectropion.
Slit Excision Additional Text (Leave Blank If You Do Not Want): A linear line was drawn on the skin overlying the lesion. An incision was made slowly until the lesion was visualized.  Once visualized, the lesion was removed with blunt dissection.
Partial Purse String (Simple) Text: Given the location of the defect and the characteristics of the surrounding skin a simple purse string closure was deemed most appropriate.  Undermining was performed circumferentially around the surgical defect.  A purse string suture was then placed and tightened. Wound tension of the circular defect prevented complete closure of the wound.
Complex Repair And Split-Thickness Skin Graft Text: The defect edges were debeveled with a #15 scalpel blade.  The primary defect was closed partially with a complex linear closure.  Given the location of the defect, shape of the defect and the proximity to free margins a split thickness skin graft was deemed most appropriate to repair the remaining defect.  The graft was trimmed to fit the size of the remaining defect.  The graft was then placed in the primary defect, oriented appropriately, and sutured into place.
Rotation Flap Text: The defect edges were debeveled with a #15 scalpel blade.  Given the location of the defect, shape of the defect and the proximity to free margins a rotation flap was deemed most appropriate.  Using a sterile surgical marker, an appropriate rotation flap was drawn incorporating the defect and placing the expected incisions within the relaxed skin tension lines where possible.    The area thus outlined was incised deep to adipose tissue with a #15 scalpel blade.  The skin margins were undermined to an appropriate distance in all directions utilizing iris scissors.
Alar Island Pedicle Flap Text: The defect edges were debeveled with a #15 scalpel blade.  Given the location of the defect, shape of the defect and the proximity to the alar rim an island pedicle advancement flap was deemed most appropriate.  Using a sterile surgical marker, an appropriate advancement flap was drawn incorporating the defect, outlining the appropriate donor tissue and placing the expected incisions within the nasal ala running parallel to the alar rim. The area thus outlined was incised with a #15 scalpel blade.  The skin margins were undermined minimally to an appropriate distance in all directions around the primary defect and laterally outward around the island pedicle utilizing iris scissors.  There was minimal undermining beneath the pedicle flap.
Hemostasis: Electrocautery
Surgeon (Optional): Vishnu
Saucerization Excision Additional Text (Leave Blank If You Do Not Want): The margin was drawn around the clinically apparent lesion.  Incisions were then made along these lines, in a tangential fashion, to the appropriate tissue plane and the lesion was extirpated.
Intermediate / Complex Repair - Final Wound Length In Cm: 5.2
Complex Repair And Single Advancement Flap Text: The defect edges were debeveled with a #15 scalpel blade.  The primary defect was closed partially with a complex linear closure.  Given the location of the remaining defect, shape of the defect and the proximity to free margins a single advancement flap was deemed most appropriate for complete closure of the defect.  Using a sterile surgical marker, an appropriate advancement flap was drawn incorporating the defect and placing the expected incisions within the relaxed skin tension lines where possible.    The area thus outlined was incised deep to adipose tissue with a #15 scalpel blade.  The skin margins were undermined to an appropriate distance in all directions utilizing iris scissors.
Complex Repair And Epidermal Autograft Text: The defect edges were debeveled with a #15 scalpel blade.  The primary defect was closed partially with a complex linear closure.  Given the location of the defect, shape of the defect and the proximity to free margins an epidermal autograft was deemed most appropriate to repair the remaining defect.  The graft was trimmed to fit the size of the remaining defect.  The graft was then placed in the primary defect, oriented appropriately, and sutured into place.
Spiral Flap Text: The defect edges were debeveled with a #15 scalpel blade.  Given the location of the defect, shape of the defect and the proximity to free margins a spiral flap was deemed most appropriate.  Using a sterile surgical marker, an appropriate rotation flap was drawn incorporating the defect and placing the expected incisions within the relaxed skin tension lines where possible. The area thus outlined was incised deep to adipose tissue with a #15 scalpel blade.  The skin margins were undermined to an appropriate distance in all directions utilizing iris scissors.
Dressing: dry sterile dressing
Double Island Pedicle Flap Text: The defect edges were debeveled with a #15 scalpel blade.  Given the location of the defect, shape of the defect and the proximity to free margins a double island pedicle advancement flap was deemed most appropriate.  Using a sterile surgical marker, an appropriate advancement flap was drawn incorporating the defect, outlining the appropriate donor tissue and placing the expected incisions within the relaxed skin tension lines where possible.    The area thus outlined was incised deep to adipose tissue with a #15 scalpel blade.  The skin margins were undermined to an appropriate distance in all directions around the primary defect and laterally outward around the island pedicle utilizing iris scissors.  There was minimal undermining beneath the pedicle flap.
Elliptical Excision Additional Text (Leave Blank If You Do Not Want): The margin was drawn around the clinically apparent lesion.  An elliptical shape was then drawn on the skin incorporating the lesion and margins.  Incisions were then made along these lines to the appropriate tissue plane and the lesion was extirpated.
Lab: [id number]
Complex Repair And Double Advancement Flap Text: The defect edges were debeveled with a #15 scalpel blade.  The primary defect was closed partially with a complex linear closure.  Given the location of the remaining defect, shape of the defect and the proximity to free margins a double advancement flap was deemed most appropriate for complete closure of the defect.  Using a sterile surgical marker, an appropriate advancement flap was drawn incorporating the defect and placing the expected incisions within the relaxed skin tension lines where possible.    The area thus outlined was incised deep to adipose tissue with a #15 scalpel blade.  The skin margins were undermined to an appropriate distance in all directions utilizing iris scissors.
Complex Repair And Dermal Autograft Text: The defect edges were debeveled with a #15 scalpel blade.  The primary defect was closed partially with a complex linear closure.  Given the location of the defect, shape of the defect and the proximity to free margins an dermal autograft was deemed most appropriate to repair the remaining defect.  The graft was trimmed to fit the size of the remaining defect.  The graft was then placed in the primary defect, oriented appropriately, and sutured into place.
Star Wedge Flap Text: The defect edges were debeveled with a #15 scalpel blade.  Given the location of the defect, shape of the defect and the proximity to free margins a star wedge flap was deemed most appropriate.  Using a sterile surgical marker, an appropriate rotation flap was drawn incorporating the defect and placing the expected incisions within the relaxed skin tension lines where possible. The area thus outlined was incised deep to adipose tissue with a #15 scalpel blade.  The skin margins were undermined to an appropriate distance in all directions utilizing iris scissors.
Island Pedicle Flap-Requiring Vessel Identification Text: The defect edges were debeveled with a #15 scalpel blade.  Given the location of the defect, shape of the defect and the proximity to free margins an island pedicle advancement flap was deemed most appropriate.  Using a sterile surgical marker, an appropriate advancement flap was drawn, based on the axial vessel mentioned above, incorporating the defect, outlining the appropriate donor tissue and placing the expected incisions within the relaxed skin tension lines where possible.    The area thus outlined was incised deep to adipose tissue with a #15 scalpel blade.  The skin margins were undermined to an appropriate distance in all directions around the primary defect and laterally outward around the island pedicle utilizing iris scissors.  There was minimal undermining beneath the pedicle flap.
Deep Sutures: 4-0 Vicryl
Lab Facility: 
Fusiform Excision Additional Text (Leave Blank If You Do Not Want): The margin was drawn around the clinically apparent lesion.  A fusiform shape was then drawn on the skin incorporating the lesion and margins.  Incisions were then made along these lines to the appropriate tissue plane and the lesion was extirpated.
Complex Repair And Modified Advancement Flap Text: The defect edges were debeveled with a #15 scalpel blade.  The primary defect was closed partially with a complex linear closure.  Given the location of the remaining defect, shape of the defect and the proximity to free margins a modified advancement flap was deemed most appropriate for complete closure of the defect.  Using a sterile surgical marker, an appropriate advancement flap was drawn incorporating the defect and placing the expected incisions within the relaxed skin tension lines where possible.    The area thus outlined was incised deep to adipose tissue with a #15 scalpel blade.  The skin margins were undermined to an appropriate distance in all directions utilizing iris scissors.
Complex Repair And Tissue Cultured Epidermal Autograft Text: The defect edges were debeveled with a #15 scalpel blade.  The primary defect was closed partially with a complex linear closure.  Given the location of the defect, shape of the defect and the proximity to free margins an tissue cultured epidermal autograft was deemed most appropriate to repair the remaining defect.  The graft was trimmed to fit the size of the remaining defect.  The graft was then placed in the primary defect, oriented appropriately, and sutured into place.
Transposition Flap Text: The defect edges were debeveled with a #15 scalpel blade.  Given the location of the defect and the proximity to free margins a transposition flap was deemed most appropriate.  Using a sterile surgical marker, an appropriate transposition flap was drawn incorporating the defect.    The area thus outlined was incised deep to adipose tissue with a #15 scalpel blade.  The skin margins were undermined to an appropriate distance in all directions utilizing iris scissors.
Wound Care: Bacitracin
Keystone Flap Text: The defect edges were debeveled with a #15 scalpel blade.  Given the location of the defect, shape of the defect a keystone flap was deemed most appropriate.  Using a sterile surgical marker, an appropriate keystone flap was drawn incorporating the defect, outlining the appropriate donor tissue and placing the expected incisions within the relaxed skin tension lines where possible. The area thus outlined was incised deep to adipose tissue with a #15 scalpel blade.  The skin margins were undermined to an appropriate distance in all directions around the primary defect and laterally outward around the flap utilizing iris scissors.
Additional Anesthesia Volume In Cc: 6
Graft Donor Site Bandage (Optional-Leave Blank If You Don't Want In Note): Steri-strips and a pressure bandage were applied to the donor site.
Curvilinear Excision Additional Text (Leave Blank If You Do Not Want): The margin was drawn around the clinically apparent lesion.  A curvilinear shape was then drawn on the skin incorporating the lesion and margins.  Incisions were then made along these lines to the appropriate tissue plane and the lesion was extirpated.
Excision Depth: adipose tissue
Complex Repair And A-T Advancement Flap Text: The defect edges were debeveled with a #15 scalpel blade.  The primary defect was closed partially with a complex linear closure.  Given the location of the remaining defect, shape of the defect and the proximity to free margins an A-T advancement flap was deemed most appropriate for complete closure of the defect.  Using a sterile surgical marker, an appropriate advancement flap was drawn incorporating the defect and placing the expected incisions within the relaxed skin tension lines where possible.    The area thus outlined was incised deep to adipose tissue with a #15 scalpel blade.  The skin margins were undermined to an appropriate distance in all directions utilizing iris scissors.
Complex Repair And Xenograft Text: The defect edges were debeveled with a #15 scalpel blade.  The primary defect was closed partially with a complex linear closure.  Given the location of the defect, shape of the defect and the proximity to free margins a xenograft was deemed most appropriate to repair the remaining defect.  The graft was trimmed to fit the size of the remaining defect.  The graft was then placed in the primary defect, oriented appropriately, and sutured into place.
Advancement Flap (Double) Text: The defect edges were debeveled with a #15 scalpel blade.  Given the location of the defect and the proximity to free margins a double advancement flap was deemed most appropriate.  Using a sterile surgical marker, the appropriate advancement flaps were drawn incorporating the defect and placing the expected incisions within the relaxed skin tension lines where possible.    The area thus outlined was incised deep to adipose tissue with a #15 scalpel blade.  The skin margins were undermined to an appropriate distance in all directions utilizing iris scissors.
Muscle Hinge Flap Text: The defect edges were debeveled with a #15 scalpel blade.  Given the size, depth and location of the defect and the proximity to free margins a muscle hinge flap was deemed most appropriate.  Using a sterile surgical marker, an appropriate hinge flap was drawn incorporating the defect. The area thus outlined was incised with a #15 scalpel blade.  The skin margins were undermined to an appropriate distance in all directions utilizing iris scissors.
Undermining Location (Optional): in the superficial subcutaneous fat
O-T Plasty Text: The defect edges were debeveled with a #15 scalpel blade.  Given the location of the defect, shape of the defect and the proximity to free margins an O-T plasty was deemed most appropriate.  Using a sterile surgical marker, an appropriate O-T plasty was drawn incorporating the defect and placing the expected incisions within the relaxed skin tension lines where possible.    The area thus outlined was incised deep to adipose tissue with a #15 scalpel blade.  The skin margins were undermined to an appropriate distance in all directions utilizing iris scissors.
Paramedian Forehead Flap Text: A decision was made to reconstruct the defect utilizing an interpolation axial flap and a staged reconstruction.  A telfa template was made of the defect.  This telfa template was then used to outline the paramedian forehead pedicle flap.  The donor area for the pedicle flap was then injected with anesthesia.  The flap was excised through the skin and subcutaneous tissue down to the layer of the underlying musculature.  The pedicle flap was carefully excised within this deep plane to maintain its blood supply.  The edges of the donor site were undermined.   The donor site was closed in a primary fashion.  The pedicle was then rotated into position and sutured.  Once the tube was sutured into place, adequate blood supply was confirmed with blanching and refill.  The pedicle was then wrapped with xeroform gauze and dressed appropriately with a telfa and gauze bandage to ensure continued blood supply and protect the attached pedicle.
Double M-Plasty Intermediate Repair Preamble Text (Leave Blank If You Do Not Want): Undermining was performed with blunt dissection.
Complex Repair And O-T Advancement Flap Text: The defect edges were debeveled with a #15 scalpel blade.  The primary defect was closed partially with a complex linear closure.  Given the location of the remaining defect, shape of the defect and the proximity to free margins an O-T advancement flap was deemed most appropriate for complete closure of the defect.  Using a sterile surgical marker, an appropriate advancement flap was drawn incorporating the defect and placing the expected incisions within the relaxed skin tension lines where possible.    The area thus outlined was incised deep to adipose tissue with a #15 scalpel blade.  The skin margins were undermined to an appropriate distance in all directions utilizing iris scissors.
Complex Repair And Skin Substitute Graft Text: The defect edges were debeveled with a #15 scalpel blade.  The primary defect was closed partially with a complex linear closure.  Given the location of the remaining defect, shape of the defect and the proximity to free margins a skin substitute graft was deemed most appropriate to repair the remaining defect.  The graft was trimmed to fit the size of the remaining defect.  The graft was then placed in the primary defect, oriented appropriately, and sutured into place.
Burow's Advancement Flap Text: The defect edges were debeveled with a #15 scalpel blade.  Given the location of the defect and the proximity to free margins a Burow's advancement flap was deemed most appropriate.  Using a sterile surgical marker, the appropriate advancement flap was drawn incorporating the defect and placing the expected incisions within the relaxed skin tension lines where possible.    The area thus outlined was incised deep to adipose tissue with a #15 scalpel blade.  The skin margins were undermined to an appropriate distance in all directions utilizing iris scissors.
Melolabial Transposition Flap Text: The defect edges were debeveled with a #15 scalpel blade.  Given the location of the defect and the proximity to free margins a melolabial flap was deemed most appropriate.  Using a sterile surgical marker, an appropriate melolabial transposition flap was drawn incorporating the defect.    The area thus outlined was incised deep to adipose tissue with a #15 scalpel blade.  The skin margins were undermined to an appropriate distance in all directions utilizing iris scissors.
O-Z Plasty Text: The defect edges were debeveled with a #15 scalpel blade.  Given the location of the defect, shape of the defect and the proximity to free margins an O-Z plasty (double transposition flap) was deemed most appropriate.  Using a sterile surgical marker, the appropriate transposition flaps were drawn incorporating the defect and placing the expected incisions within the relaxed skin tension lines where possible.    The area thus outlined was incised deep to adipose tissue with a #15 scalpel blade.  The skin margins were undermined to an appropriate distance in all directions utilizing iris scissors.  Hemostasis was achieved with electrocautery.  The flaps were then transposed into place, one clockwise and the other counterclockwise, and anchored with interrupted buried subcutaneous sutures.
Anesthesia Volume In Cc: 12
Previous Accession (Optional): [id number]
Lip Wedge Excision Repair Text: Given the location of the defect and the proximity to free margins a full thickness wedge repair was deemed most appropriate.  Using a sterile surgical marker, the appropriate repair was drawn incorporating the defect and placing the expected incisions perpendicular to the vermilion border.  The vermilion border was also meticulously outlined to ensure appropriate reapproximation during the repair.  The area thus outlined was incised through and through with a #15 scalpel blade.  The muscularis and dermis were reaproximated with deep sutures following hemostasis. Care was taken to realign the vermilion border before proceeding with the superficial closure.  Once the vermilion was realigned the superfical and mucosal closure was finished.
Information: Selecting Yes will display possible errors in your note based on the variables you have selected. This validation is only offered as a suggestion for you. PLEASE NOTE THAT THE VALIDATION TEXT WILL BE REMOVED WHEN YOU FINALIZE YOUR NOTE. IF YOU WANT TO FAX A PRELIMINARY NOTE YOU WILL NEED TO TOGGLE THIS TO 'NO' IF YOU DO NOT WANT IT IN YOUR FAXED NOTE.
Complex Repair And O-L Flap Text: The defect edges were debeveled with a #15 scalpel blade.  The primary defect was closed partially with a complex linear closure.  Given the location of the remaining defect, shape of the defect and the proximity to free margins an O-L flap was deemed most appropriate for complete closure of the defect.  Using a sterile surgical marker, an appropriate flap was drawn incorporating the defect and placing the expected incisions within the relaxed skin tension lines where possible.    The area thus outlined was incised deep to adipose tissue with a #15 scalpel blade.  The skin margins were undermined to an appropriate distance in all directions utilizing iris scissors.
Scalpel Size: 15 blade
Path Notes (To The Dermatopathologist): Please check margins.
Chonodrocutaneous Helical Advancement Flap Text: The defect edges were debeveled with a #15 scalpel blade.  Given the location of the defect and the proximity to free margins a chondrocutaneous helical advancement flap was deemed most appropriate.  Using a sterile surgical marker, the appropriate advancement flap was drawn incorporating the defect and placing the expected incisions within the relaxed skin tension lines where possible.    The area thus outlined was incised deep to adipose tissue with a #15 scalpel blade.  The skin margins were undermined to an appropriate distance in all directions utilizing iris scissors.
Rhombic Flap Text: The defect edges were debeveled with a #15 scalpel blade.  Given the location of the defect and the proximity to free margins a rhombic flap was deemed most appropriate.  Using a sterile surgical marker, an appropriate rhombic flap was drawn incorporating the defect.    The area thus outlined was incised deep to adipose tissue with a #15 scalpel blade.  The skin margins were undermined to an appropriate distance in all directions utilizing iris scissors.
Double O-Z Plasty Text: The defect edges were debeveled with a #15 scalpel blade.  Given the location of the defect, shape of the defect and the proximity to free margins a Double O-Z plasty (double transposition flap) was deemed most appropriate.  Using a sterile surgical marker, the appropriate transposition flaps were drawn incorporating the defect and placing the expected incisions within the relaxed skin tension lines where possible. The area thus outlined was incised deep to adipose tissue with a #15 scalpel blade.  The skin margins were undermined to an appropriate distance in all directions utilizing iris scissors.  Hemostasis was achieved with electrocautery.  The flaps were then transposed into place, one clockwise and the other counterclockwise, and anchored with interrupted buried subcutaneous sutures.
Ftsg Text: The defect edges were debeveled with a #15 scalpel blade.  Given the location of the defect, shape of the defect and the proximity to free margins a full thickness skin graft was deemed most appropriate.  Using a sterile surgical marker, the primary defect shape was transferred to the donor site. The area thus outlined was incised deep to adipose tissue with a #15 scalpel blade.  The harvested graft was then trimmed of adipose tissue until only dermis and epidermis was left.  The skin margins of the secondary defect were undermined to an appropriate distance in all directions utilizing iris scissors.  The secondary defect was closed with interrupted buried subcutaneous sutures.  The skin edges were then re-apposed with running  sutures.  The skin graft was then placed in the primary defect and oriented appropriately.
Complex Repair And Bilobe Flap Text: The defect edges were debeveled with a #15 scalpel blade.  The primary defect was closed partially with a complex linear closure.  Given the location of the remaining defect, shape of the defect and the proximity to free margins a bilobe flap was deemed most appropriate for complete closure of the defect.  Using a sterile surgical marker, an appropriate advancement flap was drawn incorporating the defect and placing the expected incisions within the relaxed skin tension lines where possible.    The area thus outlined was incised deep to adipose tissue with a #15 scalpel blade.  The skin margins were undermined to an appropriate distance in all directions utilizing iris scissors.
Crescentic Advancement Flap Text: The defect edges were debeveled with a #15 scalpel blade.  Given the location of the defect and the proximity to free margins a crescentic advancement flap was deemed most appropriate.  Using a sterile surgical marker, the appropriate advancement flap was drawn incorporating the defect and placing the expected incisions within the relaxed skin tension lines where possible.    The area thus outlined was incised deep to adipose tissue with a #15 scalpel blade.  The skin margins were undermined to an appropriate distance in all directions utilizing iris scissors.
Rhomboid Transposition Flap Text: The defect edges were debeveled with a #15 scalpel blade.  Given the location of the defect and the proximity to free margins a rhomboid transposition flap was deemed most appropriate.  Using a sterile surgical marker, an appropriate rhomboid flap was drawn incorporating the defect.    The area thus outlined was incised deep to adipose tissue with a #15 scalpel blade.  The skin margins were undermined to an appropriate distance in all directions utilizing iris scissors.
Epidermal Closure: running subcuticular
S Plasty Text: Given the location and shape of the defect, and the orientation of relaxed skin tension lines, an S-plasty was deemed most appropriate for repair.  Using a sterile surgical marker, the appropriate outline of the S-plasty was drawn, incorporating the defect and placing the expected incisions within the relaxed skin tension lines where possible.  The area thus outlined was incised deep to adipose tissue with a #15 scalpel blade.  The skin margins were undermined to an appropriate distance in all directions utilizing iris scissors. The skin flaps were advanced over the defect.  The opposing margins were then approximated with interrupted buried subcutaneous sutures.
Anesthesia Type: 1% lidocaine with epinephrine
Split-Thickness Skin Graft Text: The defect edges were debeveled with a #15 scalpel blade.  Given the location of the defect, shape of the defect and the proximity to free margins a split thickness skin graft was deemed most appropriate.  Using a sterile surgical marker, the primary defect shape was transferred to the donor site. The split thickness graft was then harvested.  The skin graft was then placed in the primary defect and oriented appropriately.
Complex Repair And Melolabial Flap Text: The defect edges were debeveled with a #15 scalpel blade.  The primary defect was closed partially with a complex linear closure.  Given the location of the remaining defect, shape of the defect and the proximity to free margins a melolabial flap was deemed most appropriate for complete closure of the defect.  Using a sterile surgical marker, an appropriate advancement flap was drawn incorporating the defect and placing the expected incisions within the relaxed skin tension lines where possible.    The area thus outlined was incised deep to adipose tissue with a #15 scalpel blade.  The skin margins were undermined to an appropriate distance in all directions utilizing iris scissors.
Primary Defect Length (In Cm): 0
A-T Advancement Flap Text: The defect edges were debeveled with a #15 scalpel blade.  Given the location of the defect, shape of the defect and the proximity to free margins an A-T advancement flap was deemed most appropriate.  Using a sterile surgical marker, an appropriate advancement flap was drawn incorporating the defect and placing the expected incisions within the relaxed skin tension lines where possible.    The area thus outlined was incised deep to adipose tissue with a #15 scalpel blade.  The skin margins were undermined to an appropriate distance in all directions utilizing iris scissors.
Bi-Rhombic Flap Text: The defect edges were debeveled with a #15 scalpel blade.  Given the location of the defect and the proximity to free margins a bi-rhombic flap was deemed most appropriate.  Using a sterile surgical marker, an appropriate rhombic flap was drawn incorporating the defect. The area thus outlined was incised deep to adipose tissue with a #15 scalpel blade.  The skin margins were undermined to an appropriate distance in all directions utilizing iris scissors.
V-Y Plasty Text: The defect edges were debeveled with a #15 scalpel blade.  Given the location of the defect, shape of the defect and the proximity to free margins an V-Y advancement flap was deemed most appropriate.  Using a sterile surgical marker, an appropriate advancement flap was drawn incorporating the defect and placing the expected incisions within the relaxed skin tension lines where possible.    The area thus outlined was incised deep to adipose tissue with a #15 scalpel blade.  The skin margins were undermined to an appropriate distance in all directions utilizing iris scissors.
Cartilage Graft Text: The defect edges were debeveled with a #15 scalpel blade.  Given the location of the defect, shape of the defect, the fact the defect involved a full thickness cartilage defect a cartilage graft was deemed most appropriate.  An appropriate donor site was identified, cleansed, and anesthetized. The cartilage graft was then harvested and transferred to the recipient site, oriented appropriately and then sutured into place.  The secondary defect was then repaired using a primary closure.
Double M-Plasty Complex Repair Preamble Text (Leave Blank If You Do Not Want): Extensive wide undermining was performed.
Complex Repair And Rotation Flap Text: The defect edges were debeveled with a #15 scalpel blade.  The primary defect was closed partially with a complex linear closure.  Given the location of the remaining defect, shape of the defect and the proximity to free margins a rotation flap was deemed most appropriate for complete closure of the defect.  Using a sterile surgical marker, an appropriate advancement flap was drawn incorporating the defect and placing the expected incisions within the relaxed skin tension lines where possible.    The area thus outlined was incised deep to adipose tissue with a #15 scalpel blade.  The skin margins were undermined to an appropriate distance in all directions utilizing iris scissors.
O-T Advancement Flap Text: The defect edges were debeveled with a #15 scalpel blade.  Given the location of the defect, shape of the defect and the proximity to free margins an O-T advancement flap was deemed most appropriate.  Using a sterile surgical marker, an appropriate advancement flap was drawn incorporating the defect and placing the expected incisions within the relaxed skin tension lines where possible.    The area thus outlined was incised deep to adipose tissue with a #15 scalpel blade.  The skin margins were undermined to an appropriate distance in all directions utilizing iris scissors.
Consent was obtained from the patient. The risks and benefits to therapy were discussed in detail. Specifically, the risks of infection, scarring, bleeding, prolonged wound healing, incomplete removal, allergy to anesthesia, nerve injury and recurrence were addressed. Prior to the procedure, the treatment site was clearly identified and confirmed by the patient. All components of Universal Protocol/PAUSE Rule completed.
Helical Rim Advancement Flap Text: The defect edges were debeveled with a #15 blade scalpel.  Given the location of the defect and the proximity to free margins (helical rim) a double helical rim advancement flap was deemed most appropriate.  Using a sterile surgical marker, the appropriate advancement flaps were drawn incorporating the defect and placing the expected incisions between the helical rim and antihelix where possible.  The area thus outlined was incised through and through with a #15 scalpel blade.  With a skin hook and iris scissors, the flaps were gently and sharply undermined and freed up.
H Plasty Text: Given the location of the defect, shape of the defect and the proximity to free margins a H-plasty was deemed most appropriate for repair.  Using a sterile surgical marker, the appropriate advancement arms of the H-plasty were drawn incorporating the defect and placing the expected incisions within the relaxed skin tension lines where possible. The area thus outlined was incised deep to adipose tissue with a #15 scalpel blade. The skin margins were undermined to an appropriate distance in all directions utilizing iris scissors.  The opposing advancement arms were then advanced into place in opposite direction and anchored with interrupted buried subcutaneous sutures.
Repair Anesthesia Method: local infiltration
Composite Graft Text: The defect edges were debeveled with a #15 scalpel blade.  Given the location of the defect, shape of the defect, the proximity to free margins and the fact the defect was full thickness a composite graft was deemed most appropriate.  The defect was outline and then transferred to the donor site.  A full thickness graft was then excised from the donor site. The graft was then placed in the primary defect, oriented appropriately and then sutured into place.  The secondary defect was then repaired using a primary closure.
Size Of Lesion In Cm: 2.1
Complex Repair And Rhombic Flap Text: The defect edges were debeveled with a #15 scalpel blade.  The primary defect was closed partially with a complex linear closure.  Given the location of the remaining defect, shape of the defect and the proximity to free margins a rhombic flap was deemed most appropriate for complete closure of the defect.  Using a sterile surgical marker, an appropriate advancement flap was drawn incorporating the defect and placing the expected incisions within the relaxed skin tension lines where possible.    The area thus outlined was incised deep to adipose tissue with a #15 scalpel blade.  The skin margins were undermined to an appropriate distance in all directions utilizing iris scissors.
O-L Flap Text: The defect edges were debeveled with a #15 scalpel blade.  Given the location of the defect, shape of the defect and the proximity to free margins an O-L flap was deemed most appropriate.  Using a sterile surgical marker, an appropriate advancement flap was drawn incorporating the defect and placing the expected incisions within the relaxed skin tension lines where possible.    The area thus outlined was incised deep to adipose tissue with a #15 scalpel blade.  The skin margins were undermined to an appropriate distance in all directions utilizing iris scissors.
Where Do You Want The Question To Include Opioid Counseling Located?: Case Summary Tab
Bilateral Helical Rim Advancement Flap Text: The defect edges were debeveled with a #15 blade scalpel.  Given the location of the defect and the proximity to free margins (helical rim) a bilateral helical rim advancement flap was deemed most appropriate.  Using a sterile surgical marker, the appropriate advancement flaps were drawn incorporating the defect and placing the expected incisions between the helical rim and antihelix where possible.  The area thus outlined was incised through and through with a #15 scalpel blade.  With a skin hook and iris scissors, the flaps were gently and sharply undermined and freed up.
W Plasty Text: The lesion was extirpated to the level of the fat with a #15 scalpel blade.  Given the location of the defect, shape of the defect and the proximity to free margins a W-plasty was deemed most appropriate for repair.  Using a sterile surgical marker, the appropriate transposition arms of the W-plasty were drawn incorporating the defect and placing the expected incisions within the relaxed skin tension lines where possible.    The area thus outlined was incised deep to adipose tissue with a #15 scalpel blade.  The skin margins were undermined to an appropriate distance in all directions utilizing iris scissors.  The opposing transposition arms were then transposed into place in opposite direction and anchored with interrupted buried subcutaneous sutures.
Epidermal Autograft Text: The defect edges were debeveled with a #15 scalpel blade.  Given the location of the defect, shape of the defect and the proximity to free margins an epidermal autograft was deemed most appropriate.  Using a sterile surgical marker, the primary defect shape was transferred to the donor site. The epidermal graft was then harvested.  The skin graft was then placed in the primary defect and oriented appropriately.
Complex Repair And Transposition Flap Text: The defect edges were debeveled with a #15 scalpel blade.  The primary defect was closed partially with a complex linear closure.  Given the location of the remaining defect, shape of the defect and the proximity to free margins a transposition flap was deemed most appropriate for complete closure of the defect.  Using a sterile surgical marker, an appropriate advancement flap was drawn incorporating the defect and placing the expected incisions within the relaxed skin tension lines where possible.    The area thus outlined was incised deep to adipose tissue with a #15 scalpel blade.  The skin margins were undermined to an appropriate distance in all directions utilizing iris scissors.
O-Z Flap Text: The defect edges were debeveled with a #15 scalpel blade.  Given the location of the defect, shape of the defect and the proximity to free margins an O-Z flap was deemed most appropriate.  Using a sterile surgical marker, an appropriate transposition flap was drawn incorporating the defect and placing the expected incisions within the relaxed skin tension lines where possible. The area thus outlined was incised deep to adipose tissue with a #15 scalpel blade.  The skin margins were undermined to an appropriate distance in all directions utilizing iris scissors.
Ear Star Wedge Flap Text: The defect edges were debeveled with a #15 blade scalpel.  Given the location of the defect and the proximity to free margins (helical rim) an ear star wedge flap was deemed most appropriate.  Using a sterile surgical marker, the appropriate flap was drawn incorporating the defect and placing the expected incisions between the helical rim and antihelix where possible.  The area thus outlined was incised through and through with a #15 scalpel blade.
Z Plasty Text: The lesion was extirpated to the level of the fat with a #15 scalpel blade.  Given the location of the defect, shape of the defect and the proximity to free margins a Z-plasty was deemed most appropriate for repair.  Using a sterile surgical marker, the appropriate transposition arms of the Z-plasty were drawn incorporating the defect and placing the expected incisions within the relaxed skin tension lines where possible.    The area thus outlined was incised deep to adipose tissue with a #15 scalpel blade.  The skin margins were undermined to an appropriate distance in all directions utilizing iris scissors.  The opposing transposition arms were then transposed into place in opposite direction and anchored with interrupted buried subcutaneous sutures.
Dermal Autograft Text: The defect edges were debeveled with a #15 scalpel blade.  Given the location of the defect, shape of the defect and the proximity to free margins a dermal autograft was deemed most appropriate.  Using a sterile surgical marker, the primary defect shape was transferred to the donor site. The area thus outlined was incised deep to adipose tissue with a #15 scalpel blade.  The harvested graft was then trimmed of adipose and epidermal tissue until only dermis was left.  The skin graft was then placed in the primary defect and oriented appropriately.
Size Of Margin In Cm: 0.2
Complex Repair And V-Y Plasty Text: The defect edges were debeveled with a #15 scalpel blade.  The primary defect was closed partially with a complex linear closure.  Given the location of the remaining defect, shape of the defect and the proximity to free margins a V-Y plasty was deemed most appropriate for complete closure of the defect.  Using a sterile surgical marker, an appropriate advancement flap was drawn incorporating the defect and placing the expected incisions within the relaxed skin tension lines where possible.    The area thus outlined was incised deep to adipose tissue with a #15 scalpel blade.  The skin margins were undermined to an appropriate distance in all directions utilizing iris scissors.
Double O-Z Flap Text: The defect edges were debeveled with a #15 scalpel blade.  Given the location of the defect, shape of the defect and the proximity to free margins a Double O-Z flap was deemed most appropriate.  Using a sterile surgical marker, an appropriate transposition flap was drawn incorporating the defect and placing the expected incisions within the relaxed skin tension lines where possible. The area thus outlined was incised deep to adipose tissue with a #15 scalpel blade.  The skin margins were undermined to an appropriate distance in all directions utilizing iris scissors.
Banner Transposition Flap Text: The defect edges were debeveled with a #15 scalpel blade.  Given the location of the defect and the proximity to free margins a Banner transposition flap was deemed most appropriate.  Using a sterile surgical marker, an appropriate flap drawn around the defect. The area thus outlined was incised deep to adipose tissue with a #15 scalpel blade.  The skin margins were undermined to an appropriate distance in all directions utilizing iris scissors.
Cheek Interpolation Flap Text: A decision was made to reconstruct the defect utilizing an interpolation axial flap and a staged reconstruction.  A telfa template was made of the defect.  This telfa template was then used to outline the Cheek Interpolation flap.  The donor area for the pedicle flap was then injected with anesthesia.  The flap was excised through the skin and subcutaneous tissue down to the layer of the underlying musculature.  The interpolation flap was carefully excised within this deep plane to maintain its blood supply.  The edges of the donor site were undermined.   The donor site was closed in a primary fashion.  The pedicle was then rotated into position and sutured.  Once the tube was sutured into place, adequate blood supply was confirmed with blanching and refill.  The pedicle was then wrapped with xeroform gauze and dressed appropriately with a telfa and gauze bandage to ensure continued blood supply and protect the attached pedicle.
Advancement Flap (Single) Text: The defect edges were debeveled with a #15 scalpel blade.  Given the location of the defect and the proximity to free margins a single advancement flap was deemed most appropriate.  Using a sterile surgical marker, an appropriate advancement flap was drawn incorporating the defect and placing the expected incisions within the relaxed skin tension lines where possible.    The area thus outlined was incised deep to adipose tissue with a #15 scalpel blade.  The skin margins were undermined to an appropriate distance in all directions utilizing iris scissors.
Post-Care Instructions: I reviewed with the patient in detail post-care instructions:\\n1. Apply bacitracin over the steri-strips.  \\n2. Cut non-stick pad (Telfa) to cover the steri-strips\\n3. Apply tape (hypafix) over the non-stick pad\\n4. Change once per day for 5 days\\n5. Shower with bandage on, change bandage after shower\\n\\nPatient is not to engage in any heavy lifting, exercise, hot tub, or swimming for the next 14 days. Should the patient develop any fevers, chills, bleeding, severe pain patient will contact the office immediately.
Skin Substitute Text: The defect edges were debeveled with a #15 scalpel blade.  Given the location of the defect, shape of the defect and the proximity to free margins a skin substitute graft was deemed most appropriate.  The graft material was trimmed to fit the size of the defect. The graft was then placed in the primary defect and oriented appropriately.
Complex Repair And M Plasty Text: The defect edges were debeveled with a #15 scalpel blade.  The primary defect was closed partially with a complex linear closure.  Given the location of the remaining defect, shape of the defect and the proximity to free margins an M plasty was deemed most appropriate for complete closure of the defect.  Using a sterile surgical marker, an appropriate advancement flap was drawn incorporating the defect and placing the expected incisions within the relaxed skin tension lines where possible.    The area thus outlined was incised deep to adipose tissue with a #15 scalpel blade.  The skin margins were undermined to an appropriate distance in all directions utilizing iris scissors.
V-Y Flap Text: The defect edges were debeveled with a #15 scalpel blade.  Given the location of the defect, shape of the defect and the proximity to free margins a V-Y flap was deemed most appropriate.  Using a sterile surgical marker, an appropriate advancement flap was drawn incorporating the defect and placing the expected incisions within the relaxed skin tension lines where possible.    The area thus outlined was incised deep to adipose tissue with a #15 scalpel blade.  The skin margins were undermined to an appropriate distance in all directions utilizing iris scissors.
Epidermal Closure Graft Donor Site (Optional): simple interrupted
Excision Method: Elliptical
Bilobed Flap Text: The defect edges were debeveled with a #15 scalpel blade.  Given the location of the defect and the proximity to free margins a bilobe flap was deemed most appropriate.  Using a sterile surgical marker, an appropriate bilobe flap drawn around the defect.    The area thus outlined was incised deep to adipose tissue with a #15 scalpel blade.  The skin margins were undermined to an appropriate distance in all directions utilizing iris scissors.
Home Suture Removal Text: Patient was provided a home suture removal kit and will remove their sutures at home.  If they have any questions or difficulties they will call the office.
Cheek-To-Nose Interpolation Flap Text: A decision was made to reconstruct the defect utilizing an interpolation axial flap and a staged reconstruction.  A telfa template was made of the defect.  This telfa template was then used to outline the Cheek-To-Nose Interpolation flap.  The donor area for the pedicle flap was then injected with anesthesia.  The flap was excised through the skin and subcutaneous tissue down to the layer of the underlying musculature.  The interpolation flap was carefully excised within this deep plane to maintain its blood supply.  The edges of the donor site were undermined.   The donor site was closed in a primary fashion.  The pedicle was then rotated into position and sutured.  Once the tube was sutured into place, adequate blood supply was confirmed with blanching and refill.  The pedicle was then wrapped with xeroform gauze and dressed appropriately with a telfa and gauze bandage to ensure continued blood supply and protect the attached pedicle.
No Repair - Repaired With Adjacent Surgical Defect Text (Leave Blank If You Do Not Want): After the excision the defect was repaired concurrently with another surgical defect which was in close approximation.
Detail Level: Detailed
Tissue Cultured Epidermal Autograft Text: The defect edges were debeveled with a #15 scalpel blade.  Given the location of the defect, shape of the defect and the proximity to free margins a tissue cultured epidermal autograft was deemed most appropriate.  The graft was then trimmed to fit the size of the defect.  The graft was then placed in the primary defect and oriented appropriately.
Epidermal Sutures: 5-0 Vicryl Rapide
Complex Repair And Double M Plasty Text: The defect edges were debeveled with a #15 scalpel blade.  The primary defect was closed partially with a complex linear closure.  Given the location of the remaining defect, shape of the defect and the proximity to free margins a double M plasty was deemed most appropriate for complete closure of the defect.  Using a sterile surgical marker, an appropriate advancement flap was drawn incorporating the defect and placing the expected incisions within the relaxed skin tension lines where possible.    The area thus outlined was incised deep to adipose tissue with a #15 scalpel blade.  The skin margins were undermined to an appropriate distance in all directions utilizing iris scissors.
Advancement-Rotation Flap Text: The defect edges were debeveled with a #15 scalpel blade.  Given the location of the defect, shape of the defect and the proximity to free margins an advancement-rotation flap was deemed most appropriate.  Using a sterile surgical marker, an appropriate flap was drawn incorporating the defect and placing the expected incisions within the relaxed skin tension lines where possible. The area thus outlined was incised deep to adipose tissue with a #15 scalpel blade.  The skin margins were undermined to an appropriate distance in all directions utilizing iris scissors.
Bilobed Transposition Flap Text: The defect edges were debeveled with a #15 scalpel blade.  Given the location of the defect and the proximity to free margins a bilobed transposition flap was deemed most appropriate.  Using a sterile surgical marker, an appropriate bilobe flap drawn around the defect.    The area thus outlined was incised deep to adipose tissue with a #15 scalpel blade.  The skin margins were undermined to an appropriate distance in all directions utilizing iris scissors.
Repair Performed By Another Provider Text (Leave Blank If You Do Not Want): After the tissue was excised the defect was repaired by another provider.
Xenograft Text: The defect edges were debeveled with a #15 scalpel blade.  Given the location of the defect, shape of the defect and the proximity to free margins a xenograft was deemed most appropriate.  The graft was then trimmed to fit the size of the defect.  The graft was then placed in the primary defect and oriented appropriately.
Pre-Excision Curettage Text (Leave Blank If You Do Not Want): Prior to drawing the surgical margin the visible lesion was removed with electrodesiccation and curettage to clearly define the lesion size.
Interpolation Flap Text: A decision was made to reconstruct the defect utilizing an interpolation axial flap and a staged reconstruction.  A telfa template was made of the defect.  This telfa template was then used to outline the interpolation flap.  The donor area for the pedicle flap was then injected with anesthesia.  The flap was excised through the skin and subcutaneous tissue down to the layer of the underlying musculature.  The interpolation flap was carefully excised within this deep plane to maintain its blood supply.  The edges of the donor site were undermined.   The donor site was closed in a primary fashion.  The pedicle was then rotated into position and sutured.  Once the tube was sutured into place, adequate blood supply was confirmed with blanching and refill.  The pedicle was then wrapped with xeroform gauze and dressed appropriately with a telfa and gauze bandage to ensure continued blood supply and protect the attached pedicle.
Mercedes Flap Text: The defect edges were debeveled with a #15 scalpel blade.  Given the location of the defect, shape of the defect and the proximity to free margins a Mercedes flap was deemed most appropriate.  Using a sterile surgical marker, an appropriate advancement flap was drawn incorporating the defect and placing the expected incisions within the relaxed skin tension lines where possible. The area thus outlined was incised deep to adipose tissue with a #15 scalpel blade.  The skin margins were undermined to an appropriate distance in all directions utilizing iris scissors.
Complex Repair And W Plasty Text: The defect edges were debeveled with a #15 scalpel blade.  The primary defect was closed partially with a complex linear closure.  Given the location of the remaining defect, shape of the defect and the proximity to free margins a W plasty was deemed most appropriate for complete closure of the defect.  Using a sterile surgical marker, an appropriate advancement flap was drawn incorporating the defect and placing the expected incisions within the relaxed skin tension lines where possible.    The area thus outlined was incised deep to adipose tissue with a #15 scalpel blade.  The skin margins were undermined to an appropriate distance in all directions utilizing iris scissors.
Trilobed Flap Text: The defect edges were debeveled with a #15 scalpel blade.  Given the location of the defect and the proximity to free margins a trilobed flap was deemed most appropriate.  Using a sterile surgical marker, an appropriate trilobed flap drawn around the defect.    The area thus outlined was incised deep to adipose tissue with a #15 scalpel blade.  The skin margins were undermined to an appropriate distance in all directions utilizing iris scissors.
Purse String (Intermediate) Text: Given the location of the defect and the characteristics of the surrounding skin a purse string intermediate closure was deemed most appropriate.  Undermining was performed circumfirentially around the surgical defect.  A purse string suture was then placed and tightened.
Positioning (Leave Blank If You Do Not Want): The patient was placed in a comfortable position exposing the surgical site.
Dermal Closure: buried vertical mattress
Melolabial Interpolation Flap Text: A decision was made to reconstruct the defect utilizing an interpolation axial flap and a staged reconstruction.  A telfa template was made of the defect.  This telfa template was then used to outline the melolabial interpolation flap.  The donor area for the pedicle flap was then injected with anesthesia.  The flap was excised through the skin and subcutaneous tissue down to the layer of the underlying musculature.  The pedicle flap was carefully excised within this deep plane to maintain its blood supply.  The edges of the donor site were undermined.   The donor site was closed in a primary fashion.  The pedicle was then rotated into position and sutured.  Once the tube was sutured into place, adequate blood supply was confirmed with blanching and refill.  The pedicle was then wrapped with xeroform gauze and dressed appropriately with a telfa and gauze bandage to ensure continued blood supply and protect the attached pedicle.
Complex Repair And Z Plasty Text: The defect edges were debeveled with a #15 scalpel blade.  The primary defect was closed partially with a complex linear closure.  Given the location of the remaining defect, shape of the defect and the proximity to free margins a Z plasty was deemed most appropriate for complete closure of the defect.  Using a sterile surgical marker, an appropriate advancement flap was drawn incorporating the defect and placing the expected incisions within the relaxed skin tension lines where possible.    The area thus outlined was incised deep to adipose tissue with a #15 scalpel blade.  The skin margins were undermined to an appropriate distance in all directions utilizing iris scissors.
Perilesional Excision Additional Text (Leave Blank If You Do Not Want): The margin was drawn around the clinically apparent lesion. Incisions were then made along these lines to the appropriate tissue plane and the lesion was extirpated.
Modified Advancement Flap Text: The defect edges were debeveled with a #15 scalpel blade.  Given the location of the defect, shape of the defect and the proximity to free margins a modified advancement flap was deemed most appropriate.  Using a sterile surgical marker, an appropriate advancement flap was drawn incorporating the defect and placing the expected incisions within the relaxed skin tension lines where possible.    The area thus outlined was incised deep to adipose tissue with a #15 scalpel blade.  The skin margins were undermined to an appropriate distance in all directions utilizing iris scissors.
Purse String (Simple) Text: Given the location of the defect and the characteristics of the surrounding skin a purse string simple closure was deemed most appropriate.  Undermining was performed circumferentially around the surgical defect.  A purse string suture was then placed and tightened.
Dorsal Nasal Flap Text: The defect edges were debeveled with a #15 scalpel blade.  Given the location of the defect and the proximity to free margins a dorsal nasal flap was deemed most appropriate.  Using a sterile surgical marker, an appropriate dorsal nasal flap was drawn around the defect.    The area thus outlined was incised deep to adipose tissue with a #15 scalpel blade.  The skin margins were undermined to an appropriate distance in all directions utilizing iris scissors.
Mastoid Interpolation Flap Text: A decision was made to reconstruct the defect utilizing an interpolation axial flap and a staged reconstruction.  A telfa template was made of the defect.  This telfa template was then used to outline the mastoid interpolation flap.  The donor area for the pedicle flap was then injected with anesthesia.  The flap was excised through the skin and subcutaneous tissue down to the layer of the underlying musculature.  The pedicle flap was carefully excised within this deep plane to maintain its blood supply.  The edges of the donor site were undermined.   The donor site was closed in a primary fashion.  The pedicle was then rotated into position and sutured.  Once the tube was sutured into place, adequate blood supply was confirmed with blanching and refill.  The pedicle was then wrapped with xeroform gauze and dressed appropriately with a telfa and gauze bandage to ensure continued blood supply and protect the attached pedicle.
Billing Type: Third-Party Bill
Complex Repair And Dorsal Nasal Flap Text: The defect edges were debeveled with a #15 scalpel blade.  The primary defect was closed partially with a complex linear closure.  Given the location of the remaining defect, shape of the defect and the proximity to free margins a dorsal nasal flap was deemed most appropriate for complete closure of the defect.  Using a sterile surgical marker, an appropriate flap was drawn incorporating the defect and placing the expected incisions within the relaxed skin tension lines where possible.    The area thus outlined was incised deep to adipose tissue with a #15 scalpel blade.  The skin margins were undermined to an appropriate distance in all directions utilizing iris scissors.

## 2019-12-09 ENCOUNTER — OFFICE VISIT (OUTPATIENT)
Dept: MEDICAL GROUP | Facility: MEDICAL CENTER | Age: 84
End: 2019-12-09
Payer: MEDICARE

## 2019-12-09 VITALS
HEART RATE: 83 BPM | OXYGEN SATURATION: 95 % | DIASTOLIC BLOOD PRESSURE: 62 MMHG | SYSTOLIC BLOOD PRESSURE: 116 MMHG | TEMPERATURE: 97.3 F | BODY MASS INDEX: 28.13 KG/M2 | WEIGHT: 134 LBS | HEIGHT: 58 IN

## 2019-12-09 DIAGNOSIS — N39.0 RECURRENT UTI: ICD-10-CM

## 2019-12-09 DIAGNOSIS — Z23 NEED FOR VACCINATION: ICD-10-CM

## 2019-12-09 DIAGNOSIS — R05.9 COUGH: ICD-10-CM

## 2019-12-09 PROBLEM — F03.90 DEMENTIA WITHOUT BEHAVIORAL DISTURBANCE (HCC): Status: RESOLVED | Noted: 2018-06-18 | Resolved: 2019-12-09

## 2019-12-09 PROCEDURE — 90662 IIV NO PRSV INCREASED AG IM: CPT | Performed by: INTERNAL MEDICINE

## 2019-12-09 PROCEDURE — G0008 ADMIN INFLUENZA VIRUS VAC: HCPCS | Performed by: INTERNAL MEDICINE

## 2019-12-09 PROCEDURE — 99214 OFFICE O/P EST MOD 30 MIN: CPT | Mod: 25 | Performed by: INTERNAL MEDICINE

## 2019-12-09 RX ORDER — NITROFURANTOIN 25; 75 MG/1; MG/1
100 CAPSULE ORAL
Qty: 90 CAP | Refills: 3 | Status: SHIPPED | OUTPATIENT
Start: 2019-12-09 | End: 2020-09-16 | Stop reason: SDUPTHER

## 2019-12-09 NOTE — PROGRESS NOTES
CC: Cough, recurrent UTIs.                                                                                                                                      HPI:   Ramya presents today with the following.    1. Cough  Resents complaining of 3 to 4 days of cough.  No fevers or chills cough is only mildly productive of clear sputum.  Reports mild sore throat no earache or other localizing symptoms.    2. Recurrent UTI  Recurrent UTIs she is unable to get into urology.  She they are interested in medications to prevent future infections.  Currently no flank pain nausea vomiting or urinary symptoms    3. 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 90 Cap 3   • donepezil (ARICEPT) 10 MG tablet TAKE 2 TABLETS BY MOUTH EVERY  Tab 3   • gabapentin (NEURONTIN) 100 MG Cap Take 1 Cap by mouth 3 times a day. 90 Cap 2   • vitamin D 5000 units Tab Take 5,000 Units by mouth every day. 30 Tab 2   • dicyclomine (BENTYL) 10 MG Cap Take 1 Cap by mouth every 6 hours as needed (abdominal cramp/discomfort). 90 Cap 2   • Omega 3 1200 MG Cap Take 1 Tab by mouth every day.     • artificial tears (EYE LUBRICANT) Ointment ophth ointment Place 1 Application in both eyes every 8 hours.     • Carboxymethylcellulose  "Sodium (REFRESH TEARS OP) 1 Drop by Ophthalmic route 2 Times a Day.     • donepezil (ARICEPT) 10 MG tablet Take 20 mg by mouth every day.     • acetaminophen (TYLENOL) 325 MG Tab Take 650 mg by mouth every four hours as needed.       No current facility-administered medications for this visit.          Allergies as of 12/09/2019 - Reviewed 12/09/2019   Allergen Reaction Noted   • Prednisone Unspecified 08/11/2018   • Seasonal  08/15/2019        ROS: Denies Chest pain, SOB, LE edema.    /62 (BP Location: Left arm, Patient Position: Sitting)   Pulse 83   Temp 36.3 °C (97.3 °F)   Ht 1.473 m (4' 10\")   Wt 60.8 kg (134 lb)   SpO2 95%   BMI 28.01 kg/m²     Physical Exam:  Gen:         Alert and oriented, No apparent distress.  Neck:        No Lymphadenopathy or Bruits.  Lungs:     Clear to auscultation bilaterally  CV:          Regular rate and rhythm. No murmurs, rubs or gallops.               Ext:          No clubbing, cyanosis, edema.      Assessment and Plan.   86 y.o. female with the following issues.    1. Cough  Likely viral in etiology. Have recommended over-the-counter pain control and symptom relief. Patient will followup if spiking fevers or symptoms worsen.    2. Recurrent UTI  Placing on Macrobid 1 pill daily cautioned about side effects particularly diarrhea they will follow-up if symptoms should occur.  - nitrofurantoin monohyd macro (MACROBID) 100 MG Cap; Take 1 Cap by mouth 1 time daily as needed.  Dispense: 90 Cap; Refill: 3    3. Need for vaccination    - INFLUENZA VACCINE, HIGH DOSE (65+ ONLY)      "

## 2020-02-27 ENCOUNTER — APPOINTMENT (RX ONLY)
Dept: URBAN - METROPOLITAN AREA CLINIC 4 | Facility: CLINIC | Age: 85
Setting detail: DERMATOLOGY
End: 2020-02-27

## 2020-02-27 PROBLEM — C44.622 SQUAMOUS CELL CARCINOMA OF SKIN OF RIGHT UPPER LIMB, INCLUDING SHOULDER: Status: ACTIVE | Noted: 2020-02-27

## 2020-02-27 PROCEDURE — ? EXCISION

## 2020-02-27 PROCEDURE — 13121 CMPLX RPR S/A/L 2.6-7.5 CM: CPT

## 2020-02-27 PROCEDURE — 11602 EXC TR-EXT MAL+MARG 1.1-2 CM: CPT

## 2020-02-27 NOTE — PROCEDURE: EXCISION
Surgeon (Optional): Vishnu
Biopsy Photograph Reviewed: Yes
Previous Accession (Optional): [id number]
Size Of Lesion In Cm: 1.3
X Size Of Lesion In Cm (Optional): 0
Size Of Margin In Cm: 0.2
Excision Method: Elliptical
Anesthesia Volume In Cc: 12
Did You Provide Opioid Counseling: No
Repair Type: Complex
Intermediate / Complex Repair - Final Wound Length In Cm: 3.5
Distance Of Undermining In Cm (Required): 1.5
Undermining Type: Entire Wound
Debridement Text: The wound edges were debrided prior to proceeding with the closure to facilitate wound healing.
Helical Rim Text: The closure involved the helical rim.
Vermilion Border Text: The closure involved the vermilion border.
Nostril Rim Text: The closure involved the nostril rim.
Retention Suture Text: Retention sutures were placed to support the closure and prevent dehiscence.
Lab: [id number]
Lab Facility: 
Graft Donor Site Bandage (Optional-Leave Blank If You Don't Want In Note): Steri-strips and a pressure bandage were applied to the donor site.
Epidermal Closure Graft Donor Site (Optional): simple interrupted
Billing Type: Third-Party Bill
Excision Depth: adipose tissue
Scalpel Size: 15 blade
Anesthesia Type: 1% lidocaine with epinephrine
Additional Anesthesia Volume In Cc: 6
Hemostasis: Electrocautery
Estimated Blood Loss (Cc): minimal
Detail Level: Detailed
Repair Anesthesia Method: local infiltration
Undermining Location (Optional): in the superficial subcutaneous fat
Deep Sutures: 4-0 Vicryl
Dermal Closure: buried vertical mattress
Epidermal Sutures: 5-0 Vicryl Rapide
Additional Epidermal Sutures: 5-0 Caprosyn
Epidermal Closure: running subcuticular
Wound Care: Bacitracin
Dressing: dry sterile dressing
Positioning (Leave Blank If You Do Not Want): The patient was placed in a comfortable position exposing the surgical site.
Pre-Excision Curettage Text (Leave Blank If You Do Not Want): Prior to drawing the surgical margin the visible lesion was removed with electrodesiccation and curettage to clearly define the lesion size.
Complex Repair Preamble Text (Leave Blank If You Do Not Want): Extensive wide undermining was performed.
Intermediate Repair Preamble Text (Leave Blank If You Do Not Want): Undermining was performed with blunt dissection.
Curvilinear Excision Additional Text (Leave Blank If You Do Not Want): The margin was drawn around the clinically apparent lesion.  A curvilinear shape was then drawn on the skin incorporating the lesion and margins.  Incisions were then made along these lines to the appropriate tissue plane and the lesion was extirpated.
Fusiform Excision Additional Text (Leave Blank If You Do Not Want): The margin was drawn around the clinically apparent lesion.  A fusiform shape was then drawn on the skin incorporating the lesion and margins.  Incisions were then made along these lines to the appropriate tissue plane and the lesion was extirpated.
Elliptical Excision Additional Text (Leave Blank If You Do Not Want): The margin was drawn around the clinically apparent lesion.  An elliptical shape was then drawn on the skin incorporating the lesion and margins.  Incisions were then made along these lines to the appropriate tissue plane and the lesion was extirpated.
Saucerization Excision Additional Text (Leave Blank If You Do Not Want): The margin was drawn around the clinically apparent lesion.  Incisions were then made along these lines, in a tangential fashion, to the appropriate tissue plane and the lesion was extirpated.
Slit Excision Additional Text (Leave Blank If You Do Not Want): A linear line was drawn on the skin overlying the lesion. An incision was made slowly until the lesion was visualized.  Once visualized, the lesion was removed with blunt dissection.
Excisional Biopsy Additional Text (Leave Blank If You Do Not Want): The margin was drawn around the clinically apparent lesion. An elliptical shape was then drawn on the skin incorporating the lesion and margins.  Incisions were then made along these lines to the appropriate tissue plane and the lesion was extirpated.
Perilesional Excision Additional Text (Leave Blank If You Do Not Want): The margin was drawn around the clinically apparent lesion. Incisions were then made along these lines to the appropriate tissue plane and the lesion was extirpated.
Repair Performed By Another Provider Text (Leave Blank If You Do Not Want): After the tissue was excised the defect was repaired by another provider.
No Repair - Repaired With Adjacent Surgical Defect Text (Leave Blank If You Do Not Want): After the excision the defect was repaired concurrently with another surgical defect which was in close approximation.
Advancement Flap (Single) Text: The defect edges were debeveled with a #15 scalpel blade.  Given the location of the defect and the proximity to free margins a single advancement flap was deemed most appropriate.  Using a sterile surgical marker, an appropriate advancement flap was drawn incorporating the defect and placing the expected incisions within the relaxed skin tension lines where possible.    The area thus outlined was incised deep to adipose tissue with a #15 scalpel blade.  The skin margins were undermined to an appropriate distance in all directions utilizing iris scissors.
Advancement Flap (Double) Text: The defect edges were debeveled with a #15 scalpel blade.  Given the location of the defect and the proximity to free margins a double advancement flap was deemed most appropriate.  Using a sterile surgical marker, the appropriate advancement flaps were drawn incorporating the defect and placing the expected incisions within the relaxed skin tension lines where possible.    The area thus outlined was incised deep to adipose tissue with a #15 scalpel blade.  The skin margins were undermined to an appropriate distance in all directions utilizing iris scissors.
Burow's Advancement Flap Text: The defect edges were debeveled with a #15 scalpel blade.  Given the location of the defect and the proximity to free margins a Burow's advancement flap was deemed most appropriate.  Using a sterile surgical marker, the appropriate advancement flap was drawn incorporating the defect and placing the expected incisions within the relaxed skin tension lines where possible.    The area thus outlined was incised deep to adipose tissue with a #15 scalpel blade.  The skin margins were undermined to an appropriate distance in all directions utilizing iris scissors.
Chonodrocutaneous Helical Advancement Flap Text: The defect edges were debeveled with a #15 scalpel blade.  Given the location of the defect and the proximity to free margins a chondrocutaneous helical advancement flap was deemed most appropriate.  Using a sterile surgical marker, the appropriate advancement flap was drawn incorporating the defect and placing the expected incisions within the relaxed skin tension lines where possible.    The area thus outlined was incised deep to adipose tissue with a #15 scalpel blade.  The skin margins were undermined to an appropriate distance in all directions utilizing iris scissors.
Crescentic Advancement Flap Text: The defect edges were debeveled with a #15 scalpel blade.  Given the location of the defect and the proximity to free margins a crescentic advancement flap was deemed most appropriate.  Using a sterile surgical marker, the appropriate advancement flap was drawn incorporating the defect and placing the expected incisions within the relaxed skin tension lines where possible.    The area thus outlined was incised deep to adipose tissue with a #15 scalpel blade.  The skin margins were undermined to an appropriate distance in all directions utilizing iris scissors.
A-T Advancement Flap Text: The defect edges were debeveled with a #15 scalpel blade.  Given the location of the defect, shape of the defect and the proximity to free margins an A-T advancement flap was deemed most appropriate.  Using a sterile surgical marker, an appropriate advancement flap was drawn incorporating the defect and placing the expected incisions within the relaxed skin tension lines where possible.    The area thus outlined was incised deep to adipose tissue with a #15 scalpel blade.  The skin margins were undermined to an appropriate distance in all directions utilizing iris scissors.
O-T Advancement Flap Text: The defect edges were debeveled with a #15 scalpel blade.  Given the location of the defect, shape of the defect and the proximity to free margins an O-T advancement flap was deemed most appropriate.  Using a sterile surgical marker, an appropriate advancement flap was drawn incorporating the defect and placing the expected incisions within the relaxed skin tension lines where possible.    The area thus outlined was incised deep to adipose tissue with a #15 scalpel blade.  The skin margins were undermined to an appropriate distance in all directions utilizing iris scissors.
O-L Flap Text: The defect edges were debeveled with a #15 scalpel blade.  Given the location of the defect, shape of the defect and the proximity to free margins an O-L flap was deemed most appropriate.  Using a sterile surgical marker, an appropriate advancement flap was drawn incorporating the defect and placing the expected incisions within the relaxed skin tension lines where possible.    The area thus outlined was incised deep to adipose tissue with a #15 scalpel blade.  The skin margins were undermined to an appropriate distance in all directions utilizing iris scissors.
O-Z Flap Text: The defect edges were debeveled with a #15 scalpel blade.  Given the location of the defect, shape of the defect and the proximity to free margins an O-Z flap was deemed most appropriate.  Using a sterile surgical marker, an appropriate transposition flap was drawn incorporating the defect and placing the expected incisions within the relaxed skin tension lines where possible. The area thus outlined was incised deep to adipose tissue with a #15 scalpel blade.  The skin margins were undermined to an appropriate distance in all directions utilizing iris scissors.
Double O-Z Flap Text: The defect edges were debeveled with a #15 scalpel blade.  Given the location of the defect, shape of the defect and the proximity to free margins a Double O-Z flap was deemed most appropriate.  Using a sterile surgical marker, an appropriate transposition flap was drawn incorporating the defect and placing the expected incisions within the relaxed skin tension lines where possible. The area thus outlined was incised deep to adipose tissue with a #15 scalpel blade.  The skin margins were undermined to an appropriate distance in all directions utilizing iris scissors.
V-Y Flap Text: The defect edges were debeveled with a #15 scalpel blade.  Given the location of the defect, shape of the defect and the proximity to free margins a V-Y flap was deemed most appropriate.  Using a sterile surgical marker, an appropriate advancement flap was drawn incorporating the defect and placing the expected incisions within the relaxed skin tension lines where possible.    The area thus outlined was incised deep to adipose tissue with a #15 scalpel blade.  The skin margins were undermined to an appropriate distance in all directions utilizing iris scissors.
Advancement-Rotation Flap Text: The defect edges were debeveled with a #15 scalpel blade.  Given the location of the defect, shape of the defect and the proximity to free margins an advancement-rotation flap was deemed most appropriate.  Using a sterile surgical marker, an appropriate flap was drawn incorporating the defect and placing the expected incisions within the relaxed skin tension lines where possible. The area thus outlined was incised deep to adipose tissue with a #15 scalpel blade.  The skin margins were undermined to an appropriate distance in all directions utilizing iris scissors.
Mercedes Flap Text: The defect edges were debeveled with a #15 scalpel blade.  Given the location of the defect, shape of the defect and the proximity to free margins a Mercedes flap was deemed most appropriate.  Using a sterile surgical marker, an appropriate advancement flap was drawn incorporating the defect and placing the expected incisions within the relaxed skin tension lines where possible. The area thus outlined was incised deep to adipose tissue with a #15 scalpel blade.  The skin margins were undermined to an appropriate distance in all directions utilizing iris scissors.
Modified Advancement Flap Text: The defect edges were debeveled with a #15 scalpel blade.  Given the location of the defect, shape of the defect and the proximity to free margins a modified advancement flap was deemed most appropriate.  Using a sterile surgical marker, an appropriate advancement flap was drawn incorporating the defect and placing the expected incisions within the relaxed skin tension lines where possible.    The area thus outlined was incised deep to adipose tissue with a #15 scalpel blade.  The skin margins were undermined to an appropriate distance in all directions utilizing iris scissors.
Mucosal Advancement Flap Text: Given the location of the defect, shape of the defect and the proximity to free margins a mucosal advancement flap was deemed most appropriate. Incisions were made with a 15 blade scalpel in the appropriate fashion along the cutaneous vermilion border and the mucosal lip. The remaining actinically damaged mucosal tissue was excised.  The mucosal advancement flap was then elevated to the gingival sulcus with care taken to preserve the neurovascular structures and advanced into the primary defect. Care was taken to ensure that precise realignment of the vermilion border was achieved.
Hatchet Flap Text: The defect edges were debeveled with a #15 scalpel blade.  Given the location of the defect, shape of the defect and the proximity to free margins a hatchet flap was deemed most appropriate.  Using a sterile surgical marker, an appropriate hatchet flap was drawn incorporating the defect and placing the expected incisions within the relaxed skin tension lines where possible.    The area thus outlined was incised deep to adipose tissue with a #15 scalpel blade.  The skin margins were undermined to an appropriate distance in all directions utilizing iris scissors.
Rotation Flap Text: The defect edges were debeveled with a #15 scalpel blade.  Given the location of the defect, shape of the defect and the proximity to free margins a rotation flap was deemed most appropriate.  Using a sterile surgical marker, an appropriate rotation flap was drawn incorporating the defect and placing the expected incisions within the relaxed skin tension lines where possible.    The area thus outlined was incised deep to adipose tissue with a #15 scalpel blade.  The skin margins were undermined to an appropriate distance in all directions utilizing iris scissors.
Spiral Flap Text: The defect edges were debeveled with a #15 scalpel blade.  Given the location of the defect, shape of the defect and the proximity to free margins a spiral flap was deemed most appropriate.  Using a sterile surgical marker, an appropriate rotation flap was drawn incorporating the defect and placing the expected incisions within the relaxed skin tension lines where possible. The area thus outlined was incised deep to adipose tissue with a #15 scalpel blade.  The skin margins were undermined to an appropriate distance in all directions utilizing iris scissors.
Star Wedge Flap Text: The defect edges were debeveled with a #15 scalpel blade.  Given the location of the defect, shape of the defect and the proximity to free margins a star wedge flap was deemed most appropriate.  Using a sterile surgical marker, an appropriate rotation flap was drawn incorporating the defect and placing the expected incisions within the relaxed skin tension lines where possible. The area thus outlined was incised deep to adipose tissue with a #15 scalpel blade.  The skin margins were undermined to an appropriate distance in all directions utilizing iris scissors.
Transposition Flap Text: The defect edges were debeveled with a #15 scalpel blade.  Given the location of the defect and the proximity to free margins a transposition flap was deemed most appropriate.  Using a sterile surgical marker, an appropriate transposition flap was drawn incorporating the defect.    The area thus outlined was incised deep to adipose tissue with a #15 scalpel blade.  The skin margins were undermined to an appropriate distance in all directions utilizing iris scissors.
Muscle Hinge Flap Text: The defect edges were debeveled with a #15 scalpel blade.  Given the size, depth and location of the defect and the proximity to free margins a muscle hinge flap was deemed most appropriate.  Using a sterile surgical marker, an appropriate hinge flap was drawn incorporating the defect. The area thus outlined was incised with a #15 scalpel blade.  The skin margins were undermined to an appropriate distance in all directions utilizing iris scissors.
Melolabial Transposition Flap Text: The defect edges were debeveled with a #15 scalpel blade.  Given the location of the defect and the proximity to free margins a melolabial flap was deemed most appropriate.  Using a sterile surgical marker, an appropriate melolabial transposition flap was drawn incorporating the defect.    The area thus outlined was incised deep to adipose tissue with a #15 scalpel blade.  The skin margins were undermined to an appropriate distance in all directions utilizing iris scissors.
Rhombic Flap Text: The defect edges were debeveled with a #15 scalpel blade.  Given the location of the defect and the proximity to free margins a rhombic flap was deemed most appropriate.  Using a sterile surgical marker, an appropriate rhombic flap was drawn incorporating the defect.    The area thus outlined was incised deep to adipose tissue with a #15 scalpel blade.  The skin margins were undermined to an appropriate distance in all directions utilizing iris scissors.
Rhomboid Transposition Flap Text: The defect edges were debeveled with a #15 scalpel blade.  Given the location of the defect and the proximity to free margins a rhomboid transposition flap was deemed most appropriate.  Using a sterile surgical marker, an appropriate rhomboid flap was drawn incorporating the defect.    The area thus outlined was incised deep to adipose tissue with a #15 scalpel blade.  The skin margins were undermined to an appropriate distance in all directions utilizing iris scissors.
Bi-Rhombic Flap Text: The defect edges were debeveled with a #15 scalpel blade.  Given the location of the defect and the proximity to free margins a bi-rhombic flap was deemed most appropriate.  Using a sterile surgical marker, an appropriate rhombic flap was drawn incorporating the defect. The area thus outlined was incised deep to adipose tissue with a #15 scalpel blade.  The skin margins were undermined to an appropriate distance in all directions utilizing iris scissors.
Helical Rim Advancement Flap Text: The defect edges were debeveled with a #15 blade scalpel.  Given the location of the defect and the proximity to free margins (helical rim) a double helical rim advancement flap was deemed most appropriate.  Using a sterile surgical marker, the appropriate advancement flaps were drawn incorporating the defect and placing the expected incisions between the helical rim and antihelix where possible.  The area thus outlined was incised through and through with a #15 scalpel blade.  With a skin hook and iris scissors, the flaps were gently and sharply undermined and freed up.
Bilateral Helical Rim Advancement Flap Text: The defect edges were debeveled with a #15 blade scalpel.  Given the location of the defect and the proximity to free margins (helical rim) a bilateral helical rim advancement flap was deemed most appropriate.  Using a sterile surgical marker, the appropriate advancement flaps were drawn incorporating the defect and placing the expected incisions between the helical rim and antihelix where possible.  The area thus outlined was incised through and through with a #15 scalpel blade.  With a skin hook and iris scissors, the flaps were gently and sharply undermined and freed up.
Ear Star Wedge Flap Text: The defect edges were debeveled with a #15 blade scalpel.  Given the location of the defect and the proximity to free margins (helical rim) an ear star wedge flap was deemed most appropriate.  Using a sterile surgical marker, the appropriate flap was drawn incorporating the defect and placing the expected incisions between the helical rim and antihelix where possible.  The area thus outlined was incised through and through with a #15 scalpel blade.
Banner Transposition Flap Text: The defect edges were debeveled with a #15 scalpel blade.  Given the location of the defect and the proximity to free margins a Banner transposition flap was deemed most appropriate.  Using a sterile surgical marker, an appropriate flap drawn around the defect. The area thus outlined was incised deep to adipose tissue with a #15 scalpel blade.  The skin margins were undermined to an appropriate distance in all directions utilizing iris scissors.
Bilobed Flap Text: The defect edges were debeveled with a #15 scalpel blade.  Given the location of the defect and the proximity to free margins a bilobe flap was deemed most appropriate.  Using a sterile surgical marker, an appropriate bilobe flap drawn around the defect.    The area thus outlined was incised deep to adipose tissue with a #15 scalpel blade.  The skin margins were undermined to an appropriate distance in all directions utilizing iris scissors.
Bilobed Transposition Flap Text: The defect edges were debeveled with a #15 scalpel blade.  Given the location of the defect and the proximity to free margins a bilobed transposition flap was deemed most appropriate.  Using a sterile surgical marker, an appropriate bilobe flap drawn around the defect.    The area thus outlined was incised deep to adipose tissue with a #15 scalpel blade.  The skin margins were undermined to an appropriate distance in all directions utilizing iris scissors.
Trilobed Flap Text: The defect edges were debeveled with a #15 scalpel blade.  Given the location of the defect and the proximity to free margins a trilobed flap was deemed most appropriate.  Using a sterile surgical marker, an appropriate trilobed flap drawn around the defect.    The area thus outlined was incised deep to adipose tissue with a #15 scalpel blade.  The skin margins were undermined to an appropriate distance in all directions utilizing iris scissors.
Dorsal Nasal Flap Text: The defect edges were debeveled with a #15 scalpel blade.  Given the location of the defect and the proximity to free margins a dorsal nasal flap was deemed most appropriate.  Using a sterile surgical marker, an appropriate dorsal nasal flap was drawn around the defect.    The area thus outlined was incised deep to adipose tissue with a #15 scalpel blade.  The skin margins were undermined to an appropriate distance in all directions utilizing iris scissors.
Island Pedicle Flap Text: The defect edges were debeveled with a #15 scalpel blade.  Given the location of the defect, shape of the defect and the proximity to free margins an island pedicle advancement flap was deemed most appropriate.  Using a sterile surgical marker, an appropriate advancement flap was drawn incorporating the defect, outlining the appropriate donor tissue and placing the expected incisions within the relaxed skin tension lines where possible.    The area thus outlined was incised deep to adipose tissue with a #15 scalpel blade.  The skin margins were undermined to an appropriate distance in all directions around the primary defect and laterally outward around the island pedicle utilizing iris scissors.  There was minimal undermining beneath the pedicle flap.
Island Pedicle Flap With Canthal Suspension Text: The defect edges were debeveled with a #15 scalpel blade.  Given the location of the defect, shape of the defect and the proximity to free margins an island pedicle advancement flap was deemed most appropriate.  Using a sterile surgical marker, an appropriate advancement flap was drawn incorporating the defect, outlining the appropriate donor tissue and placing the expected incisions within the relaxed skin tension lines where possible. The area thus outlined was incised deep to adipose tissue with a #15 scalpel blade.  The skin margins were undermined to an appropriate distance in all directions around the primary defect and laterally outward around the island pedicle utilizing iris scissors.  There was minimal undermining beneath the pedicle flap. A suspension suture was placed in the canthal tendon to prevent tension and prevent ectropion.
Alar Island Pedicle Flap Text: The defect edges were debeveled with a #15 scalpel blade.  Given the location of the defect, shape of the defect and the proximity to the alar rim an island pedicle advancement flap was deemed most appropriate.  Using a sterile surgical marker, an appropriate advancement flap was drawn incorporating the defect, outlining the appropriate donor tissue and placing the expected incisions within the nasal ala running parallel to the alar rim. The area thus outlined was incised with a #15 scalpel blade.  The skin margins were undermined minimally to an appropriate distance in all directions around the primary defect and laterally outward around the island pedicle utilizing iris scissors.  There was minimal undermining beneath the pedicle flap.
Double Island Pedicle Flap Text: The defect edges were debeveled with a #15 scalpel blade.  Given the location of the defect, shape of the defect and the proximity to free margins a double island pedicle advancement flap was deemed most appropriate.  Using a sterile surgical marker, an appropriate advancement flap was drawn incorporating the defect, outlining the appropriate donor tissue and placing the expected incisions within the relaxed skin tension lines where possible.    The area thus outlined was incised deep to adipose tissue with a #15 scalpel blade.  The skin margins were undermined to an appropriate distance in all directions around the primary defect and laterally outward around the island pedicle utilizing iris scissors.  There was minimal undermining beneath the pedicle flap.
Island Pedicle Flap-Requiring Vessel Identification Text: The defect edges were debeveled with a #15 scalpel blade.  Given the location of the defect, shape of the defect and the proximity to free margins an island pedicle advancement flap was deemed most appropriate.  Using a sterile surgical marker, an appropriate advancement flap was drawn, based on the axial vessel mentioned above, incorporating the defect, outlining the appropriate donor tissue and placing the expected incisions within the relaxed skin tension lines where possible.    The area thus outlined was incised deep to adipose tissue with a #15 scalpel blade.  The skin margins were undermined to an appropriate distance in all directions around the primary defect and laterally outward around the island pedicle utilizing iris scissors.  There was minimal undermining beneath the pedicle flap.
Keystone Flap Text: The defect edges were debeveled with a #15 scalpel blade.  Given the location of the defect, shape of the defect a keystone flap was deemed most appropriate.  Using a sterile surgical marker, an appropriate keystone flap was drawn incorporating the defect, outlining the appropriate donor tissue and placing the expected incisions within the relaxed skin tension lines where possible. The area thus outlined was incised deep to adipose tissue with a #15 scalpel blade.  The skin margins were undermined to an appropriate distance in all directions around the primary defect and laterally outward around the flap utilizing iris scissors.
O-T Plasty Text: The defect edges were debeveled with a #15 scalpel blade.  Given the location of the defect, shape of the defect and the proximity to free margins an O-T plasty was deemed most appropriate.  Using a sterile surgical marker, an appropriate O-T plasty was drawn incorporating the defect and placing the expected incisions within the relaxed skin tension lines where possible.    The area thus outlined was incised deep to adipose tissue with a #15 scalpel blade.  The skin margins were undermined to an appropriate distance in all directions utilizing iris scissors.
O-Z Plasty Text: The defect edges were debeveled with a #15 scalpel blade.  Given the location of the defect, shape of the defect and the proximity to free margins an O-Z plasty (double transposition flap) was deemed most appropriate.  Using a sterile surgical marker, the appropriate transposition flaps were drawn incorporating the defect and placing the expected incisions within the relaxed skin tension lines where possible.    The area thus outlined was incised deep to adipose tissue with a #15 scalpel blade.  The skin margins were undermined to an appropriate distance in all directions utilizing iris scissors.  Hemostasis was achieved with electrocautery.  The flaps were then transposed into place, one clockwise and the other counterclockwise, and anchored with interrupted buried subcutaneous sutures.
Double O-Z Plasty Text: The defect edges were debeveled with a #15 scalpel blade.  Given the location of the defect, shape of the defect and the proximity to free margins a Double O-Z plasty (double transposition flap) was deemed most appropriate.  Using a sterile surgical marker, the appropriate transposition flaps were drawn incorporating the defect and placing the expected incisions within the relaxed skin tension lines where possible. The area thus outlined was incised deep to adipose tissue with a #15 scalpel blade.  The skin margins were undermined to an appropriate distance in all directions utilizing iris scissors.  Hemostasis was achieved with electrocautery.  The flaps were then transposed into place, one clockwise and the other counterclockwise, and anchored with interrupted buried subcutaneous sutures.
S Plasty Text: Given the location and shape of the defect, and the orientation of relaxed skin tension lines, an S-plasty was deemed most appropriate for repair.  Using a sterile surgical marker, the appropriate outline of the S-plasty was drawn, incorporating the defect and placing the expected incisions within the relaxed skin tension lines where possible.  The area thus outlined was incised deep to adipose tissue with a #15 scalpel blade.  The skin margins were undermined to an appropriate distance in all directions utilizing iris scissors. The skin flaps were advanced over the defect.  The opposing margins were then approximated with interrupted buried subcutaneous sutures.
V-Y Plasty Text: The defect edges were debeveled with a #15 scalpel blade.  Given the location of the defect, shape of the defect and the proximity to free margins an V-Y advancement flap was deemed most appropriate.  Using a sterile surgical marker, an appropriate advancement flap was drawn incorporating the defect and placing the expected incisions within the relaxed skin tension lines where possible.    The area thus outlined was incised deep to adipose tissue with a #15 scalpel blade.  The skin margins were undermined to an appropriate distance in all directions utilizing iris scissors.
H Plasty Text: Given the location of the defect, shape of the defect and the proximity to free margins a H-plasty was deemed most appropriate for repair.  Using a sterile surgical marker, the appropriate advancement arms of the H-plasty were drawn incorporating the defect and placing the expected incisions within the relaxed skin tension lines where possible. The area thus outlined was incised deep to adipose tissue with a #15 scalpel blade. The skin margins were undermined to an appropriate distance in all directions utilizing iris scissors.  The opposing advancement arms were then advanced into place in opposite direction and anchored with interrupted buried subcutaneous sutures.
W Plasty Text: The lesion was extirpated to the level of the fat with a #15 scalpel blade.  Given the location of the defect, shape of the defect and the proximity to free margins a W-plasty was deemed most appropriate for repair.  Using a sterile surgical marker, the appropriate transposition arms of the W-plasty were drawn incorporating the defect and placing the expected incisions within the relaxed skin tension lines where possible.    The area thus outlined was incised deep to adipose tissue with a #15 scalpel blade.  The skin margins were undermined to an appropriate distance in all directions utilizing iris scissors.  The opposing transposition arms were then transposed into place in opposite direction and anchored with interrupted buried subcutaneous sutures.
Z Plasty Text: The lesion was extirpated to the level of the fat with a #15 scalpel blade.  Given the location of the defect, shape of the defect and the proximity to free margins a Z-plasty was deemed most appropriate for repair.  Using a sterile surgical marker, the appropriate transposition arms of the Z-plasty were drawn incorporating the defect and placing the expected incisions within the relaxed skin tension lines where possible.    The area thus outlined was incised deep to adipose tissue with a #15 scalpel blade.  The skin margins were undermined to an appropriate distance in all directions utilizing iris scissors.  The opposing transposition arms were then transposed into place in opposite direction and anchored with interrupted buried subcutaneous sutures.
Cheek Interpolation Flap Text: A decision was made to reconstruct the defect utilizing an interpolation axial flap and a staged reconstruction.  A telfa template was made of the defect.  This telfa template was then used to outline the Cheek Interpolation flap.  The donor area for the pedicle flap was then injected with anesthesia.  The flap was excised through the skin and subcutaneous tissue down to the layer of the underlying musculature.  The interpolation flap was carefully excised within this deep plane to maintain its blood supply.  The edges of the donor site were undermined.   The donor site was closed in a primary fashion.  The pedicle was then rotated into position and sutured.  Once the tube was sutured into place, adequate blood supply was confirmed with blanching and refill.  The pedicle was then wrapped with xeroform gauze and dressed appropriately with a telfa and gauze bandage to ensure continued blood supply and protect the attached pedicle.
Cheek-To-Nose Interpolation Flap Text: A decision was made to reconstruct the defect utilizing an interpolation axial flap and a staged reconstruction.  A telfa template was made of the defect.  This telfa template was then used to outline the Cheek-To-Nose Interpolation flap.  The donor area for the pedicle flap was then injected with anesthesia.  The flap was excised through the skin and subcutaneous tissue down to the layer of the underlying musculature.  The interpolation flap was carefully excised within this deep plane to maintain its blood supply.  The edges of the donor site were undermined.   The donor site was closed in a primary fashion.  The pedicle was then rotated into position and sutured.  Once the tube was sutured into place, adequate blood supply was confirmed with blanching and refill.  The pedicle was then wrapped with xeroform gauze and dressed appropriately with a telfa and gauze bandage to ensure continued blood supply and protect the attached pedicle.
Interpolation Flap Text: A decision was made to reconstruct the defect utilizing an interpolation axial flap and a staged reconstruction.  A telfa template was made of the defect.  This telfa template was then used to outline the interpolation flap.  The donor area for the pedicle flap was then injected with anesthesia.  The flap was excised through the skin and subcutaneous tissue down to the layer of the underlying musculature.  The interpolation flap was carefully excised within this deep plane to maintain its blood supply.  The edges of the donor site were undermined.   The donor site was closed in a primary fashion.  The pedicle was then rotated into position and sutured.  Once the tube was sutured into place, adequate blood supply was confirmed with blanching and refill.  The pedicle was then wrapped with xeroform gauze and dressed appropriately with a telfa and gauze bandage to ensure continued blood supply and protect the attached pedicle.
Melolabial Interpolation Flap Text: A decision was made to reconstruct the defect utilizing an interpolation axial flap and a staged reconstruction.  A telfa template was made of the defect.  This telfa template was then used to outline the melolabial interpolation flap.  The donor area for the pedicle flap was then injected with anesthesia.  The flap was excised through the skin and subcutaneous tissue down to the layer of the underlying musculature.  The pedicle flap was carefully excised within this deep plane to maintain its blood supply.  The edges of the donor site were undermined.   The donor site was closed in a primary fashion.  The pedicle was then rotated into position and sutured.  Once the tube was sutured into place, adequate blood supply was confirmed with blanching and refill.  The pedicle was then wrapped with xeroform gauze and dressed appropriately with a telfa and gauze bandage to ensure continued blood supply and protect the attached pedicle.
Mastoid Interpolation Flap Text: A decision was made to reconstruct the defect utilizing an interpolation axial flap and a staged reconstruction.  A telfa template was made of the defect.  This telfa template was then used to outline the mastoid interpolation flap.  The donor area for the pedicle flap was then injected with anesthesia.  The flap was excised through the skin and subcutaneous tissue down to the layer of the underlying musculature.  The pedicle flap was carefully excised within this deep plane to maintain its blood supply.  The edges of the donor site were undermined.   The donor site was closed in a primary fashion.  The pedicle was then rotated into position and sutured.  Once the tube was sutured into place, adequate blood supply was confirmed with blanching and refill.  The pedicle was then wrapped with xeroform gauze and dressed appropriately with a telfa and gauze bandage to ensure continued blood supply and protect the attached pedicle.
Posterior Auricular Interpolation Flap Text: A decision was made to reconstruct the defect utilizing an interpolation axial flap and a staged reconstruction.  A telfa template was made of the defect.  This telfa template was then used to outline the posterior auricular interpolation flap.  The donor area for the pedicle flap was then injected with anesthesia.  The flap was excised through the skin and subcutaneous tissue down to the layer of the underlying musculature.  The pedicle flap was carefully excised within this deep plane to maintain its blood supply.  The edges of the donor site were undermined.   The donor site was closed in a primary fashion.  The pedicle was then rotated into position and sutured.  Once the tube was sutured into place, adequate blood supply was confirmed with blanching and refill.  The pedicle was then wrapped with xeroform gauze and dressed appropriately with a telfa and gauze bandage to ensure continued blood supply and protect the attached pedicle.
Paramedian Forehead Flap Text: A decision was made to reconstruct the defect utilizing an interpolation axial flap and a staged reconstruction.  A telfa template was made of the defect.  This telfa template was then used to outline the paramedian forehead pedicle flap.  The donor area for the pedicle flap was then injected with anesthesia.  The flap was excised through the skin and subcutaneous tissue down to the layer of the underlying musculature.  The pedicle flap was carefully excised within this deep plane to maintain its blood supply.  The edges of the donor site were undermined.   The donor site was closed in a primary fashion.  The pedicle was then rotated into position and sutured.  Once the tube was sutured into place, adequate blood supply was confirmed with blanching and refill.  The pedicle was then wrapped with xeroform gauze and dressed appropriately with a telfa and gauze bandage to ensure continued blood supply and protect the attached pedicle.
Lip Wedge Excision Repair Text: Given the location of the defect and the proximity to free margins a full thickness wedge repair was deemed most appropriate.  Using a sterile surgical marker, the appropriate repair was drawn incorporating the defect and placing the expected incisions perpendicular to the vermilion border.  The vermilion border was also meticulously outlined to ensure appropriate reapproximation during the repair.  The area thus outlined was incised through and through with a #15 scalpel blade.  The muscularis and dermis were reaproximated with deep sutures following hemostasis. Care was taken to realign the vermilion border before proceeding with the superficial closure.  Once the vermilion was realigned the superfical and mucosal closure was finished.
Ftsg Text: The defect edges were debeveled with a #15 scalpel blade.  Given the location of the defect, shape of the defect and the proximity to free margins a full thickness skin graft was deemed most appropriate.  Using a sterile surgical marker, the primary defect shape was transferred to the donor site. The area thus outlined was incised deep to adipose tissue with a #15 scalpel blade.  The harvested graft was then trimmed of adipose tissue until only dermis and epidermis was left.  The skin margins of the secondary defect were undermined to an appropriate distance in all directions utilizing iris scissors.  The secondary defect was closed with interrupted buried subcutaneous sutures.  The skin edges were then re-apposed with running  sutures.  The skin graft was then placed in the primary defect and oriented appropriately.
Split-Thickness Skin Graft Text: The defect edges were debeveled with a #15 scalpel blade.  Given the location of the defect, shape of the defect and the proximity to free margins a split thickness skin graft was deemed most appropriate.  Using a sterile surgical marker, the primary defect shape was transferred to the donor site. The split thickness graft was then harvested.  The skin graft was then placed in the primary defect and oriented appropriately.
Cartilage Graft Text: The defect edges were debeveled with a #15 scalpel blade.  Given the location of the defect, shape of the defect, the fact the defect involved a full thickness cartilage defect a cartilage graft was deemed most appropriate.  An appropriate donor site was identified, cleansed, and anesthetized. The cartilage graft was then harvested and transferred to the recipient site, oriented appropriately and then sutured into place.  The secondary defect was then repaired using a primary closure.
Composite Graft Text: The defect edges were debeveled with a #15 scalpel blade.  Given the location of the defect, shape of the defect, the proximity to free margins and the fact the defect was full thickness a composite graft was deemed most appropriate.  The defect was outline and then transferred to the donor site.  A full thickness graft was then excised from the donor site. The graft was then placed in the primary defect, oriented appropriately and then sutured into place.  The secondary defect was then repaired using a primary closure.
Epidermal Autograft Text: The defect edges were debeveled with a #15 scalpel blade.  Given the location of the defect, shape of the defect and the proximity to free margins an epidermal autograft was deemed most appropriate.  Using a sterile surgical marker, the primary defect shape was transferred to the donor site. The epidermal graft was then harvested.  The skin graft was then placed in the primary defect and oriented appropriately.
Dermal Autograft Text: The defect edges were debeveled with a #15 scalpel blade.  Given the location of the defect, shape of the defect and the proximity to free margins a dermal autograft was deemed most appropriate.  Using a sterile surgical marker, the primary defect shape was transferred to the donor site. The area thus outlined was incised deep to adipose tissue with a #15 scalpel blade.  The harvested graft was then trimmed of adipose and epidermal tissue until only dermis was left.  The skin graft was then placed in the primary defect and oriented appropriately.
Skin Substitute Text: The defect edges were debeveled with a #15 scalpel blade.  Given the location of the defect, shape of the defect and the proximity to free margins a skin substitute graft was deemed most appropriate.  The graft material was trimmed to fit the size of the defect. The graft was then placed in the primary defect and oriented appropriately.
Tissue Cultured Epidermal Autograft Text: The defect edges were debeveled with a #15 scalpel blade.  Given the location of the defect, shape of the defect and the proximity to free margins a tissue cultured epidermal autograft was deemed most appropriate.  The graft was then trimmed to fit the size of the defect.  The graft was then placed in the primary defect and oriented appropriately.
Xenograft Text: The defect edges were debeveled with a #15 scalpel blade.  Given the location of the defect, shape of the defect and the proximity to free margins a xenograft was deemed most appropriate.  The graft was then trimmed to fit the size of the defect.  The graft was then placed in the primary defect and oriented appropriately.
Purse String (Intermediate) Text: Given the location of the defect and the characteristics of the surrounding skin a purse string intermediate closure was deemed most appropriate.  Undermining was performed circumfirentially around the surgical defect.  A purse string suture was then placed and tightened.
Purse String (Simple) Text: Given the location of the defect and the characteristics of the surrounding skin a purse string simple closure was deemed most appropriate.  Undermining was performed circumferentially around the surgical defect.  A purse string suture was then placed and tightened.
Partial Purse String (Intermediate) Text: Given the location of the defect and the characteristics of the surrounding skin an intermediate purse string closure was deemed most appropriate.  Undermining was performed circumferentially around the surgical defect.  A purse string suture was then placed and tightened. Wound tension of the circular defect prevented complete closure of the wound.
Partial Purse String (Simple) Text: Given the location of the defect and the characteristics of the surrounding skin a simple purse string closure was deemed most appropriate.  Undermining was performed circumferentially around the surgical defect.  A purse string suture was then placed and tightened. Wound tension of the circular defect prevented complete closure of the wound.
Complex Repair And Single Advancement Flap Text: The defect edges were debeveled with a #15 scalpel blade.  The primary defect was closed partially with a complex linear closure.  Given the location of the remaining defect, shape of the defect and the proximity to free margins a single advancement flap was deemed most appropriate for complete closure of the defect.  Using a sterile surgical marker, an appropriate advancement flap was drawn incorporating the defect and placing the expected incisions within the relaxed skin tension lines where possible.    The area thus outlined was incised deep to adipose tissue with a #15 scalpel blade.  The skin margins were undermined to an appropriate distance in all directions utilizing iris scissors.
Complex Repair And Double Advancement Flap Text: The defect edges were debeveled with a #15 scalpel blade.  The primary defect was closed partially with a complex linear closure.  Given the location of the remaining defect, shape of the defect and the proximity to free margins a double advancement flap was deemed most appropriate for complete closure of the defect.  Using a sterile surgical marker, an appropriate advancement flap was drawn incorporating the defect and placing the expected incisions within the relaxed skin tension lines where possible.    The area thus outlined was incised deep to adipose tissue with a #15 scalpel blade.  The skin margins were undermined to an appropriate distance in all directions utilizing iris scissors.
Complex Repair And Modified Advancement Flap Text: The defect edges were debeveled with a #15 scalpel blade.  The primary defect was closed partially with a complex linear closure.  Given the location of the remaining defect, shape of the defect and the proximity to free margins a modified advancement flap was deemed most appropriate for complete closure of the defect.  Using a sterile surgical marker, an appropriate advancement flap was drawn incorporating the defect and placing the expected incisions within the relaxed skin tension lines where possible.    The area thus outlined was incised deep to adipose tissue with a #15 scalpel blade.  The skin margins were undermined to an appropriate distance in all directions utilizing iris scissors.
Complex Repair And A-T Advancement Flap Text: The defect edges were debeveled with a #15 scalpel blade.  The primary defect was closed partially with a complex linear closure.  Given the location of the remaining defect, shape of the defect and the proximity to free margins an A-T advancement flap was deemed most appropriate for complete closure of the defect.  Using a sterile surgical marker, an appropriate advancement flap was drawn incorporating the defect and placing the expected incisions within the relaxed skin tension lines where possible.    The area thus outlined was incised deep to adipose tissue with a #15 scalpel blade.  The skin margins were undermined to an appropriate distance in all directions utilizing iris scissors.
Complex Repair And O-T Advancement Flap Text: The defect edges were debeveled with a #15 scalpel blade.  The primary defect was closed partially with a complex linear closure.  Given the location of the remaining defect, shape of the defect and the proximity to free margins an O-T advancement flap was deemed most appropriate for complete closure of the defect.  Using a sterile surgical marker, an appropriate advancement flap was drawn incorporating the defect and placing the expected incisions within the relaxed skin tension lines where possible.    The area thus outlined was incised deep to adipose tissue with a #15 scalpel blade.  The skin margins were undermined to an appropriate distance in all directions utilizing iris scissors.
Complex Repair And O-L Flap Text: The defect edges were debeveled with a #15 scalpel blade.  The primary defect was closed partially with a complex linear closure.  Given the location of the remaining defect, shape of the defect and the proximity to free margins an O-L flap was deemed most appropriate for complete closure of the defect.  Using a sterile surgical marker, an appropriate flap was drawn incorporating the defect and placing the expected incisions within the relaxed skin tension lines where possible.    The area thus outlined was incised deep to adipose tissue with a #15 scalpel blade.  The skin margins were undermined to an appropriate distance in all directions utilizing iris scissors.
Complex Repair And Bilobe Flap Text: The defect edges were debeveled with a #15 scalpel blade.  The primary defect was closed partially with a complex linear closure.  Given the location of the remaining defect, shape of the defect and the proximity to free margins a bilobe flap was deemed most appropriate for complete closure of the defect.  Using a sterile surgical marker, an appropriate advancement flap was drawn incorporating the defect and placing the expected incisions within the relaxed skin tension lines where possible.    The area thus outlined was incised deep to adipose tissue with a #15 scalpel blade.  The skin margins were undermined to an appropriate distance in all directions utilizing iris scissors.
Complex Repair And Melolabial Flap Text: The defect edges were debeveled with a #15 scalpel blade.  The primary defect was closed partially with a complex linear closure.  Given the location of the remaining defect, shape of the defect and the proximity to free margins a melolabial flap was deemed most appropriate for complete closure of the defect.  Using a sterile surgical marker, an appropriate advancement flap was drawn incorporating the defect and placing the expected incisions within the relaxed skin tension lines where possible.    The area thus outlined was incised deep to adipose tissue with a #15 scalpel blade.  The skin margins were undermined to an appropriate distance in all directions utilizing iris scissors.
Complex Repair And Rotation Flap Text: The defect edges were debeveled with a #15 scalpel blade.  The primary defect was closed partially with a complex linear closure.  Given the location of the remaining defect, shape of the defect and the proximity to free margins a rotation flap was deemed most appropriate for complete closure of the defect.  Using a sterile surgical marker, an appropriate advancement flap was drawn incorporating the defect and placing the expected incisions within the relaxed skin tension lines where possible.    The area thus outlined was incised deep to adipose tissue with a #15 scalpel blade.  The skin margins were undermined to an appropriate distance in all directions utilizing iris scissors.
Complex Repair And Rhombic Flap Text: The defect edges were debeveled with a #15 scalpel blade.  The primary defect was closed partially with a complex linear closure.  Given the location of the remaining defect, shape of the defect and the proximity to free margins a rhombic flap was deemed most appropriate for complete closure of the defect.  Using a sterile surgical marker, an appropriate advancement flap was drawn incorporating the defect and placing the expected incisions within the relaxed skin tension lines where possible.    The area thus outlined was incised deep to adipose tissue with a #15 scalpel blade.  The skin margins were undermined to an appropriate distance in all directions utilizing iris scissors.
Complex Repair And Transposition Flap Text: The defect edges were debeveled with a #15 scalpel blade.  The primary defect was closed partially with a complex linear closure.  Given the location of the remaining defect, shape of the defect and the proximity to free margins a transposition flap was deemed most appropriate for complete closure of the defect.  Using a sterile surgical marker, an appropriate advancement flap was drawn incorporating the defect and placing the expected incisions within the relaxed skin tension lines where possible.    The area thus outlined was incised deep to adipose tissue with a #15 scalpel blade.  The skin margins were undermined to an appropriate distance in all directions utilizing iris scissors.
Complex Repair And V-Y Plasty Text: The defect edges were debeveled with a #15 scalpel blade.  The primary defect was closed partially with a complex linear closure.  Given the location of the remaining defect, shape of the defect and the proximity to free margins a V-Y plasty was deemed most appropriate for complete closure of the defect.  Using a sterile surgical marker, an appropriate advancement flap was drawn incorporating the defect and placing the expected incisions within the relaxed skin tension lines where possible.    The area thus outlined was incised deep to adipose tissue with a #15 scalpel blade.  The skin margins were undermined to an appropriate distance in all directions utilizing iris scissors.
Complex Repair And M Plasty Text: The defect edges were debeveled with a #15 scalpel blade.  The primary defect was closed partially with a complex linear closure.  Given the location of the remaining defect, shape of the defect and the proximity to free margins an M plasty was deemed most appropriate for complete closure of the defect.  Using a sterile surgical marker, an appropriate advancement flap was drawn incorporating the defect and placing the expected incisions within the relaxed skin tension lines where possible.    The area thus outlined was incised deep to adipose tissue with a #15 scalpel blade.  The skin margins were undermined to an appropriate distance in all directions utilizing iris scissors.
Complex Repair And Double M Plasty Text: The defect edges were debeveled with a #15 scalpel blade.  The primary defect was closed partially with a complex linear closure.  Given the location of the remaining defect, shape of the defect and the proximity to free margins a double M plasty was deemed most appropriate for complete closure of the defect.  Using a sterile surgical marker, an appropriate advancement flap was drawn incorporating the defect and placing the expected incisions within the relaxed skin tension lines where possible.    The area thus outlined was incised deep to adipose tissue with a #15 scalpel blade.  The skin margins were undermined to an appropriate distance in all directions utilizing iris scissors.
Complex Repair And W Plasty Text: The defect edges were debeveled with a #15 scalpel blade.  The primary defect was closed partially with a complex linear closure.  Given the location of the remaining defect, shape of the defect and the proximity to free margins a W plasty was deemed most appropriate for complete closure of the defect.  Using a sterile surgical marker, an appropriate advancement flap was drawn incorporating the defect and placing the expected incisions within the relaxed skin tension lines where possible.    The area thus outlined was incised deep to adipose tissue with a #15 scalpel blade.  The skin margins were undermined to an appropriate distance in all directions utilizing iris scissors.
Complex Repair And Z Plasty Text: The defect edges were debeveled with a #15 scalpel blade.  The primary defect was closed partially with a complex linear closure.  Given the location of the remaining defect, shape of the defect and the proximity to free margins a Z plasty was deemed most appropriate for complete closure of the defect.  Using a sterile surgical marker, an appropriate advancement flap was drawn incorporating the defect and placing the expected incisions within the relaxed skin tension lines where possible.    The area thus outlined was incised deep to adipose tissue with a #15 scalpel blade.  The skin margins were undermined to an appropriate distance in all directions utilizing iris scissors.
Complex Repair And Dorsal Nasal Flap Text: The defect edges were debeveled with a #15 scalpel blade.  The primary defect was closed partially with a complex linear closure.  Given the location of the remaining defect, shape of the defect and the proximity to free margins a dorsal nasal flap was deemed most appropriate for complete closure of the defect.  Using a sterile surgical marker, an appropriate flap was drawn incorporating the defect and placing the expected incisions within the relaxed skin tension lines where possible.    The area thus outlined was incised deep to adipose tissue with a #15 scalpel blade.  The skin margins were undermined to an appropriate distance in all directions utilizing iris scissors.
Complex Repair And Ftsg Text: The defect edges were debeveled with a #15 scalpel blade.  The primary defect was closed partially with a complex linear closure.  Given the location of the defect, shape of the defect and the proximity to free margins a full thickness skin graft was deemed most appropriate to repair the remaining defect.  The graft was trimmed to fit the size of the remaining defect.  The graft was then placed in the primary defect, oriented appropriately, and sutured into place.
Complex Repair And Split-Thickness Skin Graft Text: The defect edges were debeveled with a #15 scalpel blade.  The primary defect was closed partially with a complex linear closure.  Given the location of the defect, shape of the defect and the proximity to free margins a split thickness skin graft was deemed most appropriate to repair the remaining defect.  The graft was trimmed to fit the size of the remaining defect.  The graft was then placed in the primary defect, oriented appropriately, and sutured into place.
Complex Repair And Epidermal Autograft Text: The defect edges were debeveled with a #15 scalpel blade.  The primary defect was closed partially with a complex linear closure.  Given the location of the defect, shape of the defect and the proximity to free margins an epidermal autograft was deemed most appropriate to repair the remaining defect.  The graft was trimmed to fit the size of the remaining defect.  The graft was then placed in the primary defect, oriented appropriately, and sutured into place.
Complex Repair And Dermal Autograft Text: The defect edges were debeveled with a #15 scalpel blade.  The primary defect was closed partially with a complex linear closure.  Given the location of the defect, shape of the defect and the proximity to free margins an dermal autograft was deemed most appropriate to repair the remaining defect.  The graft was trimmed to fit the size of the remaining defect.  The graft was then placed in the primary defect, oriented appropriately, and sutured into place.
Complex Repair And Tissue Cultured Epidermal Autograft Text: The defect edges were debeveled with a #15 scalpel blade.  The primary defect was closed partially with a complex linear closure.  Given the location of the defect, shape of the defect and the proximity to free margins an tissue cultured epidermal autograft was deemed most appropriate to repair the remaining defect.  The graft was trimmed to fit the size of the remaining defect.  The graft was then placed in the primary defect, oriented appropriately, and sutured into place.
Complex Repair And Xenograft Text: The defect edges were debeveled with a #15 scalpel blade.  The primary defect was closed partially with a complex linear closure.  Given the location of the defect, shape of the defect and the proximity to free margins a xenograft was deemed most appropriate to repair the remaining defect.  The graft was trimmed to fit the size of the remaining defect.  The graft was then placed in the primary defect, oriented appropriately, and sutured into place.
Complex Repair And Skin Substitute Graft Text: The defect edges were debeveled with a #15 scalpel blade.  The primary defect was closed partially with a complex linear closure.  Given the location of the remaining defect, shape of the defect and the proximity to free margins a skin substitute graft was deemed most appropriate to repair the remaining defect.  The graft was trimmed to fit the size of the remaining defect.  The graft was then placed in the primary defect, oriented appropriately, and sutured into place.
Path Notes (To The Dermatopathologist): Please check margins.
Consent was obtained from the patient. The risks and benefits to therapy were discussed in detail. Specifically, the risks of infection, scarring, bleeding, prolonged wound healing, incomplete removal, allergy to anesthesia, nerve injury and recurrence were addressed. Prior to the procedure, the treatment site was clearly identified and confirmed by the patient. All components of Universal Protocol/PAUSE Rule completed.
Post-Care Instructions: I reviewed with the patient in detail post-care instructions:\\n1. Apply bacitracin over the steri-strips.  \\n2. Cut non-stick pad (Telfa) to cover the steri-strips\\n3. Apply tape (hypafix) over the non-stick pad\\n4. Change once per day for 5 days\\n5. Shower with bandage on, change bandage after shower\\n\\nPatient is not to engage in any heavy lifting, exercise, hot tub, or swimming for the next 14 days. Should the patient develop any fevers, chills, bleeding, severe pain patient will contact the office immediately.
Home Suture Removal Text: Patient was provided a home suture removal kit and will remove their sutures at home.  If they have any questions or difficulties they will call the office.
Where Do You Want The Question To Include Opioid Counseling Located?: Case Summary Tab
Information: Selecting Yes will display possible errors in your note based on the variables you have selected. This validation is only offered as a suggestion for you. PLEASE NOTE THAT THE VALIDATION TEXT WILL BE REMOVED WHEN YOU FINALIZE YOUR NOTE. IF YOU WANT TO FAX A PRELIMINARY NOTE YOU WILL NEED TO TOGGLE THIS TO 'NO' IF YOU DO NOT WANT IT IN YOUR FAXED NOTE.

## 2020-02-27 NOTE — PROCEDURE: MIPS QUALITY
Quality 111:Pneumonia Vaccination Status For Older Adults: Pneumococcal Vaccination Previously Received
Detail Level: Detailed
Quality 226: Preventive Care And Screening: Tobacco Use: Screening And Cessation Intervention: Patient screened for tobacco use and is an ex/non-smoker
Quality 265: Biopsy Follow-Up: Biopsy results reviewed, communicated, tracked, and documented
Quality 130: Documentation Of Current Medications In The Medical Record: Current Medications Documented

## 2020-03-06 PROBLEM — I70.0 ATHEROSCLEROSIS OF AORTA (HCC): Status: ACTIVE | Noted: 2020-03-06

## 2020-03-11 ENCOUNTER — OFFICE VISIT (OUTPATIENT)
Dept: MEDICAL GROUP | Facility: MEDICAL CENTER | Age: 85
End: 2020-03-11
Payer: MEDICARE

## 2020-03-11 VITALS
HEART RATE: 83 BPM | DIASTOLIC BLOOD PRESSURE: 68 MMHG | BODY MASS INDEX: 29.39 KG/M2 | WEIGHT: 140 LBS | HEIGHT: 58 IN | SYSTOLIC BLOOD PRESSURE: 112 MMHG | OXYGEN SATURATION: 93 % | TEMPERATURE: 97.3 F

## 2020-03-11 DIAGNOSIS — F02.81 ALZHEIMER'S DEMENTIA WITH BEHAVIORAL DISTURBANCE, UNSPECIFIED TIMING OF DEMENTIA ONSET: ICD-10-CM

## 2020-03-11 DIAGNOSIS — G30.9 ALZHEIMER'S DEMENTIA WITH BEHAVIORAL DISTURBANCE, UNSPECIFIED TIMING OF DEMENTIA ONSET: ICD-10-CM

## 2020-03-11 DIAGNOSIS — J34.89 RHINORRHEA: ICD-10-CM

## 2020-03-11 DIAGNOSIS — I70.0 ATHEROSCLEROSIS OF AORTA (HCC): ICD-10-CM

## 2020-03-11 DIAGNOSIS — L89.329 PRESSURE INJURY OF SKIN OF LEFT BUTTOCK, UNSPECIFIED INJURY STAGE: ICD-10-CM

## 2020-03-11 PROCEDURE — 99214 OFFICE O/P EST MOD 30 MIN: CPT | Performed by: INTERNAL MEDICINE

## 2020-03-11 PROCEDURE — 8041 PR SCP AHA: Performed by: INTERNAL MEDICINE

## 2020-03-11 RX ORDER — AZELASTINE 1 MG/ML
1 SPRAY, METERED NASAL 2 TIMES DAILY
Qty: 30 ML | Refills: 3 | Status: SHIPPED | OUTPATIENT
Start: 2020-03-11 | End: 2020-09-16 | Stop reason: SDUPTHER

## 2020-03-11 ASSESSMENT — FIBROSIS 4 INDEX: FIB4 SCORE: 1.67

## 2020-03-11 NOTE — PROGRESS NOTES
Annual Health Assessment Questions:    1.  Are you currently engaging in any exercise or physical activity? Yes    2.  How would you describe your mood or emotional well-being today? good    3.  Have you had any falls in the last year? No    4.  Have you noticed any problems with your balance or had difficulty walking? No    5.  In the last six months have you experienced any leakage of urine? No    6. DPA/Advanced Directive: Patient has Durable Power of  on file.       CC: Runny nose, Alzheimer's, pressure ulcer.                                                                                                                                    HPI:   Ramya presents today with the following.    1. Rhinorrhea  Presents complaining of persistent runny nose tried multiple different agents over-the-counter none of which have been helpful.  No fevers chills sore throat is been present for some time clear in nature    2. Pressure injury of skin of left buttock, unspecified injury stage  Does have a pressure ulcer on her buttock left side not draining no spreading redness she does not spend a lot of time in bed but she sleeps in recliner because of her back.    3. Atherosclerosis of aorta ([facility])   does not want to add additional medications currently for reduction of risk    4. Alzheimer's dementia with behavioral disturbance, unspecified timing of dementia onset ([facility])  Memory still problematic daughters quite helpful with her care and she is in a nursing home Facility.      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     • azelastine (ASTELIN) 137 MCG/SPRAY nasal spray [place] 1 Spray in nose 2 times a day. 30 mL 3   • gabapentin (NEURONTIN) 100 MG Cap Take 1 Cap by mouth 3 times a day. 270 Cap 3   • nitrofurantoin monohyd macro (MACROBID) 100 MG Cap Take 1 Cap by mouth 1 time daily as needed. 90 Cap 3   • donepezil (ARICEPT) 10 MG tablet TAKE 2 TABLETS BY MOUTH EVERY  Tab 3   • vitamin D 5000 units Tab Take 5,000 Units by mouth every day. 30 Tab 2   • dicyclomine (BENTYL) 10 MG Cap Take 1 Cap by mouth every 6 hours as needed (abdominal cramp/discomfort). 90 Cap 2   • Omega 3 1200 MG Cap Take 1 Tab by mouth every day.     • artificial tears (EYE LUBRICANT) Ointment ophth ointment Place 1 Application in both eyes every 8 hours.     • Carboxymethylcellulose Sodium (REFRESH TEARS OP) 1 Drop by Ophthalmic route 2 Times a Day.     • acetaminophen (TYLENOL) 325 MG Tab Take 650 mg by mouth every four hours as needed.     • donepezil (ARICEPT) 10 MG tablet Take 20 mg by mouth every day.       No current facility-administered medications for this visit.          Allergies as of 03/11/2020 - Reviewed 03/11/2020   Allergen Reaction Noted   • Prednisone Unspecified 08/11/2018   • Seasonal  08/15/2019        ROS: Denies Chest pain, SOB, LE edema.    /68 (BP Location: Left arm, Patient Position: Sitting)   Pulse 83   Temp 36.3 °C (97.3 °F)   Ht 1.473 m (4' 10\")   Wt 63.5 kg (140 lb)   SpO2 93%   BMI 29.26 kg/m²     Physical Exam:  Gen:         Alert and oriented, No apparent distress.  Neck:        No Lymphadenopathy or Bruits.  Lungs:     Clear to auscultation bilaterally  CV:          Regular rate and rhythm. No murmurs, rubs " or gallops.               Ext:          No clubbing, cyanosis, edema.      Assessment and Plan.   86 y.o. female with the following issues.    1. Rhinorrhea  Sounds to be allergic mediated have placed on Astelin nose spray.    2. Pressure injury of skin of left buttock, unspecified injury stage  No sign of active infection referring to wound clinic  - REFERRAL TO WOUND CLINIC    3. Atherosclerosis of aorta (HCC)  Declining statin    4. Alzheimer's dementia with behavioral disturbance, unspecified timing of dementia onset (HCC)  Manage expectantly with current medications follow with specialist

## 2020-03-17 ENCOUNTER — HOSPITAL ENCOUNTER (OUTPATIENT)
Dept: RADIOLOGY | Facility: MEDICAL CENTER | Age: 85
End: 2020-03-17
Attending: PHYSICIAN ASSISTANT
Payer: MEDICARE

## 2020-03-17 DIAGNOSIS — N39.0 URINARY TRACT INFECTION WITHOUT HEMATURIA, SITE UNSPECIFIED: ICD-10-CM

## 2020-03-17 PROCEDURE — 76775 US EXAM ABDO BACK WALL LIM: CPT

## 2020-04-03 ENCOUNTER — OFFICE VISIT (OUTPATIENT)
Dept: WOUND CARE | Facility: MEDICAL CENTER | Age: 85
End: 2020-04-03
Attending: INTERNAL MEDICINE
Payer: MEDICARE

## 2020-04-03 VITALS
OXYGEN SATURATION: 95 % | SYSTOLIC BLOOD PRESSURE: 129 MMHG | RESPIRATION RATE: 14 BRPM | DIASTOLIC BLOOD PRESSURE: 61 MMHG | HEART RATE: 84 BPM | TEMPERATURE: 98.2 F

## 2020-04-03 DIAGNOSIS — Z91.89 SEDENTARY LIFESTYLE: ICD-10-CM

## 2020-04-03 DIAGNOSIS — L89.313 PRESSURE INJURY OF RIGHT BUTTOCK, STAGE 3 (HCC): ICD-10-CM

## 2020-04-03 DIAGNOSIS — R32 URINARY INCONTINENCE, UNSPECIFIED TYPE: ICD-10-CM

## 2020-04-03 PROCEDURE — 99211 OFF/OP EST MAY X REQ PHY/QHP: CPT | Mod: XU,25

## 2020-04-03 PROCEDURE — 99214 OFFICE O/P EST MOD 30 MIN: CPT | Mod: 25 | Performed by: NURSE PRACTITIONER

## 2020-04-03 PROCEDURE — 11042 DBRDMT SUBQ TIS 1ST 20SQCM/<: CPT

## 2020-04-03 PROCEDURE — 11042 DBRDMT SUBQ TIS 1ST 20SQCM/<: CPT | Performed by: NURSE PRACTITIONER

## 2020-04-03 ASSESSMENT — ENCOUNTER SYMPTOMS
FEVER: 0
DIZZINESS: 0
WEIGHT LOSS: 0
HEADACHES: 0
DEPRESSION: 0
CHILLS: 0
CONSTIPATION: 0
NAUSEA: 0
COUGH: 0
VOMITING: 0
DIARRHEA: 0
NERVOUS/ANXIOUS: 0
BACK PAIN: 1
SHORTNESS OF BREATH: 0

## 2020-04-03 NOTE — PATIENT INSTRUCTIONS
-Keep dressings clean, dry and covered while bathing. Only change dressings if they become over saturated, soiled or fall off.     -Should you experience any significant changes in your wound(s), such as infection (redness, swelling, localized heat, increased pain, fever > 101 F, chills) or have any questions regarding your home care instructions, please contact the wound center at [phone]. If after hours, contact your primary care physician or go to the hospital emergency room.

## 2020-04-03 NOTE — PROGRESS NOTES
Provider Encounter- Pressure Injury        HISTORY OF PRESENT ILLNESS  Wound History:    START OF CARE IN CLINIC: 4/3/2020    REFERRING PROVIDER: Basil Banks MD     WOUND- Pressure Injury.    LOCATION and STAGE: Right medial gluteus, shallow stage III   HISTORY: Patient first developed redness and injury to her sacrum and buttocks while hospitalized in August 2019 for back surgery.  Over the following months, wounds to her sacrum and left buttock went on to heal.  However, the wound to her right medial buttock has continued to worsen.  Patient has some dementia, and also back pain issues.  She spends most of her time in a recliner chair, approximately 20 hours/day per daughter.  She is still able to ambulate, and makes a point of walking approximately 30 minutes/day.  She is continent of bowel, and has occasional urinary incontinence.  Her appetite is good.    Pertinent Medical History: Lumbar stenosis, Alzheimer's dementia, urinary incontinence   Contributing factors: Immobility, Activity level, Inadequate support surfaces, Bladder incontinence and Obesity    Personal support: Family     TOBACCO USE: She has never smoked or used tobacco products    Patient's problem list, allergies, and current medications reviewed and updated in Epic    Interval History:  4/3/2020: Initial clinic visit with RUPINDER Mercedes.  Patient presents today with her daughter.  Daughter states she has been treating the wound herself with, Calmoseptine ointment.  She bathes her mother 3 times per week.  Per daughter, patient spends approximately 12 hours/day in her recliner chair due to back issues.      REVIEW OF SYSTEMS:   Review of Systems   Constitutional: Negative for chills, fever and weight loss.   Respiratory: Negative for cough and shortness of breath.    Cardiovascular: Positive for leg swelling. Negative for chest pain.   Gastrointestinal: Negative for constipation, diarrhea, nausea and vomiting.   Musculoskeletal: Positive  for back pain.        Chronic back pain, history of lumbar stenosis   Neurological: Negative for dizziness and headaches.   Psychiatric/Behavioral: Negative for depression. The patient is not nervous/anxious.         History of Alzheimer's       PHYSICAL EXAMINATION:   /61   Pulse 84   Temp 36.8 °C (98.2 °F) (Temporal)   Resp 14   SpO2 95%     Physical Exam   Constitutional: She is well-developed, well-nourished, and in no distress.   She is obese   HENT:   Head: Normocephalic.   Eyes: Pupils are equal, round, and reactive to light.   Pulmonary/Chest: Effort normal.   Abdominal: Soft.   Musculoskeletal:         General: Edema present.      Comments: 2+ edema bilateral lower extremities   Neurological: She is alert.   She is a bit forgetful, poor historian  Relies upon daughter to provide information regarding her health   Skin: Skin is warm.   Shallow stage III pressure ulcer to medial aspect of right buttock.  Periwound with non-blanchable redness  Refer to wound flowsheet and photos   Psychiatric: Mood, affect and judgment normal.   Poor memory       WOUND ASSESSMENT  Wound 04/03/20 Full Thickness Wound Buttocks Medial Right Right Medial Buttock (Active)   Wound Image    4/3/2020 10:00 AM   Site Assessment [place] 4/3/2020 10:00 AM   Periwound Assessment Intact;Blanchable erythema 4/3/2020 10:00 AM   Margins Attached edges 4/3/2020 10:00 AM   Drainage Amount NAVARRO 4/3/2020 10:00 AM   Drainage Description NAVARRO 4/3/2020 10:00 AM   Treatments Cleansed;Topical Lidocaine;Provider debridement 4/3/2020 10:00 AM   Wound Cleansing Normal Saline Irrigation 4/3/2020 10:00 AM   Periwound Protectant Skin Protectant Wipes to Periwound 4/3/2020 10:00 AM   Dressing Options Honey Colloid;Hypafix Tape 4/3/2020 10:00 AM   Dressing Changed New 4/3/2020 10:00 AM   Pressure Injury Stage 2 4/3/2020 10:00 AM   Non-staged Wound Description Full thickness 4/3/2020 10:00 AM   Wound Length (cm) 0.5 cm 4/3/2020 10:00 AM   Wound Width (cm)  1 cm 4/3/2020 10:00 AM   Wound Depth (cm) 0.2 cm 4/3/2020 10:00 AM   Wound Surface Area (cm^2) 0.5 cm^2 4/3/2020 10:00 AM   Wound Volume (cm^3) 0.1 cm^3 4/3/2020 10:00 AM   Post-Procedure Length (cm) 0.6 cm 4/3/2020 10:00 AM   Post-Procedure Width (cm) 1.1 cm 4/3/2020 10:00 AM   Post-Procedure Depth (cm) 0.3 cm 4/3/2020 10:00 AM   Post-Procedure Surface Area (cm^2) 0.66 cm^2 4/3/2020 10:00 AM   Post-Procedure Volume (cm^3) 0.2 cm^3 4/3/2020 10:00 AM   Wound Bed Epithelium (%) 0 % 4/3/2020 10:00 AM   Wound Bed Eschar (%) 0 % 4/3/2020 10:00 AM   Wound Bed Granulation (%) - Post-Procedure 100 % 4/3/2020 10:00 AM   Wound Bed Epithelium % - (Post-Procedure) 0 % 4/3/2020 10:00 AM   Wound Bed Slough % - (Post-Procedure) 0 % 4/3/2020 10:00 AM   Wound Bed Eschar % - (Post-Procedure) 0 % 4/3/2020 10:00 AM   Tunneling (cm) 0 cm 4/3/2020 10:00 AM   Undermining (cm) 0 cm 4/3/2020 10:00 AM   Wound Odor None 4/3/2020 10:00 AM   Exposed Structures None 4/3/2020 10:00 AM   Number of days: 0         PROCEDURE: Excisional debridement of right medial buttock ulcer  -2% viscous lidocaine applied topically to wound bed for approximately 5 minutes prior to debridement  -Curette used to debride wound bed.  Excisional debridement was performed to remove devitalized tissue until healthy, bleeding tissue was visualized.   Entire surface of wound, 0.66 cm2 debrided.  Tissue debrided into the subcutaneous layer.    -Bleeding controlled with manual pressure.    -Wound care completed by wound RN, refer to flowsheet  -Patient tolerated the procedure well, without c/o pain or discomfort.     Pertinent Labs and Diagnostics:    Labs:     A1c:   Lab Results   Component Value Date/Time    HBA1C 5.3 10/01/2019 05:18 AM          IMAGING: No results found.    VASCULAR STUDIES: No results found.    LAST  WOUND CULTURE:   Lab Results   Component Value Date/Time    CULTRSULT No acid fast bacilli detected. 09/23/2019 12:59 PM    CULTRSULT No fungal growth.  09/23/2019 12:59 PM    CULTRSULT No growth at 72 hours. 09/23/2019 12:59 PM    CULTRSULT No Anaerobes isolated. 09/23/2019 12:59 PM    CULTRSULT No fungal growth. 09/23/2019 12:59 PM    CULTRSULT - (A) 09/23/2019 12:59 PM    CULTRSULT Klebsiella pneumoniae  >100,000 cfu/mL   (A) 09/23/2019 12:59 PM          ASSESSMENT AND PLAN:   1. Pressure injury of right buttock, stage 3 (HCC)  Comments: Pressure ulcers to sacrum and bilateral buttocks started while hospitalized in August 2019.  Sacral and left buttock wounds went on to resolve, right buttock wound has deteriorated.    4/3/2020: Initial clinic visit.  Daughter present with her today.  Wound has gotten larger despite applying barrier paste.  Daughter has not been able to get a dressing that would stick thus far.  -Excisional debridement of wound in clinic today, medically necessary to promote wound healing.  -Patient to return to clinic in 2 weeks for assessment and debridement (decreased frequency due to patient's risk for COVID-19)  -Patient's daughter change dressing 1-2 times per week in between clinic visits    Wound care: Honey colloid to promote autolytic debridement and to manage bioburden, Hypafix tape, Skin-Prep over edges of tape    2. Sedentary lifestyle  Comments: Patient spends most of her day in a recliner chair, up to 20 hours/day.    4/3/2020: Inadequate support surface, recommend pressure relief cushion  -Reviewed appropriate pressure relieving cushions with patient's daughter on Internet recommended EHOB, or gel type cushion    3. Urinary incontinence, unspecified type    4/3/2020: Complicating factor.  -Recommend returning to use of barrier paste if unable to keep dressings intact for more than 1 day.  -Recommend appropriate absorptive undergarments to wick moisture away from skin            PATIENT EDUCATION  -Etiology of pressure injury  -Importance of offloading and frequent repositioning  -Strategies for offloading in bed and when seated  discussed and demonstrated  - Importance of adequate nutrition for wound healing  - Advised to go to ER for any increased redness, swelling, drainage or odor, or if patient develops fever, chills, nausea or vomiting.    30 min spent face to face with patient, >50% of time spent counseling, coordinating care, reviewing records, discussing POC, educating patient regarding wound healing and progression, offloading and frequent repositioning, nutrition.  This time was spent in excess to procedure time.       Please note that this note may have been created using voice recognition software. I have worked with technical experts from [employer] to optimize the interface.  I have made every reasonable attempt to correct obvious errors, but there may be errors of grammar and possibly content that I did not discover before finalizing the note.

## 2020-04-16 ENCOUNTER — OFFICE VISIT (OUTPATIENT)
Dept: WOUND CARE | Facility: MEDICAL CENTER | Age: 85
End: 2020-04-16
Attending: INTERNAL MEDICINE
Payer: MEDICARE

## 2020-04-16 VITALS
HEART RATE: 80 BPM | OXYGEN SATURATION: 95 % | RESPIRATION RATE: 20 BRPM | DIASTOLIC BLOOD PRESSURE: 68 MMHG | SYSTOLIC BLOOD PRESSURE: 113 MMHG | TEMPERATURE: 98.4 F

## 2020-04-16 DIAGNOSIS — R32 URINARY INCONTINENCE, UNSPECIFIED TYPE: ICD-10-CM

## 2020-04-16 DIAGNOSIS — Z91.89 SEDENTARY LIFESTYLE: ICD-10-CM

## 2020-04-16 DIAGNOSIS — L89.313 PRESSURE INJURY OF RIGHT BUTTOCK, STAGE 3 (HCC): ICD-10-CM

## 2020-04-16 PROCEDURE — 11042 DBRDMT SUBQ TIS 1ST 20SQCM/<: CPT | Performed by: NURSE PRACTITIONER

## 2020-04-16 PROCEDURE — 11042 DBRDMT SUBQ TIS 1ST 20SQCM/<: CPT

## 2020-04-16 PROCEDURE — 99213 OFFICE O/P EST LOW 20 MIN: CPT | Mod: 25 | Performed by: NURSE PRACTITIONER

## 2020-04-16 PROCEDURE — 99211 OFF/OP EST MAY X REQ PHY/QHP: CPT | Mod: XU,25

## 2020-04-16 ASSESSMENT — ENCOUNTER SYMPTOMS
NERVOUS/ANXIOUS: 0
WEIGHT LOSS: 0
VOMITING: 0
CHILLS: 0
CONSTIPATION: 0
BACK PAIN: 1
DIARRHEA: 0
FEVER: 0
HEADACHES: 0
DEPRESSION: 0
COUGH: 0
DIZZINESS: 0
SHORTNESS OF BREATH: 0
NAUSEA: 0

## 2020-04-16 NOTE — PATIENT INSTRUCTIONS
-Apply calmoseptine to area daily and as needed in between clinic visits.      -Never walk around the house barefoot. Always wear a rubber soled slipper when walking around the house.    -Should you experience any significant changes in your wound(s), such as infection (redness, swelling, localized heat, increased pain, fever > 101 F, chills) or have any questions regarding your home care instructions, please contact the wound center at [phone]. If after hours, contact your primary care physician or go to the hospital emergency room.

## 2020-04-16 NOTE — PROGRESS NOTES
Provider Encounter- Pressure Injury        HISTORY OF PRESENT ILLNESS  Wound History:    START OF CARE IN CLINIC: 4/3/2020    REFERRING PROVIDER: Basil Banks MD     WOUND- Pressure Injury.    LOCATION and STAGE: Right medial gluteus, shallow stage III   HISTORY: Patient first developed redness and injury to her sacrum and buttocks while hospitalized in August 2019 for back surgery.  Over the following months, wounds to her sacrum and left buttock went on to heal.  However, the wound to her right medial buttock has continued to worsen.  Patient has some dementia, and also back pain issues.  She spends most of her time in a recliner chair, approximately 20 hours/day per daughter.  She is still able to ambulate, and makes a point of walking approximately 30 minutes/day.  She is continent of bowel, and has occasional urinary incontinence.  Her appetite is good.    Pertinent Medical History: Lumbar stenosis, Alzheimer's dementia, urinary incontinence   Contributing factors: Immobility, Activity level, Inadequate support surfaces, Bladder incontinence and Obesity    Personal support: Family     TOBACCO USE: She has never smoked or used tobacco products    Patient's problem list, allergies, and current medications reviewed and updated in Epic    Interval History:  4/3/2020: Initial clinic visit with RUPINDER Mercedes.  Patient presents today with her daughter.  Daughter states she has been treating the wound herself with, Calmoseptine ointment.  She bathes her mother 3 times per week.  Per daughter, patient spends approximately 12 hours/day in her recliner chair due to back issues.    4/16/2020: Clinic visit with RUPINDER Mercedes.  Patient presents to clinic today, again accompanied by her daughter.  Patient states she is feeling well, denies fevers, chills, nausea, vomiting, cough or shortness of breath.  Her daughter was able to obtain a gel cushion, which patient has been using at all times while in her recliner  chair.  Per patient, much more comfortable, relieves not only her buttock pain, but her back pain.  They have not been able to keep dressings in place, and have returned to using calmoseptine over wound, applied generously several times per day.      REVIEW OF SYSTEMS:   Review of Systems   Constitutional: Negative for chills, fever and weight loss.   Respiratory: Negative for cough and shortness of breath.    Cardiovascular: Positive for leg swelling. Negative for chest pain.   Gastrointestinal: Negative for constipation, diarrhea, nausea and vomiting.   Musculoskeletal: Positive for back pain.        Chronic back pain, history of lumbar stenosis   Neurological: Negative for dizziness and headaches.   Psychiatric/Behavioral: Negative for depression. The patient is not nervous/anxious.         History of Alzheimer's       PHYSICAL EXAMINATION:   /68   Pulse 80   Temp 36.9 °C (98.4 °F) (Temporal)   Resp 20   SpO2 95%     Physical Exam   Constitutional: She is well-developed, well-nourished, and in no distress.   She is obese   HENT:   Head: Normocephalic.   Eyes: Pupils are equal, round, and reactive to light.   Pulmonary/Chest: Effort normal.   Abdominal: Soft.   Musculoskeletal:         General: Edema present.      Comments: 2+ edema bilateral lower extremities   Neurological: She is alert.   She is a bit forgetful, poor historian  Relies upon daughter to provide information regarding her health   Skin: Skin is warm.   Shallow stage III pressure ulcer to medial aspect of right buttock.  Periwound with non-blanchable redness  Refer to wound flowsheet and photos   Psychiatric: Mood, affect and judgment normal.   Poor memory       WOUND ASSESSMENT   Wound 04/03/20 Full Thickness Wound Buttocks Medial Right Right Medial Buttock (Active)   Wound Image    4/16/2020 10:35 AM   Site Assessment Pink;Yellow 4/16/2020 10:35 AM   Periwound Assessment Intact;Blanchable erythema 4/16/2020 10:35 AM   Margins [place]  (rolled edges) 4/16/2020 10:35 AM   Drainage Amount NAVARRO 4/16/2020 10:35 AM   Drainage Description [place] 4/16/2020 10:35 AM   Treatments Cleansed;Topical Lidocaine;Provider debridement 4/16/2020 10:35 AM   Wound Cleansing Normal Saline Irrigation 4/16/2020 10:35 AM   Periwound Protectant Not Applicable 4/16/2020 10:35 AM   Dressing Options Other (Comments) 4/16/2020 10:35 AM   Dressing Changed New 4/16/2020 10:35 AM   Dressing Change/Treatment Frequency Daily, and As Needed 4/16/2020 10:35 AM   Pressure Injury Stage 2 4/16/2020 10:35 AM   Non-staged Wound Description Full thickness 4/16/2020 10:35 AM   Wound Length (cm) 0.6 cm 4/16/2020 10:35 AM   Wound Width (cm) 0.9 cm 4/16/2020 10:35 AM   Wound Depth (cm) 0.1 cm 4/16/2020 10:35 AM   Wound Surface Area (cm^2) 0.54 cm^2 4/16/2020 10:35 AM   Wound Volume (cm^3) 0.05 cm^3 4/16/2020 10:35 AM   Post-Procedure Length (cm) 0.7 cm 4/16/2020 10:35 AM   Post-Procedure Width (cm) 0.9 cm 4/16/2020 10:35 AM   Post-Procedure Depth (cm) 0.1 cm 4/16/2020 10:35 AM   Post-Procedure Surface Area (cm^2) 0.63 cm^2 4/16/2020 10:35 AM   Post-Procedure Volume (cm^3) 0.06 cm^3 4/16/2020 10:35 AM   Wound Healing % 50 4/16/2020 10:35 AM   Wound Bed Epithelium (%) 0 % 4/3/2020 10:00 AM   Wound Bed Eschar (%) 0 % 4/3/2020 10:00 AM   Wound Bed Granulation (%) - Post-Procedure 100 % 4/3/2020 10:00 AM   Wound Bed Epithelium % - (Post-Procedure) 0 % 4/3/2020 10:00 AM   Wound Bed Slough % - (Post-Procedure) 0 % 4/3/2020 10:00 AM   Wound Bed Eschar % - (Post-Procedure) 0 % 4/3/2020 10:00 AM   Tunneling (cm) 0 cm 4/16/2020 10:35 AM   Undermining (cm) 0 cm 4/16/2020 10:35 AM   Wound Odor None 4/16/2020 10:35 AM   Exposed Structures None 4/16/2020 10:35 AM            PROCEDURE: Excisional debridement of right medial buttock ulcer  -2% viscous lidocaine applied topically to wound bed for approximately 5 minutes prior to debridement  -Curette used to debride wound bed.  Excisional debridement was performed  to remove devitalized tissue until healthy, bleeding tissue was visualized.   Entire surface of wound, 0.63 cm2 debrided.  Tissue debrided into the subcutaneous layer.    -Bleeding controlled with manual pressure.    -Wound care completed by wound RN, refer to flowsheet  -Patient tolerated the procedure well, without c/o pain or discomfort.     Pertinent Labs and Diagnostics:    Labs:     A1c:   Lab Results   Component Value Date/Time    HBA1C 5.3 10/01/2019 05:18 AM          IMAGING: No results found.    VASCULAR STUDIES: No results found.    LAST  WOUND CULTURE:   Lab Results   Component Value Date/Time    CULTRSULT No acid fast bacilli detected. 09/23/2019 12:59 PM    CULTRSULT No fungal growth. 09/23/2019 12:59 PM    CULTRSULT No growth at 72 hours. 09/23/2019 12:59 PM    CULTRSULT No Anaerobes isolated. 09/23/2019 12:59 PM    CULTRSULT No fungal growth. 09/23/2019 12:59 PM    CULTRSULT - (A) 09/23/2019 12:59 PM    CULTRSULT Klebsiella pneumoniae  >100,000 cfu/mL   (A) 09/23/2019 12:59 PM          ASSESSMENT AND PLAN:   1. Pressure injury of right buttock, stage 3 (HCC)  Comments: Pressure ulcers to sacrum and bilateral buttocks started while hospitalized in August 2019.  Sacral and left buttock wounds went on to resolve, right buttock wound has deteriorated.    4/16/2020: Patient and daughter have been unsuccessful in keeping dressing in place.  Have returned using calmoseptine, applied several times per day  -Excisional debridement of wound in clinic today, medically necessary to promote wound healing.  -Patient to return to clinic in 2 weeks for assessment and debridement (decreased frequency due to patient's risk for COVID-19)  -Patient or daughter to apply, calmoseptine cream at least daily, reapply as needed, apply generous amount      Wound care: Calmoseptine    2. Sedentary lifestyle  Comments: Patient spends most of her day in a recliner chair, up to 20 hours/day.    4/16/2020: Patient now has gel  cushion for her recliner chair  -Continue with gel cushion to recliner chair  -Encourage patient to shift her weight frequently while sitting, ambulate as much as possible, at least several times per day and her home    3. Urinary incontinence, unspecified type    4/16/2020: Complicating factor.  -Barrier paste (calmoseptine) to protect skin and wound from urine  -Recommend appropriate absorptive undergarments to wick moisture away from skin            PATIENT EDUCATION  -Etiology of pressure injury  -Importance of offloading and frequent repositioning  -Strategies for offloading in bed and when seated discussed and demonstrated  - Importance of adequate nutrition for wound healing  - Advised to go to ER for any increased redness, swelling, drainage or odor, or if patient develops fever, chills, nausea or vomiting.    15 min spent face to face with patient, >50% of time spent counseling, coordinating care, reviewing records, discussing POC, educating patient regarding wound healing and progression, offloading and frequent repositioning, nutrition.  This time was spent in excess to procedure time.       Please note that this note may have been created using voice recognition software. I have worked with technical experts from [employer] to optimize the interface.  I have made every reasonable attempt to correct obvious errors, but there may be errors of grammar and possibly content that I did not discover before finalizing the note.

## 2020-04-30 ENCOUNTER — OFFICE VISIT (OUTPATIENT)
Dept: WOUND CARE | Facility: MEDICAL CENTER | Age: 85
End: 2020-04-30
Attending: INTERNAL MEDICINE
Payer: MEDICARE

## 2020-04-30 VITALS
RESPIRATION RATE: 16 BRPM | DIASTOLIC BLOOD PRESSURE: 60 MMHG | OXYGEN SATURATION: 95 % | HEART RATE: 83 BPM | SYSTOLIC BLOOD PRESSURE: 138 MMHG

## 2020-04-30 DIAGNOSIS — R32 URINARY INCONTINENCE, UNSPECIFIED TYPE: ICD-10-CM

## 2020-04-30 DIAGNOSIS — L89.313 PRESSURE INJURY OF RIGHT BUTTOCK, STAGE 3 (HCC): Primary | ICD-10-CM

## 2020-04-30 DIAGNOSIS — Z91.89 SEDENTARY LIFESTYLE: ICD-10-CM

## 2020-04-30 PROCEDURE — 11042 DBRDMT SUBQ TIS 1ST 20SQCM/<: CPT

## 2020-04-30 PROCEDURE — 11042 DBRDMT SUBQ TIS 1ST 20SQCM/<: CPT | Performed by: NURSE PRACTITIONER

## 2020-04-30 ASSESSMENT — ENCOUNTER SYMPTOMS
CHILLS: 0
HEADACHES: 0
NAUSEA: 0
DEPRESSION: 0
NERVOUS/ANXIOUS: 0
WEIGHT LOSS: 0
COUGH: 0
VOMITING: 0
BACK PAIN: 1
DIARRHEA: 0
SHORTNESS OF BREATH: 0
DIZZINESS: 0
CONSTIPATION: 0
FEVER: 0

## 2020-04-30 NOTE — PROGRESS NOTES
Provider Encounter- Pressure Injury        HISTORY OF PRESENT ILLNESS  Wound History:    START OF CARE IN CLINIC: 4/3/2020    REFERRING PROVIDER: Basil Banks MD     WOUND- Pressure Injury.    LOCATION and STAGE: Right medial gluteus, shallow stage III   HISTORY: Patient first developed redness and injury to her sacrum and buttocks while hospitalized in August 2019 for back surgery.  Over the following months, wounds to her sacrum and left buttock went on to heal.  However, the wound to her right medial buttock has continued to worsen.  Patient has some dementia, and also back pain issues.  She spends most of her time in a recliner chair, approximately 20 hours/day per daughter.  She is still able to ambulate, and makes a point of walking approximately 30 minutes/day.  She is continent of bowel, and has occasional urinary incontinence.  Her appetite is good.    Pertinent Medical History: Lumbar stenosis, Alzheimer's dementia, urinary incontinence   Contributing factors: Immobility, Activity level, Inadequate support surfaces, Bladder incontinence and Obesity    Personal support: Family     TOBACCO USE: She has never smoked or used tobacco products    Patient's problem list, allergies, and current medications reviewed and updated in Epic    Interval History:  4/3/2020: Initial clinic visit with RUPINDER Mercedes.  Patient presents today with her daughter.  Daughter states she has been treating the wound herself with, Calmoseptine ointment.  She bathes her mother 3 times per week.  Per daughter, patient spends approximately 12 hours/day in her recliner chair due to back issues.    4/16/2020: Clinic visit with RUPINDER Mercedes.  Patient presents to clinic today, again accompanied by her daughter.  Patient states she is feeling well, denies fevers, chills, nausea, vomiting, cough or shortness of breath.  Her daughter was able to obtain a gel cushion, which patient has been using at all times while in her recliner  chair.  Per patient, much more comfortable, relieves not only her buttock pain, but her back pain.  They have not been able to keep dressings in place, and have returned to using calmoseptine over wound, applied generously several times per day.    4/30/2020: Clinic visit with RUPINDER Bowden. Patient states that they are feeling well today.  Patient denies fever, chills, nausea, vomiting, lightheadedness, dizziness, shortness of breath and chest pain.  Patient has been placing calamine lotion to pressure ulcer.  He gave the daughter and patient a bottle of Eduin cream with zinc in it to use over the wound.  Daughter reported that patient spends a significant a time up in a wheelchair/chair I recommended that the daughter take her mother to new [facility] for seating eval to determine if the cushion that she purchased is appropriate for her mother.  The daughter was a little upset because she had just purchased the new cushion for $40 and now is telling her to get a second opinion on the cushion and a seating evaluation.      REVIEW OF SYSTEMS:   Review of Systems   Constitutional: Negative for chills, fever and weight loss.   Respiratory: Negative for cough and shortness of breath.    Cardiovascular: Positive for leg swelling. Negative for chest pain.   Gastrointestinal: Negative for constipation, diarrhea, nausea and vomiting.   Musculoskeletal: Positive for back pain.        Chronic back pain, history of lumbar stenosis   Neurological: Negative for dizziness and headaches.   Psychiatric/Behavioral: Negative for depression. The patient is not nervous/anxious.         History of Alzheimer's       PHYSICAL EXAMINATION:   /60   Pulse 83   Resp 16   SpO2 95%     Physical Exam   Constitutional: She is well-developed, well-nourished, and in no distress.   She is obese   HENT:   Head: Normocephalic.   Eyes: Pupils are equal, round, and reactive to light.   Pulmonary/Chest: Effort normal.   Abdominal: Soft.    Musculoskeletal:         General: Edema present.      Comments: 2+ edema bilateral lower extremities   Neurological: She is alert.   She is a bit forgetful, poor historian  Relies upon daughter to provide information regarding her health   Skin: Skin is warm.   Shallow stage III pressure ulcer to medial aspect of right buttock.  Periwound with non-blanchable redness  Refer to wound flowsheet and photos   Psychiatric: Mood, affect and judgment normal.   Poor memory       WOUND ASSESSMENT     Wound 04/03/20 Full Thickness Wound Buttocks Medial Right Right Medial Buttock (Active)   Wound Image    4/30/2020 11:00 AM   Site Assessment Pink;Dry;Yellow 4/30/2020 11:00 AM   Periwound Assessment Intact;Blanchable erythema 4/30/2020 11:00 AM   Margins Epibole (rolled edges) 4/30/2020 11:00 AM   Drainage Amount NAVARRO 4/30/2020 11:00 AM   Drainage Description NAVARRO 4/30/2020 11:00 AM   Treatments Cleansed;Topical Lidocaine;Provider debridement 4/30/2020 11:00 AM   Wound Cleansing Normal Saline Irrigation 4/30/2020 11:00 AM   Periwound Protectant Not Applicable 4/30/2020 11:00 AM   Dressing Options Other (Comments) 4/30/2020 11:00 AM   Dressing Changed Changed 4/30/2020 11:00 AM   Dressing Change/Treatment Frequency Daily, and As Needed 4/30/2020 11:00 AM   Pressure Injury Stage 2 4/30/2020 11:00 AM   Non-staged Wound Description Full thickness 4/30/2020 11:00 AM   Wound Length (cm) 0.6 cm 4/30/2020 11:00 AM   Wound Width (cm) 0.9 cm 4/30/2020 11:00 AM   Wound Depth (cm) 0.1 cm 4/30/2020 11:00 AM   Wound Surface Area (cm^2) 0.54 cm^2 4/30/2020 11:00 AM   Wound Volume (cm^3) 0.05 cm^3 4/30/2020 11:00 AM   Post-Procedure Length (cm) 0.7 cm 4/30/2020 11:00 AM   Post-Procedure Width (cm) 0.9 cm 4/30/2020 11:00 AM   Post-Procedure Depth (cm) 0.1 cm 4/30/2020 11:00 AM   Post-Procedure Surface Area (cm^2) 0.63 cm^2 4/30/2020 11:00 AM   Post-Procedure Volume (cm^3) 0.06 cm^3 4/30/2020 11:00 AM   Wound Healing % 50 4/30/2020 11:00 AM    Wound Bed Epithelium (%) 0 % 4/3/2020 10:00 AM   Wound Bed Eschar (%) 0 % 4/3/2020 10:00 AM   Wound Bed Granulation (%) - Post-Procedure 100 % 4/3/2020 10:00 AM   Wound Bed Epithelium % - (Post-Procedure) 0 % 4/3/2020 10:00 AM   Wound Bed Slough % - (Post-Procedure) 0 % 4/3/2020 10:00 AM   Wound Bed Eschar % - (Post-Procedure) 0 % 4/3/2020 10:00 AM   Tunneling (cm) 0 cm 4/30/2020 11:00 AM   Undermining (cm) 0 cm 4/30/2020 11:00 AM   Wound Odor None 4/30/2020 11:00 AM   Exposed Structures None 4/30/2020 11:00 AM                 PROCEDURE: Excisional debridement of right medial buttock ulcer  -2% viscous lidocaine applied topically to wound bed for approximately 5 minutes prior to debridement  -Curette used to debride wound bed.  Excisional debridement was performed to remove devitalized tissue until healthy, bleeding tissue was visualized.   Entire surface of wound, 0.63 cm2 debrided.  Tissue debrided into the subcutaneous layer.    -Bleeding controlled with manual pressure.    -Wound care completed by wound RN, refer to flowsheet  -Patient tolerated the procedure well, without c/o pain or discomfort.     Pertinent Labs and Diagnostics:    Labs:     A1c:   Lab Results   Component Value Date/Time    HBA1C 5.3 10/01/2019 05:18 AM          IMAGING: No results found.    VASCULAR STUDIES: No results found.    LAST  WOUND CULTURE:   Lab Results   Component Value Date/Time    CULTRSULT No acid fast bacilli detected. 09/23/2019 12:59 PM    CULTRSULT No fungal growth. 09/23/2019 12:59 PM    CULTRSULT No growth at 72 hours. 09/23/2019 12:59 PM    CULTRSULT No Anaerobes isolated. 09/23/2019 12:59 PM    CULTRSULT No fungal growth. 09/23/2019 12:59 PM    CULTRSULT - (A) 09/23/2019 12:59 PM    CULTRSULT Klebsiella pneumoniae  >100,000 cfu/mL   (A) 09/23/2019 12:59 PM          ASSESSMENT AND PLAN:   1. Pressure injury of right buttock, stage 3 (HCC)  Comments: Pressure ulcers to sacrum and bilateral buttocks started while  hospitalized in August 2019.  Sacral and left buttock wounds went on to resolve, right buttock wound has deteriorated.    4/30/2020: Patient and daughter have been unsuccessful in keeping dressing in place.  Recommended that the patient use zinc protective cream to wound and periwound area and to replace after each bowel or voiding episode.  -Excisional debridement of wound in clinic today, medically necessary to promote wound healing.  -Patient to return to clinic in 2 weeks for assessment and debridement (decreased frequency due to patient's risk for COVID-19)  -Patient or daughter to apply, zinc bazza cream at least daily, reapply as needed, apply generous amount  -Patient has slightly blanching area to left buttock.  I am worried that this will turn into a ulcer.  - Patient and daughter told to rotate position frequently every 1/2 hour to hour.    Wound care: Zinc skin protectant cream.    2. Sedentary lifestyle  Comments: Patient spends most of her day in a recliner chair, up to 20 hours/day.    4/30/2020: Patient now has gel cushion for her recliner chair  -I recommended that the patient get a seating evaluation and recommendation from [facility] regarding the appropriate cushion for her chair.  -Continue with gel cushion to recliner chair, optional recommendation for the patient to have a seating evaluation done with possibly addition of a new properly tested cushion  -Encourage patient to shift her weight frequently while sitting, ambulate as much as possible, at least several times per day and her home    3. Urinary incontinence, unspecified type    4/16/2020: Complicating factor.  -Barrier paste (zinc) to protect skin and wound from urine  -Recommend appropriate absorptive undergarments to wick moisture away from skin      PATIENT EDUCATION  -Etiology of pressure injury  -Importance of offloading and frequent repositioning  -Strategies for offloading in bed and when seated discussed and demonstrated  - Importance  of adequate nutrition for wound healing  - Advised to go to ER for any increased redness, swelling, drainage or odor, or if patient develops fever, chills, nausea or vomiting.         Please note that this note may have been created using voice recognition software. I have worked with technical experts from [employer] [facility] to optimize the interface.  I have made every reasonable attempt to correct obvious errors, but there may be errors of grammar and possibly content that I did not discover before finalizing the note.

## 2020-04-30 NOTE — PATIENT INSTRUCTIONS
-Keep area clean and dry. Apply moisture barrier cream to area after using toilet.     -Avoid prolonged sitting and change position frequently.    -Should you experience any significant changes in your wound(s), such as infection (redness, swelling, localized heat, increased pain, fever > 101 F, chills) or have any questions regarding your home care instructions, please contact the wound center at [phone]. If after hours, contact your primary care physician or go to the hospital emergency room.

## 2020-05-12 ENCOUNTER — OFFICE VISIT (OUTPATIENT)
Dept: WOUND CARE | Facility: MEDICAL CENTER | Age: 85
End: 2020-05-12
Attending: INTERNAL MEDICINE
Payer: MEDICARE

## 2020-05-12 VITALS
OXYGEN SATURATION: 94 % | RESPIRATION RATE: 16 BRPM | HEART RATE: 85 BPM | TEMPERATURE: 97.7 F | SYSTOLIC BLOOD PRESSURE: 144 MMHG | DIASTOLIC BLOOD PRESSURE: 70 MMHG

## 2020-05-12 DIAGNOSIS — L89.313 PRESSURE INJURY OF RIGHT BUTTOCK, STAGE 3 (HCC): ICD-10-CM

## 2020-05-12 DIAGNOSIS — R32 URINARY INCONTINENCE, UNSPECIFIED TYPE: ICD-10-CM

## 2020-05-12 DIAGNOSIS — Z91.89 SEDENTARY LIFESTYLE: ICD-10-CM

## 2020-05-12 PROCEDURE — 11042 DBRDMT SUBQ TIS 1ST 20SQCM/<: CPT

## 2020-05-12 PROCEDURE — 11042 DBRDMT SUBQ TIS 1ST 20SQCM/<: CPT | Performed by: NURSE PRACTITIONER

## 2020-05-12 ASSESSMENT — ENCOUNTER SYMPTOMS
FEVER: 0
CHILLS: 0
DIZZINESS: 0
SHORTNESS OF BREATH: 0
COUGH: 0
VOMITING: 0
CONSTIPATION: 0
DIARRHEA: 0
BACK PAIN: 1
NAUSEA: 0
DEPRESSION: 0
NERVOUS/ANXIOUS: 0
HEADACHES: 0
WEIGHT LOSS: 0

## 2020-05-12 NOTE — PROGRESS NOTES
Provider Encounter- Pressure Injury        HISTORY OF PRESENT ILLNESS  Wound History:    START OF CARE IN CLINIC: 4/3/2020    REFERRING PROVIDER: Basil Banks MD     WOUND- Pressure Injury.    LOCATION and STAGE: Right medial gluteus, shallow stage III   HISTORY: Patient first developed redness and injury to her sacrum and buttocks while hospitalized in August 2019 for back surgery.  Over the following months, wounds to her sacrum and left buttock went on to heal.  However, the wound to her right medial buttock has continued to worsen.  Patient has some dementia, and also back pain issues.  She spends most of her time in a recliner chair, approximately 20 hours/day per daughter.  She is still able to ambulate, and makes a point of walking approximately 30 minutes/day.  She is continent of bowel, and has occasional urinary incontinence.  Her appetite is good.    Pertinent Medical History: Lumbar stenosis, Alzheimer's dementia, urinary incontinence   Contributing factors: Immobility, Activity level, Inadequate support surfaces, Bladder incontinence and Obesity    Personal support: Family     TOBACCO USE: She has never smoked or used tobacco products    Patient's problem list, allergies, and current medications reviewed and updated in Epic    Interval History:  4/3/2020: Initial clinic visit with RUPINDER Mercedes.  Patient presents today with her daughter.  Daughter states she has been treating the wound herself with, Calmoseptine ointment.  She bathes her mother 3 times per week.  Per daughter, patient spends approximately 12 hours/day in her recliner chair due to back issues.    4/16/2020: Clinic visit with RUPINDER Mercedes.  Patient presents to clinic today, again accompanied by her daughter.  Patient states she is feeling well, denies fevers, chills, nausea, vomiting, cough or shortness of breath.  Her daughter was able to obtain a gel cushion, which patient has been using at all times while in her recliner  chair.  Per patient, much more comfortable, relieves not only her buttock pain, but her back pain.  They have not been able to keep dressings in place, and have returned to using calmoseptine over wound, applied generously several times per day.    4/30/2020: Clinic visit with RUPINDER Bowden. Patient states that they are feeling well today.  Patient denies fever, chills, nausea, vomiting, lightheadedness, dizziness, shortness of breath and chest pain.  Patient has been placing calamine lotion to pressure ulcer.  He gave the daughter and patient a bottle of Eduin cream with zinc in it to use over the wound.  Daughter reported that patient spends a significant a time up in a wheelchair/chair I recommended that the daughter take her mother to [facility] for seating eval to determine if the cushion that she purchased is appropriate for her mother.  The daughter was a little upset because she had just purchased the new cushion for $40 and now is telling her to get a second opinion on the cushion and a seating evaluation.    5/12/2020 : Clinic visit with RUPINDER Mercedes.  Ramya states she is feeling well today,denies fevers, chills, nausea, vomiting, cough or shortness of breath.  She is still having a bit of discomfort from her wound, states she sits on a pillow when she is sitting in her chair.  She has been applying barrier paste to the wound.      REVIEW OF SYSTEMS:   Review of Systems   Constitutional: Negative for chills, fever and weight loss.   Respiratory: Negative for cough and shortness of breath.    Cardiovascular: Positive for leg swelling. Negative for chest pain.   Gastrointestinal: Negative for constipation, diarrhea, nausea and vomiting.   Musculoskeletal: Positive for back pain.        Chronic back pain, history of lumbar stenosis   Neurological: Negative for dizziness and headaches.   Psychiatric/Behavioral: Negative for depression. The patient is not nervous/anxious.         History of  Alzheimer's       PHYSICAL EXAMINATION:   /70   Pulse 85   Temp 36.5 °C (97.7 °F) (Temporal)   Resp 16   SpO2 94%     Physical Exam   Constitutional: She is well-developed, well-nourished, and in no distress.   She is obese   HENT:   Head: Normocephalic.   Eyes: Pupils are equal, round, and reactive to light.   Pulmonary/Chest: Effort normal.   Abdominal: Soft.   Musculoskeletal:         General: Edema present.      Comments: 2+ edema bilateral lower extremities   Neurological: She is alert.   She is a bit forgetful, poor historian  Relies upon daughter to provide information regarding her health   Skin: Skin is warm.   Shallow stage III pressure ulcer to medial aspect of right buttock.  Periwound with non-blanchable redness  Refer to wound flowsheet and photos   Psychiatric: Mood, affect and judgment normal.   Poor memory       WOUND ASSESSMENT   Wound 04/03/20 Full Thickness Wound Buttocks Medial Right Right Medial Buttock (Active)   Wound Image    5/12/2020  1:43 PM   Site Assessment Pink;Epithelialization 5/12/2020  1:43 PM   Periwound Assessment Intact;Blanchable erythema 5/12/2020  1:43 PM   Margins Epibole (rolled edges) 5/12/2020  1:43 PM   Drainage Amount NAVARRO 4/30/2020 11:00 AM   Drainage Description NAVARRO 4/30/2020 11:00 AM   Treatments Cleansed;Topical Lidocaine;Provider debridement 5/12/2020  1:43 PM   Wound Cleansing Normal Saline Irrigation 5/12/2020  1:43 PM   Periwound Protectant Not Applicable 5/12/2020  1:43 PM   Dressing Options Hydrofiber Silver;Hydrocolloid Thin 5/12/2020  1:43 PM   Dressing Changed Changed 4/30/2020 11:00 AM   Dressing Change/Treatment Frequency Daily, and As Needed 4/30/2020 11:00 AM   Pressure Injury Stage 2 4/30/2020 11:00 AM   Non-staged Wound Description Full thickness 5/12/2020  1:43 PM   Wound Width (cm) 0.9 cm 4/30/2020 11:00 AM   Wound Depth (cm) 0.1 cm 4/30/2020 11:00 AM   Wound Surface Area (cm^2) 0.54 cm^2 4/30/2020 11:00 AM   Wound Volume (cm^3) 0.05 cm^3  4/30/2020 11:00 AM   Post-Procedure Length (cm) 1 cm 5/12/2020  1:43 PM   Post-Procedure Width (cm) 0.3 cm 5/12/2020  1:43 PM   Post-Procedure Depth (cm) 0.1 cm 5/12/2020  1:43 PM   Post-Procedure Surface Area (cm^2) 0.3 cm^2 5/12/2020  1:43 PM   Post-Procedure Volume (cm^3) 0.03 cm^3 5/12/2020  1:43 PM   Wound Healing % 50 4/30/2020 11:00 AM   Wound Bed Epithelium (%) 0 % 4/3/2020 10:00 AM   Wound Bed Eschar (%) 0 % 4/3/2020 10:00 AM   Wound Bed Granulation (%) - Post-Procedure 100 % 4/3/2020 10:00 AM   Wound Bed Epithelium % - (Post-Procedure) 0 % 4/3/2020 10:00 AM   Wound Bed Slough % - (Post-Procedure) 0 % 4/3/2020 10:00 AM   Wound Bed Eschar % - (Post-Procedure) 0 % 4/3/2020 10:00 AM   Tunneling (cm) 0 cm 4/30/2020 11:00 AM   Undermining (cm) 0 cm 4/30/2020 11:00 AM   Wound Odor None 5/12/2020  1:43 PM   Exposed Structures None 5/12/2020  1:43 PM       PROCEDURE: Excisional debridement of right medial buttock ulcer  -2% viscous lidocaine applied topically to wound bed for approximately 5 minutes prior to debridement  -Curette used to debride wound bed.  Excisional debridement was performed to remove devitalized tissue until healthy, bleeding tissue was visualized.   Entire surface of wound, 0.3 cm2 debrided.  Tissue debrided into the subcutaneous layer.    -Bleeding controlled with manual pressure.    -Wound care completed by wound RN, refer to flowsheet  -Patient tolerated the procedure well, without c/o pain or discomfort.     Pertinent Labs and Diagnostics:    Labs:     A1c:   Lab Results   Component Value Date/Time    HBA1C 5.3 10/01/2019 05:18 AM          IMAGING: No results found.    VASCULAR STUDIES: No results found.    LAST  WOUND CULTURE:   Lab Results   Component Value Date/Time    CULTRSULT No acid fast bacilli detected. 09/23/2019 12:59 PM    CULTRSULT No fungal growth. 09/23/2019 12:59 PM    CULTRSULT No growth at 72 hours. 09/23/2019 12:59 PM    CULTRSULT No Anaerobes isolated. 09/23/2019 12:59  PM    CULTRSULT No fungal growth. 09/23/2019 12:59 PM    CULTRSULT - (A) 09/23/2019 12:59 PM    CULTRSULT Klebsiella pneumoniae  >100,000 cfu/mL   (A) 09/23/2019 12:59 PM          ASSESSMENT AND PLAN:   1. Pressure injury of right buttock, stage 3 (HCC)  Comments: Pressure ulcers to sacrum and bilateral buttocks started while hospitalized in August 2019.  Sacral and left buttock wounds went on to resolve, right buttock wound has deteriorated.    5/12/2020: Wound area has decreased in his last assessment  -Excisional debridement of wound in clinic today, medically necessary to promote wound healing.  -Patient to return to clinic in 2 weeks for assessment and debridement (decreased frequency due to patient's risk for COVID-19)  -Patient or daughter to change dressing 2 times per week in between clinic visits    Wound care: Silver Hydrofiber to manage exudate and bioburden, hydrocolloid to decrease friction    2. Sedentary lifestyle  Comments: Patient spends most of her day in a recliner chair, up to 20 hours/day.    5/12/2020: Patient now has gel cushion for her recliner chair, however states she has been using a pillow in her chair  --Encouraged patient to shift her weight frequently while sitting, ambulate as much as possible, at least several times per day and her home.      3. Urinary incontinence, unspecified type    5/12/2020: Complicating factor.  -Barrier paste (zinc) to protect skin and wound from urine  -Recommend appropriate absorptive undergarments to wick moisture away from skin             Please note that this note may have been created using voice recognition software. I have worked with technical experts from [employer] to optimize the interface.  I have made every reasonable attempt to correct obvious errors, but there may be errors of grammar and possibly content that I did not discover before finalizing the note.

## 2020-05-26 ENCOUNTER — OFFICE VISIT (OUTPATIENT)
Dept: WOUND CARE | Facility: MEDICAL CENTER | Age: 85
End: 2020-05-26
Attending: INTERNAL MEDICINE
Payer: MEDICARE

## 2020-05-26 VITALS
TEMPERATURE: 99 F | SYSTOLIC BLOOD PRESSURE: 137 MMHG | DIASTOLIC BLOOD PRESSURE: 81 MMHG | RESPIRATION RATE: 16 BRPM | HEART RATE: 80 BPM | OXYGEN SATURATION: 92 %

## 2020-05-26 DIAGNOSIS — Z91.89 SEDENTARY LIFESTYLE: ICD-10-CM

## 2020-05-26 DIAGNOSIS — R32 URINARY INCONTINENCE, UNSPECIFIED TYPE: ICD-10-CM

## 2020-05-26 DIAGNOSIS — L89.313 PRESSURE INJURY OF RIGHT BUTTOCK, STAGE 3 (HCC): ICD-10-CM

## 2020-05-26 PROCEDURE — 11042 DBRDMT SUBQ TIS 1ST 20SQCM/<: CPT

## 2020-05-26 PROCEDURE — 11042 DBRDMT SUBQ TIS 1ST 20SQCM/<: CPT | Performed by: NURSE PRACTITIONER

## 2020-05-26 ASSESSMENT — ENCOUNTER SYMPTOMS
CHILLS: 0
DEPRESSION: 0
NERVOUS/ANXIOUS: 0
COUGH: 0
VOMITING: 0
BACK PAIN: 1
WEIGHT LOSS: 0
FEVER: 0
NAUSEA: 0
HEADACHES: 0
CONSTIPATION: 0
DIARRHEA: 0
SHORTNESS OF BREATH: 0
DIZZINESS: 0

## 2020-05-26 NOTE — PROGRESS NOTES
Provider Encounter- Pressure Injury        HISTORY OF PRESENT ILLNESS  Wound History:    START OF CARE IN CLINIC: 4/3/2020    REFERRING PROVIDER: Basil Banks MD     WOUND- Pressure Injury.    LOCATION and STAGE: Right medial gluteus, shallow stage III   HISTORY: Patient first developed redness and injury to her sacrum and buttocks while hospitalized in August 2019 for back surgery.  Over the following months, wounds to her sacrum and left buttock went on to heal.  However, the wound to her right medial buttock has continued to worsen.  Patient has some dementia, and also back pain issues.  She spends most of her time in a recliner chair, approximately 20 hours/day per daughter.  She is still able to ambulate, and makes a point of walking approximately 30 minutes/day.  She is continent of bowel, and has occasional urinary incontinence.  Her appetite is good.    Pertinent Medical History: Lumbar stenosis, Alzheimer's dementia, urinary incontinence   Contributing factors: Immobility, Activity level, Inadequate support surfaces, Bladder incontinence and Obesity    Personal support: Family     TOBACCO USE: She has never smoked or used tobacco products    Patient's problem list, allergies, and current medications reviewed and updated in Epic    Interval History:  4/3/2020: Initial clinic visit with RUIPNDER Mercedes.  Patient presents today with her daughter.  Daughter states she has been treating the wound herself with, Calmoseptine ointment.  She bathes her mother 3 times per week.  Per daughter, patient spends approximately 12 hours/day in her recliner chair due to back issues.    4/16/2020: Clinic visit with RUPINDER Mercedes.  Patient presents to clinic today, again accompanied by her daughter.  Patient states she is feeling well, denies fevers, chills, nausea, vomiting, cough or shortness of breath.  Her daughter was able to obtain a gel cushion, which patient has been using at all times while in her recliner  chair.  Per patient, much more comfortable, relieves not only her buttock pain, but her back pain.  They have not been able to keep dressings in place, and have returned to using calmoseptine over wound, applied generously several times per day.    4/30/2020: Clinic visit with RUPINDER Bowden. Patient states that they are feeling well today.  Patient denies fever, chills, nausea, vomiting, lightheadedness, dizziness, shortness of breath and chest pain.  Patient has been placing calamine lotion to pressure ulcer.  He gave the daughter and patient a bottle of Eduin cream with zinc in it to use over the wound.  Daughter reported that patient spends a significant a time up in a wheelchair/chair I recommended that the daughter take her mother to new [facility] for seating eval to determine if the cushion that she purchased is appropriate for her mother.  The daughter was a little upset because she had just purchased the new cushion for $40 and now is telling her to get a second opinion on the cushion and a seating evaluation.    5/12/2020 : Clinic visit with RUPINDER Mercedes.  Ramya states she is feeling well today,denies fevers, chills, nausea, vomiting, cough or shortness of breath.  She is still having a bit of discomfort from her wound, states she sits on a pillow when she is sitting in her chair.  She has been applying barrier paste to the wound.    5/26/2020 : Clinic visit with RUPINDER Mercedes.  Ramya states she is feeling well today,denies fevers, chills, nausea, vomiting, cough or shortness of breath.  She has been applying calmoseptine to her wound several times per day, as she has not been able to keep the dressing in place.  She feels it is getting better, less painful.      REVIEW OF SYSTEMS:   Review of Systems   Constitutional: Negative for chills, fever and weight loss.   Respiratory: Negative for cough and shortness of breath.    Cardiovascular: Positive for leg swelling. Negative for chest pain.    Gastrointestinal: Negative for constipation, diarrhea, nausea and vomiting.   Musculoskeletal: Positive for back pain.        Chronic back pain, history of lumbar stenosis   Neurological: Negative for dizziness and headaches.   Psychiatric/Behavioral: Negative for depression. The patient is not nervous/anxious.         History of Alzheimer's       PHYSICAL EXAMINATION:   /81   Pulse 80   Temp 37.2 °C (99 °F) (Temporal)   Resp 16   SpO2 92%     Physical Exam   Constitutional: She is well-developed, well-nourished, and in no distress.   She is obese   HENT:   Head: Normocephalic.   Eyes: Pupils are equal, round, and reactive to light.   Pulmonary/Chest: Effort normal.   Abdominal: Soft.   Musculoskeletal:         General: Edema present.      Comments: 2+ edema bilateral lower extremities   Neurological: She is alert.   She is a bit forgetful, poor historian  Relies upon daughter to provide information regarding her health   Skin: Skin is warm.   Shallow stage III pressure ulcer to medial aspect of right buttock.  Periwound with non-blanchable redness  Refer to wound flowsheet and photos   Psychiatric: Mood, affect and judgment normal.   Poor memory       WOUND ASSESSMENT    Wound 04/03/20 Full Thickness Wound Buttocks Medial Right Right Medial Buttock (Active)   Wound Image    05/26/20 1105   Site Assessment [place] 05/26/20 1105   Periwound Assessment Intact 05/26/20 1105   Margins Attached edges 05/26/20 1105   Drainage Amount NAVARRO 05/26/20 1105   Drainage Description NAVARRO 04/30/20 1100   Treatments Cleansed;Topical Lidocaine;Provider debridement 05/26/20 1105   Wound Cleansing Normal Saline Irrigation 05/26/20 1105   Periwound Protectant Barrier Paste 05/26/20 1105   Dressing Options Other (Comments) 05/26/20 1105   Dressing Changed Changed 04/30/20 1100   Dressing Change/Treatment Frequency Daily, and As Needed 04/30/20 1100   Pressure Injury Stage 2 04/30/20 1100   Non-staged Wound Description Full  thickness 05/26/20 1105   Wound Length (cm) 0.2 cm 05/26/20 1105   Wound Width (cm) 0.3 cm 05/26/20 1105   Wound Depth (cm) 0.1 cm 05/26/20 1105   Wound Surface Area (cm^2) 0.06 cm^2 05/26/20 1105   Wound Volume (cm^3) 0.01 cm^3 05/26/20 1105   Post-Procedure Length (cm) 0.2 cm 05/26/20 1105   Post-Procedure Width (cm) 0.3 cm 05/26/20 1105   Post-Procedure Depth (cm) 0.2 cm 05/26/20 1105   Post-Procedure Surface Area (cm^2) 0.06 cm^2 05/26/20 1105   Post-Procedure Volume (cm^3) 0.01 cm^3 05/26/20 1105   Wound Healing % 90 05/26/20 1105   Wound Bed Epithelium (%) 0 % 04/03/20 1000   Wound Bed Slough (%) 0 % 05/26/20 1105   Wound Bed Eschar (%) 0 % 04/03/20 1000   Wound Bed Granulation (%) - Post-Procedure 100 % 04/03/20 1000   Wound Bed Epithelium % - (Post-Procedure) 0 % 04/03/20 1000   Wound Bed Slough % - (Post-Procedure) 0 % 04/03/20 1000   Wound Bed Eschar % - (Post-Procedure) 0 % 04/03/20 1000   Tunneling (cm) 0 cm 04/30/20 1100   Undermining (cm) 0 cm 04/30/20 1100   Wound Odor None 05/26/20 1105   Exposed Structures None 05/26/20 1105          PROCEDURE: Excisional debridement of right medial buttock ulcer  -2% viscous lidocaine applied topically to wound bed for approximately 5 minutes prior to debridement  -Curette used to debride wound bed.  Excisional debridement was performed to remove devitalized tissue until healthy, bleeding tissue was visualized.   Entire surface of wound, 0.06 cm2 debrided.  Tissue debrided into the subcutaneous layer.    -Bleeding controlled with manual pressure.    -Wound care completed by wound RN, refer to flowsheet  -Patient tolerated the procedure well, without c/o pain or discomfort.     Pertinent Labs and Diagnostics:    Labs:     A1c:   Lab Results   Component Value Date/Time    HBA1C 5.3 10/01/2019 05:18 AM          IMAGING: No results found.    VASCULAR STUDIES: No results found.    LAST  WOUND CULTURE:   Lab Results   Component Value Date/Time    CULTRSULT No acid fast  bacilli detected. 09/23/2019 12:59 PM    CULTRSULT No fungal growth. 09/23/2019 12:59 PM    CULTRSULT No growth at 72 hours. 09/23/2019 12:59 PM    CULTRSULT No Anaerobes isolated. 09/23/2019 12:59 PM    CULTRSULT No fungal growth. 09/23/2019 12:59 PM    CULTRSULT - (A) 09/23/2019 12:59 PM    CULTRSULT Klebsiella pneumoniae  >100,000 cfu/mL   (A) 09/23/2019 12:59 PM          ASSESSMENT AND PLAN:   1. Pressure injury of right buttock, stage 3 (HCC)  Comments: Pressure ulcers to sacrum and bilateral buttocks started while hospitalized in August 2019.  Sacral and left buttock wounds went on to resolve, right buttock wound has deteriorated.    5/26/2020: Wound area has again decreased.  Patient reports less painful.  -Excisional debridement of wound in clinic today, medically necessary to promote wound healing.  -Patient to return to clinic in 2 weeks for assessment and debridement (decreased frequency due to patient's risk for COVID-19)  -Patient advised continue with calmoseptine, apply several times at the day, use generous amount.    Wound care: Barrier paste    2. Sedentary lifestyle  Comments: Patient spends most of her day in a recliner chair, up to 20 hours/day.    5/26/2020: Patient now has gel cushion for her recliner chair, however states she has been using a pillow in her chair  --Encouraged patient to shift her weight frequently while sitting, ambulate as much as possible, at least several times per day and her home.      3. Urinary incontinence, unspecified type    5/26/2020: Complicating factor.  -Barrier paste (zinc) to protect skin and wound from urine  -Recommend appropriate absorptive undergarments to wick moisture away from skin             Please note that this note may have been created using voice recognition software. I have worked with technical experts from [employer] to optimize the interface.  I have made every reasonable attempt to correct obvious errors, but there may be errors of  grammar and possibly content that I did not discover before finalizing the note.

## 2020-06-09 ENCOUNTER — OFFICE VISIT (OUTPATIENT)
Dept: WOUND CARE | Facility: MEDICAL CENTER | Age: 85
End: 2020-06-09
Attending: INTERNAL MEDICINE
Payer: MEDICARE

## 2020-06-09 VITALS
RESPIRATION RATE: 16 BRPM | SYSTOLIC BLOOD PRESSURE: 120 MMHG | OXYGEN SATURATION: 94 % | TEMPERATURE: 98.3 F | HEART RATE: 82 BPM | DIASTOLIC BLOOD PRESSURE: 55 MMHG

## 2020-06-09 DIAGNOSIS — L89.313 PRESSURE INJURY OF RIGHT BUTTOCK, STAGE 3 (HCC): ICD-10-CM

## 2020-06-09 DIAGNOSIS — Z91.89 SEDENTARY LIFESTYLE: ICD-10-CM

## 2020-06-09 DIAGNOSIS — R32 URINARY INCONTINENCE, UNSPECIFIED TYPE: ICD-10-CM

## 2020-06-09 PROCEDURE — 99213 OFFICE O/P EST LOW 20 MIN: CPT | Performed by: NURSE PRACTITIONER

## 2020-06-09 PROCEDURE — 99212 OFFICE O/P EST SF 10 MIN: CPT

## 2020-06-09 ASSESSMENT — ENCOUNTER SYMPTOMS
CONSTIPATION: 0
HEADACHES: 0
DIZZINESS: 0
COUGH: 0
SHORTNESS OF BREATH: 0
CHILLS: 0
DEPRESSION: 0
NAUSEA: 0
DIARRHEA: 0
BACK PAIN: 1
VOMITING: 0
WEIGHT LOSS: 0
NERVOUS/ANXIOUS: 0
FEVER: 0

## 2020-06-09 NOTE — PROGRESS NOTES
Provider Encounter- Pressure Injury        HISTORY OF PRESENT ILLNESS  Wound History:    START OF CARE IN CLINIC: 4/3/2020    REFERRING PROVIDER: Basil Banks MD     WOUND- Pressure Injury.    LOCATION and STAGE: Right medial gluteus, shallow stage III   HISTORY: Patient first developed redness and injury to her sacrum and buttocks while hospitalized in August 2019 for back surgery.  Over the following months, wounds to her sacrum and left buttock went on to heal.  However, the wound to her right medial buttock has continued to worsen.  Patient has some dementia, and also back pain issues.  She spends most of her time in a recliner chair, approximately 20 hours/day per daughter.  She is still able to ambulate, and makes a point of walking approximately 30 minutes/day.  She is continent of bowel, and has occasional urinary incontinence.  Her appetite is good.    Pertinent Medical History: Lumbar stenosis, Alzheimer's dementia, urinary incontinence   Contributing factors: Immobility, Activity level, Inadequate support surfaces, Bladder incontinence and Obesity    Personal support: Family     TOBACCO USE: She has never smoked or used tobacco products    Patient's problem list, allergies, and current medications reviewed and updated in Epic    Interval History:  4/3/2020: Initial clinic visit with RUPINDER Mercedes.  Patient presents today with her daughter.  Daughter states she has been treating the wound herself with, Calmoseptine ointment.  She bathes her mother 3 times per week.  Per daughter, patient spends approximately 12 hours/day in her recliner chair due to back issues.    4/16/2020: Clinic visit with RUPINDER Mercedes.  Patient presents to clinic today, again accompanied by her daughter.  Patient states she is feeling well, denies fevers, chills, nausea, vomiting, cough or shortness of breath.  Her daughter was able to obtain a gel cushion, which patient has been using at all times while in her recliner  chair.  Per patient, much more comfortable, relieves not only her buttock pain, but her back pain.  They have not been able to keep dressings in place, and have returned to using calmoseptine over wound, applied generously several times per day.    4/30/2020: Clinic visit with RUPINDER Bowden. Patient states that they are feeling well today.  Patient denies fever, chills, nausea, vomiting, lightheadedness, dizziness, shortness of breath and chest pain.  Patient has been placing calamine lotion to pressure ulcer.  He gave the daughter and patient a bottle of Eduin cream with zinc in it to use over the wound.  Daughter reported that patient spends a significant a time up in a wheelchair/chair I recommended that the daughter take her mother to [facility] for seating eval to determine if the cushion that she purchased is appropriate for her mother.  The daughter was a little upset because she had just purchased the new cushion for $40 and now is telling her to get a second opinion on the cushion and a seating evaluation.    5/12/2020 : Clinic visit with FELICIANO Mercedes  Ramya states she is feeling well today,denies fevers, chills, nausea, vomiting, cough or shortness of breath.  She is still having a bit of discomfort from her wound, states she sits on a pillow when she is sitting in her chair.  She has been applying barrier paste to the wound.    5/26/2020 : Clinic visit with FELICIANO Mercedes states she is feeling well today,denies fevers, chills, nausea, vomiting, cough or shortness of breath.  She has been applying calmoseptine to her wound several times per day, as she has not been able to keep the dressing in place.  She feels it is getting better, less painful.    6/9/2020 : Clinic visit with FELICIANO Mercedes states she is feeling well today,denies fevers, chills, nausea, vomiting, cough or shortness of breath.  She continues to apply calmoseptine to her buttocks several times per  day.  She denies any discomfort from the area.  Her wound is essentially resolved today, though she does still have some scarring.  She is discharged from [facility].  She understands she is return to clinic if wound recurs, or she develops new wounds.      REVIEW OF SYSTEMS:   Review of Systems   Constitutional: Negative for chills, fever and weight loss.   Respiratory: Negative for cough and shortness of breath.    Cardiovascular: Positive for leg swelling. Negative for chest pain.   Gastrointestinal: Negative for constipation, diarrhea, nausea and vomiting.   Musculoskeletal: Positive for back pain.        Chronic back pain, history of lumbar stenosis   Neurological: Negative for dizziness and headaches.   Psychiatric/Behavioral: Negative for depression. The patient is not nervous/anxious.         History of Alzheimer's       PHYSICAL EXAMINATION:   There were no vitals taken for this visit.    Physical Exam   Constitutional: She is well-developed, well-nourished, and in no distress.   She is obese   HENT:   Head: Normocephalic.   Eyes: Pupils are equal, round, and reactive to light.   Pulmonary/Chest: Effort normal.   Abdominal: Soft.   Musculoskeletal:         General: Edema present.      Comments: 2+ edema bilateral lower extremities   Neurological: She is alert.   She is a bit forgetful, poor historian  Relies upon daughter to provide information regarding her health   Skin: Skin is warm.   Resolved right medial buttock pressure ulcer   Psychiatric: Mood, affect and judgment normal.   Poor memory       WOUND ASSESSMENT               PROCEDURE: Excisional debridement of right medial buttock ulcer  - no need for debridement today, wound is resolved.    -Zinc barrier paste applied by wound RN, refer to flowsheet  -Patient tolerated the procedure well, without c/o pain or discomfort.     Pertinent Labs and Diagnostics:    Labs:     A1c:   Lab Results   Component Value Date/Time    HBA1C 5.3 10/01/2019 05:18 AM           IMAGING: No results found.    VASCULAR STUDIES: No results found.    LAST  WOUND CULTURE:   Lab Results   Component Value Date/Time    CULTRSULT No acid fast bacilli detected. 09/23/2019 12:59 PM    CULTRSULT No fungal growth. 09/23/2019 12:59 PM    CULTRSULT No growth at 72 hours. 09/23/2019 12:59 PM    CULTRSULT No Anaerobes isolated. 09/23/2019 12:59 PM    CULTRSULT No fungal growth. 09/23/2019 12:59 PM    CULTRSULT - (A) 09/23/2019 12:59 PM    CULTRSULT Klebsiella pneumoniae  >100,000 cfu/mL   (A) 09/23/2019 12:59 PM          ASSESSMENT AND PLAN:   1. Pressure injury of right buttock, stage 3 (HCC)  Comments: Pressure ulcers to sacrum and bilateral buttocks started while hospitalized in August 2019.  Sacral and left buttock wounds went on to resolve, right buttock wound has deteriorated.    6/9/2020: Resolved  -Discharge from [facility]  -Patient to return to clinic ASAP if wound recurs, or if he/she develops new wounds    Wound care: Barrier paste    2. Sedentary lifestyle  Comments: Patient spends most of her day in a recliner chair, up to 20 hours/day.    6/9/2020: Patient now has gel cushion for her recliner chair, however states she has been using a pillow in her chair  --Encouraged patient to shift her weight frequently while sitting, ambulate as much as possible, at least several times per day and her home.      3. Urinary incontinence, unspecified type    6/9/2020: Complicating factor.  -Barrier paste (zinc) to protect skin and wound from urine  -Recommend appropriate absorptive undergarments to wick moisture away from skin         Patient was seen for 15 minutes face to face of which > 50% of appointment time was spent on counseling and coordination of care regarding the above.    Please note that this note may have been created using voice recognition software. I have worked with technical experts from [employer] to optimize the interface.  I have made every reasonable attempt to correct obvious  errors, but there may be errors of grammar and possibly content that I did not discover before finalizing the note.

## 2020-06-09 NOTE — PROGRESS NOTES
"Patient was to be discharged this visit due to wound resolution. After this appointment patient's niece verbalized being upset that patient was discharged, stating \"there is a new wound, how could she be discharged\".  This RN and clinic manager spoke with patient rhett who is her caregiver regarding discharge due to patient no longer having a wound. Photo reviewed with niseverino via iPad. Niseverino pointed out area that appeared scabbed to this RN.  Patient, niece, this RN, and clinic manager came to agreement that patient will be seen in one week with patient niece in the room for final assessment prior to discharge. Patient's niece to be allowed in room for visits if any more appointments occur.              "

## 2020-06-16 ENCOUNTER — APPOINTMENT (OUTPATIENT)
Dept: WOUND CARE | Facility: MEDICAL CENTER | Age: 85
End: 2020-06-16
Attending: INTERNAL MEDICINE
Payer: MEDICARE

## 2020-06-17 ENCOUNTER — APPOINTMENT (OUTPATIENT)
Dept: WOUND CARE | Facility: MEDICAL CENTER | Age: 85
End: 2020-06-17
Attending: INTERNAL MEDICINE
Payer: MEDICARE

## 2020-06-23 ENCOUNTER — OFFICE VISIT (OUTPATIENT)
Dept: WOUND CARE | Facility: MEDICAL CENTER | Age: 85
End: 2020-06-23
Attending: INTERNAL MEDICINE
Payer: MEDICARE

## 2020-06-23 VITALS
TEMPERATURE: 98.4 F | DIASTOLIC BLOOD PRESSURE: 57 MMHG | HEART RATE: 86 BPM | OXYGEN SATURATION: 93 % | SYSTOLIC BLOOD PRESSURE: 123 MMHG | RESPIRATION RATE: 16 BRPM

## 2020-06-23 DIAGNOSIS — Z91.89 SEDENTARY LIFESTYLE: ICD-10-CM

## 2020-06-23 DIAGNOSIS — L89.313 PRESSURE INJURY OF RIGHT BUTTOCK, STAGE 3 (HCC): ICD-10-CM

## 2020-06-23 DIAGNOSIS — R32 URINARY INCONTINENCE, UNSPECIFIED TYPE: ICD-10-CM

## 2020-06-23 PROCEDURE — 99213 OFFICE O/P EST LOW 20 MIN: CPT

## 2020-06-23 PROCEDURE — 99213 OFFICE O/P EST LOW 20 MIN: CPT | Performed by: NURSE PRACTITIONER

## 2020-06-23 ASSESSMENT — ENCOUNTER SYMPTOMS
BACK PAIN: 1
COUGH: 0
DEPRESSION: 0
HEADACHES: 0
CONSTIPATION: 0
DIARRHEA: 0
FEVER: 0
CHILLS: 0
NERVOUS/ANXIOUS: 0
VOMITING: 0
DIZZINESS: 0
WEIGHT LOSS: 0
NAUSEA: 0
SHORTNESS OF BREATH: 0

## 2020-06-23 NOTE — PROGRESS NOTES
[facility]   [facility]   [address]   [address]   [place] [address]   [phone] Fax: [phone]    Discharge Note        Wound location: Right buttock  Date of Discharge: 06/23/2020    Assessment:  Discharge patient at this time secondary to wound resolution. Patient presents with niseverino Lisa to wound care appointment for final assessment of wound to right buttock. Discussed with patient and niece importance of keeping weight off of area, and continuing to cover area with barrier paste to prevent area from breaking down. Niece and patient verbalize understanding of all education and are agreeable to discharge at this time.

## 2020-06-23 NOTE — PROGRESS NOTES
Provider Encounter- Pressure Injury        HISTORY OF PRESENT ILLNESS  Wound History:    START OF CARE IN CLINIC: 4/3/2020    REFERRING PROVIDER: Basil Banks MD     WOUND- Pressure Injury.    LOCATION and STAGE: Right medial gluteus, shallow stage III   HISTORY: Patient first developed redness and injury to her sacrum and buttocks while hospitalized in August 2019 for back surgery.  Over the following months, wounds to her sacrum and left buttock went on to heal.  However, the wound to her right medial buttock has continued to worsen.  Patient has some dementia, and also back pain issues.  She spends most of her time in a recliner chair, approximately 20 hours/day per daughter.  She is still able to ambulate, and makes a point of walking approximately 30 minutes/day.  She is continent of bowel, and has occasional urinary incontinence.  Her appetite is good.    Pertinent Medical History: Lumbar stenosis, Alzheimer's dementia, urinary incontinence   Contributing factors: Immobility, Activity level, Inadequate support surfaces, Bladder incontinence and Obesity    Personal support: Family     TOBACCO USE: She has never smoked or used tobacco products    Patient's problem list, allergies, and current medications reviewed and updated in Epic    Interval History:  4/3/2020: Initial clinic visit with RUPINDER Mercedes.  Patient presents today with her daughter.  Daughter states she has been treating the wound herself with, Calmoseptine ointment.  She bathes her mother 3 times per week.  Per daughter, patient spends approximately 12 hours/day in her recliner chair due to back issues.    4/16/2020: Clinic visit with RUPINDER Mercedes.  Patient presents to clinic today, again accompanied by her daughter.  Patient states she is feeling well, denies fevers, chills, nausea, vomiting, cough or shortness of breath.  Her daughter was able to obtain a gel cushion, which patient has been using at all times while in her recliner  chair.  Per patient, much more comfortable, relieves not only her buttock pain, but her back pain.  They have not been able to keep dressings in place, and have returned to using calmoseptine over wound, applied generously several times per day.    4/30/2020: Clinic visit with RUPINDER Bowden. Patient states that they are feeling well today.  Patient denies fever, chills, nausea, vomiting, lightheadedness, dizziness, shortness of breath and chest pain.  Patient has been placing calamine lotion to pressure ulcer.  He gave the daughter and patient a bottle of Eduin cream with zinc in it to use over the wound.  Daughter reported that patient spends a significant a time up in a wheelchair/chair I recommended that the daughter take her mother to new motion for seating eval to determine if the cushion that she purchased is appropriate for her mother.  The daughter was a little upset because she had just purchased the new cushion for $40 and now is telling her to get a second opinion on the cushion and a seating evaluation.    5/12/2020 : Clinic visit with FELICIANO Mercedes  Ramya states she is feeling well today,denies fevers, chills, nausea, vomiting, cough or shortness of breath.  She is still having a bit of discomfort from her wound, states she sits on a pillow when she is sitting in her chair.  She has been applying barrier paste to the wound.    5/26/2020 : Clinic visit with FELICIANO Mercedes states she is feeling well today,denies fevers, chills, nausea, vomiting, cough or shortness of breath.  She has been applying calmoseptine to her wound several times per day, as she has not been able to keep the dressing in place.  She feels it is getting better, less painful.    6/9/2020 : Clinic visit with FELICIANO Mercedes states she is feeling well today,denies fevers, chills, nausea, vomiting, cough or shortness of breath.  She continues to apply calmoseptine to her buttocks several times per  "day.  She denies any discomfort from the area.  Her wound is essentially resolved today, though she does still have some scarring.  She is discharged from [facility].  She understands she is return to clinic if wound recurs, or she develops new wounds.    6/23/2020 : Clinic visit with RUPINDER Mercedes.   Ramya presents to the clinic today accompanied by her niece.  Ramya was actually discharged from the clinic 2 weeks ago due to wound resolution.  However, niece was very concerned that she was not included in that visit , and thus did not know the plan patient does have some dementia, is unable to clearly relate information.  Therefore, patient was brought back to clinic with niece present.  I explained to niece that these wound would likely be chronic given the location, \"kissing wounds\", caused by rubbing together of gluteals.  Recommended continue with barrier paste.  Niece states this is problematic as the paste soaks through her clothing.  She also informed me, that patient has a occasional episodes of urinary incontinence.  Patient complains of some burning with urination.  Several options discussed, including nicolle-pad or adult briefs.  Patient apparently has memory issues, does not remember to apply paste, nor does she remember to change her briefs.     REVIEW OF SYSTEMS:   Review of Systems   Constitutional: Negative for chills, fever and weight loss.   Respiratory: Negative for cough and shortness of breath.    Cardiovascular: Positive for leg swelling. Negative for chest pain.   Gastrointestinal: Negative for constipation, diarrhea, nausea and vomiting.   Musculoskeletal: Positive for back pain.        Chronic back pain, history of lumbar stenosis   Neurological: Negative for dizziness and headaches.   Psychiatric/Behavioral: Negative for depression. The patient is not nervous/anxious.         History of Alzheimer's       PHYSICAL EXAMINATION:   /57   Pulse 86   Temp 36.9 °C (98.4 °F) (Temporal)   Resp " 16   SpO2 93%     Physical Exam   Constitutional: She is well-developed, well-nourished, and in no distress.   She is obese   HENT:   Head: Normocephalic.   Eyes: Pupils are equal, round, and reactive to light.   Pulmonary/Chest: Effort normal.   Abdominal: Soft.   Musculoskeletal:         General: Edema present.      Comments: 2+ edema bilateral lower extremities   Neurological: She is alert.   She is a bit forgetful, poor historian  Relies upon daughter to provide information regarding her health   Skin: Skin is warm.   Resolved right medial buttock pressure ulcer   Psychiatric: Mood, affect and judgment normal.   Poor memory       WOUND ASSESSMENT                   PROCEDURE: Excisional debridement of right medial buttock ulcer  - no need for debridement today, wound is resolved.    -Zinc barrier paste applied by wound RN, refer to flowsheet  -Patient tolerated the procedure well, without c/o pain or discomfort.     Pertinent Labs and Diagnostics:    Labs:     A1c:   Lab Results   Component Value Date/Time    HBA1C 5.3 10/01/2019 05:18 AM          IMAGING: No results found.    VASCULAR STUDIES: No results found.    LAST  WOUND CULTURE:   Lab Results   Component Value Date/Time    CULTRSULT No acid fast bacilli detected. 09/23/2019 12:59 PM    CULTRSULT No fungal growth. 09/23/2019 12:59 PM    CULTRSULT No growth at 72 hours. 09/23/2019 12:59 PM    CULTRSULT No Anaerobes isolated. 09/23/2019 12:59 PM    CULTRSULT No fungal growth. 09/23/2019 12:59 PM    CULTRSULT - (A) 09/23/2019 12:59 PM    CULTRSULT Klebsiella pneumoniae  >100,000 cfu/mL   (A) 09/23/2019 12:59 PM          ASSESSMENT AND PLAN:   1. Pressure injury of right buttock, stage 3 (HCC)  Comments: Pressure ulcers to sacrum and bilateral buttocks started while hospitalized in August 2019.  Sacral and left buttock wounds went on to resolve, right buttock wound has deteriorated.    6/23/2020: Wound remains slightly open, however given location of patient's  wounds, these are quite likely to be chronic.  However, these wounds are not severe enough to require weekly debridements, prior attempts to keep dressings intact have been unsuccessful.  My recommendations are that she continue to apply barrier paste several times per day.  -Discharge from [facility]  -Patient to return to clinic ASAP if wound recurs, or if she develops new wounds  -She is to continue to apply barrier paste several times per day.  Unfortunately she does have memory issues, and this may be a challenge for her.  -Recommendation for adult briefs to manage incontinence, and also to protect clothing for barrier paste    Wound care: Barrier paste    2. Sedentary lifestyle  Comments: Patient spends most of her day in a recliner chair, up to 20 hours/day.    6/23/2020: Patient  has gel cushion for her recliner chair, however states she has been using a pillow in her chair  -Reminded patient to shift her weight frequently while sitting, ambulate as much as possible, at least several times per day and her home.  However, due to her memory issues, uncertain whether or not she will remember to do so.    3. Urinary incontinence, unspecified type    6/23/2020: Per patient's niece, patient is experiencing urinary incontinence more frequently.  Patient also complaining of a sensation with urination.  She is already on Macrobid.  She does have a urologist.  -Barrier paste (zinc) to protect skin and wound from urine  -Recommend appropriate absorptive undergarments to wick moisture away from skin  -Recommended that patient see her urologist regarding dysuria         Patient was seen for 15 minutes face to face of which > 50% of appointment time was spent on counseling and coordination of care regarding the above.    Please note that this note may have been created using voice recognition software. I have worked with technical experts from [email or web address] to optimize the interface.  I have made every reasonable attempt to  correct obvious errors, but there may be errors of grammar and possibly content that I did not discover before finalizing the note.

## 2020-06-26 ENCOUNTER — OFFICE VISIT (OUTPATIENT)
Dept: URGENT CARE | Facility: CLINIC | Age: 85
End: 2020-06-26
Payer: MEDICARE

## 2020-06-26 ENCOUNTER — HOSPITAL ENCOUNTER (OUTPATIENT)
Facility: MEDICAL CENTER | Age: 85
End: 2020-06-26
Attending: FAMILY MEDICINE
Payer: MEDICARE

## 2020-06-26 VITALS
BODY MASS INDEX: 32.17 KG/M2 | HEIGHT: 56 IN | RESPIRATION RATE: 14 BRPM | SYSTOLIC BLOOD PRESSURE: 102 MMHG | WEIGHT: 143 LBS | TEMPERATURE: 97.7 F | DIASTOLIC BLOOD PRESSURE: 60 MMHG | HEART RATE: 83 BPM | OXYGEN SATURATION: 94 %

## 2020-06-26 DIAGNOSIS — N30.00 ACUTE CYSTITIS WITHOUT HEMATURIA: ICD-10-CM

## 2020-06-26 LAB
APPEARANCE UR: CLEAR
BILIRUB UR STRIP-MCNC: NEGATIVE MG/DL
COLOR UR AUTO: YELLOW
GLUCOSE UR STRIP.AUTO-MCNC: NEGATIVE MG/DL
KETONES UR STRIP.AUTO-MCNC: NEGATIVE MG/DL
LEUKOCYTE ESTERASE UR QL STRIP.AUTO: NEGATIVE
NITRITE UR QL STRIP.AUTO: NEGATIVE
PH UR STRIP.AUTO: 7 [PH] (ref 5–8)
PROT UR QL STRIP: NEGATIVE MG/DL
RBC UR QL AUTO: NORMAL
SP GR UR STRIP.AUTO: 1.1
UROBILINOGEN UR STRIP-MCNC: 0.2 MG/DL

## 2020-06-26 PROCEDURE — 87086 URINE CULTURE/COLONY COUNT: CPT

## 2020-06-26 PROCEDURE — 99214 OFFICE O/P EST MOD 30 MIN: CPT | Performed by: FAMILY MEDICINE

## 2020-06-26 PROCEDURE — 81002 URINALYSIS NONAUTO W/O SCOPE: CPT | Performed by: FAMILY MEDICINE

## 2020-06-26 RX ORDER — CEFDINIR 300 MG/1
300 CAPSULE ORAL EVERY 12 HOURS
Qty: 10 CAP | Refills: 0 | Status: SHIPPED | OUTPATIENT
Start: 2020-06-26 | End: 2020-07-01

## 2020-06-26 ASSESSMENT — ENCOUNTER SYMPTOMS
COUGH: 0
FEVER: 0
FLANK PAIN: 0
VOMITING: 0
DIARRHEA: 0
SHORTNESS OF BREATH: 0

## 2020-06-26 ASSESSMENT — FIBROSIS 4 INDEX: FIB4 SCORE: 1.67

## 2020-06-26 NOTE — PROGRESS NOTES
Subjective:     Ramya Bhakta is a 86 y.o. female who presents for UTI (burning, feeling likes she needs to go all the time x over week)    HPI  Pt presents for evaluation of a new problem   Patient with new urinary complaints for the past 1 week  Having urgency and dysuria constantly  Has chronic back pain, however no new back pain  Having mild abdominal upset in the lower abdomen  No hematuria  +Hesitancy  No incomplete voiding  Symptoms all started suddenly and have not been improving  Patient does not drink much water during the day    Review of Systems   Constitutional: Negative for fever.   Respiratory: Negative for cough and shortness of breath.    Cardiovascular: Negative for chest pain.   Gastrointestinal: Negative for diarrhea and vomiting.   Genitourinary: Positive for dysuria and urgency. Negative for flank pain.   Skin: Negative for rash.     PMH:  has a past medical history of Acute pain of right knee (3/6/2017), Arthritis, Cancer (HCC), CATARACT, Dry cough, Glaucoma, Hepatitis (1955), Hepatitis A (1955), Jaundice (1955), and Pain.  MEDS:   Current Outpatient Medications:   •  D-MANNOSE PO, 1,000 mg every day., Disp: , Rfl:   •  azelastine (ASTELIN) 137 MCG/SPRAY nasal spray, Spray 1 Spray in nose 2 times a day., Disp: 30 mL, Rfl: 3  •  gabapentin (NEURONTIN) 100 MG Cap, Take 1 Cap by mouth 3 times a day., Disp: 270 Cap, Rfl: 3  •  nitrofurantoin monohyd macro (MACROBID) 100 MG Cap, Take 1 Cap by mouth 1 time daily as needed., Disp: 90 Cap, Rfl: 3  •  vitamin D 5000 units Tab, Take 5,000 Units by mouth every day., Disp: 30 Tab, Rfl: 2  •  dicyclomine (BENTYL) 10 MG Cap, Take 1 Cap by mouth every 6 hours as needed (abdominal cramp/discomfort)., Disp: 90 Cap, Rfl: 2  •  Omega 3 1200 MG Cap, Take 1 Tab by mouth every day., Disp: , Rfl:   •  artificial tears (EYE LUBRICANT) Ointment ophth ointment, Place 1 Application in both eyes every 8 hours., Disp: , Rfl:   •  Carboxymethylcellulose Sodium (REFRESH  "TEARS OP), 1 Drop by Ophthalmic route 2 Times a Day., Disp: , Rfl:   •  donepezil (ARICEPT) 10 MG tablet, Take 20 mg by mouth every day., Disp: , Rfl:   •  acetaminophen (TYLENOL) 325 MG Tab, Take 650 mg by mouth every four hours as needed., Disp: , Rfl:   ALLERGIES:   Allergies   Allergen Reactions   • Prednisone Unspecified     Pt reports dizziness on this med   • Seasonal      SURGHX:   Past Surgical History:   Procedure Laterality Date   • IRRIGATION AND DEBRIDEMENT, WOUND, AFTER PROCEDURE INVOH N/A 9/23/2019    Procedure: IRRIGATION AND DEBRIDEMENT, WOUND, AFTER PROCEDURE INVOLVING LUMBAR SPINE BY POSTERIOR APPROACH-CEREBROSPINAL FLUID LEAK REPAIR;  Surgeon: Bhanu Lorenz D.O.;  Location: SURGERY [facility];  Service: Neurosurgery   • IRRIGATION & DEBRIDEMENT NEURO N/A 9/23/2019    Procedure: IRRIGATION AND DEBRIDEMENT, WOUND;  Surgeon: Bhanu Lorenz D.O.;  Location: SURGERY [facility];  Service: Neurosurgery   • LUMBAR LAMINECTOMY DISKECTOMY  8/16/2019    Procedure: LAMINECTOMY, SPINE, LUMBAR, WITH [id number];  Surgeon: Bhanu Lorenz D.O.;  Location: SURGERY [facility];  Service: Neurosurgery   • OTHER  2017    removal skin cancer -back and arm   • CATARACT PHACO WITH IOL  12/14/2011    Performed by ALEJANDRA HEALY at SURGERY SAME DAY [facility]   • EYE ENUCLEATION  November 2007    left eye   • HEMORRHOIDECTOMY       SOCHX:  reports that she has never smoked. She has never used smokeless tobacco. She reports current alcohol use. She reports that she does not use drugs.  FH: Family history was reviewed, not contributing to acute illness     Objective:   /60   Pulse 83   Temp 36.5 °C (97.7 °F) (Temporal)   Resp 14   Ht 1.43 m (4' 8.3\")   Wt 64.9 kg (143 lb)   SpO2 94%   BMI 31.72 kg/m²     Physical Exam  Constitutional:       General: She is not in acute distress.     Appearance: She is well-developed. She is not diaphoretic.   HENT:      Head: Normocephalic and atraumatic. "   Pulmonary:      Effort: Pulmonary effort is normal.   Abdominal:      General: Abdomen is flat. Bowel sounds are normal. There is no distension.      Palpations: Abdomen is soft.      Tenderness: There is no right CVA tenderness, left CVA tenderness, guarding or rebound.      Comments: Mild tenderness in suprapubic area and lower quadrants   Skin:     General: Skin is warm and dry.      Findings: No erythema.   Neurological:      Mental Status: She is alert and oriented to person, place, and time.      Sensory: No sensory deficit.   Psychiatric:         Behavior: Behavior normal.         Thought Content: Thought content normal.         Judgment: Judgment normal.       Assessment/Plan:   Assessment    1. Acute cystitis without hematuria  - POCT Urinalysis  - Urine Culture; Future  - cefdinir (OMNICEF) 300 MG Cap; Take 1 Cap by mouth every 12 hours for 5 days.  Dispense: 10 Cap; Refill: 0    Send urine for culture and empirically treat with cefdinir.

## 2020-06-26 NOTE — PATIENT INSTRUCTIONS

## 2020-06-28 LAB
BACTERIA UR CULT: NORMAL
SIGNIFICANT IND 70042: NORMAL
SITE SITE: NORMAL
SOURCE SOURCE: NORMAL

## 2020-07-20 ENCOUNTER — HOSPITAL ENCOUNTER (OUTPATIENT)
Dept: RADIOLOGY | Facility: MEDICAL CENTER | Age: 85
End: 2020-07-20
Attending: NEUROLOGICAL SURGERY
Payer: MEDICARE

## 2020-07-20 DIAGNOSIS — M54.50 LOW BACK PAIN, UNSPECIFIED BACK PAIN LATERALITY, UNSPECIFIED CHRONICITY, UNSPECIFIED WHETHER SCIATICA PRESENT: ICD-10-CM

## 2020-07-20 PROCEDURE — 72110 X-RAY EXAM L-2 SPINE 4/>VWS: CPT

## 2020-09-15 NOTE — PROGRESS NOTES
Outcome: Left Message birthday calls     Please transfer to Patient Outreach Team at [phone] when patient returns call.      Attempt # 1

## 2020-09-16 ENCOUNTER — OFFICE VISIT (OUTPATIENT)
Dept: MEDICAL GROUP | Facility: MEDICAL CENTER | Age: 85
End: 2020-09-16
Payer: MEDICARE

## 2020-09-16 VITALS
OXYGEN SATURATION: 96 % | SYSTOLIC BLOOD PRESSURE: 112 MMHG | TEMPERATURE: 97.9 F | BODY MASS INDEX: 32.39 KG/M2 | WEIGHT: 144 LBS | HEIGHT: 56 IN | HEART RATE: 64 BPM | DIASTOLIC BLOOD PRESSURE: 64 MMHG

## 2020-09-16 DIAGNOSIS — G30.9 ALZHEIMER'S DEMENTIA WITH BEHAVIORAL DISTURBANCE, UNSPECIFIED TIMING OF DEMENTIA ONSET: ICD-10-CM

## 2020-09-16 DIAGNOSIS — G96.198 PSEUDOMENINGOCELE: ICD-10-CM

## 2020-09-16 DIAGNOSIS — N39.0 RECURRENT UTI: ICD-10-CM

## 2020-09-16 DIAGNOSIS — F02.81 ALZHEIMER'S DEMENTIA WITH BEHAVIORAL DISTURBANCE, UNSPECIFIED TIMING OF DEMENTIA ONSET: ICD-10-CM

## 2020-09-16 PROCEDURE — 99214 OFFICE O/P EST MOD 30 MIN: CPT | Performed by: INTERNAL MEDICINE

## 2020-09-16 RX ORDER — NITROFURANTOIN 25; 75 MG/1; MG/1
100 CAPSULE ORAL
Qty: 90 CAP | Refills: 3 | Status: SHIPPED | OUTPATIENT
Start: 2020-09-16 | End: 2020-11-10 | Stop reason: SDUPTHER

## 2020-09-16 RX ORDER — AZELASTINE 1 MG/ML
1 SPRAY, METERED NASAL 2 TIMES DAILY
Qty: 30 ML | Refills: 3 | Status: SHIPPED | OUTPATIENT
Start: 2020-09-16 | End: 2021-04-06

## 2020-09-16 RX ORDER — GABAPENTIN 100 MG/1
100 CAPSULE ORAL 3 TIMES DAILY
Qty: 270 CAP | Refills: 3 | Status: SHIPPED | OUTPATIENT
Start: 2020-09-16 | End: 2021-04-06

## 2020-09-16 ASSESSMENT — FIBROSIS 4 INDEX: FIB4 SCORE: 1.69

## 2020-09-16 NOTE — PROGRESS NOTES
CC: Follow-up recurrent UTIs, Alzheimer's, chronic leg pain.                                                                                                                                      HPI:   Ramya presents today with the following.    1. Recurrent UTI  Presents with a history of recurrent UTIs did have a urinalysis in June did not show infection.  Denies any pain with urination currently she is requesting refills of Macrobid for prophylactic purposes.    2. Alzheimer's dementia with behavioral disturbance, unspecified timing of dementia onset ([facility])  History of dementia currently not on medication she did have issues with diarrhea previously.  She is now in an assisted facility social aspects are better contain.    3. 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 90 Cap 3   • gabapentin (NEURONTIN) 100 MG Cap Take 1 Cap by mouth 3 times a day. 270 Cap 3   • azelastine (ASTELIN) 137 MCG/SPRAY nasal spray [place] 1 Spray in nose 2 times a day. 30 mL 3   • dicyclomine (BENTYL) 10 MG Cap Take 1 Cap by mouth every 6 hours as  "needed (abdominal cramp/discomfort). 120 Cap 2   • D-MANNOSE PO 1,000 mg every day.     • vitamin D 5000 units Tab Take 5,000 Units by mouth every day. 30 Tab 2   • Omega 3 1200 MG Cap Take 1 Tab by mouth every day.     • artificial tears (EYE LUBRICANT) Ointment ophth ointment Place 1 Application in both eyes every 8 hours.     • Carboxymethylcellulose Sodium (REFRESH TEARS OP) 1 Drop by Ophthalmic route 2 Times a Day.     • donepezil (ARICEPT) 10 MG tablet Take 20 mg by mouth every day.     • acetaminophen (TYLENOL) 325 MG Tab Take 650 mg by mouth every four hours as needed.       No current facility-administered medications for this visit.          Allergies as of 09/16/2020 - Reviewed 09/16/2020   Allergen Reaction Noted   • Prednisone Unspecified 08/11/2018   • Seasonal  08/15/2019        ROS: Denies Chest pain, SOB, LE edema.    /64 (BP Location: Left arm, Patient Position: Sitting)   Pulse 64   Temp 36.6 °C (97.9 °F)   Ht 1.422 m (4' 8\")   Wt 65.3 kg (144 lb)   SpO2 96%   BMI 32.28 kg/m²     Physical Exam:  Gen:         Alert and oriented, No apparent distress.  Neck:        No Lymphadenopathy or Bruits.  Lungs:     Clear to auscultation bilaterally  CV:          Regular rate and rhythm. No murmurs, rubs or gallops.               Ext:          No clubbing, cyanosis, edema.      Assessment and Plan.   87 y.o. female with the following issues.    1. Recurrent UTI  No obvious signs of recurrence refilled medications.  - nitrofurantoin (MACROBID) 100 MG Cap; Take 1 Cap by mouth 1 time daily as needed.  Dispense: 90 Cap; Refill: 3    2. Alzheimer's dementia with behavioral disturbance, unspecified timing of dementia onset (HCC)  Following predicted course of should worsen consider neurology referral.    3. s/p laminectomy c/b pseudomeningocele s/p InD  Refilled gabapentin.      "

## 2020-11-10 DIAGNOSIS — N39.0 RECURRENT UTI: ICD-10-CM

## 2020-11-11 RX ORDER — NITROFURANTOIN 25; 75 MG/1; MG/1
100 CAPSULE ORAL
Qty: 90 CAP | Refills: 0 | Status: SHIPPED | OUTPATIENT
Start: 2020-11-11 | End: 2021-04-06

## 2020-11-19 ENCOUNTER — PATIENT MESSAGE (OUTPATIENT)
Dept: NEUROLOGY | Facility: MEDICAL CENTER | Age: 85
End: 2020-11-19

## 2020-11-19 DIAGNOSIS — G30.1 LATE ONSET ALZHEIMER'S DISEASE WITH BEHAVIORAL DISTURBANCE (HCC): ICD-10-CM

## 2020-11-19 DIAGNOSIS — F02.818 LATE ONSET ALZHEIMER'S DISEASE WITH BEHAVIORAL DISTURBANCE (HCC): ICD-10-CM

## 2020-11-19 RX ORDER — DONEPEZIL HYDROCHLORIDE 10 MG/1
20 TABLET, FILM COATED ORAL DAILY
Qty: 180 TAB | Refills: 3 | Status: SHIPPED | OUTPATIENT
Start: 2020-11-19 | End: 2021-04-06

## 2020-11-19 NOTE — PATIENT COMMUNICATION
Received request via: Patient    Was the patient seen in the last year in this department? No     Does the patient have an active prescription (recently filled or refills available) for medication(s) requested? No     Patient has appointment 2/9/2021 at 8:40AM

## 2020-12-01 ENCOUNTER — APPOINTMENT (RX ONLY)
Dept: URBAN - METROPOLITAN AREA CLINIC 4 | Facility: CLINIC | Age: 85
Setting detail: DERMATOLOGY
End: 2020-12-01

## 2020-12-01 DIAGNOSIS — Z85.828 PERSONAL HISTORY OF OTHER MALIGNANT NEOPLASM OF SKIN: ICD-10-CM

## 2020-12-01 DIAGNOSIS — L81.4 OTHER MELANIN HYPERPIGMENTATION: ICD-10-CM

## 2020-12-01 DIAGNOSIS — L82.0 INFLAMED SEBORRHEIC KERATOSIS: ICD-10-CM

## 2020-12-01 DIAGNOSIS — L82.1 OTHER SEBORRHEIC KERATOSIS: ICD-10-CM

## 2020-12-01 DIAGNOSIS — D18.0 HEMANGIOMA: ICD-10-CM

## 2020-12-01 DIAGNOSIS — L57.8 OTHER SKIN CHANGES DUE TO CHRONIC EXPOSURE TO NONIONIZING RADIATION: ICD-10-CM

## 2020-12-01 DIAGNOSIS — D22 MELANOCYTIC NEVI: ICD-10-CM

## 2020-12-01 PROBLEM — D18.01 HEMANGIOMA OF SKIN AND SUBCUTANEOUS TISSUE: Status: ACTIVE | Noted: 2020-12-01

## 2020-12-01 PROBLEM — D22.5 MELANOCYTIC NEVI OF TRUNK: Status: ACTIVE | Noted: 2020-12-01

## 2020-12-01 PROCEDURE — 17110 DESTRUCTION B9 LES UP TO 14: CPT

## 2020-12-01 PROCEDURE — ? LIQUID NITROGEN

## 2020-12-01 PROCEDURE — ? COUNSELING

## 2020-12-01 PROCEDURE — 99214 OFFICE O/P EST MOD 30 MIN: CPT | Mod: 25

## 2020-12-01 ASSESSMENT — LOCATION ZONE DERM
LOCATION ZONE: HAND
LOCATION ZONE: FACE
LOCATION ZONE: NECK
LOCATION ZONE: TRUNK

## 2020-12-01 ASSESSMENT — LOCATION DETAILED DESCRIPTION DERM
LOCATION DETAILED: RIGHT SUPERIOR UPPER BACK
LOCATION DETAILED: RIGHT ULNAR DORSAL HAND
LOCATION DETAILED: RIGHT MEDIAL SUPERIOR CHEST
LOCATION DETAILED: LEFT SUPERIOR ANTERIOR NECK
LOCATION DETAILED: LEFT ULNAR DORSAL HAND
LOCATION DETAILED: RIGHT LATERAL SUPERIOR CHEST
LOCATION DETAILED: RIGHT INFERIOR CENTRAL MALAR CHEEK
LOCATION DETAILED: RIGHT SUPERIOR MEDIAL UPPER BACK

## 2020-12-01 ASSESSMENT — LOCATION SIMPLE DESCRIPTION DERM
LOCATION SIMPLE: RIGHT UPPER BACK
LOCATION SIMPLE: RIGHT CHEEK
LOCATION SIMPLE: LEFT HAND
LOCATION SIMPLE: CHEST
LOCATION SIMPLE: RIGHT HAND
LOCATION SIMPLE: LEFT ANTERIOR NECK

## 2020-12-01 NOTE — PROCEDURE: LIQUID NITROGEN
Duration Of Freeze Thaw-Cycle (Seconds): 3
Medical Necessity Information: It is in your best interest to select a reason for this procedure from the list below. All of these items fulfill various CMS LCD requirements except the new and changing color options.
Aperture Size (Optional): C
Number Of Freeze-Thaw Cycles: 1 freeze-thaw cycle
Medical Necessity Clause: This procedure was medically necessary because the lesions that were treated were:
Consent: The patient's consent was obtained including but not limited to risks of crusting, scabbing, blistering, scarring, darker or lighter pigmentary change, recurrence, incomplete removal and infection.
Include Z78.9 (Other Specified Conditions Influencing Health Status) As An Associated Diagnosis?: No
Detail Level: Detailed
Post-Care Instructions: I reviewed with the patient in detail post-care instructions. Patient is to wear sunprotection, and avoid picking at any of the treated lesions. Pt may apply Vaseline to crusted or scabbing areas.

## 2020-12-16 ENCOUNTER — APPOINTMENT (OUTPATIENT)
Dept: LAB | Facility: MEDICAL CENTER | Age: 85
End: 2020-12-16
Payer: MEDICARE

## 2020-12-16 ENCOUNTER — OFFICE VISIT (OUTPATIENT)
Dept: MEDICAL GROUP | Facility: MEDICAL CENTER | Age: 85
End: 2020-12-16
Payer: MEDICARE

## 2020-12-16 VITALS
HEART RATE: 83 BPM | DIASTOLIC BLOOD PRESSURE: 48 MMHG | BODY MASS INDEX: 32.17 KG/M2 | RESPIRATION RATE: 16 BRPM | HEIGHT: 56 IN | OXYGEN SATURATION: 96 % | WEIGHT: 143 LBS | SYSTOLIC BLOOD PRESSURE: 100 MMHG | TEMPERATURE: 98.6 F

## 2020-12-16 DIAGNOSIS — E78.5 DYSLIPIDEMIA: ICD-10-CM

## 2020-12-16 DIAGNOSIS — L30.4 INTERTRIGO: ICD-10-CM

## 2020-12-16 DIAGNOSIS — G30.1 LATE ONSET ALZHEIMER'S DISEASE WITH BEHAVIORAL DISTURBANCE (HCC): ICD-10-CM

## 2020-12-16 DIAGNOSIS — F02.818 LATE ONSET ALZHEIMER'S DISEASE WITH BEHAVIORAL DISTURBANCE (HCC): ICD-10-CM

## 2020-12-16 DIAGNOSIS — Z00.00 HEALTH CARE MAINTENANCE: ICD-10-CM

## 2020-12-16 DIAGNOSIS — M54.50 CHRONIC LOW BACK PAIN WITHOUT SCIATICA, UNSPECIFIED BACK PAIN LATERALITY: ICD-10-CM

## 2020-12-16 DIAGNOSIS — K21.9 GASTROESOPHAGEAL REFLUX DISEASE WITHOUT ESOPHAGITIS: Chronic | ICD-10-CM

## 2020-12-16 DIAGNOSIS — E55.9 VITAMIN D DEFICIENCY: ICD-10-CM

## 2020-12-16 DIAGNOSIS — G89.29 CHRONIC LOW BACK PAIN WITHOUT SCIATICA, UNSPECIFIED BACK PAIN LATERALITY: ICD-10-CM

## 2020-12-16 PROCEDURE — 99215 OFFICE O/P EST HI 40 MIN: CPT | Performed by: FAMILY MEDICINE

## 2020-12-16 RX ORDER — CLOTRIMAZOLE AND BETAMETHASONE DIPROPIONATE 10; .64 MG/G; MG/G
1 CREAM TOPICAL 2 TIMES DAILY
Qty: 45 G | Refills: 2 | Status: SHIPPED | OUTPATIENT
Start: 2020-12-16 | End: 2021-04-06

## 2020-12-16 ASSESSMENT — PATIENT HEALTH QUESTIONNAIRE - PHQ9: CLINICAL INTERPRETATION OF PHQ2 SCORE: 0

## 2020-12-16 ASSESSMENT — FIBROSIS 4 INDEX: FIB4 SCORE: 1.69

## 2020-12-16 NOTE — PROGRESS NOTES
CC: New patient: Dementia, hyperlipidemia, acid reflux, chronic back pain, vitamin D deficiency, intertrigo    HPI:  Ramya presents today to establish new PCP.    Patient came in today with her daughter, she has dementia however she has been living  in an independent living facility.  The following chronic medical issues reviewed.    Late onset Alzheimer's disease with behavioral disturbance (HCC)  Patient has been slowly progressive dementia.  She lives at an independent senior facility where they help her with her medication, food, and clinic.  Her daughter visits her every week to help with showering and other things , she is physically active.      Dyslipidemia  Last lipid panel showed:  Ref Range & Units 4yr ago    Cholesterol,Tot 100 - 199 mg/dL 233High     Triglycerides 0 - 149 mg/dL 85    HDL >=40 mg/dL 69    LDL <100 mg/dL 147High       No history of diabetes, CAD, or stroke.    Gastroesophageal reflux disease without esophagitis  Patient has been using over-the-counter antacids and it has been helping.      Chronic low back pain without sciatica, unspecified back pain laterality  Patient has history of laminectomy.  She has been doing fine on gabapentin 100 mg 3 times a day    Vitamin D deficiency  Last vitamin D level was 26, she has been on vitamin D 5000 units daily.    Intertrigo  Patient has been having red scaly skin between the buttocks.  Denies any pain.    Vaccinations are up-to-date.      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 45 g 2   • donepezil (ARICEPT) 10 MG tablet Take 2 Tabs by mouth every day. 180 Tab 3   • nitrofurantoin (MACROBID) 100 MG Cap Take 1 Cap by mouth 1 time daily as needed. 90 Cap 0   • gabapentin (NEURONTIN) 100 MG Cap Take 1 Cap by mouth 3 times a day. 270 Cap 3   • azelastine (ASTELIN) 137 MCG/SPRAY nasal spray [place] 1 Spray in nose 2 times a day. 30 mL 3   • dicyclomine (BENTYL) 10 MG Cap Take 1 Cap by mouth every 6 hours as needed (abdominal cramp/discomfort). 120 Cap 2   • D-MANNOSE PO 1,000 mg every day.     • vitamin D 5000 units Tab Take 5,000 Units by mouth every day. 30 Tab 2   • Omega 3 1200 MG Cap Take 1 Tab by mouth every day.     • artificial tears (EYE LUBRICANT) Ointment ophth ointment Place 1 Application in both eyes every 8 hours.     • Carboxymethylcellulose Sodium (REFRESH TEARS OP) 1 Drop by Ophthalmic route 2 Times a Day.     • acetaminophen (TYLENOL) 325 MG Tab Take 650 mg by mouth every four hours as needed.       No current facility-administered medications for this visit.          Allergies as of 12/16/2020 - Reviewed 12/16/2020   Allergen Reaction Noted   • Seasonal  08/15/2019        Social History     Socioeconomic History   • Marital status: Single     Spouse name: Not on file   • Number of children: Not on file   • Years of education: Not on file   • Highest education level: Not on file   Occupational History   • Not on file   Social Needs   • Financial resource strain: Not on file   • Food insecurity     Worry: Not on file     Inability: Not on file   • Transportation needs     Medical: Not on file     Non-medical: Not on file   Tobacco Use   • Smoking status: Never Smoker   • Smokeless  tobacco: Never Used   Substance and Sexual Activity   • Alcohol use: Yes     Alcohol/week: 0.0 oz     Frequency: Monthly or less     Comment: 1-2/mth   • Drug use: No   • Sexual activity: Never   Lifestyle   • Physical activity     Days per week: Not on file     Minutes per session: Not on file   • Stress: Not on file   Relationships   • Social connections     Talks on phone: Not on file     Gets together: Not on file     Attends Restorationist service: Not on file     Active member of club or organization: Not on file     Attends meetings of clubs or organizations: Not on file     Relationship status: Not on file   • Intimate partner violence     Fear of current or ex partner: Not on file     Emotionally abused: Not on file     Physically abused: Not on file     Forced sexual activity: Not on file   Other Topics Concern   • Not on file   Social History Narrative   • Not on file       Family History   Problem Relation Age of Onset   • Heart Disease Mother    • Lung Disease Father    • Stroke Brother    • Cancer Sister         breast   • Heart Disease Sister    • Stroke Maternal Grandfather        Past Surgical History:   Procedure Laterality Date   • IRRIGATION AND DEBRIDEMENT, WOUND, AFTER PROCEDURE INVOH N/A 9/23/2019    Procedure: IRRIGATION AND DEBRIDEMENT, WOUND, AFTER PROCEDURE INVOLVING LUMBAR SPINE BY POSTERIOR APPROACH-CEREBROSPINAL FLUID LEAK REPAIR;  Surgeon: Bhanu Lorenz D.O.;  Location: SURGERY [facility];  Service: Neurosurgery   • IRRIGATION & DEBRIDEMENT NEURO N/A 9/23/2019    Procedure: IRRIGATION AND DEBRIDEMENT, WOUND;  Surgeon: Bhanu Lorenz D.O.;  Location: [facility];  Service: Neurosurgery   • LUMBAR LAMINECTOMY DISKECTOMY  8/16/2019    Procedure: LAMINECTOMY, SPINE, LUMBAR, WITH NPIAMDRSPI-X3-3;  Surgeon: Bhanu Lorenz D.O.;  Location: SURGERY [facility];  Service: Neurosurgery   • OTHER  2017    removal skin cancer -back and arm   • CATARACT PHACO WITH IOL  12/14/2011  "   Performed by ALEJANDRA HEALY at SURGERY SAME DAY [facility] ORS   • EYE ENUCLEATION  November 2007    left eye   • HEMORRHOIDECTOMY         ROS:  Denies any Headache, Blurred Vision, Confusion Chest pain,  Shortness of breath,  Abdominal pain, Changes of bowel or bladder, Lower ext edema, Fevers, Nights sweats, Weight Changes, Focal weakness or numbness.  All other systems are negative.    /48 (BP Location: Right arm, Patient Position: Sitting, BP Cuff Size: Adult)   Pulse 83   Temp 37 °C (98.6 °F) (Temporal)   Resp 16   Ht 1.422 m (4' 8\")   Wt 64.9 kg (143 lb)   SpO2 96%   BMI 32.06 kg/m²     Physical Exam:  Gen:         Alert and oriented, No apparent distress.  HEENT:   P[place]la, TM clear,  Oralpharynx no erythema or exudates.  Neck:       No Jugular venous distension, Lymphadenopathy, Thyromegaly, Bruits.  Lungs:     Clear to auscultation bilaterally  CV:          Regular rate and rhythm. No murmurs, rubs or gallops.  Abd:         Soft non tender, non distended. Normal active bowel sounds. No                                        Hepatosplenomegaly, No pulsatile masses.  Ext:          No clubbing, cyanosis, edema.  Skin         red scaly skin between the buttocks.    Assessment and Plan.   87 y.o. female     1. Late onset Alzheimer's disease with behavioral disturbance (HCC)  Slowly progressive, however patient is physically independent, lives in an independent senior facility.    - CBC WITH DIFFERENTIAL; Future  - Comp Metabolic Panel; Future  - TSH; Future    2. Dyslipidemia  Last lipid panel showed:  Ref Range & Units 4yr ago    Cholesterol,Tot 100 - 199 mg/dL 233High     Triglycerides 0 - 149 mg/dL 85    HDL >=40 mg/dL 69    LDL <100 mg/dL 147High       Patient is counseled on lifestyle medication.  - Lipid Profile; Future    3. Gastroesophageal reflux disease without esophagitis  Uses over-the-counter stuff.    4. Chronic low back pain without sciatica, unspecified back pain laterality  History of " laminectomy.  She has been doing fine on gabapentin 100 mg 3 times a day    5. Health care maintenance  Vaccinations are up-to-date.    6. Vitamin D deficiency  Last vitamin D level was 326  Continue vitamin D 5000 units daily.    - VITAMIN D,25 HYDROXY; Future    7. Intertrigo  Advised use clotrimazole cream twice a day.    - clotrimazole-betamethasone (LOTRISONE) 1-0.05 % Cream; Apply 1 Application topically 2 times a day.  Dispense: 45 g; Refill: 2

## 2020-12-17 ENCOUNTER — HOSPITAL ENCOUNTER (OUTPATIENT)
Dept: LAB | Facility: MEDICAL CENTER | Age: 85
End: 2020-12-17
Attending: FAMILY MEDICINE
Payer: MEDICARE

## 2020-12-17 DIAGNOSIS — E55.9 VITAMIN D DEFICIENCY: ICD-10-CM

## 2020-12-17 DIAGNOSIS — E78.5 DYSLIPIDEMIA: ICD-10-CM

## 2020-12-17 DIAGNOSIS — F02.818 LATE ONSET ALZHEIMER'S DISEASE WITH BEHAVIORAL DISTURBANCE (HCC): ICD-10-CM

## 2020-12-17 DIAGNOSIS — G30.1 LATE ONSET ALZHEIMER'S DISEASE WITH BEHAVIORAL DISTURBANCE (HCC): ICD-10-CM

## 2020-12-17 LAB
25(OH)D3 SERPL-MCNC: 55 NG/ML (ref 30–100)
ALBUMIN SERPL BCP-MCNC: 4.2 G/DL (ref 3.2–4.9)
ALBUMIN/GLOB SERPL: 1.3 G/DL
ALP SERPL-CCNC: 98 U/L (ref 30–99)
ALT SERPL-CCNC: 11 U/L (ref 2–50)
ANION GAP SERPL CALC-SCNC: 5 MMOL/L (ref 7–16)
AST SERPL-CCNC: 18 U/L (ref 12–45)
BASOPHILS # BLD AUTO: 0.6 % (ref 0–1.8)
BASOPHILS # BLD: 0.03 K/UL (ref 0–0.12)
BILIRUB SERPL-MCNC: 0.5 MG/DL (ref 0.1–1.5)
BUN SERPL-MCNC: 30 MG/DL (ref 8–22)
CALCIUM SERPL-MCNC: 10 MG/DL (ref 8.5–10.5)
CHLORIDE SERPL-SCNC: 107 MMOL/L (ref 96–112)
CHOLEST SERPL-MCNC: 208 MG/DL (ref 100–199)
CO2 SERPL-SCNC: 29 MMOL/L (ref 20–33)
CREAT SERPL-MCNC: 0.81 MG/DL (ref 0.5–1.4)
EOSINOPHIL # BLD AUTO: 0.12 K/UL (ref 0–0.51)
EOSINOPHIL NFR BLD: 2.3 % (ref 0–6.9)
ERYTHROCYTE [DISTWIDTH] IN BLOOD BY AUTOMATED COUNT: 49.5 FL (ref 35.9–50)
FASTING STATUS PATIENT QL REPORTED: NORMAL
GLOBULIN SER CALC-MCNC: 3.3 G/DL (ref 1.9–3.5)
GLUCOSE SERPL-MCNC: 89 MG/DL (ref 65–99)
HCT VFR BLD AUTO: 45.1 % (ref 37–47)
HDLC SERPL-MCNC: 63 MG/DL
HGB BLD-MCNC: 14.3 G/DL (ref 12–16)
IMM GRANULOCYTES # BLD AUTO: 0.01 K/UL (ref 0–0.11)
IMM GRANULOCYTES NFR BLD AUTO: 0.2 % (ref 0–0.9)
LDLC SERPL CALC-MCNC: 128 MG/DL
LYMPHOCYTES # BLD AUTO: 1.35 K/UL (ref 1–4.8)
LYMPHOCYTES NFR BLD: 26.3 % (ref 22–41)
MCH RBC QN AUTO: 31 PG (ref 27–33)
MCHC RBC AUTO-ENTMCNC: 31.7 G/DL (ref 33.6–35)
MCV RBC AUTO: 97.8 FL (ref 81.4–97.8)
MONOCYTES # BLD AUTO: 0.52 K/UL (ref 0–0.85)
MONOCYTES NFR BLD AUTO: 10.1 % (ref 0–13.4)
NEUTROPHILS # BLD AUTO: 3.1 K/UL (ref 2–7.15)
NEUTROPHILS NFR BLD: 60.5 % (ref 44–72)
NRBC # BLD AUTO: 0 K/UL
NRBC BLD-RTO: 0 /100 WBC
PLATELET # BLD AUTO: 234 K/UL (ref 164–446)
PMV BLD AUTO: 9.7 FL (ref 9–12.9)
POTASSIUM SERPL-SCNC: 4.7 MMOL/L (ref 3.6–5.5)
PROT SERPL-MCNC: 7.5 G/DL (ref 6–8.2)
RBC # BLD AUTO: 4.61 M/UL (ref 4.2–5.4)
SODIUM SERPL-SCNC: 141 MMOL/L (ref 135–145)
TRIGL SERPL-MCNC: 85 MG/DL (ref 0–149)
TSH SERPL DL<=0.005 MIU/L-ACNC: 1.36 UIU/ML (ref 0.38–5.33)
WBC # BLD AUTO: 5.1 K/UL (ref 4.8–10.8)

## 2020-12-17 PROCEDURE — 85025 COMPLETE CBC W/AUTO DIFF WBC: CPT

## 2020-12-17 PROCEDURE — 36415 COLL VENOUS BLD VENIPUNCTURE: CPT

## 2020-12-17 PROCEDURE — 80053 COMPREHEN METABOLIC PANEL: CPT

## 2020-12-17 PROCEDURE — 82306 VITAMIN D 25 HYDROXY: CPT

## 2020-12-17 PROCEDURE — 80061 LIPID PANEL: CPT

## 2020-12-17 PROCEDURE — 84443 ASSAY THYROID STIM HORMONE: CPT

## 2021-01-11 DIAGNOSIS — Z23 NEED FOR VACCINATION: ICD-10-CM

## 2021-02-01 RX ORDER — DICYCLOMINE HYDROCHLORIDE 10 MG/1
10 CAPSULE ORAL EVERY 6 HOURS PRN
Qty: 120 CAP | Refills: 2 | Status: SHIPPED | OUTPATIENT
Start: 2021-02-01 | End: 2021-04-06

## 2021-02-01 NOTE — TELEPHONE ENCOUNTER
Received request via: Pharmacy    Was the patient seen in the last year in this department? Yes    Does the patient have an active prescription (recently filled or refills available) for medication(s) requested? No     Requested Prescriptions     Pending Prescriptions Disp Refills   • dicyclomine (BENTYL) 10 MG Cap 120 Cap 2     Sig: Take 1 Cap by mouth every 6 hours as needed (abdominal cramp/discomfort).

## 2021-04-02 ENCOUNTER — PATIENT MESSAGE (OUTPATIENT)
Dept: HEALTH INFORMATION MANAGEMENT | Facility: OTHER | Age: 86
End: 2021-04-02

## 2021-04-06 ENCOUNTER — OFFICE VISIT (OUTPATIENT)
Dept: NEUROLOGY | Facility: MEDICAL CENTER | Age: 86
End: 2021-04-06
Attending: PSYCHIATRY & NEUROLOGY
Payer: MEDICARE

## 2021-04-06 VITALS
OXYGEN SATURATION: 96 % | HEART RATE: 75 BPM | BODY MASS INDEX: 32.06 KG/M2 | SYSTOLIC BLOOD PRESSURE: 102 MMHG | TEMPERATURE: 97.8 F | WEIGHT: 143 LBS | DIASTOLIC BLOOD PRESSURE: 58 MMHG | RESPIRATION RATE: 15 BRPM

## 2021-04-06 DIAGNOSIS — F02.818 LATE ONSET ALZHEIMER'S DISEASE WITH BEHAVIORAL DISTURBANCE (HCC): Primary | ICD-10-CM

## 2021-04-06 DIAGNOSIS — G30.1 LATE ONSET ALZHEIMER'S DISEASE WITH BEHAVIORAL DISTURBANCE (HCC): Primary | ICD-10-CM

## 2021-04-06 PROCEDURE — 99211 OFF/OP EST MAY X REQ PHY/QHP: CPT | Performed by: PSYCHIATRY & NEUROLOGY

## 2021-04-06 PROCEDURE — 99213 OFFICE O/P EST LOW 20 MIN: CPT | Performed by: PSYCHIATRY & NEUROLOGY

## 2021-04-06 ASSESSMENT — FIBROSIS 4 INDEX: FIB4 SCORE: 2.02

## 2021-04-06 NOTE — PROGRESS NOTES
Subjective:      Ramya Bhakta is a 87 y.o. female who presents with her niece Lisa, as always, for follow-up, with a history of moderate Alzheimer's dementia.  Most of the history is gotten from Lisa on today's examination.     HPI    When seen about 6 months ago, Ramya had seem to be doing well.  Still a resident at [facility], she was doing well.  On Aricept 10 mg, I had increased to a 20 mg dose which she was taking as presented to her by the staff.  She was eating well, enjoying her time with her table mates.  She was participating in activities.  She was not hallucinating, there were no reports of behavioral changes.    According to Lisa, she is still maintaining her apartment in a safe fashion.  She evidently has been engaging in conversation, there has been no apparent issue with language, but she is much more forgetful, from moment to moment forgetting everything, repeating herself incessantly.  Content of conversation is curtailed and limited.    There have been no issues with incontinence, she is still independent with her ADLs.  As above, she was taking medications when given to her, but in the last month she has decided not to take anything.  Essentially she has been refusing all medicines.  During this time Lisa actually noticed her aunt brightening up noticeably.  She is more spry with walking, energy level improved.    Ramya states that she is doing well.  She recognizes that she has memory issues, but beyond that very little as to its degree.    Medical, surgical and family histories are reviewed, there are no new drug allergies.  She is presently on no medications, including her supplements.    Review of Systems   Unable to perform ROS: Dementia        Objective:     /58 (BP Location: Right arm)   Pulse 75   Temp 36.6 °C (97.8 °F) (Temporal)   Resp 15   Wt 64.9 kg (143 lb)   SpO2 96%   BMI 32.06 kg/m²      Physical Exam    She appears in no acute distress.   She is clean and appropriately dressed.  She is cooperative.  She is very perseverative, follows commands but has difficulty maintaining some attention.  Her vital signs are stable.  There is no malar rash or sialorrhea.  Her neck is supple.  Cardiac evaluation is unremarkable.    Partially oriented only, she does know the month and day of the week as well as the city, but requires cueing for other aspects.  She is redundant, more field dependent, word finding issues are more prominent, paraphasic errors are used more commonly.  Content of speech is more constrained.  Mental processing speed is more noticeably slowed.  MoCA is now 13/30, a noticeable difference from 21/30 back in July, 2019.  There is now some rostrocaudal extinction, frontal release signs are present.    PERRLA/EOMI, to threat visual fields are grossly full, there is no bradykinesia or hypophonia.  Sensory exam is intact to temperature, there is no bulbar dysfunction.    There is no tremor or drift.  Strength is intact throughout.  There are no tonal abnormalities.    She stands easily, gait is normal and station and stride length.  There is no appendicular dystaxia.  Repetitive movements in all 4 extremities are symmetric, slightly slowed, but normal in amplitude.    Sensory exam is intact to temperature.     Assessment/Plan:     1. Late onset Alzheimer's disease with behavioral disturbance (HCC)  There has been a noticeable worsening clinically based on objective testing over the last nearly 2 years.  Still, her spirit and energy level has improved off medication, and I think it is less best to keep her off the donepezil and Neurontin.  Given the severity of her cognitive symptoms, I doubt trying a different pharmacologic agent is likely to prove more beneficial.    Spent some time talking with Lisa in regards to all of the above.  I doubt that he is really understanding much of any of this.  She is in very good spirits nonetheless.  It is best  to leave things well enough alone.  She is in a safe living environment where her daily cares are provided for.  We can follow-up in 6 months.    Time: 25 minutes spent face-to-face for exam, review, discussion, and education, of this over 50% of the time spent counseling and coordinating care.

## 2021-04-28 ENCOUNTER — PATIENT OUTREACH (OUTPATIENT)
Dept: HEALTH INFORMATION MANAGEMENT | Facility: OTHER | Age: 86
End: 2021-04-28

## 2021-04-28 NOTE — PROGRESS NOTES
Outcome: Declined Comprehensive Health Assessment pt currently at a SNF    Please transfer to Patient Outreach Team at [phone] when patient returns call.      Attempt # 2

## 2021-06-02 ENCOUNTER — APPOINTMENT (RX ONLY)
Dept: URBAN - METROPOLITAN AREA CLINIC 4 | Facility: CLINIC | Age: 86
Setting detail: DERMATOLOGY
End: 2021-06-02

## 2021-06-02 DIAGNOSIS — L82.1 OTHER SEBORRHEIC KERATOSIS: ICD-10-CM

## 2021-06-02 DIAGNOSIS — L57.0 ACTINIC KERATOSIS: ICD-10-CM

## 2021-06-02 DIAGNOSIS — D22 MELANOCYTIC NEVI: ICD-10-CM

## 2021-06-02 DIAGNOSIS — L81.4 OTHER MELANIN HYPERPIGMENTATION: ICD-10-CM

## 2021-06-02 DIAGNOSIS — D18.0 HEMANGIOMA: ICD-10-CM

## 2021-06-02 DIAGNOSIS — L57.8 OTHER SKIN CHANGES DUE TO CHRONIC EXPOSURE TO NONIONIZING RADIATION: ICD-10-CM

## 2021-06-02 DIAGNOSIS — Z85.828 PERSONAL HISTORY OF OTHER MALIGNANT NEOPLASM OF SKIN: ICD-10-CM

## 2021-06-02 PROBLEM — D22.5 MELANOCYTIC NEVI OF TRUNK: Status: ACTIVE | Noted: 2021-06-02

## 2021-06-02 PROBLEM — D18.01 HEMANGIOMA OF SKIN AND SUBCUTANEOUS TISSUE: Status: ACTIVE | Noted: 2021-06-02

## 2021-06-02 PROBLEM — D48.5 NEOPLASM OF UNCERTAIN BEHAVIOR OF SKIN: Status: ACTIVE | Noted: 2021-06-02

## 2021-06-02 PROCEDURE — 99213 OFFICE O/P EST LOW 20 MIN: CPT | Mod: 25

## 2021-06-02 PROCEDURE — ? BIOPSY BY SHAVE METHOD

## 2021-06-02 PROCEDURE — ? LIQUID NITROGEN

## 2021-06-02 PROCEDURE — 17000 DESTRUCT PREMALG LESION: CPT | Mod: 59

## 2021-06-02 PROCEDURE — ? COUNSELING

## 2021-06-02 PROCEDURE — 11102 TANGNTL BX SKIN SINGLE LES: CPT

## 2021-06-02 ASSESSMENT — LOCATION ZONE DERM
LOCATION ZONE: ARM
LOCATION ZONE: NECK
LOCATION ZONE: HAND
LOCATION ZONE: TRUNK
LOCATION ZONE: FACE

## 2021-06-02 ASSESSMENT — LOCATION SIMPLE DESCRIPTION DERM
LOCATION SIMPLE: LEFT FOREARM
LOCATION SIMPLE: LEFT ANTERIOR NECK
LOCATION SIMPLE: LEFT HAND
LOCATION SIMPLE: RIGHT CHEEK
LOCATION SIMPLE: RIGHT HAND
LOCATION SIMPLE: RIGHT UPPER BACK

## 2021-06-02 ASSESSMENT — LOCATION DETAILED DESCRIPTION DERM
LOCATION DETAILED: RIGHT SUPERIOR UPPER BACK
LOCATION DETAILED: RIGHT ULNAR DORSAL HAND
LOCATION DETAILED: LEFT ULNAR DORSAL HAND
LOCATION DETAILED: LEFT DISTAL DORSAL FOREARM
LOCATION DETAILED: RIGHT INFERIOR CENTRAL MALAR CHEEK
LOCATION DETAILED: LEFT SUPERIOR ANTERIOR NECK
LOCATION DETAILED: RIGHT SUPERIOR MEDIAL UPPER BACK

## 2021-06-02 NOTE — PROCEDURE: LIQUID NITROGEN
Render Post-Care Instructions In Note?: no
Post-Care Instructions: I reviewed with the patient in detail post-care instructions. Patient is to wear sunprotection, and avoid picking at any of the treated lesions. Pt may apply Vaseline to crusted or scabbing areas.
Number Of Freeze-Thaw Cycles: 2 freeze-thaw cycles
Detail Level: Simple
Consent: The patient's consent was obtained including but not limited to risks of crusting, scabbing, blistering, scarring, darker or lighter pigmentary change, recurrence, incomplete removal and infection.
Aperture Size (Optional): C
Duration Of Freeze Thaw-Cycle (Seconds): 3

## 2021-06-02 NOTE — PROCEDURE: COUNSELING
Daily Note     Today's date: 2019  Patient name: Anh Rivera  : 1972  MRN: [id number]  Referring provider: Susana Nickerson 
Detail Level: Detailed
Detail Level: Zone

## 2021-06-14 ENCOUNTER — TELEPHONE (OUTPATIENT)
Dept: MEDICAL GROUP | Facility: MEDICAL CENTER | Age: 86
End: 2021-06-14

## 2021-06-14 NOTE — TELEPHONE ENCOUNTER
ESTABLISHED PATIENT PRE-VISIT PLANNING     Patient was NOT contacted to complete PVP.     Note: Patient will not be contacted if there is no indication to call.     1.  Reviewed notes from the last few office visits within the medical group: Yes    2.  If any orders were placed at last visit or intended to be done for this visit (i.e. 6 mos follow-up), do we have Results/Consult Notes?         •  Labs - Labs ordered, completed on 12/17/2020 and results are in chart.  Note: If patient appointment is for lab review and patient did not complete labs, check with provider if OK to reschedule patient until labs completed.       •  Imaging - Imaging was not ordered at last office visit.       •  Referrals - No referrals were ordered at last office visit.    3. Is this appointment scheduled as a Hospital Follow-Up? No    4.  Immunizations were updated in Epic using Reconcile Outside Information activity? Yes    5.  Patient is due for the following Health Maintenance Topics:   Health Maintenance Due   Topic Date Due   • Annual Wellness Visit  05/20/2020         6.  AHA (Pulse8) form printed for Provider? Yes

## 2021-06-21 ENCOUNTER — OFFICE VISIT (OUTPATIENT)
Dept: URGENT CARE | Facility: CLINIC | Age: 86
End: 2021-06-21
Payer: MEDICARE

## 2021-06-21 ENCOUNTER — APPOINTMENT (OUTPATIENT)
Dept: MEDICAL GROUP | Facility: MEDICAL CENTER | Age: 86
End: 2021-06-21
Payer: MEDICARE

## 2021-06-21 VITALS
SYSTOLIC BLOOD PRESSURE: 100 MMHG | HEIGHT: 60 IN | HEART RATE: 96 BPM | BODY MASS INDEX: 25.13 KG/M2 | OXYGEN SATURATION: 95 % | WEIGHT: 128 LBS | TEMPERATURE: 98.2 F | RESPIRATION RATE: 14 BRPM | DIASTOLIC BLOOD PRESSURE: 62 MMHG

## 2021-06-21 DIAGNOSIS — R30.0 DYSURIA: ICD-10-CM

## 2021-06-21 LAB
APPEARANCE UR: CLEAR
BILIRUB UR STRIP-MCNC: NEGATIVE MG/DL
COLOR UR AUTO: YELLOW
GLUCOSE UR STRIP.AUTO-MCNC: NEGATIVE MG/DL
KETONES UR STRIP.AUTO-MCNC: NEGATIVE MG/DL
LEUKOCYTE ESTERASE UR QL STRIP.AUTO: NEGATIVE
NITRITE UR QL STRIP.AUTO: NEGATIVE
PH UR STRIP.AUTO: 5 [PH] (ref 5–8)
PROT UR QL STRIP: NORMAL MG/DL
RBC UR QL AUTO: NORMAL
SP GR UR STRIP.AUTO: 1.03
UROBILINOGEN UR STRIP-MCNC: 0.2 MG/DL

## 2021-06-21 PROCEDURE — 81002 URINALYSIS NONAUTO W/O SCOPE: CPT | Performed by: PHYSICIAN ASSISTANT

## 2021-06-21 PROCEDURE — 99213 OFFICE O/P EST LOW 20 MIN: CPT | Performed by: PHYSICIAN ASSISTANT

## 2021-06-21 RX ORDER — CEFDINIR 300 MG/1
300 CAPSULE ORAL EVERY 12 HOURS
Qty: 10 CAPSULE | Refills: 0 | Status: SHIPPED | OUTPATIENT
Start: 2021-06-21 | End: 2021-06-26

## 2021-06-21 ASSESSMENT — ENCOUNTER SYMPTOMS
BACK PAIN: 0
FEVER: 0
VOMITING: 0
FLANK PAIN: 0
ABDOMINAL PAIN: 0
SHORTNESS OF BREATH: 0
COUGH: 0
PALPITATIONS: 0
NAUSEA: 0
CHILLS: 0

## 2021-06-21 ASSESSMENT — FIBROSIS 4 INDEX: FIB4 SCORE: 2.02

## 2021-06-21 NOTE — PROGRESS NOTES
Subjective:   Ramya Bhakta is a 87 y.o. female who presents for UTI (x 4 days with pain in R flank, urgency and frequency. )      UTI  This is a new problem. The current episode started in the past 7 days. The problem occurs constantly. Pertinent negatives include no abdominal pain, chest pain, chills, coughing, fever, nausea or vomiting. Nothing aggravates the symptoms.       Review of Systems   Constitutional: Negative for chills and fever.   Respiratory: Negative for cough and shortness of breath.    Cardiovascular: Negative for chest pain and palpitations.   Gastrointestinal: Negative for abdominal pain, nausea and vomiting.   Genitourinary: Positive for dysuria, frequency and urgency. Negative for flank pain and hematuria.   Musculoskeletal: Negative for back pain.   All other systems reviewed and are negative.      Medications:    • cefdinir Caps    Allergies: Seasonal    Problem List: Ramya Bhakta does not have any pertinent problems on file.    Surgical History:  Past Surgical History:   Procedure Laterality Date   • IRRIGATION AND DEBRIDEMENT, WOUND, AFTER PROCEDURE INVOH N/A 9/23/2019    Procedure: IRRIGATION AND DEBRIDEMENT, WOUND, AFTER PROCEDURE INVOLVING LUMBAR SPINE BY POSTERIOR APPROACH-CEREBROSPINAL FLUID LEAK REPAIR;  Surgeon: Bhanu Lorenz D.O.;  Location: SURGERY [facility];  Service: Neurosurgery   • IRRIGATION & DEBRIDEMENT NEURO N/A 9/23/2019    Procedure: IRRIGATION AND DEBRIDEMENT, WOUND;  Surgeon: Bhanu Lorenz D.O.;  Location: [facility];  Service: Neurosurgery   • LUMBAR LAMINECTOMY DISKECTOMY  8/16/2019    Procedure: LAMINECTOMY, SPINE, LUMBAR, WITH [id number]-[id number];  Surgeon: Bhanu Lorenz D.O.;  Location: SURGERY [facility];  Service: Neurosurgery   • OTHER  2017    removal skin cancer -back and arm   • CATARACT PHACO WITH IOL  12/14/2011    Performed by ALEJANDRA EHALY at SURGERY SAME DAY [facility] ORS   • EYE ENUCLEATION  November 2007    left eye   •  HEMORRHOIDECTOMY         Past Social Hx: Ramya Bhakta  reports that she has never smoked. She has never used smokeless tobacco. She reports current alcohol use. She reports that she does not use drugs.     Past Family Hx:  Ramya Bhakta family history includes Cancer in her sister; Heart Disease in her mother and sister; Lung Disease in her father; Stroke in her brother and maternal grandfather.     Problem list, medications, and allergies reviewed by myself today in Epic.     Objective:     Blood Pressure 100/62   Pulse 96   Temperature 36.8 °C (98.2 °F) (Temporal)   Respiration 14   Height 1.524 m (5')   Weight 58.1 kg (128 lb)   Oxygen Saturation 95%   Body Mass Index 25.00 kg/m²     Physical Exam  Vitals reviewed.   Constitutional:       Appearance: She is well-developed.   HENT:      Head: Normocephalic and atraumatic.      Right Ear: External ear normal.      Left Ear: External ear normal.      Nose: Nose normal.   Cardiovascular:      Rate and Rhythm: Normal rate and regular rhythm.      Heart sounds: Normal heart sounds.   Pulmonary:      Effort: Pulmonary effort is normal.      Breath sounds: Normal breath sounds.   Abdominal:      General: Bowel sounds are normal. There is no distension.      Palpations: Abdomen is soft. There is no mass.      Tenderness: There is no abdominal tenderness. There is no right CVA tenderness, left CVA tenderness, guarding or rebound.      Hernia: No hernia is present.   Musculoskeletal:      Cervical back: Normal range of motion and neck supple.   Skin:     General: Skin is warm and dry.   Neurological:      Mental Status: She is alert and oriented to person, place, and time.   Psychiatric:         Behavior: Behavior normal.         Thought Content: Thought content normal.         Judgment: Judgment normal.         Assessment/Plan:     Medical Decision Making/Comments     Pt is an 87 yr old female who presents for evaluation of dysuria.  Pt states she has had  dysuria, frequency, and urgency for 3 days.  Denies fevers, flank pain/chills, nausea/vomiting, or vaginal discharge.  Pt is not pregnant, diabetic or immunocompromised.  No use of catheters.  Vital signs normal.  Pt does not appear ill or altered mental status.  There is no PTP of the abdomen or CVA tenderness.  UA has moderate blood.  Not enough to culture.   Diagnosis and associated orders     1. Dysuria  POCT Urinalysis    cefdinir (OMNICEF) 300 MG Cap              Differential diagnosis, natural history, supportive care, and indications for immediate follow-up discussed.    Advised the patient to follow-up with the primary care physician for recheck, reevaluation, and consideration of further management.    Please note that this dictation was created using voice recognition software. I have made a reasonable attempt to correct obvious errors, but I expect that there are errors of grammar and possibly content that I did not discover before finalizing the note.

## 2021-06-22 ENCOUNTER — APPOINTMENT (RX ONLY)
Dept: URBAN - METROPOLITAN AREA CLINIC 4 | Facility: CLINIC | Age: 86
Setting detail: DERMATOLOGY
End: 2021-06-22

## 2021-06-22 PROBLEM — C44.622 SQUAMOUS CELL CARCINOMA OF SKIN OF RIGHT UPPER LIMB, INCLUDING SHOULDER: Status: ACTIVE | Noted: 2021-06-22

## 2021-06-22 PROCEDURE — 13121 CMPLX RPR S/A/L 2.6-7.5 CM: CPT

## 2021-06-22 PROCEDURE — 11603 EXC TR-EXT MAL+MARG 2.1-3 CM: CPT

## 2021-06-22 PROCEDURE — ? EXCISION

## 2021-06-22 NOTE — PROCEDURE: EXCISION
Surgeon (Optional): Vishnu
Biopsy Photograph Reviewed: Yes
Previous Accession (Optional): [id number]
Size Of Lesion In Cm: 1.9
X Size Of Lesion In Cm (Optional): 0
Size Of Margin In Cm: 0.2
Anesthesia Volume In Cc: 12
Was An Eye Clamp Used?: No
Eye Clamp Note Details: An eye clamp was used during the procedure.
Excision Method: Fusiform
Repair Type: Complex
Suturegard Retention Suture: 2-0 Nylon
Retention Suture Bite Size: 3 mm
Length To Time In Minutes Device Was In Place: 10
Number Of Hemigard Strips Per Side: 1
Intermediate / Complex Repair - Final Wound Length In Cm: 4.6
Width Of Defect Perpendicular To Closure In Cm (Required): 1.4
Distance Of Undermining In Cm (Required): 1.8
Undermining Type: Entire Wound
Debridement Text: The wound edges were debrided prior to proceeding with the closure to facilitate wound healing.
Helical Rim Text: The closure involved the helical rim.
Vermilion Border Text: The closure involved the vermilion border.
Nostril Rim Text: The closure involved the nostril rim.
Retention Suture Text: Retention sutures were placed to support the closure and prevent dehiscence.
Lab: [id number]
Lab Facility: 
Graft Donor Site Bandage (Optional-Leave Blank If You Don't Want In Note): Steri-strips and a pressure bandage were applied to the donor site.
Epidermal Closure Graft Donor Site (Optional): simple interrupted
Billing Type: Third-Party Bill
Excision Depth: adipose tissue
Scalpel Size: 15 blade
Anesthesia Type: 1% lidocaine with epinephrine
Additional Anesthesia Volume In Cc: 6
Hemostasis: Electrocautery
Estimated Blood Loss (Cc): minimal
Detail Level: Detailed
Repair Anesthesia Method: local infiltration
Deep Sutures: 4-0 Vicryl
Dermal Closure: buried vertical mattress
Epidermal Sutures: 5-0 Caprosyn
Epidermal Closure: running subcuticular
Wound Care: Bacitracin
Dressing: dry sterile dressing
Suturegard Intro: Intraoperative tissue expansion was performed, utilizing the SUTUREGARD device, in order to reduce wound tension.
Suturegard Body: The suture ends were repeatedly re-tightened and re-clamped to achieve the desired tissue expansion.
Hemigard Intro: Due to skin fragility and wound tension, it was decided to use HEMIGARD adhesive retention suture devices to permit a linear closure. The skin was cleaned and dried for a 6cm distance away from the wound. Excessive hair, if present, was removed to allow for adhesion.
Hemigard Postcare Instructions: The HEMIGARD strips are to remain completely dry for at least 5-7 days.
Positioning (Leave Blank If You Do Not Want): The patient was placed in a comfortable position exposing the surgical site.
Pre-Excision Curettage Text (Leave Blank If You Do Not Want): Prior to drawing the surgical margin the visible lesion was removed with electrodesiccation and curettage to clearly define the lesion size.
Complex Repair Preamble Text (Leave Blank If You Do Not Want): Extensive wide undermining was performed.
Intermediate Repair Preamble Text (Leave Blank If You Do Not Want): Undermining was performed with blunt dissection.
Curvilinear Excision Additional Text (Leave Blank If You Do Not Want): The margin was drawn around the clinically apparent lesion.  A curvilinear shape was then drawn on the skin incorporating the lesion and margins.  Incisions were then made along these lines to the appropriate tissue plane and the lesion was extirpated.
Fusiform Excision Additional Text (Leave Blank If You Do Not Want): The margin was drawn around the clinically apparent lesion.  A fusiform shape was then drawn on the skin incorporating the lesion and margins.  Incisions were then made along these lines to the appropriate tissue plane and the lesion was extirpated.
Elliptical Excision Additional Text (Leave Blank If You Do Not Want): The margin was drawn around the clinically apparent lesion.  An elliptical shape was then drawn on the skin incorporating the lesion and margins.  Incisions were then made along these lines to the appropriate tissue plane and the lesion was extirpated.
Saucerization Excision Additional Text (Leave Blank If You Do Not Want): The margin was drawn around the clinically apparent lesion.  Incisions were then made along these lines, in a tangential fashion, to the appropriate tissue plane and the lesion was extirpated.
Slit Excision Additional Text (Leave Blank If You Do Not Want): A linear line was drawn on the skin overlying the lesion. An incision was made slowly until the lesion was visualized.  Once visualized, the lesion was removed with blunt dissection.
Excisional Biopsy Additional Text (Leave Blank If You Do Not Want): The margin was drawn around the clinically apparent lesion. An elliptical shape was then drawn on the skin incorporating the lesion and margins.  Incisions were then made along these lines to the appropriate tissue plane and the lesion was extirpated.
Perilesional Excision Additional Text (Leave Blank If You Do Not Want): The margin was drawn around the clinically apparent lesion. Incisions were then made along these lines to the appropriate tissue plane and the lesion was extirpated.
Repair Performed By Another Provider Text (Leave Blank If You Do Not Want): After the tissue was excised the defect was repaired by another provider.
No Repair - Repaired With Adjacent Surgical Defect Text (Leave Blank If You Do Not Want): After the excision the defect was repaired concurrently with another surgical defect which was in close approximation.
Advancement Flap (Single) Text: The defect edges were debeveled with a #15 scalpel blade.  Given the location of the defect and the proximity to free margins a single advancement flap was deemed most appropriate.  Using a sterile surgical marker, an appropriate advancement flap was drawn incorporating the defect and placing the expected incisions within the relaxed skin tension lines where possible.    The area thus outlined was incised deep to adipose tissue with a #15 scalpel blade.  The skin margins were undermined to an appropriate distance in all directions utilizing iris scissors.
Advancement Flap (Double) Text: The defect edges were debeveled with a #15 scalpel blade.  Given the location of the defect and the proximity to free margins a double advancement flap was deemed most appropriate.  Using a sterile surgical marker, the appropriate advancement flaps were drawn incorporating the defect and placing the expected incisions within the relaxed skin tension lines where possible.    The area thus outlined was incised deep to adipose tissue with a #15 scalpel blade.  The skin margins were undermined to an appropriate distance in all directions utilizing iris scissors.
Burow's Advancement Flap Text: The defect edges were debeveled with a #15 scalpel blade.  Given the location of the defect and the proximity to free margins a Burow's advancement flap was deemed most appropriate.  Using a sterile surgical marker, the appropriate advancement flap was drawn incorporating the defect and placing the expected incisions within the relaxed skin tension lines where possible.    The area thus outlined was incised deep to adipose tissue with a #15 scalpel blade.  The skin margins were undermined to an appropriate distance in all directions utilizing iris scissors.
Chonodrocutaneous Helical Advancement Flap Text: The defect edges were debeveled with a #15 scalpel blade.  Given the location of the defect and the proximity to free margins a chondrocutaneous helical advancement flap was deemed most appropriate.  Using a sterile surgical marker, the appropriate advancement flap was drawn incorporating the defect and placing the expected incisions within the relaxed skin tension lines where possible.    The area thus outlined was incised deep to adipose tissue with a #15 scalpel blade.  The skin margins were undermined to an appropriate distance in all directions utilizing iris scissors.
Crescentic Advancement Flap Text: The defect edges were debeveled with a #15 scalpel blade.  Given the location of the defect and the proximity to free margins a crescentic advancement flap was deemed most appropriate.  Using a sterile surgical marker, the appropriate advancement flap was drawn incorporating the defect and placing the expected incisions within the relaxed skin tension lines where possible.    The area thus outlined was incised deep to adipose tissue with a #15 scalpel blade.  The skin margins were undermined to an appropriate distance in all directions utilizing iris scissors.
A-T Advancement Flap Text: The defect edges were debeveled with a #15 scalpel blade.  Given the location of the defect, shape of the defect and the proximity to free margins an A-T advancement flap was deemed most appropriate.  Using a sterile surgical marker, an appropriate advancement flap was drawn incorporating the defect and placing the expected incisions within the relaxed skin tension lines where possible.    The area thus outlined was incised deep to adipose tissue with a #15 scalpel blade.  The skin margins were undermined to an appropriate distance in all directions utilizing iris scissors.
O-T Advancement Flap Text: The defect edges were debeveled with a #15 scalpel blade.  Given the location of the defect, shape of the defect and the proximity to free margins an O-T advancement flap was deemed most appropriate.  Using a sterile surgical marker, an appropriate advancement flap was drawn incorporating the defect and placing the expected incisions within the relaxed skin tension lines where possible.    The area thus outlined was incised deep to adipose tissue with a #15 scalpel blade.  The skin margins were undermined to an appropriate distance in all directions utilizing iris scissors.
O-L Flap Text: The defect edges were debeveled with a #15 scalpel blade.  Given the location of the defect, shape of the defect and the proximity to free margins an O-L flap was deemed most appropriate.  Using a sterile surgical marker, an appropriate advancement flap was drawn incorporating the defect and placing the expected incisions within the relaxed skin tension lines where possible.    The area thus outlined was incised deep to adipose tissue with a #15 scalpel blade.  The skin margins were undermined to an appropriate distance in all directions utilizing iris scissors.
O-Z Flap Text: The defect edges were debeveled with a #15 scalpel blade.  Given the location of the defect, shape of the defect and the proximity to free margins an O-Z flap was deemed most appropriate.  Using a sterile surgical marker, an appropriate transposition flap was drawn incorporating the defect and placing the expected incisions within the relaxed skin tension lines where possible. The area thus outlined was incised deep to adipose tissue with a #15 scalpel blade.  The skin margins were undermined to an appropriate distance in all directions utilizing iris scissors.
Double O-Z Flap Text: The defect edges were debeveled with a #15 scalpel blade.  Given the location of the defect, shape of the defect and the proximity to free margins a Double O-Z flap was deemed most appropriate.  Using a sterile surgical marker, an appropriate transposition flap was drawn incorporating the defect and placing the expected incisions within the relaxed skin tension lines where possible. The area thus outlined was incised deep to adipose tissue with a #15 scalpel blade.  The skin margins were undermined to an appropriate distance in all directions utilizing iris scissors.
V-Y Flap Text: The defect edges were debeveled with a #15 scalpel blade.  Given the location of the defect, shape of the defect and the proximity to free margins a V-Y flap was deemed most appropriate.  Using a sterile surgical marker, an appropriate advancement flap was drawn incorporating the defect and placing the expected incisions within the relaxed skin tension lines where possible.    The area thus outlined was incised deep to adipose tissue with a #15 scalpel blade.  The skin margins were undermined to an appropriate distance in all directions utilizing iris scissors.
Advancement-Rotation Flap Text: The defect edges were debeveled with a #15 scalpel blade.  Given the location of the defect, shape of the defect and the proximity to free margins an advancement-rotation flap was deemed most appropriate.  Using a sterile surgical marker, an appropriate flap was drawn incorporating the defect and placing the expected incisions within the relaxed skin tension lines where possible. The area thus outlined was incised deep to adipose tissue with a #15 scalpel blade.  The skin margins were undermined to an appropriate distance in all directions utilizing iris scissors.
Mercedes Flap Text: The defect edges were debeveled with a #15 scalpel blade.  Given the location of the defect, shape of the defect and the proximity to free margins a Mercedes flap was deemed most appropriate.  Using a sterile surgical marker, an appropriate advancement flap was drawn incorporating the defect and placing the expected incisions within the relaxed skin tension lines where possible. The area thus outlined was incised deep to adipose tissue with a #15 scalpel blade.  The skin margins were undermined to an appropriate distance in all directions utilizing iris scissors.
Modified Advancement Flap Text: The defect edges were debeveled with a #15 scalpel blade.  Given the location of the defect, shape of the defect and the proximity to free margins a modified advancement flap was deemed most appropriate.  Using a sterile surgical marker, an appropriate advancement flap was drawn incorporating the defect and placing the expected incisions within the relaxed skin tension lines where possible.    The area thus outlined was incised deep to adipose tissue with a #15 scalpel blade.  The skin margins were undermined to an appropriate distance in all directions utilizing iris scissors.
Mucosal Advancement Flap Text: Given the location of the defect, shape of the defect and the proximity to free margins a mucosal advancement flap was deemed most appropriate. Incisions were made with a 15 blade scalpel in the appropriate fashion along the cutaneous vermilion border and the mucosal lip. The remaining actinically damaged mucosal tissue was excised.  The mucosal advancement flap was then elevated to the gingival sulcus with care taken to preserve the neurovascular structures and advanced into the primary defect. Care was taken to ensure that precise realignment of the vermilion border was achieved.
Peng Advancement Flap Text: The defect edges were debeveled with a #15 scalpel blade.  Given the location of the defect, shape of the defect and the proximity to free margins a Peng advancement flap was deemed most appropriate.  Using a sterile surgical marker, an appropriate advancement flap was drawn incorporating the defect and placing the expected incisions within the relaxed skin tension lines where possible. The area thus outlined was incised deep to adipose tissue with a #15 scalpel blade.  The skin margins were undermined to an appropriate distance in all directions utilizing iris scissors.
Hatchet Flap Text: The defect edges were debeveled with a #15 scalpel blade.  Given the location of the defect, shape of the defect and the proximity to free margins a hatchet flap was deemed most appropriate.  Using a sterile surgical marker, an appropriate hatchet flap was drawn incorporating the defect and placing the expected incisions within the relaxed skin tension lines where possible.    The area thus outlined was incised deep to adipose tissue with a #15 scalpel blade.  The skin margins were undermined to an appropriate distance in all directions utilizing iris scissors.
Rotation Flap Text: The defect edges were debeveled with a #15 scalpel blade.  Given the location of the defect, shape of the defect and the proximity to free margins a rotation flap was deemed most appropriate.  Using a sterile surgical marker, an appropriate rotation flap was drawn incorporating the defect and placing the expected incisions within the relaxed skin tension lines where possible.    The area thus outlined was incised deep to adipose tissue with a #15 scalpel blade.  The skin margins were undermined to an appropriate distance in all directions utilizing iris scissors.
Spiral Flap Text: The defect edges were debeveled with a #15 scalpel blade.  Given the location of the defect, shape of the defect and the proximity to free margins a spiral flap was deemed most appropriate.  Using a sterile surgical marker, an appropriate rotation flap was drawn incorporating the defect and placing the expected incisions within the relaxed skin tension lines where possible. The area thus outlined was incised deep to adipose tissue with a #15 scalpel blade.  The skin margins were undermined to an appropriate distance in all directions utilizing iris scissors.
Star Wedge Flap Text: The defect edges were debeveled with a #15 scalpel blade.  Given the location of the defect, shape of the defect and the proximity to free margins a star wedge flap was deemed most appropriate.  Using a sterile surgical marker, an appropriate rotation flap was drawn incorporating the defect and placing the expected incisions within the relaxed skin tension lines where possible. The area thus outlined was incised deep to adipose tissue with a #15 scalpel blade.  The skin margins were undermined to an appropriate distance in all directions utilizing iris scissors.
Transposition Flap Text: The defect edges were debeveled with a #15 scalpel blade.  Given the location of the defect and the proximity to free margins a transposition flap was deemed most appropriate.  Using a sterile surgical marker, an appropriate transposition flap was drawn incorporating the defect.    The area thus outlined was incised deep to adipose tissue with a #15 scalpel blade.  The skin margins were undermined to an appropriate distance in all directions utilizing iris scissors.
Muscle Hinge Flap Text: The defect edges were debeveled with a #15 scalpel blade.  Given the size, depth and location of the defect and the proximity to free margins a muscle hinge flap was deemed most appropriate.  Using a sterile surgical marker, an appropriate hinge flap was drawn incorporating the defect. The area thus outlined was incised with a #15 scalpel blade.  The skin margins were undermined to an appropriate distance in all directions utilizing iris scissors.
Nasal Turnover Hinge Flap Text: The defect edges were debeveled with a #15 scalpel blade.  Given the size, depth, location of the defect and the defect being full thickness a nasal turnover hinge flap was deemed most appropriate.  Using a sterile surgical marker, an appropriate hinge flap was drawn incorporating the defect. The area thus outlined was incised with a #15 scalpel blade. The flap was designed to recreate the nasal mucosal lining and the alar rim. The skin margins were undermined to an appropriate distance in all directions utilizing iris scissors.
Nasalis-Muscle-Based Myocutaneous Island Pedicle Flap Text: Using a #15 blade, an incision was made around the donor flap to the level of the nasalis muscle. Wide lateral undermining was then performed in both the subcutaneous plane above the nasalis muscle, and in a submuscular plane just above periosteum. This allowed the formation of a free nasalis muscle axial pedicle (based on the angular artery) which was still attached to the actual cutaneous flap, increasing its mobility and vascular viability. Hemostasis was obtained with pinpoint electrocoagulation. The flap was mobilized into position and the pivotal anchor points positioned and stabilized with buried interrupted sutures. Subcutaneous and dermal tissues were closed in a multilayered fashion with sutures. Tissue redundancies were excised, and the epidermal edges were apposed without significant tension and sutured with sutures.
Orbicularis Oris Muscle Flap Text: The defect edges were debeveled with a #15 scalpel blade.  Given that the defect affected the competency of the oral sphincter an orbicularis oris muscle flap was deemed most appropriate to restore this competency and normal muscle function.  Using a sterile surgical marker, an appropriate flap was drawn incorporating the defect. The area thus outlined was incised with a #15 scalpel blade.
Melolabial Transposition Flap Text: The defect edges were debeveled with a #15 scalpel blade.  Given the location of the defect and the proximity to free margins a melolabial flap was deemed most appropriate.  Using a sterile surgical marker, an appropriate melolabial transposition flap was drawn incorporating the defect.    The area thus outlined was incised deep to adipose tissue with a #15 scalpel blade.  The skin margins were undermined to an appropriate distance in all directions utilizing iris scissors.
Rhombic Flap Text: The defect edges were debeveled with a #15 scalpel blade.  Given the location of the defect and the proximity to free margins a rhombic flap was deemed most appropriate.  Using a sterile surgical marker, an appropriate rhombic flap was drawn incorporating the defect.    The area thus outlined was incised deep to adipose tissue with a #15 scalpel blade.  The skin margins were undermined to an appropriate distance in all directions utilizing iris scissors.
Rhomboid Transposition Flap Text: The defect edges were debeveled with a #15 scalpel blade.  Given the location of the defect and the proximity to free margins a rhomboid transposition flap was deemed most appropriate.  Using a sterile surgical marker, an appropriate rhomboid flap was drawn incorporating the defect.    The area thus outlined was incised deep to adipose tissue with a #15 scalpel blade.  The skin margins were undermined to an appropriate distance in all directions utilizing iris scissors.
Bi-Rhombic Flap Text: The defect edges were debeveled with a #15 scalpel blade.  Given the location of the defect and the proximity to free margins a bi-rhombic flap was deemed most appropriate.  Using a sterile surgical marker, an appropriate rhombic flap was drawn incorporating the defect. The area thus outlined was incised deep to adipose tissue with a #15 scalpel blade.  The skin margins were undermined to an appropriate distance in all directions utilizing iris scissors.
Helical Rim Advancement Flap Text: The defect edges were debeveled with a #15 blade scalpel.  Given the location of the defect and the proximity to free margins (helical rim) a double helical rim advancement flap was deemed most appropriate.  Using a sterile surgical marker, the appropriate advancement flaps were drawn incorporating the defect and placing the expected incisions between the helical rim and antihelix where possible.  The area thus outlined was incised through and through with a #15 scalpel blade.  With a skin hook and iris scissors, the flaps were gently and sharply undermined and freed up.
Bilateral Helical Rim Advancement Flap Text: The defect edges were debeveled with a #15 blade scalpel.  Given the location of the defect and the proximity to free margins (helical rim) a bilateral helical rim advancement flap was deemed most appropriate.  Using a sterile surgical marker, the appropriate advancement flaps were drawn incorporating the defect and placing the expected incisions between the helical rim and antihelix where possible.  The area thus outlined was incised through and through with a #15 scalpel blade.  With a skin hook and iris scissors, the flaps were gently and sharply undermined and freed up.
Ear Star Wedge Flap Text: The defect edges were debeveled with a #15 blade scalpel.  Given the location of the defect and the proximity to free margins (helical rim) an ear star wedge flap was deemed most appropriate.  Using a sterile surgical marker, the appropriate flap was drawn incorporating the defect and placing the expected incisions between the helical rim and antihelix where possible.  The area thus outlined was incised through and through with a #15 scalpel blade.
Banner Transposition Flap Text: The defect edges were debeveled with a #15 scalpel blade.  Given the location of the defect and the proximity to free margins a Banner transposition flap was deemed most appropriate.  Using a sterile surgical marker, an appropriate flap drawn around the defect. The area thus outlined was incised deep to adipose tissue with a #15 scalpel blade.  The skin margins were undermined to an appropriate distance in all directions utilizing iris scissors.
Bilobed Flap Text: The defect edges were debeveled with a #15 scalpel blade.  Given the location of the defect and the proximity to free margins a bilobe flap was deemed most appropriate.  Using a sterile surgical marker, an appropriate bilobe flap drawn around the defect.    The area thus outlined was incised deep to adipose tissue with a #15 scalpel blade.  The skin margins were undermined to an appropriate distance in all directions utilizing iris scissors.
Bilobed Transposition Flap Text: The defect edges were debeveled with a #15 scalpel blade.  Given the location of the defect and the proximity to free margins a bilobed transposition flap was deemed most appropriate.  Using a sterile surgical marker, an appropriate bilobe flap drawn around the defect.    The area thus outlined was incised deep to adipose tissue with a #15 scalpel blade.  The skin margins were undermined to an appropriate distance in all directions utilizing iris scissors.
Trilobed Flap Text: The defect edges were debeveled with a #15 scalpel blade.  Given the location of the defect and the proximity to free margins a trilobed flap was deemed most appropriate.  Using a sterile surgical marker, an appropriate trilobed flap drawn around the defect.    The area thus outlined was incised deep to adipose tissue with a #15 scalpel blade.  The skin margins were undermined to an appropriate distance in all directions utilizing iris scissors.
Dorsal Nasal Flap Text: The defect edges were debeveled with a #15 scalpel blade.  Given the location of the defect and the proximity to free margins a dorsal nasal flap was deemed most appropriate.  Using a sterile surgical marker, an appropriate dorsal nasal flap was drawn around the defect.    The area thus outlined was incised deep to adipose tissue with a #15 scalpel blade.  The skin margins were undermined to an appropriate distance in all directions utilizing iris scissors.
Island Pedicle Flap Text: The defect edges were debeveled with a #15 scalpel blade.  Given the location of the defect, shape of the defect and the proximity to free margins an island pedicle advancement flap was deemed most appropriate.  Using a sterile surgical marker, an appropriate advancement flap was drawn incorporating the defect, outlining the appropriate donor tissue and placing the expected incisions within the relaxed skin tension lines where possible.    The area thus outlined was incised deep to adipose tissue with a #15 scalpel blade.  The skin margins were undermined to an appropriate distance in all directions around the primary defect and laterally outward around the island pedicle utilizing iris scissors.  There was minimal undermining beneath the pedicle flap.
Island Pedicle Flap With Canthal Suspension Text: The defect edges were debeveled with a #15 scalpel blade.  Given the location of the defect, shape of the defect and the proximity to free margins an island pedicle advancement flap was deemed most appropriate.  Using a sterile surgical marker, an appropriate advancement flap was drawn incorporating the defect, outlining the appropriate donor tissue and placing the expected incisions within the relaxed skin tension lines where possible. The area thus outlined was incised deep to adipose tissue with a #15 scalpel blade.  The skin margins were undermined to an appropriate distance in all directions around the primary defect and laterally outward around the island pedicle utilizing iris scissors.  There was minimal undermining beneath the pedicle flap. A suspension suture was placed in the canthal tendon to prevent tension and prevent ectropion.
Alar Island Pedicle Flap Text: The defect edges were debeveled with a #15 scalpel blade.  Given the location of the defect, shape of the defect and the proximity to the alar rim an island pedicle advancement flap was deemed most appropriate.  Using a sterile surgical marker, an appropriate advancement flap was drawn incorporating the defect, outlining the appropriate donor tissue and placing the expected incisions within the nasal ala running parallel to the alar rim. The area thus outlined was incised with a #15 scalpel blade.  The skin margins were undermined minimally to an appropriate distance in all directions around the primary defect and laterally outward around the island pedicle utilizing iris scissors.  There was minimal undermining beneath the pedicle flap.
Double Island Pedicle Flap Text: The defect edges were debeveled with a #15 scalpel blade.  Given the location of the defect, shape of the defect and the proximity to free margins a double island pedicle advancement flap was deemed most appropriate.  Using a sterile surgical marker, an appropriate advancement flap was drawn incorporating the defect, outlining the appropriate donor tissue and placing the expected incisions within the relaxed skin tension lines where possible.    The area thus outlined was incised deep to adipose tissue with a #15 scalpel blade.  The skin margins were undermined to an appropriate distance in all directions around the primary defect and laterally outward around the island pedicle utilizing iris scissors.  There was minimal undermining beneath the pedicle flap.
Island Pedicle Flap-Requiring Vessel Identification Text: The defect edges were debeveled with a #15 scalpel blade.  Given the location of the defect, shape of the defect and the proximity to free margins an island pedicle advancement flap was deemed most appropriate.  Using a sterile surgical marker, an appropriate advancement flap was drawn, based on the axial vessel mentioned above, incorporating the defect, outlining the appropriate donor tissue and placing the expected incisions within the relaxed skin tension lines where possible.    The area thus outlined was incised deep to adipose tissue with a #15 scalpel blade.  The skin margins were undermined to an appropriate distance in all directions around the primary defect and laterally outward around the island pedicle utilizing iris scissors.  There was minimal undermining beneath the pedicle flap.
Keystone Flap Text: The defect edges were debeveled with a #15 scalpel blade.  Given the location of the defect, shape of the defect a keystone flap was deemed most appropriate.  Using a sterile surgical marker, an appropriate keystone flap was drawn incorporating the defect, outlining the appropriate donor tissue and placing the expected incisions within the relaxed skin tension lines where possible. The area thus outlined was incised deep to adipose tissue with a #15 scalpel blade.  The skin margins were undermined to an appropriate distance in all directions around the primary defect and laterally outward around the flap utilizing iris scissors.
O-T Plasty Text: The defect edges were debeveled with a #15 scalpel blade.  Given the location of the defect, shape of the defect and the proximity to free margins an O-T plasty was deemed most appropriate.  Using a sterile surgical marker, an appropriate O-T plasty was drawn incorporating the defect and placing the expected incisions within the relaxed skin tension lines where possible.    The area thus outlined was incised deep to adipose tissue with a #15 scalpel blade.  The skin margins were undermined to an appropriate distance in all directions utilizing iris scissors.
O-Z Plasty Text: The defect edges were debeveled with a #15 scalpel blade.  Given the location of the defect, shape of the defect and the proximity to free margins an O-Z plasty (double transposition flap) was deemed most appropriate.  Using a sterile surgical marker, the appropriate transposition flaps were drawn incorporating the defect and placing the expected incisions within the relaxed skin tension lines where possible.    The area thus outlined was incised deep to adipose tissue with a #15 scalpel blade.  The skin margins were undermined to an appropriate distance in all directions utilizing iris scissors.  Hemostasis was achieved with electrocautery.  The flaps were then transposed into place, one clockwise and the other counterclockwise, and anchored with interrupted buried subcutaneous sutures.
Double O-Z Plasty Text: The defect edges were debeveled with a #15 scalpel blade.  Given the location of the defect, shape of the defect and the proximity to free margins a Double O-Z plasty (double transposition flap) was deemed most appropriate.  Using a sterile surgical marker, the appropriate transposition flaps were drawn incorporating the defect and placing the expected incisions within the relaxed skin tension lines where possible. The area thus outlined was incised deep to adipose tissue with a #15 scalpel blade.  The skin margins were undermined to an appropriate distance in all directions utilizing iris scissors.  Hemostasis was achieved with electrocautery.  The flaps were then transposed into place, one clockwise and the other counterclockwise, and anchored with interrupted buried subcutaneous sutures.
V-Y Plasty Text: The defect edges were debeveled with a #15 scalpel blade.  Given the location of the defect, shape of the defect and the proximity to free margins an V-Y advancement flap was deemed most appropriate.  Using a sterile surgical marker, an appropriate advancement flap was drawn incorporating the defect and placing the expected incisions within the relaxed skin tension lines where possible.    The area thus outlined was incised deep to adipose tissue with a #15 scalpel blade.  The skin margins were undermined to an appropriate distance in all directions utilizing iris scissors.
H Plasty Text: Given the location of the defect, shape of the defect and the proximity to free margins a H-plasty was deemed most appropriate for repair.  Using a sterile surgical marker, the appropriate advancement arms of the H-plasty were drawn incorporating the defect and placing the expected incisions within the relaxed skin tension lines where possible. The area thus outlined was incised deep to adipose tissue with a #15 scalpel blade. The skin margins were undermined to an appropriate distance in all directions utilizing iris scissors.  The opposing advancement arms were then advanced into place in opposite direction and anchored with interrupted buried subcutaneous sutures.
W Plasty Text: The lesion was extirpated to the level of the fat with a #15 scalpel blade.  Given the location of the defect, shape of the defect and the proximity to free margins a W-plasty was deemed most appropriate for repair.  Using a sterile surgical marker, the appropriate transposition arms of the W-plasty were drawn incorporating the defect and placing the expected incisions within the relaxed skin tension lines where possible.    The area thus outlined was incised deep to adipose tissue with a #15 scalpel blade.  The skin margins were undermined to an appropriate distance in all directions utilizing iris scissors.  The opposing transposition arms were then transposed into place in opposite direction and anchored with interrupted buried subcutaneous sutures.
Z Plasty Text: The lesion was extirpated to the level of the fat with a #15 scalpel blade.  Given the location of the defect, shape of the defect and the proximity to free margins a Z-plasty was deemed most appropriate for repair.  Using a sterile surgical marker, the appropriate transposition arms of the Z-plasty were drawn incorporating the defect and placing the expected incisions within the relaxed skin tension lines where possible.    The area thus outlined was incised deep to adipose tissue with a #15 scalpel blade.  The skin margins were undermined to an appropriate distance in all directions utilizing iris scissors.  The opposing transposition arms were then transposed into place in opposite direction and anchored with interrupted buried subcutaneous sutures.
Zygomaticofacial Flap Text: Given the location of the defect, shape of the defect and the proximity to free margins a zygomaticofacial flap was deemed most appropriate for repair.  Using a sterile surgical marker, the appropriate flap was drawn incorporating the defect and placing the expected incisions within the relaxed skin tension lines where possible. The area thus outlined was incised deep to adipose tissue with a #15 scalpel blade with preservation of a vascular pedicle.  The skin margins were undermined to an appropriate distance in all directions utilizing iris scissors.  The flap was then placed into the defect and anchored with interrupted buried subcutaneous sutures.
Cheek Interpolation Flap Text: A decision was made to reconstruct the defect utilizing an interpolation axial flap and a staged reconstruction.  A telfa template was made of the defect.  This telfa template was then used to outline the Cheek Interpolation flap.  The donor area for the pedicle flap was then injected with anesthesia.  The flap was excised through the skin and subcutaneous tissue down to the layer of the underlying musculature.  The interpolation flap was carefully excised within this deep plane to maintain its blood supply.  The edges of the donor site were undermined.   The donor site was closed in a primary fashion.  The pedicle was then rotated into position and sutured.  Once the tube was sutured into place, adequate blood supply was confirmed with blanching and refill.  The pedicle was then wrapped with xeroform gauze and dressed appropriately with a telfa and gauze bandage to ensure continued blood supply and protect the attached pedicle.
Cheek-To-Nose Interpolation Flap Text: A decision was made to reconstruct the defect utilizing an interpolation axial flap and a staged reconstruction.  A telfa template was made of the defect.  This telfa template was then used to outline the Cheek-To-Nose Interpolation flap.  The donor area for the pedicle flap was then injected with anesthesia.  The flap was excised through the skin and subcutaneous tissue down to the layer of the underlying musculature.  The interpolation flap was carefully excised within this deep plane to maintain its blood supply.  The edges of the donor site were undermined.   The donor site was closed in a primary fashion.  The pedicle was then rotated into position and sutured.  Once the tube was sutured into place, adequate blood supply was confirmed with blanching and refill.  The pedicle was then wrapped with xeroform gauze and dressed appropriately with a telfa and gauze bandage to ensure continued blood supply and protect the attached pedicle.
Interpolation Flap Text: A decision was made to reconstruct the defect utilizing an interpolation axial flap and a staged reconstruction.  A telfa template was made of the defect.  This telfa template was then used to outline the interpolation flap.  The donor area for the pedicle flap was then injected with anesthesia.  The flap was excised through the skin and subcutaneous tissue down to the layer of the underlying musculature.  The interpolation flap was carefully excised within this deep plane to maintain its blood supply.  The edges of the donor site were undermined.   The donor site was closed in a primary fashion.  The pedicle was then rotated into position and sutured.  Once the tube was sutured into place, adequate blood supply was confirmed with blanching and refill.  The pedicle was then wrapped with xeroform gauze and dressed appropriately with a telfa and gauze bandage to ensure continued blood supply and protect the attached pedicle.
Melolabial Interpolation Flap Text: A decision was made to reconstruct the defect utilizing an interpolation axial flap and a staged reconstruction.  A telfa template was made of the defect.  This telfa template was then used to outline the melolabial interpolation flap.  The donor area for the pedicle flap was then injected with anesthesia.  The flap was excised through the skin and subcutaneous tissue down to the layer of the underlying musculature.  The pedicle flap was carefully excised within this deep plane to maintain its blood supply.  The edges of the donor site were undermined.   The donor site was closed in a primary fashion.  The pedicle was then rotated into position and sutured.  Once the tube was sutured into place, adequate blood supply was confirmed with blanching and refill.  The pedicle was then wrapped with xeroform gauze and dressed appropriately with a telfa and gauze bandage to ensure continued blood supply and protect the attached pedicle.
Mastoid Interpolation Flap Text: A decision was made to reconstruct the defect utilizing an interpolation axial flap and a staged reconstruction.  A telfa template was made of the defect.  This telfa template was then used to outline the mastoid interpolation flap.  The donor area for the pedicle flap was then injected with anesthesia.  The flap was excised through the skin and subcutaneous tissue down to the layer of the underlying musculature.  The pedicle flap was carefully excised within this deep plane to maintain its blood supply.  The edges of the donor site were undermined.   The donor site was closed in a primary fashion.  The pedicle was then rotated into position and sutured.  Once the tube was sutured into place, adequate blood supply was confirmed with blanching and refill.  The pedicle was then wrapped with xeroform gauze and dressed appropriately with a telfa and gauze bandage to ensure continued blood supply and protect the attached pedicle.
Posterior Auricular Interpolation Flap Text: A decision was made to reconstruct the defect utilizing an interpolation axial flap and a staged reconstruction.  A telfa template was made of the defect.  This telfa template was then used to outline the posterior auricular interpolation flap.  The donor area for the pedicle flap was then injected with anesthesia.  The flap was excised through the skin and subcutaneous tissue down to the layer of the underlying musculature.  The pedicle flap was carefully excised within this deep plane to maintain its blood supply.  The edges of the donor site were undermined.   The donor site was closed in a primary fashion.  The pedicle was then rotated into position and sutured.  Once the tube was sutured into place, adequate blood supply was confirmed with blanching and refill.  The pedicle was then wrapped with xeroform gauze and dressed appropriately with a telfa and gauze bandage to ensure continued blood supply and protect the attached pedicle.
Paramedian Forehead Flap Text: A decision was made to reconstruct the defect utilizing an interpolation axial flap and a staged reconstruction.  A telfa template was made of the defect.  This telfa template was then used to outline the paramedian forehead pedicle flap.  The donor area for the pedicle flap was then injected with anesthesia.  The flap was excised through the skin and subcutaneous tissue down to the layer of the underlying musculature.  The pedicle flap was carefully excised within this deep plane to maintain its blood supply.  The edges of the donor site were undermined.   The donor site was closed in a primary fashion.  The pedicle was then rotated into position and sutured.  Once the tube was sutured into place, adequate blood supply was confirmed with blanching and refill.  The pedicle was then wrapped with xeroform gauze and dressed appropriately with a telfa and gauze bandage to ensure continued blood supply and protect the attached pedicle.
Lip Wedge Excision Repair Text: Given the location of the defect and the proximity to free margins a full thickness wedge repair was deemed most appropriate.  Using a sterile surgical marker, the appropriate repair was drawn incorporating the defect and placing the expected incisions perpendicular to the vermilion border.  The vermilion border was also meticulously outlined to ensure appropriate reapproximation during the repair.  The area thus outlined was incised through and through with a #15 scalpel blade.  The muscularis and dermis were reaproximated with deep sutures following hemostasis. Care was taken to realign the vermilion border before proceeding with the superficial closure.  Once the vermilion was realigned the superfical and mucosal closure was finished.
Ftsg Text: The defect edges were debeveled with a #15 scalpel blade.  Given the location of the defect, shape of the defect and the proximity to free margins a full thickness skin graft was deemed most appropriate.  Using a sterile surgical marker, the primary defect shape was transferred to the donor site. The area thus outlined was incised deep to adipose tissue with a #15 scalpel blade.  The harvested graft was then trimmed of adipose tissue until only dermis and epidermis was left.  The skin margins of the secondary defect were undermined to an appropriate distance in all directions utilizing iris scissors.  The secondary defect was closed with interrupted buried subcutaneous sutures.  The skin edges were then re-apposed with running  sutures.  The skin graft was then placed in the primary defect and oriented appropriately.
Split-Thickness Skin Graft Text: The defect edges were debeveled with a #15 scalpel blade.  Given the location of the defect, shape of the defect and the proximity to free margins a split thickness skin graft was deemed most appropriate.  Using a sterile surgical marker, the primary defect shape was transferred to the donor site. The split thickness graft was then harvested.  The skin graft was then placed in the primary defect and oriented appropriately.
Burow's Graft Text: The defect edges were debeveled with a #15 scalpel blade.  Given the location of the defect, shape of the defect, the proximity to free margins and the presence of a standing cone deformity a Burow's skin graft was deemed most appropriate. The standing cone was removed and this tissue was then trimmed to the shape of the primary defect. The adipose tissue was also removed until only dermis and epidermis were left.  The skin margins of the secondary defect were undermined to an appropriate distance in all directions utilizing iris scissors.  The secondary defect was closed with interrupted buried subcutaneous sutures.  The skin edges were then re-apposed with running  sutures.  The skin graft was then placed in the primary defect and oriented appropriately.
Cartilage Graft Text: The defect edges were debeveled with a #15 scalpel blade.  Given the location of the defect, shape of the defect, the fact the defect involved a full thickness cartilage defect a cartilage graft was deemed most appropriate.  An appropriate donor site was identified, cleansed, and anesthetized. The cartilage graft was then harvested and transferred to the recipient site, oriented appropriately and then sutured into place.  The secondary defect was then repaired using a primary closure.
Composite Graft Text: The defect edges were debeveled with a #15 scalpel blade.  Given the location of the defect, shape of the defect, the proximity to free margins and the fact the defect was full thickness a composite graft was deemed most appropriate.  The defect was outline and then transferred to the donor site.  A full thickness graft was then excised from the donor site. The graft was then placed in the primary defect, oriented appropriately and then sutured into place.  The secondary defect was then repaired using a primary closure.
Epidermal Autograft Text: The defect edges were debeveled with a #15 scalpel blade.  Given the location of the defect, shape of the defect and the proximity to free margins an epidermal autograft was deemed most appropriate.  Using a sterile surgical marker, the primary defect shape was transferred to the donor site. The epidermal graft was then harvested.  The skin graft was then placed in the primary defect and oriented appropriately.
Dermal Autograft Text: The defect edges were debeveled with a #15 scalpel blade.  Given the location of the defect, shape of the defect and the proximity to free margins a dermal autograft was deemed most appropriate.  Using a sterile surgical marker, the primary defect shape was transferred to the donor site. The area thus outlined was incised deep to adipose tissue with a #15 scalpel blade.  The harvested graft was then trimmed of adipose and epidermal tissue until only dermis was left.  The skin graft was then placed in the primary defect and oriented appropriately.
Skin Substitute Text: The defect edges were debeveled with a #15 scalpel blade.  Given the location of the defect, shape of the defect and the proximity to free margins a skin substitute graft was deemed most appropriate.  The graft material was trimmed to fit the size of the defect. The graft was then placed in the primary defect and oriented appropriately.
Tissue Cultured Epidermal Autograft Text: The defect edges were debeveled with a #15 scalpel blade.  Given the location of the defect, shape of the defect and the proximity to free margins a tissue cultured epidermal autograft was deemed most appropriate.  The graft was then trimmed to fit the size of the defect.  The graft was then placed in the primary defect and oriented appropriately.
Xenograft Text: The defect edges were debeveled with a #15 scalpel blade.  Given the location of the defect, shape of the defect and the proximity to free margins a xenograft was deemed most appropriate.  The graft was then trimmed to fit the size of the defect.  The graft was then placed in the primary defect and oriented appropriately.
Purse String (Intermediate) Text: Given the location of the defect and the characteristics of the surrounding skin a purse string intermediate closure was deemed most appropriate.  Undermining was performed circumfirentially around the surgical defect.  A purse string suture was then placed and tightened.
Purse String (Simple) Text: Given the location of the defect and the characteristics of the surrounding skin a purse string simple closure was deemed most appropriate.  Undermining was performed circumferentially around the surgical defect.  A purse string suture was then placed and tightened.
Partial Purse String (Intermediate) Text: Given the location of the defect and the characteristics of the surrounding skin an intermediate purse string closure was deemed most appropriate.  Undermining was performed circumferentially around the surgical defect.  A purse string suture was then placed and tightened. Wound tension of the circular defect prevented complete closure of the wound.
Partial Purse String (Simple) Text: Given the location of the defect and the characteristics of the surrounding skin a simple purse string closure was deemed most appropriate.  Undermining was performed circumferentially around the surgical defect.  A purse string suture was then placed and tightened. Wound tension of the circular defect prevented complete closure of the wound.
Complex Repair And Single Advancement Flap Text: The defect edges were debeveled with a #15 scalpel blade.  The primary defect was closed partially with a complex linear closure.  Given the location of the remaining defect, shape of the defect and the proximity to free margins a single advancement flap was deemed most appropriate for complete closure of the defect.  Using a sterile surgical marker, an appropriate advancement flap was drawn incorporating the defect and placing the expected incisions within the relaxed skin tension lines where possible.    The area thus outlined was incised deep to adipose tissue with a #15 scalpel blade.  The skin margins were undermined to an appropriate distance in all directions utilizing iris scissors.
Complex Repair And Double Advancement Flap Text: The defect edges were debeveled with a #15 scalpel blade.  The primary defect was closed partially with a complex linear closure.  Given the location of the remaining defect, shape of the defect and the proximity to free margins a double advancement flap was deemed most appropriate for complete closure of the defect.  Using a sterile surgical marker, an appropriate advancement flap was drawn incorporating the defect and placing the expected incisions within the relaxed skin tension lines where possible.    The area thus outlined was incised deep to adipose tissue with a #15 scalpel blade.  The skin margins were undermined to an appropriate distance in all directions utilizing iris scissors.
Complex Repair And Modified Advancement Flap Text: The defect edges were debeveled with a #15 scalpel blade.  The primary defect was closed partially with a complex linear closure.  Given the location of the remaining defect, shape of the defect and the proximity to free margins a modified advancement flap was deemed most appropriate for complete closure of the defect.  Using a sterile surgical marker, an appropriate advancement flap was drawn incorporating the defect and placing the expected incisions within the relaxed skin tension lines where possible.    The area thus outlined was incised deep to adipose tissue with a #15 scalpel blade.  The skin margins were undermined to an appropriate distance in all directions utilizing iris scissors.
Complex Repair And A-T Advancement Flap Text: The defect edges were debeveled with a #15 scalpel blade.  The primary defect was closed partially with a complex linear closure.  Given the location of the remaining defect, shape of the defect and the proximity to free margins an A-T advancement flap was deemed most appropriate for complete closure of the defect.  Using a sterile surgical marker, an appropriate advancement flap was drawn incorporating the defect and placing the expected incisions within the relaxed skin tension lines where possible.    The area thus outlined was incised deep to adipose tissue with a #15 scalpel blade.  The skin margins were undermined to an appropriate distance in all directions utilizing iris scissors.
Complex Repair And O-T Advancement Flap Text: The defect edges were debeveled with a #15 scalpel blade.  The primary defect was closed partially with a complex linear closure.  Given the location of the remaining defect, shape of the defect and the proximity to free margins an O-T advancement flap was deemed most appropriate for complete closure of the defect.  Using a sterile surgical marker, an appropriate advancement flap was drawn incorporating the defect and placing the expected incisions within the relaxed skin tension lines where possible.    The area thus outlined was incised deep to adipose tissue with a #15 scalpel blade.  The skin margins were undermined to an appropriate distance in all directions utilizing iris scissors.
Complex Repair And O-L Flap Text: The defect edges were debeveled with a #15 scalpel blade.  The primary defect was closed partially with a complex linear closure.  Given the location of the remaining defect, shape of the defect and the proximity to free margins an O-L flap was deemed most appropriate for complete closure of the defect.  Using a sterile surgical marker, an appropriate flap was drawn incorporating the defect and placing the expected incisions within the relaxed skin tension lines where possible.    The area thus outlined was incised deep to adipose tissue with a #15 scalpel blade.  The skin margins were undermined to an appropriate distance in all directions utilizing iris scissors.
Complex Repair And Bilobe Flap Text: The defect edges were debeveled with a #15 scalpel blade.  The primary defect was closed partially with a complex linear closure.  Given the location of the remaining defect, shape of the defect and the proximity to free margins a bilobe flap was deemed most appropriate for complete closure of the defect.  Using a sterile surgical marker, an appropriate advancement flap was drawn incorporating the defect and placing the expected incisions within the relaxed skin tension lines where possible.    The area thus outlined was incised deep to adipose tissue with a #15 scalpel blade.  The skin margins were undermined to an appropriate distance in all directions utilizing iris scissors.
Complex Repair And Melolabial Flap Text: The defect edges were debeveled with a #15 scalpel blade.  The primary defect was closed partially with a complex linear closure.  Given the location of the remaining defect, shape of the defect and the proximity to free margins a melolabial flap was deemed most appropriate for complete closure of the defect.  Using a sterile surgical marker, an appropriate advancement flap was drawn incorporating the defect and placing the expected incisions within the relaxed skin tension lines where possible.    The area thus outlined was incised deep to adipose tissue with a #15 scalpel blade.  The skin margins were undermined to an appropriate distance in all directions utilizing iris scissors.
Complex Repair And Rotation Flap Text: The defect edges were debeveled with a #15 scalpel blade.  The primary defect was closed partially with a complex linear closure.  Given the location of the remaining defect, shape of the defect and the proximity to free margins a rotation flap was deemed most appropriate for complete closure of the defect.  Using a sterile surgical marker, an appropriate advancement flap was drawn incorporating the defect and placing the expected incisions within the relaxed skin tension lines where possible.    The area thus outlined was incised deep to adipose tissue with a #15 scalpel blade.  The skin margins were undermined to an appropriate distance in all directions utilizing iris scissors.
Complex Repair And Rhombic Flap Text: The defect edges were debeveled with a #15 scalpel blade.  The primary defect was closed partially with a complex linear closure.  Given the location of the remaining defect, shape of the defect and the proximity to free margins a rhombic flap was deemed most appropriate for complete closure of the defect.  Using a sterile surgical marker, an appropriate advancement flap was drawn incorporating the defect and placing the expected incisions within the relaxed skin tension lines where possible.    The area thus outlined was incised deep to adipose tissue with a #15 scalpel blade.  The skin margins were undermined to an appropriate distance in all directions utilizing iris scissors.
Complex Repair And Transposition Flap Text: The defect edges were debeveled with a #15 scalpel blade.  The primary defect was closed partially with a complex linear closure.  Given the location of the remaining defect, shape of the defect and the proximity to free margins a transposition flap was deemed most appropriate for complete closure of the defect.  Using a sterile surgical marker, an appropriate advancement flap was drawn incorporating the defect and placing the expected incisions within the relaxed skin tension lines where possible.    The area thus outlined was incised deep to adipose tissue with a #15 scalpel blade.  The skin margins were undermined to an appropriate distance in all directions utilizing iris scissors.
Complex Repair And V-Y Plasty Text: The defect edges were debeveled with a #15 scalpel blade.  The primary defect was closed partially with a complex linear closure.  Given the location of the remaining defect, shape of the defect and the proximity to free margins a V-Y plasty was deemed most appropriate for complete closure of the defect.  Using a sterile surgical marker, an appropriate advancement flap was drawn incorporating the defect and placing the expected incisions within the relaxed skin tension lines where possible.    The area thus outlined was incised deep to adipose tissue with a #15 scalpel blade.  The skin margins were undermined to an appropriate distance in all directions utilizing iris scissors.
Complex Repair And M Plasty Text: The defect edges were debeveled with a #15 scalpel blade.  The primary defect was closed partially with a complex linear closure.  Given the location of the remaining defect, shape of the defect and the proximity to free margins an M plasty was deemed most appropriate for complete closure of the defect.  Using a sterile surgical marker, an appropriate advancement flap was drawn incorporating the defect and placing the expected incisions within the relaxed skin tension lines where possible.    The area thus outlined was incised deep to adipose tissue with a #15 scalpel blade.  The skin margins were undermined to an appropriate distance in all directions utilizing iris scissors.
Complex Repair And Double M Plasty Text: The defect edges were debeveled with a #15 scalpel blade.  The primary defect was closed partially with a complex linear closure.  Given the location of the remaining defect, shape of the defect and the proximity to free margins a double M plasty was deemed most appropriate for complete closure of the defect.  Using a sterile surgical marker, an appropriate advancement flap was drawn incorporating the defect and placing the expected incisions within the relaxed skin tension lines where possible.    The area thus outlined was incised deep to adipose tissue with a #15 scalpel blade.  The skin margins were undermined to an appropriate distance in all directions utilizing iris scissors.
Complex Repair And W Plasty Text: The defect edges were debeveled with a #15 scalpel blade.  The primary defect was closed partially with a complex linear closure.  Given the location of the remaining defect, shape of the defect and the proximity to free margins a W plasty was deemed most appropriate for complete closure of the defect.  Using a sterile surgical marker, an appropriate advancement flap was drawn incorporating the defect and placing the expected incisions within the relaxed skin tension lines where possible.    The area thus outlined was incised deep to adipose tissue with a #15 scalpel blade.  The skin margins were undermined to an appropriate distance in all directions utilizing iris scissors.
Complex Repair And Z Plasty Text: The defect edges were debeveled with a #15 scalpel blade.  The primary defect was closed partially with a complex linear closure.  Given the location of the remaining defect, shape of the defect and the proximity to free margins a Z plasty was deemed most appropriate for complete closure of the defect.  Using a sterile surgical marker, an appropriate advancement flap was drawn incorporating the defect and placing the expected incisions within the relaxed skin tension lines where possible.    The area thus outlined was incised deep to adipose tissue with a #15 scalpel blade.  The skin margins were undermined to an appropriate distance in all directions utilizing iris scissors.
Complex Repair And Dorsal Nasal Flap Text: The defect edges were debeveled with a #15 scalpel blade.  The primary defect was closed partially with a complex linear closure.  Given the location of the remaining defect, shape of the defect and the proximity to free margins a dorsal nasal flap was deemed most appropriate for complete closure of the defect.  Using a sterile surgical marker, an appropriate flap was drawn incorporating the defect and placing the expected incisions within the relaxed skin tension lines where possible.    The area thus outlined was incised deep to adipose tissue with a #15 scalpel blade.  The skin margins were undermined to an appropriate distance in all directions utilizing iris scissors.
Complex Repair And Ftsg Text: The defect edges were debeveled with a #15 scalpel blade.  The primary defect was closed partially with a complex linear closure.  Given the location of the defect, shape of the defect and the proximity to free margins a full thickness skin graft was deemed most appropriate to repair the remaining defect.  The graft was trimmed to fit the size of the remaining defect.  The graft was then placed in the primary defect, oriented appropriately, and sutured into place.
Complex Repair And Burow's Graft Text: The defect edges were debeveled with a #15 scalpel blade.  The primary defect was closed partially with a complex linear closure.  Given the location of the defect, shape of the defect, the proximity to free margins and the presence of a standing cone deformity a Burow's graft was deemed most appropriate to repair the remaining defect.  The graft was trimmed to fit the size of the remaining defect.  The graft was then placed in the primary defect, oriented appropriately, and sutured into place.
Complex Repair And Split-Thickness Skin Graft Text: The defect edges were debeveled with a #15 scalpel blade.  The primary defect was closed partially with a complex linear closure.  Given the location of the defect, shape of the defect and the proximity to free margins a split thickness skin graft was deemed most appropriate to repair the remaining defect.  The graft was trimmed to fit the size of the remaining defect.  The graft was then placed in the primary defect, oriented appropriately, and sutured into place.
Complex Repair And Epidermal Autograft Text: The defect edges were debeveled with a #15 scalpel blade.  The primary defect was closed partially with a complex linear closure.  Given the location of the defect, shape of the defect and the proximity to free margins an epidermal autograft was deemed most appropriate to repair the remaining defect.  The graft was trimmed to fit the size of the remaining defect.  The graft was then placed in the primary defect, oriented appropriately, and sutured into place.
Complex Repair And Dermal Autograft Text: The defect edges were debeveled with a #15 scalpel blade.  The primary defect was closed partially with a complex linear closure.  Given the location of the defect, shape of the defect and the proximity to free margins an dermal autograft was deemed most appropriate to repair the remaining defect.  The graft was trimmed to fit the size of the remaining defect.  The graft was then placed in the primary defect, oriented appropriately, and sutured into place.
Complex Repair And Tissue Cultured Epidermal Autograft Text: The defect edges were debeveled with a #15 scalpel blade.  The primary defect was closed partially with a complex linear closure.  Given the location of the defect, shape of the defect and the proximity to free margins an tissue cultured epidermal autograft was deemed most appropriate to repair the remaining defect.  The graft was trimmed to fit the size of the remaining defect.  The graft was then placed in the primary defect, oriented appropriately, and sutured into place.
Complex Repair And Xenograft Text: The defect edges were debeveled with a #15 scalpel blade.  The primary defect was closed partially with a complex linear closure.  Given the location of the defect, shape of the defect and the proximity to free margins a xenograft was deemed most appropriate to repair the remaining defect.  The graft was trimmed to fit the size of the remaining defect.  The graft was then placed in the primary defect, oriented appropriately, and sutured into place.
Complex Repair And Skin Substitute Graft Text: The defect edges were debeveled with a #15 scalpel blade.  The primary defect was closed partially with a complex linear closure.  Given the location of the remaining defect, shape of the defect and the proximity to free margins a skin substitute graft was deemed most appropriate to repair the remaining defect.  The graft was trimmed to fit the size of the remaining defect.  The graft was then placed in the primary defect, oriented appropriately, and sutured into place.
Path Notes (To The Dermatopathologist): Please check margins.
Consent was obtained from the patient. The risks and benefits to therapy were discussed in detail. Specifically, the risks of infection, scarring, bleeding, prolonged wound healing, incomplete removal, allergy to anesthesia, nerve injury and recurrence were addressed. Prior to the procedure, the treatment site was clearly identified and confirmed by the patient. All components of Universal Protocol/PAUSE Rule completed.
Post-Care Instructions: I reviewed with the patient in detail post-care instructions:\\n1. Apply bacitracin over the steri-strips.  \\n2. Cut non-stick pad (Telfa) to cover the steri-strips\\n3. Apply tape (hypafix) over the non-stick pad\\n4. Change once per day for 5 days\\n5. Shower with bandage on, change bandage after shower\\n\\nPatient is not to engage in any heavy lifting, exercise, hot tub, or swimming for the next 14 days. Should the patient develop any fevers, chills, bleeding, severe pain patient will contact the office immediately.
Home Suture Removal Text: Patient was provided a home suture removal kit and will remove their sutures at home.  If they have any questions or difficulties they will call the office.
Where Do You Want The Question To Include Opioid Counseling Located?: Case Summary Tab
Information: Selecting Yes will display possible errors in your note based on the variables you have selected. This validation is only offered as a suggestion for you. PLEASE NOTE THAT THE VALIDATION TEXT WILL BE REMOVED WHEN YOU FINALIZE YOUR NOTE. IF YOU WANT TO FAX A PRELIMINARY NOTE YOU WILL NEED TO TOGGLE THIS TO 'NO' IF YOU DO NOT WANT IT IN YOUR FAXED NOTE.

## 2021-06-22 NOTE — PROCEDURE: MIPS QUALITY
Quality 226: Preventive Care And Screening: Tobacco Use: Screening And Cessation Intervention: Patient screened for tobacco use and is an ex/non-smoker
Quality 111:Pneumonia Vaccination Status For Older Adults: Pneumococcal Vaccination Previously Received
Quality 130: Documentation Of Current Medications In The Medical Record: Current Medications Documented
Quality 265: Biopsy Follow-Up: Biopsy results reviewed, communicated, tracked, and documented
Detail Level: Detailed

## 2021-07-13 ENCOUNTER — OFFICE VISIT (OUTPATIENT)
Dept: MEDICAL GROUP | Facility: MEDICAL CENTER | Age: 86
End: 2021-07-13
Payer: MEDICARE

## 2021-07-13 VITALS
BODY MASS INDEX: 25.32 KG/M2 | HEIGHT: 60 IN | SYSTOLIC BLOOD PRESSURE: 102 MMHG | OXYGEN SATURATION: 93 % | HEART RATE: 92 BPM | WEIGHT: 129 LBS | RESPIRATION RATE: 16 BRPM | TEMPERATURE: 98.3 F | DIASTOLIC BLOOD PRESSURE: 68 MMHG

## 2021-07-13 DIAGNOSIS — I70.0 ATHEROSCLEROSIS OF AORTA (HCC): ICD-10-CM

## 2021-07-13 DIAGNOSIS — E55.9 VITAMIN D DEFICIENCY: ICD-10-CM

## 2021-07-13 DIAGNOSIS — G30.1 LATE ONSET ALZHEIMER'S DISEASE WITH BEHAVIORAL DISTURBANCE (HCC): ICD-10-CM

## 2021-07-13 DIAGNOSIS — F02.818 LATE ONSET ALZHEIMER'S DISEASE WITH BEHAVIORAL DISTURBANCE (HCC): ICD-10-CM

## 2021-07-13 DIAGNOSIS — E78.5 DYSLIPIDEMIA: ICD-10-CM

## 2021-07-13 PROCEDURE — 99214 OFFICE O/P EST MOD 30 MIN: CPT | Performed by: FAMILY MEDICINE

## 2021-07-13 ASSESSMENT — PATIENT HEALTH QUESTIONNAIRE - PHQ9: CLINICAL INTERPRETATION OF PHQ2 SCORE: 0

## 2021-07-13 ASSESSMENT — FIBROSIS 4 INDEX: FIB4 SCORE: 2.02

## 2021-07-13 NOTE — PROGRESS NOTES
CC: Dementia, hyperlipidemia, vitamin D deficiency    HPI:   Ramya presents today to discuss the following evaluations:    Late onset Alzheimer's disease with behavioral disturbance (HCC)  She has been having a slowly progressive dementia.  Currently she lives at an independent senior facility where they help her with her medication, food, and cleaning.  Her daughter visits her every week to help with showering and other things.  She has been physically active.  However patient currently stopped all her prescription medications.    Dyslipidemia  Last lipid panel was:   Ref Range & Units 6 mo ago   Cholesterol,Tot 100 - 199 mg/dL 208High     Triglycerides 0 - 149 mg/dL 85    HDL >=40 mg/dL 63    LDL <100 mg/dL 128High       No history of diabetes, CAD, or stroke.  Currently not on medication      Vitamin D deficiency  Patient with history of vitamin D deficiency, however her last vitamin D level was 55.  She has been on vitamin D over-the-counter.      Atherosclerosis of aorta (HCC)  Patient has stopped all her medications, currently not on risk reduction regimen.  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     No current facility-administered medications for this visit.         Allergies as of 07/13/2021 - Reviewed 07/13/2021   Allergen Reaction Noted   • Seasonal  08/15/2019         ROS: Denies any chest pain, Shortness of breath, Changes bowel or bladder, Lower extremity edema.    Physical Exam:  /68 (BP Location: Right arm, Patient Position: Sitting, BP Cuff Size: Adult)   Pulse 92   Temp 36.8 °C (98.3 °F) (Temporal)   Resp 16   Ht 1.524 m (5')   Wt 58.5 kg (129 lb)   SpO2 93%   BMI 25.19 kg/m²   Gen.: Well-developed, well-nourished, no apparent distress,pleasant and cooperative with the examination  Skin:  Warm and dry with good turgor. No rashes or suspicious lesions in visible areas  HEENT:Sinuses nontender with palpation, TMs clear, nares patent with pink mucosa and clear rhinorrhea,no septal deviation ,polyps or lesions. lips without lesions, oropharynx clear.  Neck: Trachea midline,no masses or adenopathy. No JVD.  Cor: Regular rate and rhythm without murmur, gallop or rub.  Lungs: Respirations unlabored.Clear to auscultation with equal breath sounds bilaterally. No wheezes, rhonchi.  Extremities: No cyanosis, clubbing or edema.      Assessment and Plan.   87 y.o. female     1. Late onset Alzheimer's disease with behavioral disturbance (HCC)  Slowly progressive  She stopped all her medications.  We will continue monitor her condition.  Continue follow-up with neurology as needed.    - CBC WITH DIFFERENTIAL; Future  - Comp Metabolic Panel; Future  - TSH; Future    2. Dyslipidemia  Last lipid panel was:   Ref Range & Units 6 mo ago   Cholesterol,Tot 100 - 199 mg/dL 208High     Triglycerides 0 - 149 mg/dL 85    HDL >=40 mg/dL 63    LDL <100 mg/dL 128High       No history of diabetes, CAD, or stroke.  At this age we will continue to monitor,  - Lipid Profile; Future    3. Vitamin D deficiency  Patient with history of vitamin D deficiency, however her last vitamin D level was 55  Continue on vitamin D over-the-counter.    - VITAMIN D,25 HYDROXY; Future    4. Atherosclerosis of aorta (HCC)  Stopped all her medications, not on risk reduction regimen  However will continue  monitor.

## 2021-10-12 ENCOUNTER — APPOINTMENT (OUTPATIENT)
Dept: NEUROLOGY | Facility: MEDICAL CENTER | Age: 86
End: 2021-10-12
Attending: PSYCHIATRY & NEUROLOGY
Payer: MEDICARE

## 2021-11-22 ENCOUNTER — OFFICE VISIT (OUTPATIENT)
Dept: NEUROLOGY | Facility: MEDICAL CENTER | Age: 86
End: 2021-11-22
Attending: PSYCHIATRY & NEUROLOGY
Payer: MEDICARE

## 2021-11-22 VITALS
RESPIRATION RATE: 12 BRPM | DIASTOLIC BLOOD PRESSURE: 64 MMHG | WEIGHT: 133.82 LBS | TEMPERATURE: 98.3 F | HEART RATE: 85 BPM | SYSTOLIC BLOOD PRESSURE: 128 MMHG | HEIGHT: 60 IN | BODY MASS INDEX: 26.27 KG/M2 | OXYGEN SATURATION: 95 %

## 2021-11-22 DIAGNOSIS — G30.1 LATE ONSET ALZHEIMER'S DEMENTIA WITH BEHAVIORAL DISTURBANCE (HCC): Primary | ICD-10-CM

## 2021-11-22 DIAGNOSIS — F02.818 LATE ONSET ALZHEIMER'S DEMENTIA WITH BEHAVIORAL DISTURBANCE (HCC): Primary | ICD-10-CM

## 2021-11-22 PROCEDURE — 99213 OFFICE O/P EST LOW 20 MIN: CPT | Performed by: PSYCHIATRY & NEUROLOGY

## 2021-11-22 PROCEDURE — 99211 OFF/OP EST MAY X REQ PHY/QHP: CPT | Performed by: PSYCHIATRY & NEUROLOGY

## 2021-11-22 ASSESSMENT — FIBROSIS 4 INDEX: FIB4 SCORE: 2.04

## 2021-11-22 NOTE — PROGRESS NOTES
Ty Bhakta is a 88 y.o. female who presents with her niece Lisa, as usual, for 6-month follow-up, with a history of moderate Alzheimer's disease.  History is gotten mostly from Lisa, the patient giving very little color.    RAMAKRISHNA Bui states that she is doing well.  She likes where she is living.  She enjoys the time at the dinner table with several friends with whom she eats regularly.  She notes that she has her own apartment which has a living room and bedroom.  She enjoys being there.                         According to the staff at [facility] where the patient is a resident, things are doing well.  Still, she is having problems manipulating simple appliances such as the TV controller.  She has thus lost interest in watching TV.  She is not getting out and socializing as much as she had been, though when she does do so, she seems to enjoy herself.  She has not been wandering, there are limited episodes where she is more agitated than usual.  She can be talked down out of these episodes without further escalation.  There have been problems with speech and language, a lot of the conversation boils down to charades to figure out what she is saying.  This does not seem to bother her at all.   Her speech is becoming more halting, more more devoid of content.  She repeats herself frequently, forgets things moment to moment.    When Lisa arrives, the patient bathes, this is twice a week.  She is now wearing the same clothes between showers, including going to sleep in them.  Otherwise, she does participate in most of her other ADLs.  She can feed herself, getting to the bathroom is quick and a bit of a rush, but she controls issues, she has never required the use of a diaper.  She walks easily, has not been falling with any kind of regularity.  Appetite is good.  She has been sleeping well.  She is on no medications.    Medical, surgical and family histories are reviewed, there are no new  drug allergies.  Evidently her sister (since past), has a daughter who is now taking it upon herself to visit.  This can prove somewhat tumultuous for the patient.  She is on no medications.                                                                Review of Systems   Unable to perform ROS: Dementia     Objective     /64 (BP Location: Right arm, Patient Position: Sitting, BP Cuff Size: Adult)   Pulse 85   Temp 36.8 °C (98.3 °F) (Temporal)   Resp 12   Ht 1.524 m (5')   Wt 60.7 kg (133 lb 13.1 oz)   SpO2 95%   BMI 26.13 kg/m²      Physical Exam    She appears in no acute distress.  Vital signs are stable.  She is clean and appropriately dressed.  She is cooperative.  There is no malar rash.  Her neck is supple.  Cardiac evaluation is unremarkable.     Neurological Exam    For today, she knows she is in the doctor's office, and if given cues, realizes that she is at [facility], which she knows is in [place], [place]. She has no clue about the date itself or the day of the week.  There is clear difficulty with spontaneous speech output, content diminished, paraphasic errors are used, comprehension slowed.  Still, she can name if given enough time, she repeats easily.  There is no apraxia.    Otherwise, in quick and cursory fashion, cranial nerve, musculoskeletal and coordination evaluations are intact.  Sensory exam cannot be done consistently.    Assessment & Plan     1. Late onset Alzheimer's dementia with behavioral disturbance (HCC)  Stable, she is doing amazingly well given the severity of her disease and her overall clinical presentation And behavior at her residence.  With the occasional incident of increased agitation, she certainly can easily be talked out of it.  I am pleased as to how things are at this time.  Unfortunately with her other niece coming to visit, this is going to prove problematic.  Lisa know she needs to be around to help support her mother.  I can follow-up with patient in  1 year.    Time: 20 minutes in total spent on patient care, including chart review, precharting, discussions with healthcare staff and documentation.  This includes face-to-face time with the patient for exam, review, and for the most part counseling and coordinating care with her niece.

## 2021-12-06 ENCOUNTER — APPOINTMENT (RX ONLY)
Dept: URBAN - METROPOLITAN AREA CLINIC 4 | Facility: CLINIC | Age: 86
Setting detail: DERMATOLOGY
End: 2021-12-06

## 2021-12-06 DIAGNOSIS — D22 MELANOCYTIC NEVI: ICD-10-CM

## 2021-12-06 DIAGNOSIS — L82.1 OTHER SEBORRHEIC KERATOSIS: ICD-10-CM

## 2021-12-06 DIAGNOSIS — Z85.828 PERSONAL HISTORY OF OTHER MALIGNANT NEOPLASM OF SKIN: ICD-10-CM

## 2021-12-06 DIAGNOSIS — L81.4 OTHER MELANIN HYPERPIGMENTATION: ICD-10-CM

## 2021-12-06 DIAGNOSIS — L57.8 OTHER SKIN CHANGES DUE TO CHRONIC EXPOSURE TO NONIONIZING RADIATION: ICD-10-CM

## 2021-12-06 DIAGNOSIS — D18.0 HEMANGIOMA: ICD-10-CM

## 2021-12-06 PROBLEM — D22.5 MELANOCYTIC NEVI OF TRUNK: Status: ACTIVE | Noted: 2021-12-06

## 2021-12-06 PROBLEM — D18.01 HEMANGIOMA OF SKIN AND SUBCUTANEOUS TISSUE: Status: ACTIVE | Noted: 2021-12-06

## 2021-12-06 PROCEDURE — 99213 OFFICE O/P EST LOW 20 MIN: CPT

## 2021-12-06 PROCEDURE — ? COUNSELING

## 2021-12-06 ASSESSMENT — LOCATION SIMPLE DESCRIPTION DERM
LOCATION SIMPLE: RIGHT UPPER BACK
LOCATION SIMPLE: LEFT HAND
LOCATION SIMPLE: LEFT ANTERIOR NECK
LOCATION SIMPLE: RIGHT HAND
LOCATION SIMPLE: RIGHT CHEEK

## 2021-12-06 ASSESSMENT — LOCATION DETAILED DESCRIPTION DERM
LOCATION DETAILED: LEFT SUPERIOR ANTERIOR NECK
LOCATION DETAILED: RIGHT INFERIOR CENTRAL MALAR CHEEK
LOCATION DETAILED: LEFT ULNAR DORSAL HAND
LOCATION DETAILED: RIGHT ULNAR DORSAL HAND
LOCATION DETAILED: RIGHT SUPERIOR MEDIAL UPPER BACK
LOCATION DETAILED: RIGHT SUPERIOR UPPER BACK

## 2021-12-06 ASSESSMENT — LOCATION ZONE DERM
LOCATION ZONE: TRUNK
LOCATION ZONE: FACE
LOCATION ZONE: NECK
LOCATION ZONE: HAND

## 2022-01-21 ENCOUNTER — HOSPITAL ENCOUNTER (OUTPATIENT)
Dept: LAB | Facility: MEDICAL CENTER | Age: 87
End: 2022-01-21
Attending: FAMILY MEDICINE
Payer: MEDICARE

## 2022-01-21 DIAGNOSIS — E55.9 VITAMIN D DEFICIENCY: ICD-10-CM

## 2022-01-21 DIAGNOSIS — E78.5 DYSLIPIDEMIA: ICD-10-CM

## 2022-01-21 DIAGNOSIS — G30.1 LATE ONSET ALZHEIMER'S DISEASE WITH BEHAVIORAL DISTURBANCE (HCC): ICD-10-CM

## 2022-01-21 DIAGNOSIS — F02.818 LATE ONSET ALZHEIMER'S DISEASE WITH BEHAVIORAL DISTURBANCE (HCC): ICD-10-CM

## 2022-01-21 LAB
25(OH)D3 SERPL-MCNC: 35 NG/ML (ref 30–100)
ALBUMIN SERPL BCP-MCNC: 4 G/DL (ref 3.2–4.9)
ALBUMIN/GLOB SERPL: 1.4 G/DL
ALP SERPL-CCNC: 90 U/L (ref 30–99)
ALT SERPL-CCNC: 12 U/L (ref 2–50)
ANION GAP SERPL CALC-SCNC: 9 MMOL/L (ref 7–16)
AST SERPL-CCNC: 17 U/L (ref 12–45)
BASOPHILS # BLD AUTO: 1 % (ref 0–1.8)
BASOPHILS # BLD: 0.04 K/UL (ref 0–0.12)
BILIRUB SERPL-MCNC: 0.5 MG/DL (ref 0.1–1.5)
BUN SERPL-MCNC: 16 MG/DL (ref 8–22)
CALCIUM SERPL-MCNC: 9.3 MG/DL (ref 8.5–10.5)
CHLORIDE SERPL-SCNC: 108 MMOL/L (ref 96–112)
CHOLEST SERPL-MCNC: 191 MG/DL (ref 100–199)
CO2 SERPL-SCNC: 26 MMOL/L (ref 20–33)
CREAT SERPL-MCNC: 0.71 MG/DL (ref 0.5–1.4)
EOSINOPHIL # BLD AUTO: 0.13 K/UL (ref 0–0.51)
EOSINOPHIL NFR BLD: 3.1 % (ref 0–6.9)
ERYTHROCYTE [DISTWIDTH] IN BLOOD BY AUTOMATED COUNT: 50 FL (ref 35.9–50)
FASTING STATUS PATIENT QL REPORTED: NORMAL
GLOBULIN SER CALC-MCNC: 2.8 G/DL (ref 1.9–3.5)
GLUCOSE SERPL-MCNC: 90 MG/DL (ref 65–99)
HCT VFR BLD AUTO: 43.7 % (ref 37–47)
HDLC SERPL-MCNC: 64 MG/DL
HGB BLD-MCNC: 13.8 G/DL (ref 12–16)
IMM GRANULOCYTES # BLD AUTO: 0.01 K/UL (ref 0–0.11)
IMM GRANULOCYTES NFR BLD AUTO: 0.2 % (ref 0–0.9)
LDLC SERPL CALC-MCNC: 113 MG/DL
LYMPHOCYTES # BLD AUTO: 1.06 K/UL (ref 1–4.8)
LYMPHOCYTES NFR BLD: 25.4 % (ref 22–41)
MCH RBC QN AUTO: 30.4 PG (ref 27–33)
MCHC RBC AUTO-ENTMCNC: 31.6 G/DL (ref 33.6–35)
MCV RBC AUTO: 96.3 FL (ref 81.4–97.8)
MONOCYTES # BLD AUTO: 0.47 K/UL (ref 0–0.85)
MONOCYTES NFR BLD AUTO: 11.2 % (ref 0–13.4)
NEUTROPHILS # BLD AUTO: 2.47 K/UL (ref 2–7.15)
NEUTROPHILS NFR BLD: 59.1 % (ref 44–72)
NRBC # BLD AUTO: 0 K/UL
NRBC BLD-RTO: 0 /100 WBC
PLATELET # BLD AUTO: 242 K/UL (ref 164–446)
PMV BLD AUTO: 9.9 FL (ref 9–12.9)
POTASSIUM SERPL-SCNC: 4.2 MMOL/L (ref 3.6–5.5)
PROT SERPL-MCNC: 6.8 G/DL (ref 6–8.2)
RBC # BLD AUTO: 4.54 M/UL (ref 4.2–5.4)
SODIUM SERPL-SCNC: 143 MMOL/L (ref 135–145)
TRIGL SERPL-MCNC: 71 MG/DL (ref 0–149)
TSH SERPL DL<=0.005 MIU/L-ACNC: 1.38 UIU/ML (ref 0.38–5.33)
WBC # BLD AUTO: 4.2 K/UL (ref 4.8–10.8)

## 2022-01-21 PROCEDURE — 80053 COMPREHEN METABOLIC PANEL: CPT

## 2022-01-21 PROCEDURE — 80061 LIPID PANEL: CPT

## 2022-01-21 PROCEDURE — 84443 ASSAY THYROID STIM HORMONE: CPT

## 2022-01-21 PROCEDURE — 36415 COLL VENOUS BLD VENIPUNCTURE: CPT

## 2022-01-21 PROCEDURE — 85025 COMPLETE CBC W/AUTO DIFF WBC: CPT

## 2022-01-21 PROCEDURE — 82306 VITAMIN D 25 HYDROXY: CPT

## 2022-01-27 ENCOUNTER — TELEPHONE (OUTPATIENT)
Dept: MEDICAL GROUP | Facility: MEDICAL CENTER | Age: 87
End: 2022-01-27

## 2022-01-27 NOTE — TELEPHONE ENCOUNTER
ESTABLISHED PATIENT PRE-VISIT PLANNING     Annual Wellness Visit Over Due      Patient was NOT contacted to complete PVP.     Note: Patient will not be contacted if there is no indication to call.     1.  Reviewed notes from the last few office visits within the medical group: Yes    2.  If any orders were placed at last visit or intended to be done for this visit (i.e. 6 mos follow-up), do we have Results/Consult Notes?         •  Labs - Labs ordered, completed on 1/21/2022 and results are in chart.  Note: If patient appointment is for lab review and patient did not complete labs, check with provider if OK to reschedule patient until labs completed.       •  Imaging - Imaging was not ordered at last office visit.       •  Referrals - No referrals were ordered at last office visit.    3. Is this appointment scheduled as a Hospital Follow-Up? No    4.  Immunizations were updated in Epic using Reconcile Outside Information activity? Yes    5.  Patient is due for the following Health Maintenance Topics:   Health Maintenance Due   Topic Date Due   • Annual Wellness Visit  05/20/2020   • IMM INFLUENZA (1) 09/01/2021   • IMM DTaP/Tdap/Td Vaccine (2 - Td or Tdap) 09/06/2021       6.  AHA (Pulse8) form printed for Provider? Email sent to [facility] requesting form

## 2022-01-28 ENCOUNTER — OFFICE VISIT (OUTPATIENT)
Dept: MEDICAL GROUP | Facility: MEDICAL CENTER | Age: 87
End: 2022-01-28
Payer: MEDICARE

## 2022-01-28 VITALS
OXYGEN SATURATION: 97 % | WEIGHT: 133 LBS | HEIGHT: 60 IN | HEART RATE: 85 BPM | SYSTOLIC BLOOD PRESSURE: 116 MMHG | BODY MASS INDEX: 26.11 KG/M2 | DIASTOLIC BLOOD PRESSURE: 74 MMHG | TEMPERATURE: 97.4 F | RESPIRATION RATE: 16 BRPM

## 2022-01-28 DIAGNOSIS — I70.0 ATHEROSCLEROSIS OF AORTA (HCC): ICD-10-CM

## 2022-01-28 DIAGNOSIS — M79.89 LEG SWELLING: ICD-10-CM

## 2022-01-28 DIAGNOSIS — E78.5 DYSLIPIDEMIA: ICD-10-CM

## 2022-01-28 DIAGNOSIS — G30.1 LATE ONSET ALZHEIMER'S DEMENTIA WITH BEHAVIORAL DISTURBANCE (HCC): ICD-10-CM

## 2022-01-28 DIAGNOSIS — F02.818 LATE ONSET ALZHEIMER'S DEMENTIA WITH BEHAVIORAL DISTURBANCE (HCC): ICD-10-CM

## 2022-01-28 DIAGNOSIS — Z23 NEED FOR VACCINATION: ICD-10-CM

## 2022-01-28 PROCEDURE — 90662 IIV NO PRSV INCREASED AG IM: CPT | Performed by: FAMILY MEDICINE

## 2022-01-28 PROCEDURE — G0008 ADMIN INFLUENZA VIRUS VAC: HCPCS | Performed by: FAMILY MEDICINE

## 2022-01-28 PROCEDURE — 99214 OFFICE O/P EST MOD 30 MIN: CPT | Mod: 25 | Performed by: FAMILY MEDICINE

## 2022-01-28 ASSESSMENT — FIBROSIS 4 INDEX: FIB4 SCORE: 1.78453719567702509

## 2022-01-28 ASSESSMENT — PATIENT HEALTH QUESTIONNAIRE - PHQ9: CLINICAL INTERPRETATION OF PHQ2 SCORE: 0

## 2022-01-28 NOTE — PROGRESS NOTES
CC: Dementia, dyslipidemia, bilateral leg swelling    HPI:   Ramya presents today to discuss the following:    Late onset Alzheimer's dementia with behavioral disturbance (HCC)  Patient has been having a slowly progressive dementia.    Patient is physically active, however she needs help with all ADLs.  She lives at an independent senior facility where they help her with her medication, food, and cleaning.  Her daughter visits her every week to help with showering and other things.  Patient currently stopped all her prescription medications.  As per daughter she refused to take any kind of medications.    Dyslipidemia  Most recent lipid panel showed improvement of cholesterol level, although she is not on any medication    Bilateral leg swelling  Patient has been having bilateral leg swelling for a long time.  However she denies any shortness of breath or chest pain.  No history of heart problems.  Has been asymptomatic.    Atherosclerosis of aorta (HCC)  Patient has stopped all her medications, currently not on risk reduction regimen.  However no history of diabetes, CAD, or stroke       All blood work results was discussed with patient and her daughter today.    Due for flu shot.    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     No current facility-administered medications for this visit.         Allergies as of 01/28/2022 -  Reviewed 01/28/2022   Allergen Reaction Noted   • Seasonal  08/15/2019        ROS: Denies any chest pain, Shortness of breath, Changes bowel or bladder, Lower extremity edema.    Physical Exam:  /74 (BP Location: Right arm, Patient Position: Sitting, BP Cuff Size: Adult)   Pulse 85   Temp 36.3 °C (97.4 °F) (Temporal)   Resp 16   Ht 1.524 m (5')   Wt 60.3 kg (133 lb)   SpO2 97%   BMI 25.97 kg/m²   Gen.: Well-developed, well-nourished, no apparent distress,pleasant and cooperative with the examination  Skin:  Warm and dry with good turgor. No rashes or suspicious lesions in visible areas  Eye: PERRLA, conjunctiva and sclera clear, lids normal  HEENT:Sinuses nontender with palpation, TMs clear, nares patent with pink mucosa, and clear rhinorrhea,no septal deviation ,polyps or lesions. lips without lesions, oropharynx clear.  Neck: Trachea midline,no masses or adenopathy. No JVD.  Thyroid: normal consistency and size. No masses or nodules. Not tender with palpation.  Cor: Regular rate and rhythm without murmur, gallop or rub.  Lungs: Respirations unlabored.Clear to auscultation with equal breath sounds bilaterally. No wheezes, rhonchi.  Abdomen: Soft nontender without hepatosplenomegaly or masses appreciated, normoactive bowel sounds. No hernias.  Psych: Alert and oriented x 3.Normal affect.  Abnormal judgement,insight and memory.  Extremities: No cyanosis, clubbing, bilateral leg edema.        Assessment and Plan.   88 y.o. female     1. Late onset Alzheimer's dementia with behavioral disturbance (HCC)  On slow progression however stable    2. Dyslipidemia  Most recent lipid panel showed improvement of cholesterol level.  We will continue monitor    3. Leg swelling  Probably due to venous insufficiency, no shortness of breath.  We will continue monitor.  Consider echocardiogram if the patient develops any shortness of breath    4. Need for vaccination  Due for flu shot, given today  - INFLUENZA VACCINE,  HIGH DOSE (65+ ONLY)    5. Atherosclerosis of aorta (HCC)  Patient has stopped all her medications, currently not on risk reduction regimen.  However no history of diabetes, CAD, or stroke

## 2022-02-02 ENCOUNTER — PATIENT MESSAGE (OUTPATIENT)
Dept: HEALTH INFORMATION MANAGEMENT | Facility: OTHER | Age: 87
End: 2022-02-02
Payer: MEDICARE

## 2022-04-04 ENCOUNTER — TELEMEDICINE (OUTPATIENT)
Dept: MEDICAL GROUP | Facility: MEDICAL CENTER | Age: 87
End: 2022-04-04
Payer: MEDICARE

## 2022-04-04 VITALS
HEIGHT: 60 IN | DIASTOLIC BLOOD PRESSURE: 70 MMHG | WEIGHT: 129 LBS | SYSTOLIC BLOOD PRESSURE: 120 MMHG | BODY MASS INDEX: 25.32 KG/M2

## 2022-04-04 DIAGNOSIS — F02.818 LATE ONSET ALZHEIMER'S DEMENTIA WITH BEHAVIORAL DISTURBANCE (HCC): ICD-10-CM

## 2022-04-04 DIAGNOSIS — G30.1 LATE ONSET ALZHEIMER'S DEMENTIA WITH BEHAVIORAL DISTURBANCE (HCC): ICD-10-CM

## 2022-04-04 DIAGNOSIS — Z02.2 ENCOUNTER FOR EXAMINATION FOR ADMISSION TO ASSISTED LIVING FACILITY: ICD-10-CM

## 2022-04-04 PROCEDURE — 99214 OFFICE O/P EST MOD 30 MIN: CPT | Mod: 95 | Performed by: FAMILY MEDICINE

## 2022-04-04 ASSESSMENT — FIBROSIS 4 INDEX: FIB4 SCORE: 1.78453719567702509

## 2022-04-04 NOTE — PROGRESS NOTES
Virtual Visit: Established Patient   This visit was conducted via Zoom  using secure and encrypted videoconferencing technology. The patient was in a private location in the state of [place].    The patient's identity was confirmed and verbal consent was obtained for this virtual visit.      CC: Physical history for assisted living admission                                                                                                                                     HPI:   Rayma presents today with the following.    Encounter for examination for admission to assisted living facility/   Late onset Alzheimer's dementia with behavioral disturbance (HCC)  The purpose of this Zoom visit is history of physical for assisted living admission.  Patient with history of Alzheimer's dementia.  She has been having a slowly progressive dementia.    Patient is physically active, however she needs help with all ADLs.  She lives at an independent senior facility where they help her with her medication, food, and cleaning.  Her daughter visits her every week to help with showering and other things.  Patient currently stopped all her prescription medications.  As per daughter she refused to take any kind of medications.  Patient will be moved to another assisted living facility, needs history and physical.  Currently she not on any medication.  Take Tylenol over-the-counter for pain.      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     No current facility-administered medications for this visit.         Allergies as of 04/04/2022 - Reviewed 01/28/2022   Allergen Reaction Noted   • Seasonal  08/15/2019        ROS:  Denies, chest pain, Shortness of breath, Edema.     /70   Ht 1.524 m (5')   Wt 58.5 kg (129 lb)   BMI 25.19 kg/m²     Physical Exam:  Constitutional: Alert, no distress, well-groomed.  Skin: No rashes in visible areas.  Eye: Round. Conjunctiva clear, lNo icterus.   ENMT: Lips pink without lesions, good dentition, moist mucous membranes. Phonation normal.  Neck: No masses, no thyromegaly. Moves freely without pain.  CV: Pulse as reported by patient  Respiratory: Unlabored respiratory effort, no cough or audible wheeze  Psych: Alert and oriented x3, normal affect and mood.          Assessment and Plan.   88 y.o. female with the following issues.    1. Encounter for examination for admission to assisted living facility/   Late onset Alzheimer's dementia with behavioral disturbance (HCC)  Progressive Alzheimer's dementia, however physically active.  Needs help with all ADLs.  No behavioral issues.  She is not on medication.  Only takes Tylenol as needed for pain.   History of physical form from the assisted living facility is filled .

## 2022-04-07 ENCOUNTER — TELEPHONE (OUTPATIENT)
Dept: MEDICAL GROUP | Facility: MEDICAL CENTER | Age: 87
End: 2022-04-07
Payer: MEDICARE

## 2022-04-07 DIAGNOSIS — Z11.1 SCREENING-PULMONARY TB: ICD-10-CM

## 2022-08-19 ENCOUNTER — TELEPHONE (OUTPATIENT)
Dept: HEALTH INFORMATION MANAGEMENT | Facility: OTHER | Age: 87
End: 2022-08-19

## (undated) DEVICE — PATTIES SURG NEURO X-RAY 1/2X1/2 (10EA/PK 20PK/CS)

## (undated) DEVICE — BOVIE BLADE COATED - (50/PK)

## (undated) DEVICE — ELECTRODE 850 FOAM ADHESIVE - HYDROGEL RADIOTRNSPRNT (50/PK)

## (undated) DEVICE — SYRINGE SAFETY 10 ML 18 GA X 1 1/2 BLUNT LL (100/BX 4BX/CA)

## (undated) DEVICE — DRAPE LAPAROTOMY T SHEET - (12EA/CA)

## (undated) DEVICE — SUTURE 4-0 NUROLON CR/8 TF - (12/BX) ETHICON

## (undated) DEVICE — GOWN WARMING STANDARD FLEX - (30/CA)

## (undated) DEVICE — DRAPE LARGE 3 QUARTER - (20/CA)

## (undated) DEVICE — GLOVE BIOGEL PI INDICATOR SZ 6.5 SURGICAL PF LF - (50/BX 4BX/CA)

## (undated) DEVICE — MIDAS LUBRICATOR DIFFUSER PACK (4EA/CA)

## (undated) DEVICE — ELECTRODE DUAL RETURN W/ CORD - (50/PK)

## (undated) DEVICE — GLOVE BIOGEL SZ 6 PF LATEX - (50EA/BX 4BX/CA)

## (undated) DEVICE — CONTAINER SPECIMEN BAG OR - STERILE 4 OZ W/LID (100EA/CA)

## (undated) DEVICE — KIT ANESTHESIA W/CIRCUIT & 3/LT BAG W/FILTER (20EA/CA)

## (undated) DEVICE — SUTURE 3-0 MONOCRYL PLUS PS-1 - 27 INCH (36/BX)

## (undated) DEVICE — DETERGENT RENUZYME PLUS 10 OZ PACKET (50/BX)

## (undated) DEVICE — DRAPE MICROSCOPE X-LONG (10EA/CA)

## (undated) DEVICE — GLOVE BIOGEL SZ 6.5 SURGICAL PF LTX (50PR/BX 4BX/CA)

## (undated) DEVICE — SET LEADWIRE 5 LEAD BEDSIDE DISPOSABLE ECG (1SET OF 5/EA)

## (undated) DEVICE — BOVIE BLADE COATED &INSULATED - 25/PK

## (undated) DEVICE — GLOVE BIOGEL PI ORTHO SZ 6 SURGICAL PF LF (40PR/BX)

## (undated) DEVICE — TUBING CLEARLINK DUO-VENT - C-FLO (48EA/CA)

## (undated) DEVICE — GLOVE BIOGEL PI INDICATOR SZ 7.0 SURGICAL PF LF - (50/BX 4BX/CA)

## (undated) DEVICE — TOOL DISSECT MATCH HEAD

## (undated) DEVICE — SUTURE 2-0 VICRYL PLUS CT-1 - 8 X 18 INCH(12/BX)

## (undated) DEVICE — SUTURE GENERAL

## (undated) DEVICE — SUCTION TIP STRAIGHT ARGYLE - 50EA/CA

## (undated) DEVICE — SUTURE 0 VICRYL PLUS CT-1 - 8 X 18 INCH (12/BX)

## (undated) DEVICE — TUBE E-T HI-LO CUFF 6.5MM (10EA/BX)

## (undated) DEVICE — KIT GEL-FLOW NT ABORBABLE GELATIN (6EA/BX)

## (undated) DEVICE — CANISTER SUCTION 3000ML MECHANICAL FILTER AUTO SHUTOFF MEDI-VAC NONSTERILE LF DISP  (40EA/CA)

## (undated) DEVICE — CORDS BIPOLAR COAGULATION - 12FT STERILE DISP. (10EA/BX)

## (undated) DEVICE — SPONGE GAUZESTER 4 X 4 4PLY - (128PK/CA)

## (undated) DEVICE — STERI STRIP COMPOUND BENZOIN - TINCTURE 0.6ML WITH APPLICATOR (40EA/BX)

## (undated) DEVICE — STAPLER SKIN DISP - (6/BX 10BX/CA) VISISTAT

## (undated) DEVICE — LACTATED RINGERS INJ 1000 ML - (14EA/CA 60CA/PF)

## (undated) DEVICE — CATHETER IV 20 GA X 1-1/4 ---SURG.& SDS ONLY--- (50EA/BX)

## (undated) DEVICE — ARMREST CRADLE FOAM - (2PR/PK 12PR/CA)

## (undated) DEVICE — SHEET PEDIATRIC LAPAROTOMY - (10/CA)

## (undated) DEVICE — HEADREST PRONEVIEW LARGE - (10/CA)

## (undated) DEVICE — NEPTUNE 4 PORT MANIFOLD - (20/PK)

## (undated) DEVICE — TUBE CONNECT SUCTION CLEAR 120 X 1/4" (50EA/CA)"

## (undated) DEVICE — GLOVE BIOGEL SZ 7 SURGICAL PF LTX - (50PR/BX 4BX/CA)

## (undated) DEVICE — EVICEL 5ML - (1/BX)REFRIGERATE UPON RECEIPT

## (undated) DEVICE — MASK ANESTHESIA ADULT  - (100/CA)

## (undated) DEVICE — GLOVE BIOGEL INDICATOR SZ 7SURGICAL PF LTX - (50/BX 4BX/CA)

## (undated) DEVICE — GLOVE BIOGEL ECLIPSE  PF LATEX SIZE 6.5 (50PR/BX)

## (undated) DEVICE — CHLORAPREP 26 ML APPLICATOR - ORANGE TINT(25/CA)

## (undated) DEVICE — SET EXTENSION WITH 2 PORTS (48EA/CA) ***PART #2C8610 IS A SUBSTITUTE*****

## (undated) DEVICE — KIT ROOM DECONTAMINATION

## (undated) DEVICE — GLOVE BIOGEL SZ 8.5 SURGICAL PF LTX - (50PR/BX 4BX/CA)

## (undated) DEVICE — PACK NEURO - (2EA/CA)

## (undated) DEVICE — TUBING C&T SET FLYING LEADS DRAIN TUBING (10EA/BX)

## (undated) DEVICE — SYRINGE SAFETY 5 ML 18 GA X 1-1/2 BLUNT LL (100/BX 4BX/CA)

## (undated) DEVICE — GLOVE BIOGEL ECLIPSE PF LATEX SIZE 9.0

## (undated) DEVICE — CLOSURE SKIN STRIP 1/2 X 4 IN - (STERI STRIP) (50/BX 4BX/CA)

## (undated) DEVICE — SURGIFOAM (SIZE 100) - (6EA/CA)

## (undated) DEVICE — PROTECTOR ULNA NERVE - (36PR/CA)

## (undated) DEVICE — SUTURE 4-0 PROLENE PS-2 18 (36PK/BX)"

## (undated) DEVICE — SUTURE 3-0 VICRYL PLUS RB-1 - 8 X 18 INCH (12/BX)

## (undated) DEVICE — GLOVE SZ 6 BIOGEL PI MICRO - PF LF (50PR/BX 4BX/CA)

## (undated) DEVICE — SEALER BIPOLAR 2.3 AQUAMANTYS

## (undated) DEVICE — GLOVE BIOGEL PI INDICATOR SZ 8.0 SURGICAL PF LF -(50/BX 4BX/CA)

## (undated) DEVICE — HEMOSTAT SURG ABSORBABLE - 4 X 8 IN SURGICEL (24EA/CA)

## (undated) DEVICE — HEMOSTAT SURG ABSORBABLE - 2 X 3 IN SURGICEL (24EA/CA)

## (undated) DEVICE — KIT EVACUATER 3 SPRING PVC LF 1/8 DRAIN SIZE (10EA/CA)"

## (undated) DEVICE — NEEDLE SPINAL NON-SAFETY 18 GA X 3 IN (25EA/BX)

## (undated) DEVICE — GLOVE BIOGEL SZ 8 SURGICAL PF LTX - (50PR/BX 4BX/CA)

## (undated) DEVICE — GLOVE SZ 6.5 BIOGEL PI MICRO - PF LF (50PR/BX)

## (undated) DEVICE — SURGIFOAM (12X7) - (12EA/CA)

## (undated) DEVICE — DRAPE STRLE REG TOWEL 18X24 - (10/BX 4BX/CA)"

## (undated) DEVICE — GOWN SURGICAL XX-LARGE - (28EA/CA) SIRUS NON REINFORCED

## (undated) DEVICE — SENSOR SPO2 NEO LNCS ADHESIVE (20/BX) SEE USER NOTES

## (undated) DEVICE — TIP EXTENDED DURAL SEALANT AUTOSPRAY ADHERUS (5EA/PK)

## (undated) DEVICE — SUCTION INSTRUMENT YANKAUER BULBOUS TIP W/O VENT (50EA/CA)

## (undated) DEVICE — ELECTRODE NEEDLE NON-SAFETY 2.8 IN (150EA/CT)

## (undated) DEVICE — SUTURE 2-0 PROLENE SH (36PK/BX)

## (undated) DEVICE — LACTATED RINGERS INJ. 500 ML - (24EA/CA)

## (undated) DEVICE — GLOVE BIOGEL INDICATOR SZ 7.5 SURGICAL PF LTX - (50PR/BX 4BX/CA)

## (undated) DEVICE — DEVICE MONOPOLAR RF PEAK PLASMABLADE 3.0S

## (undated) DEVICE — WATER IRRIG. STER. 1500 ML - (9/CA)

## (undated) DEVICE — TRAY CATHETER FOLEY URINE METER W/STATLOCK 350ML (10EA/CA)

## (undated) DEVICE — SODIUM CHL IRRIGATION 0.9% 1000ML (12EA/CA)

## (undated) DEVICE — DRESSING ABDOMINAL PAD STERILE 8 X 10" (360EA/CA)"

## (undated) DEVICE — SUTURE 1 PDS II PLUS TP-1 - (12PK/BX)

## (undated) DEVICE — PACK JACKSON TABLE KIT W/OUT - HR (6EA/CA)

## (undated) DEVICE — TRAY SRGPRP PVP IOD WT PRP - (20/CA)

## (undated) DEVICE — SLEEVE, VASO, THIGH, MED